# Patient Record
Sex: MALE | Race: WHITE | NOT HISPANIC OR LATINO | Employment: OTHER | URBAN - NONMETROPOLITAN AREA
[De-identification: names, ages, dates, MRNs, and addresses within clinical notes are randomized per-mention and may not be internally consistent; named-entity substitution may affect disease eponyms.]

---

## 2017-02-27 ENCOUNTER — OFFICE VISIT (OUTPATIENT)
Dept: GASTROENTEROLOGY | Facility: CLINIC | Age: 67
End: 2017-02-27

## 2017-02-27 VITALS
HEIGHT: 70 IN | SYSTOLIC BLOOD PRESSURE: 122 MMHG | DIASTOLIC BLOOD PRESSURE: 70 MMHG | HEART RATE: 67 BPM | OXYGEN SATURATION: 97 % | BODY MASS INDEX: 27.06 KG/M2 | WEIGHT: 189 LBS | TEMPERATURE: 98.3 F

## 2017-02-27 DIAGNOSIS — K63.5 COLON POLYPS: Primary | ICD-10-CM

## 2017-02-27 PROCEDURE — S0260 H&P FOR SURGERY: HCPCS | Performed by: NURSE PRACTITIONER

## 2017-02-27 RX ORDER — SODIUM, POTASSIUM,MAG SULFATES 17.5-3.13G
2 SOLUTION, RECONSTITUTED, ORAL ORAL ONCE
Qty: 2 BOTTLE | Refills: 0 | Status: SHIPPED | OUTPATIENT
Start: 2017-02-27 | End: 2017-02-27

## 2017-02-27 NOTE — PROGRESS NOTES
Chief Complaint   Patient presents with   • Colon Cancer Screening     Patient is here today for  colon recall.     Subjective   HPI  Se Ramirez is a 66 y.o. male who presents as a referral for preventative maintenance. He has no complaints of nausea or vomiting. No change in bowels. No wt loss. No BRBPR. No melena. There is no family hx for colon cancer. No abdominal pain. Last colonoscopy 11/20/12 with colon polyps and diverticulosis  Past Medical History   Diagnosis Date   • Colon polyps    • GERD (gastroesophageal reflux disease)    • Hyperlipidemia      Past Surgical History   Procedure Laterality Date   • Hernia repair     • Tonsillectomy     • Colonoscopy  11/20/2012   • Cataract extraction         Current Outpatient Prescriptions:   •  Multiple Vitamins-Minerals (MULTIVITAMIN WITH MINERALS) tablet tablet, Take 1 tablet by mouth Daily., Disp: , Rfl:   •  omeprazole (priLOSEC) 20 MG capsule, Take 20 mg by mouth Daily., Disp: , Rfl:   •  Probiotic Product (SOLUBLE FIBER/PROBIOTICS PO), Take  by mouth., Disp: , Rfl:   •  sodium-potassium-magnesium sulfates (SUPREP BOWEL PREP) solution oral solution, Take 2 bottles by mouth 1 (One) Time for 1 dose. Split dose prep as directed by office instructions provided.  2 bottles = one kit., Disp: 2 bottle, Rfl: 0  No Known Allergies  Social History     Social History   • Marital status:      Spouse name: N/A   • Number of children: N/A   • Years of education: N/A     Occupational History   • Not on file.     Social History Main Topics   • Smoking status: Current Every Day Smoker   • Smokeless tobacco: Not on file   • Alcohol use Yes   • Drug use: Not on file   • Sexual activity: Not on file     Other Topics Concern   • Not on file     Social History Narrative     History reviewed. No pertinent family history.    REVIEW OF SYSTEMS  General: well appearing, no fever chills or sweats, no unexplained wt loss  HEENT: no acute visual or hearing  "disturbances  Cardiovascular: No chest pain or palpitations  Pulmonary: No shortness of breath, coughing, wheezing or hemoptysis  : No burning, urgency, hematuria, or dysuria  Musculoskeletal: No joint pain or stiffness  Peripheral: no edema  Skin: No lesions or rashes  Neuro: No dizziness, headaches, stroke, syncope  Endocrine: No hot or cold intolerances  Hematological: No blood dyscrasias    Objective   Vitals:    02/27/17 0850   BP: 122/70   Pulse: 67   Temp: 98.3 °F (36.8 °C)   SpO2: 97%   Weight: 189 lb (85.7 kg)   Height: 70\" (177.8 cm)     Body mass index is 27.12 kg/(m^2).    PHYSICAL EXAM  General: age appropriate well nourished well appearing, no acute distress  Head: normocephalic and atraumatic  Global assessment-supple  Neck-No JVD noted, no lymphadenopathy  Pulmonary-clear to auscultation bilaterally, normal respiratory effort  Cardiovascular-normal rate and rhythm, normal heart sounds, S1 and S2 noted  Abdomen-soft, non tender, non distended, normal bowel sounds all 4 quadrants, no hepatosplenomegaly noted  Extremities-No clubbing cyanosis or edema  Neuro-Non focal, converses appropriately, awake, alert, oriented    Imaging Results (most recent)     None        Assessment/Plan   Se was seen today for colon cancer screening.    Diagnoses and all orders for this visit:    Colon polyps  -     Case request    Other orders  -     sodium-potassium-magnesium sulfates (SUPREP BOWEL PREP) solution oral solution; Take 2 bottles by mouth 1 (One) Time for 1 dose. Split dose prep as directed by office instructions provided.  2 bottles = one kit.      COLONOSCOPY WITH ANESTHESIA (N/A)  No Follow-up on file.  There are no Patient Instructions on file for this visit.    All risks, benefits, alternatives, and indications of colonoscopy procedure have been discussed with the patient. Risks to include perforation of the colon requiring possible surgery or colostomy, risk of bleeding from biopsies or removal of " colon tissue, possibility of missing a colon polyp or cancer, or adverse drug reaction.  Benefits to include the diagnosis and management of disease of the colon and rectum. Alternatives to include barium enema, radiographic evaluation, lab testing or no intervention. Pt verbalizes understanding and agrees.

## 2017-03-28 ENCOUNTER — ANESTHESIA EVENT (OUTPATIENT)
Dept: GASTROENTEROLOGY | Facility: HOSPITAL | Age: 67
End: 2017-03-28

## 2017-03-30 ENCOUNTER — ANESTHESIA (OUTPATIENT)
Dept: GASTROENTEROLOGY | Facility: HOSPITAL | Age: 67
End: 2017-03-30

## 2017-03-30 ENCOUNTER — HOSPITAL ENCOUNTER (OUTPATIENT)
Facility: HOSPITAL | Age: 67
Setting detail: HOSPITAL OUTPATIENT SURGERY
Discharge: HOME | End: 2017-03-30
Attending: INTERNAL MEDICINE | Admitting: INTERNAL MEDICINE

## 2017-03-30 VITALS
HEIGHT: 70 IN | RESPIRATION RATE: 16 BRPM | DIASTOLIC BLOOD PRESSURE: 71 MMHG | OXYGEN SATURATION: 95 % | TEMPERATURE: 97.3 F | SYSTOLIC BLOOD PRESSURE: 99 MMHG | HEART RATE: 71 BPM | WEIGHT: 186 LBS | BODY MASS INDEX: 26.63 KG/M2

## 2017-03-30 DIAGNOSIS — K63.5 COLON POLYPS: ICD-10-CM

## 2017-03-30 PROCEDURE — 88305 TISSUE EXAM BY PATHOLOGIST: CPT | Performed by: INTERNAL MEDICINE

## 2017-03-30 PROCEDURE — 25010000002 PROPOFOL 10 MG/ML EMULSION: Performed by: NURSE ANESTHETIST, CERTIFIED REGISTERED

## 2017-03-30 PROCEDURE — 45385 COLONOSCOPY W/LESION REMOVAL: CPT | Performed by: INTERNAL MEDICINE

## 2017-03-30 RX ORDER — PROPOFOL 10 MG/ML
VIAL (ML) INTRAVENOUS AS NEEDED
Status: DISCONTINUED | OUTPATIENT
Start: 2017-03-30 | End: 2017-03-30 | Stop reason: SURG

## 2017-03-30 RX ORDER — SODIUM CHLORIDE 0.9 % (FLUSH) 0.9 %
1-10 SYRINGE (ML) INJECTION AS NEEDED
Status: CANCELLED | OUTPATIENT
Start: 2017-03-30

## 2017-03-30 RX ORDER — LIDOCAINE HYDROCHLORIDE 20 MG/ML
INJECTION, SOLUTION INFILTRATION; PERINEURAL AS NEEDED
Status: DISCONTINUED | OUTPATIENT
Start: 2017-03-30 | End: 2017-03-30 | Stop reason: SURG

## 2017-03-30 RX ORDER — SODIUM CHLORIDE 0.9 % (FLUSH) 0.9 %
1-10 SYRINGE (ML) INJECTION AS NEEDED
Status: DISCONTINUED | OUTPATIENT
Start: 2017-03-30 | End: 2017-03-30 | Stop reason: HOSPADM

## 2017-03-30 RX ORDER — SODIUM CHLORIDE 9 MG/ML
100 INJECTION, SOLUTION INTRAVENOUS CONTINUOUS
Status: CANCELLED | OUTPATIENT
Start: 2017-03-30

## 2017-03-30 RX ORDER — SODIUM CHLORIDE 9 MG/ML
100 INJECTION, SOLUTION INTRAVENOUS CONTINUOUS
Status: DISCONTINUED | OUTPATIENT
Start: 2017-03-30 | End: 2017-03-30 | Stop reason: HOSPADM

## 2017-03-30 RX ADMIN — LIDOCAINE HYDROCHLORIDE 0.5 ML: 10 INJECTION, SOLUTION EPIDURAL; INFILTRATION; INTRACAUDAL; PERINEURAL at 07:05

## 2017-03-30 RX ADMIN — LIDOCAINE HYDROCHLORIDE 50 MG: 20 INJECTION, SOLUTION INFILTRATION; PERINEURAL at 07:35

## 2017-03-30 RX ADMIN — SODIUM CHLORIDE 100 ML/HR: 9 INJECTION, SOLUTION INTRAVENOUS at 07:06

## 2017-03-30 RX ADMIN — PROPOFOL 450 MG: 10 INJECTION, EMULSION INTRAVENOUS at 07:35

## 2017-03-30 NOTE — ANESTHESIA PREPROCEDURE EVALUATION
Anesthesia Evaluation     Patient summary reviewed and Nursing notes reviewed   no history of anesthetic complications:  NPO Status: > 2 hours   Airway   Mallampati: I  TM distance: <3 FB  Neck ROM: full  no difficulty expected  Dental - normal exam     Pulmonary - normal exam   (+) a smoker, COPD,   Cardiovascular - negative cardio ROS and normal exam  Exercise tolerance: good (4-7 METS)    (-) hypertension      Neuro/Psych- negative ROS  GI/Hepatic/Renal/Endo    (+)  GERD,   (-) renal disease, diabetes, hypothyroidism    Musculoskeletal (-) negative ROS    Abdominal  - normal exam    Bowel sounds: normal.   Substance History - negative use     OB/GYN negative ob/gyn ROS         Other                                    Anesthesia Plan    ASA 2     general     intravenous induction   Anesthetic plan and risks discussed with patient.

## 2017-03-30 NOTE — ANESTHESIA POSTPROCEDURE EVALUATION
Patient: Se Ramirez    Procedure Summary     Date Anesthesia Start Anesthesia Stop Room / Location    03/30/17 0733 0802  [facility] 2 / [facility]       Procedure Diagnosis Surgeon Provider    COLONOSCOPY WITH ANESTHESIA (N/A ) Colon polyps  (Colon polyps [K63.5]) MD Miguel Monroe CRNA          Anesthesia Type: general  Last vitals  BP      Temp      Pulse     Resp      SpO2        Post Anesthesia Care and Evaluation    Patient location during evaluation: PACU  Patient participation: complete - patient participated  Level of consciousness: awake and awake and alert  Pain score: 0  Pain management: adequate  Airway patency: patent  Anesthetic complications: No anesthetic complications    Cardiovascular status: acceptable and stable  Respiratory status: acceptable and unassisted  Hydration status: acceptable

## 2017-03-31 LAB
CYTO UR: NORMAL
LAB AP CASE REPORT: NORMAL
LAB AP CLINICAL INFORMATION: NORMAL
Lab: NORMAL
PATH REPORT.FINAL DX SPEC: NORMAL
PATH REPORT.GROSS SPEC: NORMAL

## 2017-09-12 ENCOUNTER — HOSPITAL ENCOUNTER (OUTPATIENT)
Dept: GENERAL RADIOLOGY | Age: 67
Discharge: HOME | End: 2017-09-12
Payer: MEDICARE

## 2017-09-12 DIAGNOSIS — R05.9 COUGH: ICD-10-CM

## 2017-09-12 PROCEDURE — 71020 XR CHEST STANDARD TWO VW: CPT

## 2018-06-29 ENCOUNTER — HOSPITAL ENCOUNTER (OUTPATIENT)
Dept: VASCULAR LAB | Age: 68
Discharge: HOME | End: 2018-06-29
Payer: MEDICARE

## 2018-06-29 DIAGNOSIS — R09.89 BRUIT: Primary | ICD-10-CM

## 2018-06-29 PROCEDURE — 93880 EXTRACRANIAL BILAT STUDY: CPT

## 2018-07-25 ENCOUNTER — APPOINTMENT (OUTPATIENT)
Dept: GENERAL RADIOLOGY | Age: 68
End: 2018-07-25
Payer: MEDICARE

## 2018-07-25 ENCOUNTER — APPOINTMENT (OUTPATIENT)
Dept: CT IMAGING | Age: 68
End: 2018-07-25
Payer: MEDICARE

## 2018-07-25 ENCOUNTER — HOSPITAL ENCOUNTER (EMERGENCY)
Age: 68
Discharge: HOME | End: 2018-07-25
Attending: EMERGENCY MEDICINE
Payer: MEDICARE

## 2018-07-25 VITALS
WEIGHT: 190 LBS | SYSTOLIC BLOOD PRESSURE: 137 MMHG | RESPIRATION RATE: 18 BRPM | TEMPERATURE: 98.1 F | OXYGEN SATURATION: 99 % | DIASTOLIC BLOOD PRESSURE: 75 MMHG | HEART RATE: 74 BPM | BODY MASS INDEX: 27.2 KG/M2 | HEIGHT: 70 IN

## 2018-07-25 DIAGNOSIS — S22.31XA CLOSED FRACTURE OF ONE RIB OF RIGHT SIDE, INITIAL ENCOUNTER: Primary | ICD-10-CM

## 2018-07-25 LAB
ALBUMIN SERPL-MCNC: 4.2 G/DL (ref 3.5–5.2)
ALP BLD-CCNC: 58 U/L (ref 40–130)
ALT SERPL-CCNC: 23 U/L (ref 5–41)
ANION GAP SERPL CALCULATED.3IONS-SCNC: 11 MMOL/L (ref 7–19)
AST SERPL-CCNC: 23 U/L (ref 5–40)
BACTERIA: NEGATIVE /HPF
BASOPHILS ABSOLUTE: 0.1 K/UL (ref 0–0.2)
BASOPHILS RELATIVE PERCENT: 0.7 % (ref 0–1)
BILIRUB SERPL-MCNC: 0.7 MG/DL (ref 0.2–1.2)
BILIRUBIN URINE: NEGATIVE
BLOOD, URINE: NEGATIVE
BUN BLDV-MCNC: 11 MG/DL (ref 8–23)
CALCIUM SERPL-MCNC: 9.2 MG/DL (ref 8.8–10.2)
CHLORIDE BLD-SCNC: 102 MMOL/L (ref 98–111)
CLARITY: CLEAR
CO2: 27 MMOL/L (ref 22–29)
COLOR: YELLOW
CREAT SERPL-MCNC: 0.8 MG/DL (ref 0.5–1.2)
EOSINOPHILS ABSOLUTE: 0.2 K/UL (ref 0–0.6)
EOSINOPHILS RELATIVE PERCENT: 2.3 % (ref 0–5)
EPITHELIAL CELLS, UA: 1 /HPF (ref 0–5)
GFR NON-AFRICAN AMERICAN: >60
GLUCOSE BLD-MCNC: 115 MG/DL (ref 74–109)
GLUCOSE URINE: NEGATIVE MG/DL
HCT VFR BLD CALC: 50.3 % (ref 42–52)
HEMOGLOBIN: 16.6 G/DL (ref 14–18)
HYALINE CASTS: 2 /HPF (ref 0–8)
KETONES, URINE: NEGATIVE MG/DL
LEUKOCYTE ESTERASE, URINE: ABNORMAL
LYMPHOCYTES ABSOLUTE: 1.5 K/UL (ref 1.1–4.5)
LYMPHOCYTES RELATIVE PERCENT: 19.9 % (ref 20–40)
MCH RBC QN AUTO: 31.7 PG (ref 27–31)
MCHC RBC AUTO-ENTMCNC: 33 G/DL (ref 33–37)
MCV RBC AUTO: 96.2 FL (ref 80–94)
MONOCYTES ABSOLUTE: 0.7 K/UL (ref 0–0.9)
MONOCYTES RELATIVE PERCENT: 9.2 % (ref 0–10)
NEUTROPHILS ABSOLUTE: 4.9 K/UL (ref 1.5–7.5)
NEUTROPHILS RELATIVE PERCENT: 67.4 % (ref 50–65)
NITRITE, URINE: NEGATIVE
PDW BLD-RTO: 12.3 % (ref 11.5–14.5)
PH UA: 6.5
PLATELET # BLD: 157 K/UL (ref 130–400)
PMV BLD AUTO: 10.5 FL (ref 9.4–12.4)
POTASSIUM REFLEX MAGNESIUM: 4.3 MMOL/L (ref 3.5–5)
PROTEIN UA: NEGATIVE MG/DL
RBC # BLD: 5.23 M/UL (ref 4.7–6.1)
RBC UA: 1 /HPF (ref 0–4)
SODIUM BLD-SCNC: 140 MMOL/L (ref 136–145)
SPECIFIC GRAVITY UA: 1.03
TOTAL PROTEIN: 7.3 G/DL (ref 6.6–8.7)
URINE REFLEX TO CULTURE: YES
UROBILINOGEN, URINE: 1 E.U./DL
WBC # BLD: 7.3 K/UL (ref 4.8–10.8)
WBC UA: 13 /HPF (ref 0–5)

## 2018-07-25 PROCEDURE — 74177 CT ABD & PELVIS W/CONTRAST: CPT

## 2018-07-25 PROCEDURE — 96374 THER/PROPH/DIAG INJ IV PUSH: CPT

## 2018-07-25 PROCEDURE — 99284 EMERGENCY DEPT VISIT MOD MDM: CPT

## 2018-07-25 PROCEDURE — 99284 EMERGENCY DEPT VISIT MOD MDM: CPT | Performed by: EMERGENCY MEDICINE

## 2018-07-25 PROCEDURE — 81001 URINALYSIS AUTO W/SCOPE: CPT

## 2018-07-25 PROCEDURE — 36415 COLL VENOUS BLD VENIPUNCTURE: CPT

## 2018-07-25 PROCEDURE — 96372 THER/PROPH/DIAG INJ SC/IM: CPT

## 2018-07-25 PROCEDURE — 80053 COMPREHEN METABOLIC PANEL: CPT

## 2018-07-25 PROCEDURE — 6360000002 HC RX W HCPCS: Performed by: EMERGENCY MEDICINE

## 2018-07-25 PROCEDURE — 71046 X-RAY EXAM CHEST 2 VIEWS: CPT

## 2018-07-25 PROCEDURE — 6360000004 HC RX CONTRAST MEDICATION: Performed by: EMERGENCY MEDICINE

## 2018-07-25 PROCEDURE — 87086 URINE CULTURE/COLONY COUNT: CPT

## 2018-07-25 PROCEDURE — 85025 COMPLETE CBC W/AUTO DIFF WBC: CPT

## 2018-07-25 RX ORDER — OMEPRAZOLE 20 MG/1
20 CAPSULE, DELAYED RELEASE ORAL DAILY
COMMUNITY

## 2018-07-25 RX ORDER — OXYCODONE AND ACETAMINOPHEN 10; 325 MG/1; MG/1
1 TABLET ORAL EVERY 6 HOURS PRN
Qty: 28 TABLET | Refills: 0 | Status: SHIPPED | OUTPATIENT
Start: 2018-07-25 | End: 2018-08-01

## 2018-07-25 RX ORDER — KETOROLAC TROMETHAMINE 30 MG/ML
30 INJECTION, SOLUTION INTRAMUSCULAR; INTRAVENOUS ONCE
Status: COMPLETED | OUTPATIENT
Start: 2018-07-25 | End: 2018-07-25

## 2018-07-25 RX ORDER — ORPHENADRINE CITRATE 30 MG/ML
60 INJECTION INTRAMUSCULAR; INTRAVENOUS ONCE
Status: COMPLETED | OUTPATIENT
Start: 2018-07-25 | End: 2018-07-25

## 2018-07-25 RX ADMIN — KETOROLAC TROMETHAMINE 30 MG: 30 INJECTION, SOLUTION INTRAMUSCULAR at 03:57

## 2018-07-25 RX ADMIN — ORPHENADRINE CITRATE 60 MG: 30 INJECTION INTRAMUSCULAR; INTRAVENOUS at 03:57

## 2018-07-25 RX ADMIN — IOPAMIDOL 90 ML: 755 INJECTION, SOLUTION INTRAVENOUS at 04:51

## 2018-07-25 ASSESSMENT — PAIN SCALES - GENERAL
PAINLEVEL_OUTOF10: 8
PAINLEVEL_OUTOF10: 0
PAINLEVEL_OUTOF10: 0

## 2018-07-25 ASSESSMENT — ENCOUNTER SYMPTOMS
SINUS PRESSURE: 0
FACIAL SWELLING: 0
EYE DISCHARGE: 0
ABDOMINAL PAIN: 0
BLOOD IN STOOL: 0
SHORTNESS OF BREATH: 0
SORE THROAT: 0
VOICE CHANGE: 0
NAUSEA: 0
WHEEZING: 0
CHOKING: 0
APNEA: 0
DIARRHEA: 0
BACK PAIN: 1
CONSTIPATION: 0

## 2018-07-25 NOTE — ED PROVIDER NOTES
[facility] EMERGENCY DEPT  eMERGENCY dEPARTMENT eNCOUnter      Pt Name: Pino Paulino  MRN: [id number]  [place] 1950  Date of evaluation: 7/25/2018  Provider: Yang Edmondson MD    [address] [place] [address]       Chief Complaint   Patient presents with    Fall     fell sunday    Back Pain         HISTORY OF PRESENT ILLNESS   (Location/Symptom, Timing/Onset, Context/Setting, Quality, Duration, Modifying Factors, Severity)  Note limiting factors. Pino Paulino is a 75 y.o. male who presents to the emergency department Evaluation of injuries from fall. 50-year-old male 3 days ago outside in the dark tripped over some bricks and a flower bed and fell onto them. He struck his right flank area. He was sore but was tolerable today the pain is really ramped up. It seems to be spasmodic as well. Is not complaining of any shortness of breath but he is a smoker. No fever or chills. He is here because the pain is worsened. The history is provided by the patient. Nursing Notes were reviewed. REVIEW OF SYSTEMS    (2-9 systems for level 4, 10 or more for level 5)     Review of Systems   Constitutional: Negative for chills and fever. HENT: Negative for drooling, facial swelling, nosebleeds, sinus pressure, sore throat and voice change. Eyes: Negative for discharge. Respiratory: Negative for apnea, choking, shortness of breath and wheezing. Cardiovascular: Negative for chest pain and leg swelling. Gastrointestinal: Negative for abdominal pain, blood in stool, constipation, diarrhea and nausea. Genitourinary: Negative for dysuria, enuresis and hematuria. Musculoskeletal: Positive for back pain. Negative for joint swelling. Skin: Negative for rash and wound. Neurological: Negative for seizures and syncope. Psychiatric/Behavioral: Negative for behavioral problems, hallucinations and suicidal ideas. All other systems reviewed and are negative.       A complete review of systems was performed and components within normal limits   MICROSCOPIC URINALYSIS - Abnormal; Notable for the following:     WBC, UA 13 (*)     All other components within normal limits   URINE CULTURE       All other labs were within normal range or not returned as of this dictation. EMERGENCY DEPARTMENT COURSE and DIFFERENTIAL DIAGNOSIS/MDM:   Vitals:    Vitals:    07/25/18 0242 07/25/18 0243   BP:  136/80   Pulse:  100   Resp:  20   Temp:  97.6 °F (36.4 °C)   SpO2:  95%   Weight: 190 lb (86.2 kg)    Height: 5' 10\" (1.778 m)        MDM      CONSULTS:  None    PROCEDURES:  Unless otherwise noted below, none     Procedures    FINAL IMPRESSION      1. Closed fracture of one rib of right side, initial encounter          DISPOSITION/PLAN   DISPOSITION Decision To Discharge 07/25/2018 06:02:01 AM      PATIENT REFERRED TO:  Marilyn Lacey MD  [address]  [place] [address]  [phone]            DISCHARGE MEDICATIONS:  New Prescriptions    OXYCODONE-ACETAMINOPHEN (PERCOCET)  MG PER TABLET    Take 1 tablet by mouth every 6 hours as needed for Pain for up to 7 days. .     Attestation: The Prescription Monitoring Report for this patient was reviewed today.  Eduard Oneal #[id number]) Per Ng MD)    (Please note that portions of this note were completed with a voice recognition program.  Efforts were made to edit the dictations but occasionally words are mis-transcribed.)    Per Ng MD (electronically signed)  Attending Emergency Physician          Per Ng MD  07/25/18 4268

## 2018-07-27 LAB — URINE CULTURE, ROUTINE: NORMAL

## 2018-09-07 ENCOUNTER — HOSPITAL ENCOUNTER (OUTPATIENT)
Dept: GENERAL RADIOLOGY | Age: 68
Discharge: HOME | End: 2018-09-07
Payer: MEDICARE

## 2018-09-07 DIAGNOSIS — J44.9 OBSTRUCTIVE CHRONIC BRONCHITIS WITHOUT EXACERBATION (HCC): ICD-10-CM

## 2018-09-07 PROCEDURE — 71046 X-RAY EXAM CHEST 2 VIEWS: CPT

## 2019-03-01 ENCOUNTER — HOSPITAL ENCOUNTER (OUTPATIENT)
Dept: ULTRASOUND IMAGING | Age: 69
Discharge: HOME | End: 2019-03-01
Payer: MEDICARE

## 2019-03-01 ENCOUNTER — HOSPITAL ENCOUNTER (OUTPATIENT)
Dept: NUCLEAR MEDICINE | Age: 69
Discharge: HOME | End: 2019-03-03
Payer: MEDICARE

## 2019-03-01 DIAGNOSIS — R10.9 ABDOMINAL PAIN, UNSPECIFIED ABDOMINAL LOCATION: ICD-10-CM

## 2019-03-01 PROCEDURE — 78226 HEPATOBILIARY SYSTEM IMAGING: CPT

## 2019-03-01 PROCEDURE — 76705 ECHO EXAM OF ABDOMEN: CPT

## 2019-03-01 PROCEDURE — A9537 TC99M MEBROFENIN: HCPCS | Performed by: NURSE PRACTITIONER

## 2019-03-01 PROCEDURE — 3430000000 HC RX DIAGNOSTIC RADIOPHARMACEUTICAL: Performed by: NURSE PRACTITIONER

## 2019-03-01 RX ADMIN — MEBROFENIN 5 MILLICURIE: 45 INJECTION, POWDER, LYOPHILIZED, FOR SOLUTION INTRAVENOUS at 12:34

## 2019-03-06 ENCOUNTER — HOSPITAL ENCOUNTER (OUTPATIENT)
Dept: CT IMAGING | Age: 69
Discharge: HOME | End: 2019-03-06
Payer: MEDICARE

## 2019-03-06 DIAGNOSIS — R10.9 ABDOMINAL PAIN, UNSPECIFIED ABDOMINAL LOCATION: ICD-10-CM

## 2019-03-06 PROCEDURE — 74177 CT ABD & PELVIS W/CONTRAST: CPT

## 2019-03-06 PROCEDURE — 6360000004 HC RX CONTRAST MEDICATION: Performed by: NURSE PRACTITIONER

## 2019-03-06 RX ADMIN — IOPAMIDOL 75 ML: 755 INJECTION, SOLUTION INTRAVENOUS at 09:22

## 2019-03-21 ENCOUNTER — APPOINTMENT (OUTPATIENT)
Dept: LAB | Facility: HOSPITAL | Age: 69
End: 2019-03-21

## 2019-03-21 ENCOUNTER — OFFICE VISIT (OUTPATIENT)
Dept: GASTROENTEROLOGY | Facility: CLINIC | Age: 69
End: 2019-03-21

## 2019-03-21 VITALS
HEART RATE: 75 BPM | TEMPERATURE: 98.1 F | SYSTOLIC BLOOD PRESSURE: 132 MMHG | OXYGEN SATURATION: 98 % | DIASTOLIC BLOOD PRESSURE: 80 MMHG | WEIGHT: 188 LBS | BODY MASS INDEX: 26.92 KG/M2 | HEIGHT: 70 IN

## 2019-03-21 DIAGNOSIS — F10.11 HISTORY OF ETOH ABUSE: ICD-10-CM

## 2019-03-21 DIAGNOSIS — R10.11 RUQ ABDOMINAL PAIN: ICD-10-CM

## 2019-03-21 DIAGNOSIS — K59.09 OTHER CONSTIPATION: ICD-10-CM

## 2019-03-21 DIAGNOSIS — R14.0 BLOATING: ICD-10-CM

## 2019-03-21 DIAGNOSIS — K21.9 GASTROESOPHAGEAL REFLUX DISEASE, ESOPHAGITIS PRESENCE NOT SPECIFIED: ICD-10-CM

## 2019-03-21 DIAGNOSIS — B18.2 CHRONIC HEPATITIS C WITHOUT HEPATIC COMA (HCC): Primary | ICD-10-CM

## 2019-03-21 DIAGNOSIS — R10.13 EPIGASTRIC PAIN: ICD-10-CM

## 2019-03-21 DIAGNOSIS — R14.2 BELCHING: ICD-10-CM

## 2019-03-21 LAB
HAV IGM SERPL QL IA: NEGATIVE
HBV CORE IGM SERPL QL IA: NEGATIVE
HBV SURFACE AG SERPL QL IA: NEGATIVE
HCV AB SER DONR QL: REACTIVE
HCV S/C RATIO: 14.8 (ref 0–0.99)

## 2019-03-21 PROCEDURE — 36415 COLL VENOUS BLD VENIPUNCTURE: CPT | Performed by: NURSE PRACTITIONER

## 2019-03-21 PROCEDURE — 87521 HEPATITIS C PROBE&RVRS TRNSC: CPT | Performed by: NURSE PRACTITIONER

## 2019-03-21 PROCEDURE — 80074 ACUTE HEPATITIS PANEL: CPT | Performed by: NURSE PRACTITIONER

## 2019-03-21 PROCEDURE — 99214 OFFICE O/P EST MOD 30 MIN: CPT | Performed by: NURSE PRACTITIONER

## 2019-03-21 RX ORDER — ATORVASTATIN CALCIUM 10 MG/1
10 TABLET, FILM COATED ORAL DAILY
COMMUNITY
Start: 2019-03-19

## 2019-03-21 NOTE — PROGRESS NOTES
"Chief Complaint:   Chief Complaint   Patient presents with   • Abdominal Pain     Pt has been having RUQ pain/discomfort for the last month-had CT, GB US, and HIDA scan at [facility] that were normal; pt states the pain has gone away for the last week or so; pt states it still feels \"swollen\"   • Constipation     Pt has been constipated the last month; also thinks he has a hemorrhoids-sees a little BRBPR when he wipes; has been using stool softeners-states those worked at first but aren't helping any more          Patient ID: Se Ramirez is a 68 y.o. male     History of Present Illness: This is a very pleasant 68-year-old female who was referred to our office for abdominal pain by PARISA Sandhu.    The patient was seen by Rubina Okeefe on 3/5/19 to follow-up for abdominal pain.  Patient had complaints of nausea she was started on Zofran as needed she also had an upper respiratory infection started on amoxicillin 500 mg 1 capsule 3 times daily for 10 days.     The patient states that he began to have right upper quadrant pain and discomfort over the past month.  He states that he feels some epigastric pain.  He does have a history of GERD and is on Prilosec for this.  He states he has had associated belching and bloating with his abdominal pain.  He states that he has had off and on constipation but has not really tried anything over-the-counter for relief of this.      On 3/1/19 the patient underwent a nuclear med hepatobiliary scan with findings of normal gallbladder ejection fraction and patent common bile duct and cystic ducts.  Fatty infiltration of liver.  Labs on 2/18/19 CBC unremarkable CMP unremarkable.  Positive hepatitis C.  Amylase and lipase within normal limits.  HCVRNA qualitative negative.  HCV quantitative was not performed.  The patient does tell me that he has a history of tattoos and IV drug use during the 70s.    The patient's last colonoscopy was performed on 3/30/17 with findings of colon " polyps as well as diverticulosis.    The patient denies any nausea, vomiting, dysphagia, pyrosis or hematemesis.  The patient denies any fever or chills.  Denies any melena or hematochezia.  Denies any unintentional weight loss or loss of appetite.    Past Medical History:   Diagnosis Date   • Colon polyps    • Family history of colonic polyps    • GERD (gastroesophageal reflux disease)    • Hyperlipidemia        Past Surgical History:   Procedure Laterality Date   • CATARACT EXTRACTION     • COLONOSCOPY  11/20/2012    One 1cm tubular adenomatous polyp in the sigmoid colon; One 5mm hyperplastic polyp in the rectum; Diverticulosis; Repeat 4 years    • COLONOSCOPY N/A 3/30/2017    Two 4-6mm tubular adenomatous polyps at the hepatic flexure; One 5mm tubular adenomatous polyp in the transverse colon; One 11mm tubular adenomatous polyp in the sigmoid colon; One 6mm tubular adenomatous polyp in the rectum; Diverticulosis in the left colon; Repeat 3 years    • COLONOSCOPY  12/17/2009    One less than 5mm tubular adenomatous polyp in the cecum; One 5mm hyperplastic polyp in the transverse; One less than 3mm hyperplastic polyp in the rectum; Diverticulosis; Repeat 3 years    • FOOT SURGERY Left     ORIF   • HERNIA REPAIR     • INNER EAR SURGERY     • TONSILLECTOMY           Current Outpatient Medications:   •  atorvastatin (LIPITOR) 10 MG tablet, Take 1 tablet by mouth Daily., Disp: , Rfl:   •  omeprazole (priLOSEC) 20 MG capsule, Take 20 mg by mouth Daily., Disp: , Rfl:     No Known Allergies    Social History     Socioeconomic History   • Marital status:      Spouse name: Not on file   • Number of children: Not on file   • Years of education: Not on file   • Highest education level: Not on file   Tobacco Use   • Smoking status: Current Every Day Smoker     Packs/day: 1.50     Years: 40.00     Pack years: 60.00     Types: Cigarettes   • Smokeless tobacco: Never Used   Substance and Sexual Activity   • Alcohol use:  "Yes       Family History   Problem Relation Age of Onset   • Colon polyps Mother    • Colon cancer Neg Hx        Vitals:    03/21/19 0816   BP: 132/80   BP Location: Left arm   Patient Position: Sitting   Cuff Size: Adult   Pulse: 75   Temp: 98.1 °F (36.7 °C)   TempSrc: Tympanic   SpO2: 98%   Weight: 85.3 kg (188 lb)   Height: 177.8 cm (70\")       Review of Systems:    General:    Present -feeling well   Skin:    Not Present-Rash   HEENT:     Not Present-Acute visual changes or Acute hearing changes   Neck :    Not Present- swollen glands   Genitourinary:      Not Present- burning, frequency, urgency hematuria, dysuria,   Cardiovascular:   Not Present-chest pain, palpitations, or pressure   Respiratory:   Not Present- shortness of breath or cough   Gastrointestinal:  Musculoskeletal:  Neurological:  Psychiatric:   Present as mentioned in the HP    Not Present. Recent gait disturbances.    Not Present-Seizures and weakness in extremities.    Not Present- Anxiety or Depression.       Physical Exam:    General Appearance:    Alert, cooperative, in no acute distress   Psych:    Mood appropriate    Eyes:          conjunctivae and sclerae normal, no   icterus, no pallor   ENMT:    Ears appear intact with no abnormalities noted oral mucosa moist   Neck:   No adenopathy, supple, trachea midline, no thyromegaly, no   carotid bruit, no JVD    Cardiovascular:    Regular rhythm and normal rate, normal S1 and S2, no            murmur, no gallop, no rub, no click   Gastrointestinal:     Inspection normal.  Normal bowel sounds, no masses, no organomegaly, soft round non-tender, non-distended, no guarding, no rebound or tenderness. No hepatosplenomegaly.   Skin:   No bleeding, bruising or rash   Neurologic:   nonfocal       No results found for: WBC, HGB, HCT, PLT     No results found for: NA, K, CL, CO2, BUN, CREATININE, BILITOT, ALKPHOS, ALT, AST, GLUCOSE    No results found for: INR    Body mass index is 26.98 kg/m². Patient's " Body mass index is 26.98 kg/m². BMI is within normal parameters. No follow-up required..    Assessment and Plan:  Assessment/Plan   Se was seen today for abdominal pain and constipation.    Diagnoses and all orders for this visit:    Chronic hepatitis C without hepatic coma (CMS/HCC)  Comments:  diagnosed around 19 years old not sure if treated per patient. Hx of IV drug use and tattoos. If positive will call with fu appointment  Orders:  -     Hepatitis Panel, Acute  -     HCV RNA SANYA QL RFX QT    RUQ abdominal pain  Comments:  We will schedule EGD.  Continue on Prilosec at this time.  Orders:  -     Case Request; Standing  -     Case Request    Epigastric pain  -     Case Request; Standing  -     Case Request    Belching  -     Case Request; Standing  -     Case Request    Bloating  -     Case Request; Standing  -     Case Request    Gastroesophageal reflux disease, esophagitis presence not specified    Other constipation  Comments:  for about 3 weeks. Will start Miralax as directed    History of ETOH abuse  Comments:  He states he only drinks socially now.    Other orders  -     Follow Anesthesia Guidelines / Standing Orders; Future  -     Implement Anesthesia Orders Day of Procedure; Standing  -     Obtain Informed Consent; Standing             There are no Patient Instructions on file for this visit.    Next follow-up appointment      EMR Dragon/Transcription disclaimer:  Much of this encounter note is an electronic transcription/translation of spoken language to printed text. The electronic translation of spoken language may permit erroneous, or at times, nonsensical words or phrases to be inadvertently transcribed; although I have reviewed the note for such errors, some may still exist.

## 2019-03-23 LAB — HCV RNA SERPL QL NAA+PROBE: NEGATIVE

## 2019-03-27 ENCOUNTER — ANESTHESIA EVENT (OUTPATIENT)
Dept: GASTROENTEROLOGY | Facility: HOSPITAL | Age: 69
End: 2019-03-27

## 2019-03-27 ENCOUNTER — ANESTHESIA (OUTPATIENT)
Dept: GASTROENTEROLOGY | Facility: HOSPITAL | Age: 69
End: 2019-03-27

## 2019-03-27 ENCOUNTER — HOSPITAL ENCOUNTER (OUTPATIENT)
Facility: HOSPITAL | Age: 69
Setting detail: HOSPITAL OUTPATIENT SURGERY
Discharge: HOME | End: 2019-03-27
Attending: INTERNAL MEDICINE | Admitting: INTERNAL MEDICINE

## 2019-03-27 VITALS
TEMPERATURE: 97.9 F | WEIGHT: 186 LBS | HEIGHT: 70 IN | DIASTOLIC BLOOD PRESSURE: 76 MMHG | OXYGEN SATURATION: 94 % | HEART RATE: 72 BPM | SYSTOLIC BLOOD PRESSURE: 115 MMHG | BODY MASS INDEX: 26.63 KG/M2 | RESPIRATION RATE: 18 BRPM

## 2019-03-27 DIAGNOSIS — R10.11 RUQ ABDOMINAL PAIN: ICD-10-CM

## 2019-03-27 DIAGNOSIS — R14.2 BELCHING: ICD-10-CM

## 2019-03-27 DIAGNOSIS — R14.0 BLOATING: ICD-10-CM

## 2019-03-27 DIAGNOSIS — R10.13 EPIGASTRIC PAIN: ICD-10-CM

## 2019-03-27 PROCEDURE — 88305 TISSUE EXAM BY PATHOLOGIST: CPT | Performed by: INTERNAL MEDICINE

## 2019-03-27 PROCEDURE — 25010000002 PROPOFOL 10 MG/ML EMULSION: Performed by: NURSE ANESTHETIST, CERTIFIED REGISTERED

## 2019-03-27 PROCEDURE — 43239 EGD BIOPSY SINGLE/MULTIPLE: CPT | Performed by: INTERNAL MEDICINE

## 2019-03-27 RX ORDER — LIDOCAINE HYDROCHLORIDE 20 MG/ML
INJECTION, SOLUTION INFILTRATION; PERINEURAL AS NEEDED
Status: DISCONTINUED | OUTPATIENT
Start: 2019-03-27 | End: 2019-03-27 | Stop reason: SURG

## 2019-03-27 RX ORDER — SODIUM CHLORIDE 0.9 % (FLUSH) 0.9 %
3 SYRINGE (ML) INJECTION AS NEEDED
Status: DISCONTINUED | OUTPATIENT
Start: 2019-03-27 | End: 2019-03-27 | Stop reason: HOSPADM

## 2019-03-27 RX ORDER — SODIUM CHLORIDE 9 MG/ML
500 INJECTION, SOLUTION INTRAVENOUS CONTINUOUS PRN
Status: DISCONTINUED | OUTPATIENT
Start: 2019-03-27 | End: 2019-03-27 | Stop reason: HOSPADM

## 2019-03-27 RX ORDER — PROPOFOL 10 MG/ML
VIAL (ML) INTRAVENOUS AS NEEDED
Status: DISCONTINUED | OUTPATIENT
Start: 2019-03-27 | End: 2019-03-27 | Stop reason: SURG

## 2019-03-27 RX ADMIN — PROPOFOL 70 MG: 10 INJECTION, EMULSION INTRAVENOUS at 13:02

## 2019-03-27 RX ADMIN — SODIUM CHLORIDE: 9 INJECTION, SOLUTION INTRAVENOUS at 12:56

## 2019-03-27 RX ADMIN — SODIUM CHLORIDE 500 ML: 9 INJECTION, SOLUTION INTRAVENOUS at 12:50

## 2019-03-27 RX ADMIN — LIDOCAINE HYDROCHLORIDE 100 MG: 20 INJECTION, SOLUTION INFILTRATION; PERINEURAL at 13:02

## 2019-03-27 NOTE — ANESTHESIA PREPROCEDURE EVALUATION
Anesthesia Evaluation     Patient summary reviewed   no history of anesthetic complications:  NPO Solid Status: > 8 hours             Airway   Mallampati: II  TM distance: >3 FB  Neck ROM: full  Dental    (+) upper dentures and lower dentures    Pulmonary    (+) a smoker, COPD,   Cardiovascular   Exercise tolerance: good (4-7 METS)    (+) hyperlipidemia,       Neuro/Psych- negative ROS  GI/Hepatic/Renal/Endo    (+)  GERD,      Musculoskeletal     Abdominal    Substance History      OB/GYN          Other                        Anesthesia Plan    ASA 2     MAC     intravenous induction   Anesthetic plan, all risks, benefits, and alternatives have been provided, discussed and informed consent has been obtained with: patient.

## 2019-03-27 NOTE — ANESTHESIA POSTPROCEDURE EVALUATION
Patient: Se Ramirez    Procedure Summary     Date:  03/27/19 Room / Location:  [facility] ENDOSCOPY 2 / [facility] END[facility]    Anesthesia Start:  1256 Anesthesia Stop:  1309    Procedure:  ESOPHAGOGASTRODUODENOSCOPY WITH ANESTHESIA (N/A ) Diagnosis:       RUQ abdominal pain      Epigastric pain      Belching      Bloating      (RUQ abdominal pain [R10.11])      (Epigastric pain [R10.13])      (Belching [R14.2])      (Bloating [R14.0])    Surgeon:  Kat Licea MD Provider:  Geremias Edge CRNA    Anesthesia Type:  MAC ASA Status:  2          Anesthesia Type: MAC  Last vitals  BP   127/94 (03/27/19 1228)   Temp   97.9 °F (36.6 °C) (03/27/19 1228)   Pulse   76 (03/27/19 1228)   Resp   20 (03/27/19 1228)     SpO2   94 % (03/27/19 1228)     Post Anesthesia Care and Evaluation    Patient location during evaluation: PHASE II  Patient participation: complete - patient participated  Level of consciousness: awake  Pain score: 0  Pain management: adequate  Airway patency: patent  Anesthetic complications: No anesthetic complications  PONV Status: none  Cardiovascular status: acceptable  Respiratory status: acceptable  Hydration status: acceptable

## 2019-03-31 LAB
CYTO UR: NORMAL
LAB AP CASE REPORT: NORMAL
PATH REPORT.FINAL DX SPEC: NORMAL
PATH REPORT.GROSS SPEC: NORMAL

## 2019-04-09 ENCOUNTER — TELEPHONE (OUTPATIENT)
Dept: GASTROENTEROLOGY | Facility: CLINIC | Age: 69
End: 2019-04-09

## 2019-04-09 NOTE — TELEPHONE ENCOUNTER
----- Message from PARISA Martinez sent at 4/9/2019  1:13 PM CDT -----  Please notify patient he is negative for hepatitis C

## 2019-04-10 NOTE — TELEPHONE ENCOUNTER
Tried to call pt again today re: results-was unable to reach him so I left another VM asking him to call me back.

## 2019-06-25 ENCOUNTER — OFFICE VISIT (OUTPATIENT)
Dept: OTOLARYNGOLOGY | Age: 69
End: 2019-06-25
Payer: MEDICARE

## 2019-06-25 ENCOUNTER — HOSPITAL ENCOUNTER (OUTPATIENT)
Dept: PREADMISSION TESTING | Age: 69
Setting detail: OUTPATIENT SURGERY
Discharge: HOME | End: 2019-06-29

## 2019-06-25 VITALS
SYSTOLIC BLOOD PRESSURE: 128 MMHG | DIASTOLIC BLOOD PRESSURE: 78 MMHG | BODY MASS INDEX: 26.26 KG/M2 | WEIGHT: 183 LBS | HEART RATE: 76 BPM | TEMPERATURE: 98.6 F

## 2019-06-25 VITALS — BODY MASS INDEX: 26.2 KG/M2 | WEIGHT: 183 LBS | HEIGHT: 70 IN

## 2019-06-25 DIAGNOSIS — D37.09 NEOPLASM OF UNCERTAIN BEHAVIOR OF VESTIBULE OF MOUTH: ICD-10-CM

## 2019-06-25 PROCEDURE — 99203 OFFICE O/P NEW LOW 30 MIN: CPT | Performed by: OTOLARYNGOLOGY

## 2019-06-25 PROCEDURE — 4004F PT TOBACCO SCREEN RCVD TLK: CPT | Performed by: OTOLARYNGOLOGY

## 2019-06-25 PROCEDURE — G8419 CALC BMI OUT NRM PARAM NOF/U: HCPCS | Performed by: OTOLARYNGOLOGY

## 2019-06-25 PROCEDURE — 1123F ACP DISCUSS/DSCN MKR DOCD: CPT | Performed by: OTOLARYNGOLOGY

## 2019-06-25 PROCEDURE — G8427 DOCREV CUR MEDS BY ELIG CLIN: HCPCS | Performed by: OTOLARYNGOLOGY

## 2019-06-25 PROCEDURE — 3017F COLORECTAL CA SCREEN DOC REV: CPT | Performed by: OTOLARYNGOLOGY

## 2019-06-25 PROCEDURE — 4040F PNEUMOC VAC/ADMIN/RCVD: CPT | Performed by: OTOLARYNGOLOGY

## 2019-06-25 RX ORDER — ATORVASTATIN CALCIUM 10 MG/1
TABLET, FILM COATED ORAL
COMMUNITY
Start: 2019-03-19

## 2019-06-25 NOTE — PROGRESS NOTES
70 y.o.  male presents today with lip lesion. He first became aware of this about 1 month ago. It has a bluish discoloration but otherwise is totally asymptomatic. He does smoke heavily. He reports no history of similar intraoral lesions. Does not chew tobacco    History reviewed. No pertinent family history.   Social History     Socioeconomic History    Marital status:      Spouse name: None    Number of children: None    Years of education: None    Highest education level: None   Occupational History    None   Social Needs    Financial resource strain: None    Food insecurity:     Worry: None     Inability: None    Transportation needs:     Medical: None     Non-medical: None   Tobacco Use    Smoking status: Current Every Day Smoker    Smokeless tobacco: Never Used   Substance and Sexual Activity    Alcohol use: No    Drug use: No    Sexual activity: Yes   Lifestyle    Physical activity:     Days per week: None     Minutes per session: None    Stress: None   Relationships    Social connections:     Talks on phone: None     Gets together: None     Attends [employer] service: None     Active member of club or organization: None     Attends meetings of clubs or organizations: None     Relationship status: None    Intimate partner violence:     Fear of current or ex partner: None     Emotionally abused: None     Physically abused: None     Forced sexual activity: None   Other Topics Concern    None   Social History Narrative    None     Past Medical History:   Diagnosis Date    Acid reflux     Hard of hearing      Past Surgical History:   Procedure Laterality Date    FOOT SURGERY Left          REVIEW OF SYSTEMS:  all other systems reviewed and are negative  General Health: no specific complaints reported  Ears: Hearing loss  Nose: obstruction: No  Throat: vocal difficulties: No and difficulty swallowing: No  Neck: lump(s)/ mass: No and thyroid problem: No  Respiratory: cough: Yes, emphysema: Yes and dyspnea or exertion: Yes       Comments:     PHYSICAL EXAM:    /78   Pulse 76   Temp 98.6 °F (37 °C)   Wt 183 lb (83 kg)   BMI 26.26 kg/m²   Body mass index is 26.26 kg/m². General Appearance: well developed  and well nourished  Head/ Face: normocephalic and atraumatic  Vocal Quality: coarse  Ears: Right Ear: External: external ears normal Otoscopy Ear Canal: canal clear Otoscopy TM: TM's normal Left Ear: External: external ears normal Otoscopy Ear Canal: canal clear Otoscopy TM: TM's normal  Nose: nares normal and septum midline  Oral:lips: lesion 1 cm pigmented firm lesion just inside left oral commissure teeth: edentulous full Oropharynx:normal tongue: normal   Tonsils: s/p tonsillectomy  Neck: negative, supple and adenopathy No  Thyroid: normal  Neuro: alert and oriented x3  Psych/ Mood: cooperative and no depression, anxiety or agitation    Assessment & Plan:    Problem List Items Addressed This Visit     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 Nontender. Firm and well-circumscribed. Nontender  Will excise under local anesthesia         Relevant Orders    DE Excision mouth mucosa/submucosa, simple repair          Orders Placed This Encounter   Procedures    DE Excision mouth mucosa/submucosa, simple repair     Surgery discussed with patient. We will perform under local anesthesia     Standing Status:   Future     Standing Expiration Date:   7/25/2019       No orders of the defined types were placed in this encounter. Please note that this chart was generated using dragon dictation software. Although every effort was made to ensure the accuracy of this automated transcription, some errors in transcription may have occurred.

## 2019-06-25 NOTE — ASSESSMENT & PLAN NOTE
1 cm sub-mucosal lesion just inside left oral commissure. Nontender. Firm and well-circumscribed.   Nontender  Will excise under local anesthesia

## 2019-07-02 ASSESSMENT — ENCOUNTER SYMPTOMS
ALLERGIC/IMMUNOLOGIC NEGATIVE: 1
GASTROINTESTINAL NEGATIVE: 1
EYES NEGATIVE: 1
WHEEZING: 1
SHORTNESS OF BREATH: 1

## 2019-07-03 ENCOUNTER — HOSPITAL ENCOUNTER (OUTPATIENT)
Age: 69
Setting detail: OUTPATIENT SURGERY
Discharge: HOME | End: 2019-07-03
Attending: OTOLARYNGOLOGY | Admitting: OTOLARYNGOLOGY

## 2019-07-03 ENCOUNTER — TELEPHONE (OUTPATIENT)
Dept: OTOLARYNGOLOGY | Age: 69
End: 2019-07-03

## 2019-07-03 VITALS
HEART RATE: 66 BPM | BODY MASS INDEX: 26.2 KG/M2 | SYSTOLIC BLOOD PRESSURE: 140 MMHG | OXYGEN SATURATION: 95 % | RESPIRATION RATE: 18 BRPM | WEIGHT: 183 LBS | DIASTOLIC BLOOD PRESSURE: 87 MMHG | HEIGHT: 70 IN

## 2020-03-02 ENCOUNTER — OFFICE VISIT (OUTPATIENT)
Dept: GASTROENTEROLOGY | Facility: CLINIC | Age: 70
End: 2020-03-02

## 2020-03-02 VITALS
OXYGEN SATURATION: 99 % | HEART RATE: 83 BPM | TEMPERATURE: 97.2 F | BODY MASS INDEX: 26.05 KG/M2 | SYSTOLIC BLOOD PRESSURE: 138 MMHG | WEIGHT: 182 LBS | HEIGHT: 70 IN | DIASTOLIC BLOOD PRESSURE: 82 MMHG

## 2020-03-02 DIAGNOSIS — Z86.010 HISTORY OF ADENOMATOUS POLYP OF COLON: Primary | ICD-10-CM

## 2020-03-02 DIAGNOSIS — Z83.71 FAMILY HISTORY OF POLYPS IN THE COLON: ICD-10-CM

## 2020-03-02 PROBLEM — Z86.0101 HISTORY OF ADENOMATOUS POLYP OF COLON: Status: ACTIVE | Noted: 2020-03-02

## 2020-03-02 PROBLEM — Z83.719 FAMILY HISTORY OF POLYPS IN THE COLON: Status: ACTIVE | Noted: 2020-03-02

## 2020-03-02 PROCEDURE — S0260 H&P FOR SURGERY: HCPCS | Performed by: NURSE PRACTITIONER

## 2020-03-02 RX ORDER — VARENICLINE TARTRATE 1 MG/1
1 TABLET, FILM COATED ORAL DAILY
COMMUNITY
Start: 2020-02-24 | End: 2021-04-21 | Stop reason: ALTCHOICE

## 2020-03-02 RX ORDER — SODIUM, POTASSIUM,MAG SULFATES 17.5-3.13G
1 SOLUTION, RECONSTITUTED, ORAL ORAL EVERY 12 HOURS
Qty: 2 BOTTLE | Refills: 0 | Status: SHIPPED | OUTPATIENT
Start: 2020-03-02 | End: 2020-07-02 | Stop reason: HOSPADM

## 2020-03-02 NOTE — PROGRESS NOTES
Chief Complaint   Patient presents with   • Colon Cancer Screening     Pt presents today for colon recall-last colon was 3/30/2017; Personal history of colon polyps; Family history of colon polyps     Subjective   HPI  Se Ramirez is a 69 y.o. male who presents as a referral for preventative maintenance. He has no complaints of nausea or vomiting. No change in bowels. No wt loss. No BRBPR. No melena. There is no family hx for colon cancer. No abdominal pain. There was no Cologuard screening this year. The patients last colonoscopy was 3/30/17 with adenomatous polyps.  Past Medical History:   Diagnosis Date   • COPD (chronic obstructive pulmonary disease) (CMS/HCC)    • Family history of colonic polyps    • GERD (gastroesophageal reflux disease)    • History of adenomatous polyp of colon    • History of colon polyps    • Hyperlipidemia      Past Surgical History:   Procedure Laterality Date   • CATARACT EXTRACTION     • COLONOSCOPY  11/20/2012    One 1cm tubular adenomatous polyp in the sigmoid colon; One 5mm hyperplastic polyp in the rectum; Diverticulosis; Repeat 4 years    • COLONOSCOPY N/A 3/30/2017    Two 4-6mm tubular adenomatous polyps at the hepatic flexure; One 5mm tubular adenomatous polyp in the transverse colon; One 11mm tubular adenomatous polyp in the sigmoid colon; One 6mm tubular adenomatous polyp in the rectum; Diverticulosis in the left colon; Repeat 3 years    • COLONOSCOPY  12/17/2009    One less than 5mm tubular adenomatous polyp in the cecum; One 5mm hyperplastic polyp in the transverse; One less than 3mm hyperplastic polyp in the rectum; Diverticulosis; Repeat 3 years    • ENDOSCOPY N/A 3/27/2019    Medium-sized HH; Normal stomach; Normal examined duodenum-biopsied   • FOOT SURGERY Left     ORIF   • HERNIA REPAIR     • INNER EAR SURGERY     • TONSILLECTOMY         Current Outpatient Medications:   •  atorvastatin (LIPITOR) 10 MG tablet, Take 10 mg by mouth Daily., Disp: , Rfl:   •  CHANTIX  "CONTINUING MONTH SAUNDRA 1 MG tablet, Take 1 tablet by mouth Daily., Disp: , Rfl:   •  omeprazole (priLOSEC) 20 MG capsule, Take 20 mg by mouth Daily., Disp: , Rfl:   •  sodium-potassium-magnesium sulfates (SUPREP BOWEL PREP KIT) 17.5-3.13-1.6 GM/177ML solution oral solution, Take 1 bottle by mouth Every 12 (Twelve) Hours. Split dose prep as directed by office instructions provided.  2 bottles = one kit., Disp: 2 bottle, Rfl: 0  No Known Allergies  Social History     Socioeconomic History   • Marital status:      Spouse name: Not on file   • Number of children: Not on file   • Years of education: Not on file   • Highest education level: Not on file   Tobacco Use   • Smoking status: Current Every Day Smoker     Packs/day: 0.75     Years: 40.00     Pack years: 30.00     Types: Cigarettes   • Smokeless tobacco: Never Used   • Tobacco comment: 3/2/2020-Is using Chantix to quit-has cut back \"quite a bit\" per pt   Substance and Sexual Activity   • Alcohol use: Yes     Frequency: Never     Comment: 3/2/20200-Daily-\"couple\" of drinks at night   • Drug use: Yes     Types: Marijuana   • Sexual activity: Defer     Family History   Problem Relation Age of Onset   • Colon polyps Mother         Unknown age   • Colon cancer Neg Hx    • Esophageal cancer Neg Hx    • Liver cancer Neg Hx    • Liver disease Neg Hx    • Rectal cancer Neg Hx    • Stomach cancer Neg Hx        REVIEW OF SYSTEMS  General: well appearing, no fever chills or sweats, no unexplained wt loss  HEENT: no acute visual or hearing disturbances  Cardiovascular: No chest pain or palpitations  Pulmonary: No shortness of breath, coughing, wheezing or hemoptysis  : No burning, urgency, hematuria, or dysuria  Musculoskeletal: No joint pain or stiffness  Peripheral: no edema  Skin: No lesions or rashes  Neuro: No dizziness, headaches, stroke, syncope  Endocrine: No hot or cold intolerances  Hematological: No blood dyscrasias    Objective   Vitals:    03/02/20 0854 " "  BP: 138/82   BP Location: Left arm   Patient Position: Sitting   Cuff Size: Adult   Pulse: 83   Temp: 97.2 °F (36.2 °C)   TempSrc: Tympanic   SpO2: 99%   Weight: 82.6 kg (182 lb)   Height: 177.8 cm (70\")     Body mass index is 26.11 kg/m².    PHYSICAL EXAM  General: age appropriate well nourished well appearing, no acute distress  Head: normocephalic and atraumatic  Global assessment-supple  Neck-No JVD noted, no lymphadenopathy  Pulmonary-clear to auscultation bilaterally, normal respiratory effort  Cardiovascular-normal rate and rhythm, normal heart sounds, S1 and S2 noted  Abdomen-soft, non tender, non distended, normal bowel sounds all 4 quadrants, no hepatosplenomegaly noted  Extremities-No clubbing cyanosis or edema  Neuro-Non focal, converses appropriately, awake, alert, oriented    Imaging Results (Most Recent)     None        Assessment/Plan   Se was seen today for colon cancer screening.    Diagnoses and all orders for this visit:    History of adenomatous polyp of colon  -     Case Request; Standing  -     Case Request    Family history of polyps in the colon  Comments:  mother  Orders:  -     Case Request; Standing  -     Case Request    Other orders  -     Follow Anesthesia Guidelines / Protocol; Future  -     Obtain Informed Consent; Future  -     sodium-potassium-magnesium sulfates (SUPREP BOWEL PREP KIT) 17.5-3.13-1.6 GM/177ML solution oral solution; Take 1 bottle by mouth Every 12 (Twelve) Hours. Split dose prep as directed by office instructions provided.  2 bottles = one kit.      COLONOSCOPY WITH ANESTHESIA (N/A)       Body mass index is 26.11 kg/m². Patient's Body mass index is 26.11 kg/m². BMI is above normal parameters. Recommendations include: nutrition counseling.      All risks, benefits, alternatives, and indications of colonoscopy procedure have been discussed with the patient. Risks to include perforation of the colon requiring possible surgery or colostomy, risk of bleeding from " biopsies or removal of colon tissue, possibility of missing a colon polyp or cancer, or adverse drug reaction.  Benefits to include the diagnosis and management of disease of the colon and rectum. Alternatives to include barium enema, radiographic evaluation, lab testing or no intervention. Pt verbalizes understanding and agrees.

## 2020-03-30 ENCOUNTER — TELEPHONE (OUTPATIENT)
Dept: GASTROENTEROLOGY | Facility: CLINIC | Age: 70
End: 2020-03-30

## 2020-03-30 NOTE — TELEPHONE ENCOUNTER
LVM for pt canceling his colonoscopy 4/20/20 due to COVID-19. In the message, I let pt know that we would call back at a later date to reschedule.

## 2020-05-04 ENCOUNTER — TELEPHONE (OUTPATIENT)
Dept: GASTROENTEROLOGY | Facility: CLINIC | Age: 70
End: 2020-05-04

## 2020-05-04 NOTE — TELEPHONE ENCOUNTER
I called and spoke to pt today re: rescheduling his colonoscopy that had been cancelled due to Covid-19 now that the scheduling restrictions have been lifted. I have him scheduled for Wed 5/20/2020 @ 7:30am. I am mailing him out instructions but I did go over them with him on the phone. He was advised of the new requirement for Covid-19 testing within 72 hours of his procedure and to self isolate after testing-he VU.  Pt advised to call me back with any further questions/problems.

## 2020-05-14 ENCOUNTER — TRANSCRIBE ORDERS (OUTPATIENT)
Dept: ADMINISTRATIVE | Facility: HOSPITAL | Age: 70
End: 2020-05-14

## 2020-05-14 DIAGNOSIS — Z01.818 PRE-OP TESTING: Primary | ICD-10-CM

## 2020-05-18 ENCOUNTER — TELEPHONE (OUTPATIENT)
Dept: GASTROENTEROLOGY | Facility: CLINIC | Age: 70
End: 2020-05-18

## 2020-05-18 ENCOUNTER — APPOINTMENT (OUTPATIENT)
Dept: LAB | Facility: HOSPITAL | Age: 70
End: 2020-05-18

## 2020-05-18 LAB — SARS-COV-2 RNA RESP QL NAA+PROBE: DETECTED

## 2020-05-18 PROCEDURE — 87635 SARS-COV-2 COVID-19 AMP PRB: CPT | Performed by: INTERNAL MEDICINE

## 2020-05-18 NOTE — TELEPHONE ENCOUNTER
Marina from microbiology at [facility] called about this pt-he had his Covid testing prior to his procedure scheduled for 5/20/2020 and it came back positive.     I called and discussed with pt-he DAGOBERTO. He tells me he has not had any symptoms. I have cancelled his procedure for Wed and advised him that Maryam would call him back to r/s once we know when we can get him rescheduled. Pt will need to be tested again to make sure he is negative prior to whatever date we get him rescheduled.     Pt tells me he lives at home with him wife and her mother (Who is in her 90's)-they were asking about testing. I advised them to contact their PCP's for further advice or that they could go to a drive-thru testing site. Pt was advised that he needs to quarantine for the next 14 days and to contact his PCP for further recommendations. I am faxing a copy of pt's labs to Dr. Lucio. Pt advised to call me back with any further questions/problems.    Per Marina-infection control at the hospital will notify all required parties at the State/Health dept.     Dr. Licea-just an FYI for you. Also-how far out do you want us to wait to r/s him?

## 2020-06-23 ENCOUNTER — TRANSCRIBE ORDERS (OUTPATIENT)
Dept: ADMINISTRATIVE | Facility: HOSPITAL | Age: 70
End: 2020-06-23

## 2020-06-23 DIAGNOSIS — Z01.818 PRE-OP TESTING: Primary | ICD-10-CM

## 2020-06-29 ENCOUNTER — LAB (OUTPATIENT)
Dept: LAB | Facility: HOSPITAL | Age: 70
End: 2020-06-29

## 2020-06-29 PROCEDURE — C9803 HOPD COVID-19 SPEC COLLECT: HCPCS | Performed by: INTERNAL MEDICINE

## 2020-06-29 PROCEDURE — U0003 INFECTIOUS AGENT DETECTION BY NUCLEIC ACID (DNA OR RNA); SEVERE ACUTE RESPIRATORY SYNDROME CORONAVIRUS 2 (SARS-COV-2) (CORONAVIRUS DISEASE [COVID-19]), AMPLIFIED PROBE TECHNIQUE, MAKING USE OF HIGH THROUGHPUT TECHNOLOGIES AS DESCRIBED BY CMS-2020-01-R: HCPCS | Performed by: INTERNAL MEDICINE

## 2020-06-30 LAB
COVID LABCORP PRIORITY: NORMAL
SARS-COV-2 RNA RESP QL NAA+PROBE: NOT DETECTED

## 2020-07-02 ENCOUNTER — ANESTHESIA (OUTPATIENT)
Dept: GASTROENTEROLOGY | Facility: HOSPITAL | Age: 70
End: 2020-07-02

## 2020-07-02 ENCOUNTER — HOSPITAL ENCOUNTER (OUTPATIENT)
Facility: HOSPITAL | Age: 70
Setting detail: HOSPITAL OUTPATIENT SURGERY
Discharge: HOME | End: 2020-07-02
Attending: INTERNAL MEDICINE | Admitting: INTERNAL MEDICINE

## 2020-07-02 ENCOUNTER — ANESTHESIA EVENT (OUTPATIENT)
Dept: GASTROENTEROLOGY | Facility: HOSPITAL | Age: 70
End: 2020-07-02

## 2020-07-02 VITALS
HEIGHT: 70 IN | WEIGHT: 179 LBS | HEART RATE: 78 BPM | DIASTOLIC BLOOD PRESSURE: 86 MMHG | BODY MASS INDEX: 25.62 KG/M2 | OXYGEN SATURATION: 96 % | SYSTOLIC BLOOD PRESSURE: 120 MMHG | RESPIRATION RATE: 18 BRPM

## 2020-07-02 DIAGNOSIS — Z86.010 HISTORY OF ADENOMATOUS POLYP OF COLON: ICD-10-CM

## 2020-07-02 DIAGNOSIS — Z83.71 FAMILY HISTORY OF POLYPS IN THE COLON: ICD-10-CM

## 2020-07-02 PROCEDURE — 45385 COLONOSCOPY W/LESION REMOVAL: CPT | Performed by: INTERNAL MEDICINE

## 2020-07-02 PROCEDURE — 88305 TISSUE EXAM BY PATHOLOGIST: CPT | Performed by: INTERNAL MEDICINE

## 2020-07-02 PROCEDURE — 25010000002 PROPOFOL 10 MG/ML EMULSION: Performed by: NURSE ANESTHETIST, CERTIFIED REGISTERED

## 2020-07-02 RX ORDER — SODIUM CHLORIDE 0.9 % (FLUSH) 0.9 %
10 SYRINGE (ML) INJECTION AS NEEDED
Status: DISCONTINUED | OUTPATIENT
Start: 2020-07-02 | End: 2020-07-02 | Stop reason: HOSPADM

## 2020-07-02 RX ORDER — SODIUM CHLORIDE 9 MG/ML
500 INJECTION, SOLUTION INTRAVENOUS CONTINUOUS PRN
Status: DISCONTINUED | OUTPATIENT
Start: 2020-07-02 | End: 2020-07-02 | Stop reason: HOSPADM

## 2020-07-02 RX ORDER — LIDOCAINE HYDROCHLORIDE 10 MG/ML
0.5 INJECTION, SOLUTION EPIDURAL; INFILTRATION; INTRACAUDAL; PERINEURAL ONCE AS NEEDED
Status: DISCONTINUED | OUTPATIENT
Start: 2020-07-02 | End: 2020-07-02 | Stop reason: HOSPADM

## 2020-07-02 RX ORDER — SODIUM CHLORIDE 9 MG/ML
100 INJECTION, SOLUTION INTRAVENOUS CONTINUOUS
Status: CANCELLED | OUTPATIENT
Start: 2020-07-02

## 2020-07-02 RX ORDER — SODIUM CHLORIDE 0.9 % (FLUSH) 0.9 %
10 SYRINGE (ML) INJECTION EVERY 12 HOURS SCHEDULED
Status: CANCELLED | OUTPATIENT
Start: 2020-07-02

## 2020-07-02 RX ORDER — SODIUM CHLORIDE 0.9 % (FLUSH) 0.9 %
10 SYRINGE (ML) INJECTION AS NEEDED
Status: CANCELLED | OUTPATIENT
Start: 2020-07-02

## 2020-07-02 RX ORDER — PROPOFOL 10 MG/ML
VIAL (ML) INTRAVENOUS AS NEEDED
Status: DISCONTINUED | OUTPATIENT
Start: 2020-07-02 | End: 2020-07-02 | Stop reason: SURG

## 2020-07-02 RX ADMIN — SODIUM CHLORIDE 500 ML: 9 INJECTION, SOLUTION INTRAVENOUS at 09:02

## 2020-07-02 RX ADMIN — LIDOCAINE HYDROCHLORIDE 50 MG: 20 INJECTION, SOLUTION INTRAVENOUS at 10:02

## 2020-07-02 RX ADMIN — PROPOFOL 600 MG: 10 INJECTION, EMULSION INTRAVENOUS at 10:07

## 2020-07-02 NOTE — ANESTHESIA POSTPROCEDURE EVALUATION
Patient: Se Ramirez    Procedure Summary     Date:  07/02/20 Room / Location:  [facility] ENDOSCOPY 4 / [facility]    Anesthesia Start:  1002 Anesthesia Stop:  1036    Procedure:  COLONOSCOPY WITH ANESTHESIA (N/A ) Diagnosis:       History of adenomatous polyp of colon      Family history of polyps in the colon      (History of adenomatous polyp of colon [Z86.010])      (Family history of polyps in the colon [Z83.71])    Surgeon:  Kat Licea MD Provider:  Miguel Burr CRNA    Anesthesia Type:  MAC ASA Status:  2          Anesthesia Type: MAC    Vitals  Vitals Value Taken Time   BP     Temp     Pulse 84 7/2/2020 10:35 AM   Resp     SpO2 94 % 7/2/2020 10:35 AM   Vitals shown include unvalidated device data.        Post Anesthesia Care and Evaluation    Patient location during evaluation: PACU  Patient participation: complete - patient participated  Level of consciousness: awake and awake and alert  Pain score: 0  Pain management: adequate  Airway patency: patent  Anesthetic complications: No anesthetic complications    Cardiovascular status: acceptable and stable  Respiratory status: acceptable and unassisted  Hydration status: acceptable

## 2020-07-02 NOTE — H&P
Chief Complaint:   Colon polyps    Subjective     HPI:   He had adenomatous colon polyps last colonoscopy March 2017.  He is here for ongoing surveillance.    Past Medical History:   Past Medical History:   Diagnosis Date   • COPD (chronic obstructive pulmonary disease) (CMS/HCC)    • Family history of colonic polyps    • GERD (gastroesophageal reflux disease)    • History of adenomatous polyp of colon    • History of colon polyps    • Hyperlipidemia        Past Surgical History:  Past Surgical History:   Procedure Laterality Date   • CATARACT EXTRACTION     • COLONOSCOPY  11/20/2012    One 1cm tubular adenomatous polyp in the sigmoid colon; One 5mm hyperplastic polyp in the rectum; Diverticulosis; Repeat 4 years    • COLONOSCOPY N/A 3/30/2017    Two 4-6mm tubular adenomatous polyps at the hepatic flexure; One 5mm tubular adenomatous polyp in the transverse colon; One 11mm tubular adenomatous polyp in the sigmoid colon; One 6mm tubular adenomatous polyp in the rectum; Diverticulosis in the left colon; Repeat 3 years    • COLONOSCOPY  12/17/2009    One less than 5mm tubular adenomatous polyp in the cecum; One 5mm hyperplastic polyp in the transverse; One less than 3mm hyperplastic polyp in the rectum; Diverticulosis; Repeat 3 years    • ENDOSCOPY N/A 3/27/2019    Medium-sized HH; Normal stomach; Normal examined duodenum-biopsied   • FOOT SURGERY Left     ORIF   • HERNIA REPAIR     • INNER EAR SURGERY     • TONSILLECTOMY          Family History:  Family History   Problem Relation Age of Onset   • Colon polyps Mother         Unknown age   • Colon cancer Neg Hx    • Esophageal cancer Neg Hx    • Liver cancer Neg Hx    • Liver disease Neg Hx    • Rectal cancer Neg Hx    • Stomach cancer Neg Hx        Social History:   reports that he has been smoking cigarettes. He has a 30.00 pack-year smoking history. He has never used smokeless tobacco. He reports that he drinks alcohol. He reports that he has current or past drug  "history. Drug: Marijuana.    Medications:   Medications Prior to Admission   Medication Sig Dispense Refill Last Dose   • atorvastatin (LIPITOR) 10 MG tablet Take 10 mg by mouth Daily.   7/1/2020 at Unknown time   • omeprazole (priLOSEC) 20 MG capsule Take 20 mg by mouth Daily.   7/1/2020 at Unknown time   • sodium-potassium-magnesium sulfates (SUPREP BOWEL PREP KIT) 17.5-3.13-1.6 GM/177ML solution oral solution Take 1 bottle by mouth Every 12 (Twelve) Hours. Split dose prep as directed by office instructions provided.  2 bottles = one kit. 2 bottle 0 7/2/2020 at 0600   • CHANTIX CONTINUING MONTH SAUNDRA 1 MG tablet Take 1 tablet by mouth Daily.   More than a month at Unknown time       Allergies:  Patient has no known allergies.    ROS:    General: Weight stable  Resp: No SOA  Cardiovascular: No CP      Objective     /90 (BP Location: Right arm, Patient Position: Sitting)   Pulse 90   Resp 20   Ht 177.8 cm (70\")   Wt 81.2 kg (179 lb)   SpO2 95%   BMI 25.68 kg/m²     Physical Exam   Constitutional: Pt is oriented to person, place, and in no distress.  Eyes: No icterus  ENMT: Unremarkable   Cardiovascular: Normal rate, regular rhythm.    Pulmonary/Chest: No distress.  No audible wheezes  Abdominal: Soft.   Skin: Skin is warm and dry.   Psychiatric: Mood, memory, affect and judgment appear normal.     Assessment/Plan     Diagnosis:  Colon polyps    Anticipated Surgical Procedure:  Colonoscopy    The risks, benefits, and alternatives of colonoscopy were reviewed with the patient today.  Risks including perforation of the colon possibly requiring surgery or colostomy.  Additional risks include risk of bleeding from biopsies or removal of colon tissue.  There is also the risk of a drug reaction or problems with anesthesia.  This will be discussed with the further by the anesthesia team on the day of the procedure.  Lastly there is a possibility of missing a colon polyp or cancer.  The benefits include the " diagnosis and management of disease of the colon and rectum.  Alternatives to colonoscopy include barium enema, laboratory testing, radiographic evaluation, or no intervention.  The patient verbalizes understanding and agrees.    Much of this encounter note is an electronic transcription/translation of spoken language to printed text. The electronic translation of spoken language may permit erroneous, or at times, nonsensical words or phrases to be inadvertently transcribed; although I have reviewed the note for such errors, some may still exist.

## 2020-07-02 NOTE — ANESTHESIA PREPROCEDURE EVALUATION
Anesthesia Evaluation     no history of anesthetic complications:  NPO Solid Status: > 8 hours  NPO Liquid Status: > 4 hours           Airway   Mallampati: I  TM distance: >3 FB  Neck ROM: full  Dental    (+) edentulous, upper dentures and lower dentures    Pulmonary - normal exam    breath sounds clear to auscultation  (+) a smoker (2 ppd) Current Smoked day of surgery, COPD,   (-) asthma, recent URI, sleep apnea  Cardiovascular - normal exam  Exercise tolerance: good (4-7 METS)    Rhythm: regular  Rate: normal    (+) hyperlipidemia,   (-) pacemaker, hypertension, past MI, angina, cardiac stents, CABG      Neuro/Psych  (-) seizures, TIA, CVA  GI/Hepatic/Renal/Endo    (+)  GERD,    (-) liver disease, no renal disease, diabetes, no thyroid disorder    Musculoskeletal     Abdominal    Substance History      OB/GYN          Other                        Anesthesia Plan    ASA 2     MAC     intravenous induction     Anesthetic plan, all risks, benefits, and alternatives have been provided, discussed and informed consent has been obtained with: patient.

## 2020-12-03 ENCOUNTER — HOSPITAL ENCOUNTER (OUTPATIENT)
Dept: GENERAL RADIOLOGY | Age: 70
Discharge: HOME | End: 2020-12-03
Payer: MEDICARE

## 2020-12-03 PROCEDURE — 71046 X-RAY EXAM CHEST 2 VIEWS: CPT

## 2020-12-10 ENCOUNTER — HOSPITAL ENCOUNTER (OUTPATIENT)
Dept: CT IMAGING | Age: 70
Discharge: HOME | End: 2020-12-10
Payer: MEDICARE

## 2020-12-10 PROCEDURE — 71270 CT THORAX DX C-/C+: CPT

## 2020-12-10 PROCEDURE — 6360000004 HC RX CONTRAST MEDICATION: Performed by: NURSE PRACTITIONER

## 2020-12-10 RX ADMIN — IOPAMIDOL 90 ML: 755 INJECTION, SOLUTION INTRAVENOUS at 08:21

## 2020-12-16 ENCOUNTER — HOSPITAL ENCOUNTER (OUTPATIENT)
Dept: ULTRASOUND IMAGING | Age: 70
Discharge: HOME | End: 2020-12-16
Payer: MEDICARE

## 2020-12-16 PROCEDURE — 76536 US EXAM OF HEAD AND NECK: CPT

## 2020-12-20 PROBLEM — K73.9 CHRONIC HEPATITIS: Status: ACTIVE | Noted: 2019-02-28

## 2020-12-20 PROBLEM — D37.09: Status: ACTIVE | Noted: 2019-06-25

## 2020-12-20 PROBLEM — K21.9 GASTROESOPHAGEAL REFLUX DISEASE: Status: ACTIVE | Noted: 2020-12-20

## 2020-12-20 PROBLEM — E78.5 HYPERLIPIDEMIA: Status: ACTIVE | Noted: 2020-12-20

## 2020-12-20 PROBLEM — Z72.0 TOBACCO USER: Status: ACTIVE | Noted: 2019-12-11

## 2020-12-20 PROBLEM — J44.9 CHRONIC OBSTRUCTIVE PULMONARY DISEASE (HCC): Status: ACTIVE | Noted: 2019-12-11

## 2020-12-20 NOTE — PROGRESS NOTES
Subjective   Se Ramirez is a 70 y.o. male.     Background: pt with spiculated 2.5 cm nodule rul anterior segment identified 12/2020, emphysema,sliding hiatal hernia, compression fx tspine. spirometry normal 2014. chronic change in left breast and left liver lobe.    Chief Complaint   Patient presents with   • Lung Nodule   • COPD        History of Present Illness   Dr. Lucio is asked me to see him for a lung nodule.  Patient has a history of emphysema and COPD that Dr. Lucio has been treating him for.  He uses inhalers and reports doing well with those.  He more recently was found to have a nodule on chest CT scan.  The nodule is present continuously for an undetermined time with no aggravating or alleviating factors, present continuously.  It does appear new compared to prior nodule in the same lobe which was followed for 2 years per Fleischner guidelines several years ago.  He has had normal spirometry despite the emphysema and COPD symptoms.    Medical/Family/Social History   has a past medical history of COPD (chronic obstructive pulmonary disease) (CMS/HCC), Family history of colonic polyps, GERD (gastroesophageal reflux disease), History of adenomatous polyp of colon, History of colon polyps, and Hyperlipidemia.   has a past surgical history that includes Hernia repair; Tonsillectomy; Colonoscopy (11/20/2012); Cataract extraction; Colonoscopy (N/A, 3/30/2017); Inner ear surgery; Foot surgery (Left); Colonoscopy (12/17/2009); Esophagogastroduodenoscopy (N/A, 3/27/2019); and Colonoscopy (N/A, 7/2/2020).  family history includes Colon polyps in his mother.   reports that he has been smoking cigarettes. He has a 30.00 pack-year smoking history. He has never used smokeless tobacco. He reports current alcohol use. He reports current drug use. Drug: Marijuana.  No Known Allergies  Medications    Current Outpatient Medications:   •  albuterol sulfate HFA (ProAir HFA) 108 (90 Base) MCG/ACT inhaler, ProAir HFA 90  "mcg/actuation aerosol inhaler  Inhale 2 puffs every 4 hours by inhalation route as needed for 30 days., Disp: , Rfl:   •  atorvastatin (LIPITOR) 10 MG tablet, Take 10 mg by mouth Daily., Disp: , Rfl:   •  CHANTIX CONTINUING MONTH SAUNDRA 1 MG tablet, Take 1 tablet by mouth Daily., Disp: , Rfl:   •  omeprazole (priLOSEC) 20 MG capsule, Take 20 mg by mouth Daily., Disp: , Rfl:   •  Spiriva Respimat 2.5 MCG/ACT aerosol solution inhaler, Inhale 2 puffs Daily., Disp: , Rfl:     Review of Systems   Constitutional: Negative for chills and fever.   HENT: Negative for trouble swallowing and voice change.    Eyes: Negative for photophobia and visual disturbance.   Respiratory: Negative for shortness of breath.    Gastrointestinal: Negative for abdominal pain, nausea, vomiting and GERD.   Genitourinary: Negative for difficulty urinating and hematuria.   Musculoskeletal: Negative for gait problem and joint swelling.   Skin: Negative for pallor and skin lesions.   Neurological: Negative for tremors, weakness and confusion.   Hematological: Negative for adenopathy. Does not bruise/bleed easily.   Psychiatric/Behavioral: The patient is not nervous/anxious.      Objective   /86   Pulse 75   Temp 97.5 °F (36.4 °C)   Ht 177.8 cm (70\")   Wt 83.1 kg (183 lb 3.2 oz)   SpO2 93% Comment: ra  BMI 26.29 kg/m²   Physical Exam  Constitutional:       General: He is not in acute distress.     Appearance: He is well-developed. He is not ill-appearing.   HENT:      Head: Normocephalic and atraumatic.      Nose: Nose normal.   Eyes:      Conjunctiva/sclera: Conjunctivae normal.   Neck:      Musculoskeletal: Neck supple.   Cardiovascular:      Rate and Rhythm: Normal rate and regular rhythm.      Heart sounds: S1 normal and S2 normal.   Pulmonary:      Effort: Pulmonary effort is normal.      Breath sounds: No wheezing or rales.   Abdominal:      General: There is no distension.      Palpations: Abdomen is soft.      Tenderness: There is no " abdominal tenderness. There is no guarding.   Musculoskeletal:         General: No deformity.   Lymphadenopathy:      Cervical: No cervical adenopathy.   Skin:     General: Skin is warm and dry.      Findings: No rash.   Neurological:      Mental Status: He is alert.       -----------------------------------------------------------------------------------------------  Examination. CT CHEST W WO CONTRAST 12/10/2020 7:08 AM  History: History of pulmonary nodules. Abnormal chest x-ray. No known  history of malignancy.  DLP: 1312 mGycm.  The CT scan of the chest is performed before and after intravenous  contrast enhancement. The images are acquired in axial plane with  subsequent reconstruction in coronal and sagittal planes.  The comparison is made with the previous study dated 9/27/2016. The  correlation is made with the chest radiograph dated 12/3/2020.  There is a spiculated, lobulated, noncalcified nodule in the right  upper lobe anterior segment measuring 2.5 x 1.9 cm with surrounding  parenchymal stranding extending into the visceral pleura and towards  the right hilum. It shows moderate contrast enhancement. This was not  noted in the previous study in September 2016. This was not seen in  the previous radiographs of the chest in September 2018. This  corresponds with the suspicious abnormality seen in the chest  radiograph dated 12/3/2020.  There are extensive chronic emphysematous minutes lung changes  bilaterally. A small subpleural nodule located laterally in the right  upper lobe aren't optimally visualized in this study.  The ill-defined nodular densities in the right middle lobe are stable  and probably represent intralobular thickening.  There are a few calcified nodules/granulomas in the lungs bilaterally.  The visualized soft tissues of the neck are unremarkable. No discrete  mass. No lymphadenopathy.  There is a small ill-defined low-density nodule located deep in the  left lobe of the thyroid  gland.  There is no axillary lymphadenopathy.  Atheromatous changes of the thoracic aorta is noted. No aneurysmal  dilatation or dissection. There are a few nonspecific small  subcentimeter mediastinal and right hilar lymph nodes similar to the  previous study. Calcified granulomas are seen in the mediastinum and  right hilum.  Severe atheromatous changes of coronary arteries are seen.  Fatty infiltration of the liver is noted which is a change from the  previous study. There is a persistent and unchanged small low-density  nodule in the left lobe probably represent a hepatic cyst. The spleen  appears unremarkable.  A small sliding-type hiatal hernia seen.  No radiopaque gallstones.  The pancreas appear unremarkable.  The adrenal glands appeared unremarkable.  The left kidney is not visualized. A low-density nodule in the mid  right kidney was not obviously visualized in the previous study which  is probably due to lack of contrast enhancement in the previous study.  This is too small to be further characterized.  The images reviewed in bone window show no acute bony abnormality or a  bone lesion. There is chronic appearing compression deformity of a  midthoracic vertebra which is stable since the previous study in 2016.  A hemangioma. Proximal thoracic vertebra was also noted in the  previous study. No change.   Other Result Information   Isidro, Kyin Incoming Radiology Results From [facility] - 12/10/2020  8:20 AM CST  Examination. CT CHEST W WO CONTRAST 12/10/2020 7:08 AM  History: History of pulmonary nodules. Abnormal chest x-ray. No known  history of malignancy.  DLP: 1312 mGycm.  The CT scan of the chest is performed before and after intravenous  contrast enhancement. The images are acquired in axial plane with  subsequent reconstruction in coronal and sagittal planes.  The comparison is made with the previous study dated 9/27/2016. The  correlation is made with the chest radiograph dated 12/3/2020.  There is a  spiculated, lobulated, noncalcified nodule in the right  upper lobe anterior segment measuring 2.5 x 1.9 cm with surrounding  parenchymal stranding extending into the visceral pleura and towards  the right hilum. It shows moderate contrast enhancement. This was not  noted in the previous study in September 2016. This was not seen in  the previous radiographs of the chest in September 2018. This  corresponds with the suspicious abnormality seen in the chest  radiograph dated 12/3/2020.  There are extensive chronic emphysematous minutes lung changes  bilaterally. A small subpleural nodule located laterally in the right  upper lobe aren't optimally visualized in this study.  The ill-defined nodular densities in the right middle lobe are stable  and probably represent intralobular thickening.  There are a few calcified nodules/granulomas in the lungs bilaterally.  The visualized soft tissues of the neck are unremarkable. No discrete  mass. No lymphadenopathy.  There is a small ill-defined low-density nodule located deep in the  left lobe of the thyroid gland.  There is no axillary lymphadenopathy.  Atheromatous changes of the thoracic aorta is noted. No aneurysmal  dilatation or dissection. There are a few nonspecific small  subcentimeter mediastinal and right hilar lymph nodes similar to the  previous study. Calcified granulomas are seen in the mediastinum and  right hilum.  Severe atheromatous changes of coronary arteries are seen.  Fatty infiltration of the liver is noted which is a change from the  previous study. There is a persistent and unchanged small low-density  nodule in the left lobe probably represent a hepatic cyst. The spleen  appears unremarkable.  A small sliding-type hiatal hernia seen.  No radiopaque gallstones.  The pancreas appear unremarkable.  The adrenal glands appeared unremarkable.  The left kidney is not visualized. A low-density nodule in the mid  right kidney was not obviously visualized in  the previous study which  is probably due to lack of contrast enhancement in the previous study.  This is too small to be further characterized.  The images reviewed in bone window show no acute bony abnormality or a  bone lesion. There is chronic appearing compression deformity of a  midthoracic vertebra which is stable since the previous study in 2016.  A hemangioma. Proximal thoracic vertebra was also noted in the  previous study. No change.  IMPRESSION:  A spiculated nodule in the right upper lobe which was not  noted in the previous studies and represent a neoplastic process, the  primary or metastatic?.  Chronic emphysematous minutes lung changes.  Old healed granulomas.  A stable low-density nodule in the liver.  Other findings as above.  Signed by Dr Lorenzo Jaramillo on 12/10/2020 9:17 AM        -----------------------------------------------------------------------------------------------         -----------------------------------------------------------------------------------------------  Assessment/Plan   Problem List Items Addressed This Visit        Pulmonary Problems    Chronic obstructive pulmonary disease (CMS/HCC)    Relevant Medications    Spiriva Respimat 2.5 MCG/ACT aerosol solution inhaler    albuterol sulfate HFA (ProAir HFA) 108 (90 Base) MCG/ACT inhaler    Other Relevant Orders    NM Pet Skull Base To Mid Thigh       Other    Chronic hepatitis (CMS/HCC)    Gastroesophageal reflux disease      Other Visit Diagnoses     Nodule of upper lobe of right lung    -  Primary    Relevant Orders    NM Pet Skull Base To Mid Thigh        Patient's Body mass index is 26.29 kg/m². BMI is within normal parameters. No follow-up required..    This is a new nodule in the same lobe as the previously identified nodule.  Pt is a smoker, elevated cancer risk.  Will plan PET scan  Will get PFT and CT surgery referral if pet scan shows resectability; otherwise will defer PFT until after pandemic is under better  control.  He declines flu shots         Electronically signed by Nakul Holbrook MD, 12/21/2020, 09:46 CST

## 2020-12-21 ENCOUNTER — OFFICE VISIT (OUTPATIENT)
Dept: PULMONOLOGY | Facility: CLINIC | Age: 70
End: 2020-12-21

## 2020-12-21 VITALS
TEMPERATURE: 97.5 F | SYSTOLIC BLOOD PRESSURE: 152 MMHG | DIASTOLIC BLOOD PRESSURE: 86 MMHG | HEART RATE: 75 BPM | OXYGEN SATURATION: 93 % | HEIGHT: 70 IN | WEIGHT: 183.2 LBS | BODY MASS INDEX: 26.23 KG/M2

## 2020-12-21 DIAGNOSIS — J43.2 CENTRILOBULAR EMPHYSEMA (HCC): ICD-10-CM

## 2020-12-21 DIAGNOSIS — K73.9 CHRONIC HEPATITIS (HCC): ICD-10-CM

## 2020-12-21 DIAGNOSIS — R91.1 NODULE OF UPPER LOBE OF RIGHT LUNG: Primary | ICD-10-CM

## 2020-12-21 DIAGNOSIS — K21.9 GASTROESOPHAGEAL REFLUX DISEASE, UNSPECIFIED WHETHER ESOPHAGITIS PRESENT: ICD-10-CM

## 2020-12-21 PROCEDURE — 99204 OFFICE O/P NEW MOD 45 MIN: CPT | Performed by: INTERNAL MEDICINE

## 2020-12-21 RX ORDER — ALBUTEROL SULFATE 90 UG/1
AEROSOL, METERED RESPIRATORY (INHALATION)
COMMUNITY
End: 2021-01-20 | Stop reason: SDUPTHER

## 2020-12-21 RX ORDER — TIOTROPIUM BROMIDE INHALATION SPRAY 3.12 UG/1
2 SPRAY, METERED RESPIRATORY (INHALATION) DAILY
COMMUNITY
Start: 2020-10-17 | End: 2021-01-20 | Stop reason: SDUPTHER

## 2020-12-29 ENCOUNTER — HOSPITAL ENCOUNTER (OUTPATIENT)
Dept: NUCLEAR MEDICINE | Age: 70
Discharge: HOME | End: 2020-12-31
Payer: MEDICARE

## 2020-12-29 DIAGNOSIS — R91.1 NODULE OF UPPER LOBE OF RIGHT LUNG: ICD-10-CM

## 2020-12-29 DIAGNOSIS — J43.2 CENTRILOBULAR EMPHYSEMA (HCC): ICD-10-CM

## 2020-12-29 LAB
GLUCOSE BLD-MCNC: 95 MG/DL (ref 70–99)
PERFORMED ON: NORMAL

## 2020-12-29 PROCEDURE — 82947 ASSAY GLUCOSE BLOOD QUANT: CPT

## 2020-12-29 PROCEDURE — A9552 F18 FDG: HCPCS | Performed by: INTERNAL MEDICINE

## 2020-12-29 PROCEDURE — 3430000000 HC RX DIAGNOSTIC RADIOPHARMACEUTICAL: Performed by: INTERNAL MEDICINE

## 2020-12-29 PROCEDURE — 78815 PET IMAGE W/CT SKULL-THIGH: CPT

## 2020-12-29 RX ORDER — FLUDEOXYGLUCOSE F 18 200 MCI/ML
10 INJECTION, SOLUTION INTRAVENOUS
Status: COMPLETED | OUTPATIENT
Start: 2020-12-29 | End: 2020-12-29

## 2020-12-29 RX ADMIN — FLUDEOXYGLUCOSE F 18 10 MILLICURIE: 200 INJECTION, SOLUTION INTRAVENOUS at 12:47

## 2020-12-30 NOTE — PROGRESS NOTES
Please call the patient regarding his abnormal result.  I showed positive in the spot we are concerned about, but nothing anywhere else.  Per our discussion in office, I think we should have this resected.  Will order PFT and CT surgery eval.

## 2021-01-14 NOTE — PROGRESS NOTES
"Chief Complaint  Lung Nodule right upper lobe and COPD    Subjective    History of Present Illness      Se Ramirez presents to [facility]   Mr. Ramirez is a pleasant 70-year-old male patient of Dr. Nakul Holbrook with known 2.5 cm right upper lobe anterior segment nodule noted on 12/20/2020.  He has known emphysema/COPD and a current every day smoker.  He was seen by Dr. Holbrook last month and sent for a PET scan which was done at [facility] on December 29 showing an SUV of 18.8 to the right upper lobe nodule.  He had an appointment with Dr. Murillo yesterday with plans to move forward for surgical intervention.  PFTs have been rescheduled from February 1 of January 27.  Once he has his PFTs done he will receive a call from Dr. Murillo's office letting him know if they are going to move forward with surgery and the date and time.  He does continue to have shortness of breath and is asking about inhalers.  He has been on Spiriva Respimat and albuterol HFA however he was in the donut hole and stopped using them about a month ago.  He felt as the Spiriva offered benefit as he was infrequently having to use his rescue inhaler.  Continues to have a cough that is worse in the morning.        Objective   Vital Signs:   /90   Pulse 85   Temp 96.2 °F (35.7 °C)   Ht 177.8 cm (70\")   Wt 83.6 kg (184 lb 3.2 oz)   SpO2 99% Comment: ra  BMI 26.43 kg/m²     Physical Exam  Cardiovascular:      Rate and Rhythm: Normal rate and regular rhythm.      Heart sounds: No murmur. No friction rub. No gallop.    Pulmonary:      Effort: Pulmonary effort is normal.   Neurological:      Mental Status: He is alert and oriented to person, place, and time.        Result Review :   The following data was reviewed by: PARISA Lopez on 01/20/2021:    Data reviewed: Radiologic studies PET 12/29/20 at [facility] with SUV of 18.8 to the RUL nodule        My interpretation of the PFT: " Pending- scheduled for 02/01/21    Patient's Body mass index is 26.43 kg/m². BMI is within normal parameters. No follow-up required..      Assessment and Plan    Problem List Items Addressed This Visit        Other    Chronic obstructive pulmonary disease (CMS/HCC)      Other Visit Diagnoses     Right upper lobe pulmonary nodule    -  Primary        COPD-refills of his Spiriva and albuterol HFA have been sent to the pharmacy for him.  Will await PFT results.  Right upper lobe nodule-he has had his visit with Dr. Murillo with plans to proceed with surgery depending on the results of his PFTs.  He is asked to give us a call and let us know when this is scheduled.  He is advised that we will continue to follow him for his COPD and will follow up in 3 months.    Health maintenance:   Influenza vaccine: Declines   Covid-19 plans to receive this when available for him.      Follow Up   No follow-ups on file.  Patient was given instructions and counseling regarding his condition or for health maintenance advice. Please see specific information pulled into the AVS if appropriate.     Mallory Bryan, APRN  1/20/2021  09:28 CST

## 2021-01-19 ENCOUNTER — OFFICE VISIT (OUTPATIENT)
Dept: CARDIAC SURGERY | Facility: CLINIC | Age: 71
End: 2021-01-19

## 2021-01-19 VITALS
DIASTOLIC BLOOD PRESSURE: 82 MMHG | OXYGEN SATURATION: 95 % | WEIGHT: 183.4 LBS | HEART RATE: 83 BPM | HEIGHT: 70 IN | SYSTOLIC BLOOD PRESSURE: 128 MMHG | BODY MASS INDEX: 26.26 KG/M2

## 2021-01-19 DIAGNOSIS — R91.1 RIGHT UPPER LOBE PULMONARY NODULE: ICD-10-CM

## 2021-01-19 DIAGNOSIS — Z72.0 TOBACCO USER: ICD-10-CM

## 2021-01-19 DIAGNOSIS — K73.9 CHRONIC HEPATITIS (HCC): Primary | ICD-10-CM

## 2021-01-19 DIAGNOSIS — J44.9 CHRONIC OBSTRUCTIVE PULMONARY DISEASE, UNSPECIFIED COPD TYPE (HCC): ICD-10-CM

## 2021-01-19 PROCEDURE — 99204 OFFICE O/P NEW MOD 45 MIN: CPT | Performed by: THORACIC SURGERY (CARDIOTHORACIC VASCULAR SURGERY)

## 2021-01-20 ENCOUNTER — OFFICE VISIT (OUTPATIENT)
Dept: PULMONOLOGY | Facility: CLINIC | Age: 71
End: 2021-01-20

## 2021-01-20 VITALS
WEIGHT: 184.2 LBS | BODY MASS INDEX: 26.37 KG/M2 | HEART RATE: 85 BPM | SYSTOLIC BLOOD PRESSURE: 130 MMHG | OXYGEN SATURATION: 99 % | HEIGHT: 70 IN | TEMPERATURE: 96.2 F | DIASTOLIC BLOOD PRESSURE: 82 MMHG

## 2021-01-20 DIAGNOSIS — J44.9 CHRONIC OBSTRUCTIVE PULMONARY DISEASE, UNSPECIFIED COPD TYPE (HCC): ICD-10-CM

## 2021-01-20 DIAGNOSIS — J43.9 PULMONARY EMPHYSEMA, UNSPECIFIED EMPHYSEMA TYPE (HCC): ICD-10-CM

## 2021-01-20 DIAGNOSIS — R91.1 RIGHT UPPER LOBE PULMONARY NODULE: Primary | ICD-10-CM

## 2021-01-20 PROCEDURE — 99214 OFFICE O/P EST MOD 30 MIN: CPT | Performed by: NURSE PRACTITIONER

## 2021-01-20 RX ORDER — ALBUTEROL SULFATE 90 UG/1
2 AEROSOL, METERED RESPIRATORY (INHALATION) EVERY 4 HOURS PRN
Qty: 18 G | Refills: 3 | Status: SHIPPED | OUTPATIENT
Start: 2021-01-20

## 2021-01-20 RX ORDER — TIOTROPIUM BROMIDE INHALATION SPRAY 3.12 UG/1
2 SPRAY, METERED RESPIRATORY (INHALATION) DAILY
Qty: 1 INHALER | Refills: 3 | Status: SHIPPED | OUTPATIENT
Start: 2021-01-20 | End: 2021-12-01

## 2021-01-21 ENCOUNTER — TRANSCRIBE ORDERS (OUTPATIENT)
Dept: LAB | Facility: HOSPITAL | Age: 71
End: 2021-01-21

## 2021-01-21 DIAGNOSIS — Z01.818 PRE-OP TESTING: Primary | ICD-10-CM

## 2021-01-25 ENCOUNTER — LAB (OUTPATIENT)
Dept: LAB | Facility: HOSPITAL | Age: 71
End: 2021-01-25

## 2021-01-25 LAB — SARS-COV-2 ORF1AB RESP QL NAA+PROBE: NOT DETECTED

## 2021-01-25 PROCEDURE — C9803 HOPD COVID-19 SPEC COLLECT: HCPCS | Performed by: INTERNAL MEDICINE

## 2021-01-25 PROCEDURE — U0004 COV-19 TEST NON-CDC HGH THRU: HCPCS | Performed by: INTERNAL MEDICINE

## 2021-01-27 ENCOUNTER — HOSPITAL ENCOUNTER (OUTPATIENT)
Dept: PULMONOLOGY | Facility: HOSPITAL | Age: 71
Discharge: HOME | End: 2021-01-27
Admitting: INTERNAL MEDICINE

## 2021-01-27 PROCEDURE — 94060 EVALUATION OF WHEEZING: CPT

## 2021-01-27 PROCEDURE — 94729 DIFFUSING CAPACITY: CPT | Performed by: INTERNAL MEDICINE

## 2021-01-27 PROCEDURE — 94729 DIFFUSING CAPACITY: CPT

## 2021-01-27 PROCEDURE — 94726 PLETHYSMOGRAPHY LUNG VOLUMES: CPT

## 2021-01-27 PROCEDURE — 94060 EVALUATION OF WHEEZING: CPT | Performed by: INTERNAL MEDICINE

## 2021-01-27 PROCEDURE — 94726 PLETHYSMOGRAPHY LUNG VOLUMES: CPT | Performed by: INTERNAL MEDICINE

## 2021-01-27 RX ORDER — ALBUTEROL SULFATE 2.5 MG/3ML
2.5 SOLUTION RESPIRATORY (INHALATION) ONCE
Status: COMPLETED | OUTPATIENT
Start: 2021-01-27 | End: 2021-01-27

## 2021-01-27 RX ADMIN — ALBUTEROL SULFATE 2.5 MG: 2.5 SOLUTION RESPIRATORY (INHALATION) at 16:10

## 2021-02-01 ENCOUNTER — APPOINTMENT (OUTPATIENT)
Dept: PULMONOLOGY | Facility: HOSPITAL | Age: 71
End: 2021-02-01

## 2021-02-04 ENCOUNTER — TELEPHONE (OUTPATIENT)
Dept: CARDIAC SURGERY | Facility: CLINIC | Age: 71
End: 2021-02-04

## 2021-02-09 ENCOUNTER — PREP FOR SURGERY (OUTPATIENT)
Dept: OTHER | Facility: HOSPITAL | Age: 71
End: 2021-02-09

## 2021-02-09 ENCOUNTER — TRANSCRIBE ORDERS (OUTPATIENT)
Dept: ADMINISTRATIVE | Facility: HOSPITAL | Age: 71
End: 2021-02-09

## 2021-02-09 DIAGNOSIS — Z11.59 SCREENING FOR VIRAL DISEASE: Primary | ICD-10-CM

## 2021-02-09 DIAGNOSIS — R91.1 RIGHT UPPER LOBE PULMONARY NODULE: Primary | ICD-10-CM

## 2021-02-09 DIAGNOSIS — R06.02 SOB (SHORTNESS OF BREATH): ICD-10-CM

## 2021-02-09 RX ORDER — SODIUM CHLORIDE 0.9 % (FLUSH) 0.9 %
10 SYRINGE (ML) INJECTION AS NEEDED
Status: CANCELLED | OUTPATIENT
Start: 2021-02-09

## 2021-02-09 RX ORDER — CEFAZOLIN SODIUM 2 G/50ML
2 SOLUTION INTRAVENOUS ONCE
Status: CANCELLED | OUTPATIENT
Start: 2021-02-15

## 2021-02-09 RX ORDER — SODIUM CHLORIDE 0.9 % (FLUSH) 0.9 %
3 SYRINGE (ML) INJECTION EVERY 12 HOURS SCHEDULED
Status: CANCELLED | OUTPATIENT
Start: 2021-02-09

## 2021-02-09 RX ORDER — ALBUTEROL SULFATE 1.25 MG/3ML
1.25 SOLUTION RESPIRATORY (INHALATION)
Status: CANCELLED | OUTPATIENT
Start: 2021-02-09

## 2021-02-09 NOTE — TELEPHONE ENCOUNTER
Patient notified of OR date/arrival time with instructions of NPO/no meds after midnight, also notified of prework date/time. Patient verbalized understanding to all.

## 2021-02-09 NOTE — TELEPHONE ENCOUNTER
Called pt and offered surgery date as directed.  Pt states this date will work for him.  Informed pt someone would be calling him in a day or two with prework appt and arrival time for surgery.  Pt voiced understanding to all/greg

## 2021-02-12 ENCOUNTER — ANESTHESIA EVENT (OUTPATIENT)
Dept: PERIOP | Facility: HOSPITAL | Age: 71
End: 2021-02-12

## 2021-02-12 ENCOUNTER — HOSPITAL ENCOUNTER (OUTPATIENT)
Dept: GENERAL RADIOLOGY | Facility: HOSPITAL | Age: 71
Discharge: HOME | End: 2021-02-12

## 2021-02-12 ENCOUNTER — LAB (OUTPATIENT)
Dept: LAB | Facility: HOSPITAL | Age: 71
End: 2021-02-12

## 2021-02-12 ENCOUNTER — APPOINTMENT (OUTPATIENT)
Dept: PREADMISSION TESTING | Facility: HOSPITAL | Age: 71
End: 2021-02-12

## 2021-02-12 VITALS
OXYGEN SATURATION: 95 % | BODY MASS INDEX: 26.7 KG/M2 | RESPIRATION RATE: 18 BRPM | HEART RATE: 84 BPM | SYSTOLIC BLOOD PRESSURE: 137 MMHG | HEIGHT: 70 IN | DIASTOLIC BLOOD PRESSURE: 87 MMHG | WEIGHT: 186.51 LBS

## 2021-02-12 DIAGNOSIS — R91.1 RIGHT UPPER LOBE PULMONARY NODULE: ICD-10-CM

## 2021-02-12 DIAGNOSIS — R06.02 SOB (SHORTNESS OF BREATH): ICD-10-CM

## 2021-02-12 LAB
ABO GROUP BLD: NORMAL
ALBUMIN SERPL-MCNC: 4.3 G/DL (ref 3.5–5.2)
ALBUMIN/GLOB SERPL: 1.4 G/DL
ALP SERPL-CCNC: 68 U/L (ref 39–117)
ALT SERPL W P-5'-P-CCNC: 20 U/L (ref 1–41)
ANION GAP SERPL CALCULATED.3IONS-SCNC: 9 MMOL/L (ref 5–15)
APTT PPP: 28.3 SECONDS (ref 24.1–35)
ARTERIAL PATENCY WRIST A: ABNORMAL
AST SERPL-CCNC: 20 U/L (ref 1–40)
ATMOSPHERIC PRESS: 757 MMHG
BASE EXCESS BLDA CALC-SCNC: 2.5 MMOL/L (ref 0–2)
BASOPHILS # BLD AUTO: 0.08 10*3/MM3 (ref 0–0.2)
BASOPHILS NFR BLD AUTO: 0.9 % (ref 0–1.5)
BDY SITE: ABNORMAL
BILIRUB SERPL-MCNC: 0.8 MG/DL (ref 0–1.2)
BLD GP AB SCN SERPL QL: NEGATIVE
BODY TEMPERATURE: 37 C
BUN SERPL-MCNC: 14 MG/DL (ref 8–23)
BUN/CREAT SERPL: 19.2 (ref 7–25)
CALCIUM SPEC-SCNC: 9.3 MG/DL (ref 8.6–10.5)
CHLORIDE SERPL-SCNC: 103 MMOL/L (ref 98–107)
CO2 SERPL-SCNC: 27 MMOL/L (ref 22–29)
CREAT SERPL-MCNC: 0.73 MG/DL (ref 0.76–1.27)
DEPRECATED RDW RBC AUTO: 41.4 FL (ref 37–54)
EOSINOPHIL # BLD AUTO: 0.09 10*3/MM3 (ref 0–0.4)
EOSINOPHIL NFR BLD AUTO: 1 % (ref 0.3–6.2)
ERYTHROCYTE [DISTWIDTH] IN BLOOD BY AUTOMATED COUNT: 12.1 % (ref 12.3–15.4)
GFR SERPL CREATININE-BSD FRML MDRD: 106 ML/MIN/1.73
GLOBULIN UR ELPH-MCNC: 3.1 GM/DL
GLUCOSE SERPL-MCNC: 94 MG/DL (ref 65–99)
HCO3 BLDA-SCNC: 26.7 MMOL/L (ref 20–26)
HCT VFR BLD AUTO: 45.6 % (ref 37.5–51)
HGB BLD-MCNC: 16.1 G/DL (ref 13–17.7)
IMM GRANULOCYTES # BLD AUTO: 0.04 10*3/MM3 (ref 0–0.05)
IMM GRANULOCYTES NFR BLD AUTO: 0.4 % (ref 0–0.5)
INR PPP: 0.94 (ref 0.91–1.09)
LYMPHOCYTES # BLD AUTO: 2.21 10*3/MM3 (ref 0.7–3.1)
LYMPHOCYTES NFR BLD AUTO: 24.6 % (ref 19.6–45.3)
Lab: ABNORMAL
MCH RBC QN AUTO: 32.9 PG (ref 26.6–33)
MCHC RBC AUTO-ENTMCNC: 35.3 G/DL (ref 31.5–35.7)
MCV RBC AUTO: 93.1 FL (ref 79–97)
MODALITY: ABNORMAL
MONOCYTES # BLD AUTO: 0.67 10*3/MM3 (ref 0.1–0.9)
MONOCYTES NFR BLD AUTO: 7.5 % (ref 5–12)
NEUTROPHILS NFR BLD AUTO: 5.88 10*3/MM3 (ref 1.7–7)
NEUTROPHILS NFR BLD AUTO: 65.6 % (ref 42.7–76)
NRBC BLD AUTO-RTO: 0 /100 WBC (ref 0–0.2)
PCO2 BLDA: 39.1 MM HG (ref 35–45)
PCO2 TEMP ADJ BLD: 39.1 MM HG (ref 35–45)
PH BLDA: 7.44 PH UNITS (ref 7.35–7.45)
PH, TEMP CORRECTED: 7.44 PH UNITS (ref 7.35–7.45)
PLATELET # BLD AUTO: 165 10*3/MM3 (ref 140–450)
PMV BLD AUTO: 11.5 FL (ref 6–12)
PO2 BLDA: 69.2 MM HG (ref 83–108)
PO2 TEMP ADJ BLD: 69.2 MM HG (ref 83–108)
POTASSIUM SERPL-SCNC: 4.4 MMOL/L (ref 3.5–5.2)
PROT SERPL-MCNC: 7.4 G/DL (ref 6–8.5)
PROTHROMBIN TIME: 12.2 SECONDS (ref 11.9–14.6)
RBC # BLD AUTO: 4.9 10*6/MM3 (ref 4.14–5.8)
RH BLD: POSITIVE
SAO2 % BLDCOA: 95.7 % (ref 94–99)
SARS-COV-2 ORF1AB RESP QL NAA+PROBE: NOT DETECTED
SODIUM SERPL-SCNC: 139 MMOL/L (ref 136–145)
T&S EXPIRATION DATE: NORMAL
VENTILATOR MODE: ABNORMAL
WBC # BLD AUTO: 8.97 10*3/MM3 (ref 3.4–10.8)

## 2021-02-12 PROCEDURE — 82803 BLOOD GASES ANY COMBINATION: CPT | Performed by: THORACIC SURGERY (CARDIOTHORACIC VASCULAR SURGERY)

## 2021-02-12 PROCEDURE — 85610 PROTHROMBIN TIME: CPT

## 2021-02-12 PROCEDURE — U0004 COV-19 TEST NON-CDC HGH THRU: HCPCS | Performed by: THORACIC SURGERY (CARDIOTHORACIC VASCULAR SURGERY)

## 2021-02-12 PROCEDURE — 71046 X-RAY EXAM CHEST 2 VIEWS: CPT

## 2021-02-12 PROCEDURE — 85730 THROMBOPLASTIN TIME PARTIAL: CPT

## 2021-02-12 PROCEDURE — C9803 HOPD COVID-19 SPEC COLLECT: HCPCS | Performed by: THORACIC SURGERY (CARDIOTHORACIC VASCULAR SURGERY)

## 2021-02-12 PROCEDURE — 36415 COLL VENOUS BLD VENIPUNCTURE: CPT

## 2021-02-12 PROCEDURE — 93010 ELECTROCARDIOGRAM REPORT: CPT | Performed by: INTERNAL MEDICINE

## 2021-02-12 PROCEDURE — 80053 COMPREHEN METABOLIC PANEL: CPT

## 2021-02-12 PROCEDURE — 85025 COMPLETE CBC W/AUTO DIFF WBC: CPT

## 2021-02-12 PROCEDURE — 86850 RBC ANTIBODY SCREEN: CPT

## 2021-02-12 PROCEDURE — 86901 BLOOD TYPING SEROLOGIC RH(D): CPT

## 2021-02-12 PROCEDURE — U0005 INFEC AGEN DETEC AMPLI PROBE: HCPCS | Performed by: THORACIC SURGERY (CARDIOTHORACIC VASCULAR SURGERY)

## 2021-02-12 PROCEDURE — 36600 WITHDRAWAL OF ARTERIAL BLOOD: CPT | Performed by: THORACIC SURGERY (CARDIOTHORACIC VASCULAR SURGERY)

## 2021-02-12 PROCEDURE — 93005 ELECTROCARDIOGRAM TRACING: CPT

## 2021-02-12 PROCEDURE — 86900 BLOOD TYPING SEROLOGIC ABO: CPT

## 2021-02-13 LAB
QT INTERVAL: 394 MS
QTC INTERVAL: 406 MS

## 2021-02-15 ENCOUNTER — APPOINTMENT (OUTPATIENT)
Dept: GENERAL RADIOLOGY | Facility: HOSPITAL | Age: 71
End: 2021-02-15

## 2021-02-15 ENCOUNTER — HOSPITAL ENCOUNTER (INPATIENT)
Facility: HOSPITAL | Age: 71
LOS: 4 days | Discharge: HOME | End: 2021-02-19
Attending: THORACIC SURGERY (CARDIOTHORACIC VASCULAR SURGERY) | Admitting: THORACIC SURGERY (CARDIOTHORACIC VASCULAR SURGERY)

## 2021-02-15 ENCOUNTER — ANESTHESIA (OUTPATIENT)
Dept: PERIOP | Facility: HOSPITAL | Age: 71
End: 2021-02-15

## 2021-02-15 DIAGNOSIS — C34.11 MALIGNANT NEOPLASM OF UPPER LOBE OF RIGHT LUNG (HCC): ICD-10-CM

## 2021-02-15 DIAGNOSIS — R91.1 RIGHT UPPER LOBE PULMONARY NODULE: Primary | ICD-10-CM

## 2021-02-15 LAB — GLUCOSE BLDC GLUCOMTR-MCNC: 92 MG/DL (ref 70–130)

## 2021-02-15 PROCEDURE — 25010000002 DEXAMETHASONE PER 1 MG: Performed by: ANESTHESIOLOGY

## 2021-02-15 PROCEDURE — 32666 THORACOSCOPY W/WEDGE RESECT: CPT | Performed by: THORACIC SURGERY (CARDIOTHORACIC VASCULAR SURGERY)

## 2021-02-15 PROCEDURE — 88307 TISSUE EXAM BY PATHOLOGIST: CPT | Performed by: THORACIC SURGERY (CARDIOTHORACIC VASCULAR SURGERY)

## 2021-02-15 PROCEDURE — 0BBC4ZZ EXCISION OF RIGHT UPPER LUNG LOBE, PERCUTANEOUS ENDOSCOPIC APPROACH: ICD-10-PCS | Performed by: THORACIC SURGERY (CARDIOTHORACIC VASCULAR SURGERY)

## 2021-02-15 PROCEDURE — 39402 MEDIASTINOSCPY W/LMPH NOD BX: CPT | Performed by: THORACIC SURGERY (CARDIOTHORACIC VASCULAR SURGERY)

## 2021-02-15 PROCEDURE — C1729 CATH, DRAINAGE: HCPCS | Performed by: THORACIC SURGERY (CARDIOTHORACIC VASCULAR SURGERY)

## 2021-02-15 PROCEDURE — 25010000003 HYDROMORPHONE HCL PF 50 MG/5ML SOLUTION: Performed by: THORACIC SURGERY (CARDIOTHORACIC VASCULAR SURGERY)

## 2021-02-15 PROCEDURE — 25010000002 DEXAMETHASONE PER 1 MG: Performed by: NURSE ANESTHETIST, CERTIFIED REGISTERED

## 2021-02-15 PROCEDURE — 94799 UNLISTED PULMONARY SVC/PX: CPT

## 2021-02-15 PROCEDURE — 88341 IMHCHEM/IMCYTCHM EA ADD ANTB: CPT | Performed by: THORACIC SURGERY (CARDIOTHORACIC VASCULAR SURGERY)

## 2021-02-15 PROCEDURE — 88305 TISSUE EXAM BY PATHOLOGIST: CPT | Performed by: THORACIC SURGERY (CARDIOTHORACIC VASCULAR SURGERY)

## 2021-02-15 PROCEDURE — 88342 IMHCHEM/IMCYTCHM 1ST ANTB: CPT | Performed by: THORACIC SURGERY (CARDIOTHORACIC VASCULAR SURGERY)

## 2021-02-15 PROCEDURE — 94640 AIRWAY INHALATION TREATMENT: CPT

## 2021-02-15 PROCEDURE — 31622 DX BRONCHOSCOPE/WASH: CPT | Performed by: THORACIC SURGERY (CARDIOTHORACIC VASCULAR SURGERY)

## 2021-02-15 PROCEDURE — 0BJ08ZZ INSPECTION OF TRACHEOBRONCHIAL TREE, VIA NATURAL OR ARTIFICIAL OPENING ENDOSCOPIC: ICD-10-PCS | Performed by: THORACIC SURGERY (CARDIOTHORACIC VASCULAR SURGERY)

## 2021-02-15 PROCEDURE — 25010000002 PROPOFOL 10 MG/ML EMULSION: Performed by: NURSE ANESTHETIST, CERTIFIED REGISTERED

## 2021-02-15 PROCEDURE — 82962 GLUCOSE BLOOD TEST: CPT

## 2021-02-15 PROCEDURE — 88331 PATH CONSLTJ SURG 1 BLK 1SPC: CPT | Performed by: THORACIC SURGERY (CARDIOTHORACIC VASCULAR SURGERY)

## 2021-02-15 PROCEDURE — 88313 SPECIAL STAINS GROUP 2: CPT | Performed by: THORACIC SURGERY (CARDIOTHORACIC VASCULAR SURGERY)

## 2021-02-15 PROCEDURE — 71045 X-RAY EXAM CHEST 1 VIEW: CPT

## 2021-02-15 PROCEDURE — 25010000002 ONDANSETRON PER 1 MG: Performed by: NURSE ANESTHETIST, CERTIFIED REGISTERED

## 2021-02-15 PROCEDURE — C2615 SEALANT, PULMONARY, LIQUID: HCPCS | Performed by: THORACIC SURGERY (CARDIOTHORACIC VASCULAR SURGERY)

## 2021-02-15 PROCEDURE — 25010000002 CEFAZOLIN PER 500 MG: Performed by: NURSE PRACTITIONER

## 2021-02-15 PROCEDURE — 25010000002 CEFAZOLIN PER 500 MG: Performed by: THORACIC SURGERY (CARDIOTHORACIC VASCULAR SURGERY)

## 2021-02-15 DEVICE — ENDOPATH ECHELON ENDOSCOPIC LINEAR CUTTER RELOADS, GREEN, 60MM
Type: IMPLANTABLE DEVICE | Site: LUNG | Status: FUNCTIONAL
Brand: ECHELON ENDOPATH

## 2021-02-15 DEVICE — SEALANT PLURAL AIRLEAK PROGEL W/APPL 4ML: Type: IMPLANTABLE DEVICE | Site: LUNG | Status: FUNCTIONAL

## 2021-02-15 DEVICE — ENDOPATH ECHELON ENDOSCOPIC LINEAR CUTTER RELOADS, BLACK, 60MM
Type: IMPLANTABLE DEVICE | Site: LUNG | Status: FUNCTIONAL
Brand: ECHELON ENDOPATH

## 2021-02-15 DEVICE — ENDOPATH ECHELON ENDOSCOPIC LINEAR CUTTER RELOADS, BLUE, 60MM
Type: IMPLANTABLE DEVICE | Site: LUNG | Status: FUNCTIONAL
Brand: ECHELON ENDOPATH

## 2021-02-15 RX ORDER — NALOXONE HCL 0.4 MG/ML
0.04 VIAL (ML) INJECTION AS NEEDED
Status: DISCONTINUED | OUTPATIENT
Start: 2021-02-15 | End: 2021-02-15 | Stop reason: HOSPADM

## 2021-02-15 RX ORDER — LIDOCAINE HYDROCHLORIDE 10 MG/ML
0.5 INJECTION, SOLUTION EPIDURAL; INFILTRATION; INTRACAUDAL; PERINEURAL ONCE AS NEEDED
Status: DISCONTINUED | OUTPATIENT
Start: 2021-02-15 | End: 2021-02-15 | Stop reason: HOSPADM

## 2021-02-15 RX ORDER — ACETAMINOPHEN 325 MG/1
650 TABLET ORAL EVERY 4 HOURS PRN
Status: DISCONTINUED | OUTPATIENT
Start: 2021-02-15 | End: 2021-02-19 | Stop reason: HOSPADM

## 2021-02-15 RX ORDER — GABAPENTIN 100 MG/1
100 CAPSULE ORAL 3 TIMES DAILY
Status: DISCONTINUED | OUTPATIENT
Start: 2021-02-15 | End: 2021-02-19 | Stop reason: HOSPADM

## 2021-02-15 RX ORDER — ACETYLCYSTEINE 200 MG/ML
3 SOLUTION ORAL; RESPIRATORY (INHALATION)
Status: COMPLETED | OUTPATIENT
Start: 2021-02-15 | End: 2021-02-17

## 2021-02-15 RX ORDER — IBUPROFEN 600 MG/1
600 TABLET ORAL ONCE AS NEEDED
Status: DISCONTINUED | OUTPATIENT
Start: 2021-02-15 | End: 2021-02-15 | Stop reason: HOSPADM

## 2021-02-15 RX ORDER — LIDOCAINE 50 MG/G
2 PATCH TOPICAL
Status: DISCONTINUED | OUTPATIENT
Start: 2021-02-15 | End: 2021-02-16

## 2021-02-15 RX ORDER — SODIUM CHLORIDE 0.9 % (FLUSH) 0.9 %
10 SYRINGE (ML) INJECTION AS NEEDED
Status: DISCONTINUED | OUTPATIENT
Start: 2021-02-15 | End: 2021-02-15 | Stop reason: HOSPADM

## 2021-02-15 RX ORDER — LABETALOL HYDROCHLORIDE 5 MG/ML
5 INJECTION, SOLUTION INTRAVENOUS
Status: DISCONTINUED | OUTPATIENT
Start: 2021-02-15 | End: 2021-02-15 | Stop reason: HOSPADM

## 2021-02-15 RX ORDER — BISACODYL 10 MG
10 SUPPOSITORY, RECTAL RECTAL DAILY PRN
Status: DISCONTINUED | OUTPATIENT
Start: 2021-02-15 | End: 2021-02-19 | Stop reason: HOSPADM

## 2021-02-15 RX ORDER — ONDANSETRON 4 MG/1
4 TABLET, FILM COATED ORAL EVERY 6 HOURS PRN
Status: DISCONTINUED | OUTPATIENT
Start: 2021-02-15 | End: 2021-02-19 | Stop reason: HOSPADM

## 2021-02-15 RX ORDER — SUFENTANIL CITRATE 50 UG/ML
INJECTION EPIDURAL; INTRAVENOUS AS NEEDED
Status: DISCONTINUED | OUTPATIENT
Start: 2021-02-15 | End: 2021-02-15 | Stop reason: SURG

## 2021-02-15 RX ORDER — IPRATROPIUM BROMIDE AND ALBUTEROL SULFATE 2.5; .5 MG/3ML; MG/3ML
3 SOLUTION RESPIRATORY (INHALATION)
Status: COMPLETED | OUTPATIENT
Start: 2021-02-15 | End: 2021-02-17

## 2021-02-15 RX ORDER — NALOXONE HCL 0.4 MG/ML
0.1 VIAL (ML) INJECTION
Status: DISCONTINUED | OUTPATIENT
Start: 2021-02-15 | End: 2021-02-19 | Stop reason: HOSPADM

## 2021-02-15 RX ORDER — SODIUM CHLORIDE 0.9 % (FLUSH) 0.9 %
3 SYRINGE (ML) INJECTION AS NEEDED
Status: DISCONTINUED | OUTPATIENT
Start: 2021-02-15 | End: 2021-02-15 | Stop reason: HOSPADM

## 2021-02-15 RX ORDER — FENTANYL CITRATE 50 UG/ML
25 INJECTION, SOLUTION INTRAMUSCULAR; INTRAVENOUS
Status: DISCONTINUED | OUTPATIENT
Start: 2021-02-15 | End: 2021-02-15 | Stop reason: HOSPADM

## 2021-02-15 RX ORDER — DEXAMETHASONE SODIUM PHOSPHATE 4 MG/ML
INJECTION, SOLUTION INTRA-ARTICULAR; INTRALESIONAL; INTRAMUSCULAR; INTRAVENOUS; SOFT TISSUE AS NEEDED
Status: DISCONTINUED | OUTPATIENT
Start: 2021-02-15 | End: 2021-02-15 | Stop reason: SURG

## 2021-02-15 RX ORDER — ATORVASTATIN CALCIUM 10 MG/1
10 TABLET, FILM COATED ORAL DAILY
Status: DISCONTINUED | OUTPATIENT
Start: 2021-02-15 | End: 2021-02-19 | Stop reason: HOSPADM

## 2021-02-15 RX ORDER — BUPIVACAINE HCL/0.9 % NACL/PF 0.1 %
2 PLASTIC BAG, INJECTION (ML) EPIDURAL EVERY 8 HOURS
Status: COMPLETED | OUTPATIENT
Start: 2021-02-15 | End: 2021-02-16

## 2021-02-15 RX ORDER — SODIUM CHLORIDE 0.9 % (FLUSH) 0.9 %
3 SYRINGE (ML) INJECTION EVERY 12 HOURS SCHEDULED
Status: DISCONTINUED | OUTPATIENT
Start: 2021-02-15 | End: 2021-02-15 | Stop reason: HOSPADM

## 2021-02-15 RX ORDER — OXYCODONE AND ACETAMINOPHEN 10; 325 MG/1; MG/1
1 TABLET ORAL ONCE AS NEEDED
Status: DISCONTINUED | OUTPATIENT
Start: 2021-02-15 | End: 2021-02-15 | Stop reason: HOSPADM

## 2021-02-15 RX ORDER — DOCUSATE SODIUM 100 MG/1
100 CAPSULE, LIQUID FILLED ORAL 2 TIMES DAILY
Status: DISCONTINUED | OUTPATIENT
Start: 2021-02-15 | End: 2021-02-19 | Stop reason: HOSPADM

## 2021-02-15 RX ORDER — KETAMINE HYDROCHLORIDE 50 MG/ML
INJECTION, SOLUTION, CONCENTRATE INTRAMUSCULAR; INTRAVENOUS AS NEEDED
Status: DISCONTINUED | OUTPATIENT
Start: 2021-02-15 | End: 2021-02-15 | Stop reason: SURG

## 2021-02-15 RX ORDER — DEXAMETHASONE SODIUM PHOSPHATE 4 MG/ML
4 INJECTION, SOLUTION INTRA-ARTICULAR; INTRALESIONAL; INTRAMUSCULAR; INTRAVENOUS; SOFT TISSUE ONCE AS NEEDED
Status: COMPLETED | OUTPATIENT
Start: 2021-02-15 | End: 2021-02-15

## 2021-02-15 RX ORDER — PROPOFOL 10 MG/ML
VIAL (ML) INTRAVENOUS AS NEEDED
Status: DISCONTINUED | OUTPATIENT
Start: 2021-02-15 | End: 2021-02-15 | Stop reason: SURG

## 2021-02-15 RX ORDER — OXYCODONE HYDROCHLORIDE AND ACETAMINOPHEN 5; 325 MG/1; MG/1
1 TABLET ORAL
Status: DISCONTINUED | OUTPATIENT
Start: 2021-02-15 | End: 2021-02-19 | Stop reason: HOSPADM

## 2021-02-15 RX ORDER — SODIUM CHLORIDE, SODIUM LACTATE, POTASSIUM CHLORIDE, CALCIUM CHLORIDE 600; 310; 30; 20 MG/100ML; MG/100ML; MG/100ML; MG/100ML
9 INJECTION, SOLUTION INTRAVENOUS CONTINUOUS
Status: DISCONTINUED | OUTPATIENT
Start: 2021-02-15 | End: 2021-02-15 | Stop reason: HOSPADM

## 2021-02-15 RX ORDER — BUPIVACAINE HCL/0.9 % NACL/PF 0.1 %
2 PLASTIC BAG, INJECTION (ML) EPIDURAL ONCE
Status: COMPLETED | OUTPATIENT
Start: 2021-02-15 | End: 2021-02-15

## 2021-02-15 RX ORDER — ALBUTEROL SULFATE 1.25 MG/3ML
1.25 SOLUTION RESPIRATORY (INHALATION)
Status: DISCONTINUED | OUTPATIENT
Start: 2021-02-15 | End: 2021-02-15 | Stop reason: SDUPTHER

## 2021-02-15 RX ORDER — POLYETHYLENE GLYCOL 3350 17 G/17G
17 POWDER, FOR SOLUTION ORAL DAILY
Status: DISCONTINUED | OUTPATIENT
Start: 2021-02-15 | End: 2021-02-19 | Stop reason: HOSPADM

## 2021-02-15 RX ORDER — SODIUM CHLORIDE 9 MG/ML
20 INJECTION, SOLUTION INTRAVENOUS CONTINUOUS
Status: DISCONTINUED | OUTPATIENT
Start: 2021-02-15 | End: 2021-02-17

## 2021-02-15 RX ORDER — ONDANSETRON 2 MG/ML
INJECTION INTRAMUSCULAR; INTRAVENOUS AS NEEDED
Status: DISCONTINUED | OUTPATIENT
Start: 2021-02-15 | End: 2021-02-15 | Stop reason: SURG

## 2021-02-15 RX ORDER — ALBUTEROL SULFATE 1.25 MG/3ML
1.25 SOLUTION RESPIRATORY (INHALATION)
Status: DISCONTINUED | OUTPATIENT
Start: 2021-02-15 | End: 2021-02-15 | Stop reason: HOSPADM

## 2021-02-15 RX ORDER — SODIUM CHLORIDE 0.9 % (FLUSH) 0.9 %
10 SYRINGE (ML) INJECTION EVERY 12 HOURS SCHEDULED
Status: DISCONTINUED | OUTPATIENT
Start: 2021-02-15 | End: 2021-02-15 | Stop reason: HOSPADM

## 2021-02-15 RX ORDER — SODIUM CHLORIDE, SODIUM LACTATE, POTASSIUM CHLORIDE, CALCIUM CHLORIDE 600; 310; 30; 20 MG/100ML; MG/100ML; MG/100ML; MG/100ML
1000 INJECTION, SOLUTION INTRAVENOUS CONTINUOUS
Status: DISCONTINUED | OUTPATIENT
Start: 2021-02-15 | End: 2021-02-15 | Stop reason: HOSPADM

## 2021-02-15 RX ORDER — ONDANSETRON 2 MG/ML
4 INJECTION INTRAMUSCULAR; INTRAVENOUS EVERY 6 HOURS PRN
Status: DISCONTINUED | OUTPATIENT
Start: 2021-02-15 | End: 2021-02-19 | Stop reason: HOSPADM

## 2021-02-15 RX ORDER — MAGNESIUM HYDROXIDE 1200 MG/15ML
LIQUID ORAL AS NEEDED
Status: DISCONTINUED | OUTPATIENT
Start: 2021-02-15 | End: 2021-02-15 | Stop reason: HOSPADM

## 2021-02-15 RX ORDER — NAPROXEN 500 MG/1
250 TABLET ORAL 2 TIMES DAILY WITH MEALS
Status: DISCONTINUED | OUTPATIENT
Start: 2021-02-15 | End: 2021-02-19 | Stop reason: HOSPADM

## 2021-02-15 RX ORDER — ACETAMINOPHEN 325 MG/1
650 TABLET ORAL EVERY 6 HOURS
Status: DISCONTINUED | OUTPATIENT
Start: 2021-02-15 | End: 2021-02-19 | Stop reason: HOSPADM

## 2021-02-15 RX ORDER — ROCURONIUM BROMIDE 10 MG/ML
INJECTION, SOLUTION INTRAVENOUS AS NEEDED
Status: DISCONTINUED | OUTPATIENT
Start: 2021-02-15 | End: 2021-02-15 | Stop reason: SURG

## 2021-02-15 RX ORDER — NEOSTIGMINE METHYLSULFATE 5 MG/5 ML
SYRINGE (ML) INTRAVENOUS AS NEEDED
Status: DISCONTINUED | OUTPATIENT
Start: 2021-02-15 | End: 2021-02-15 | Stop reason: SURG

## 2021-02-15 RX ORDER — DEXTROSE MONOHYDRATE 25 G/50ML
12.5 INJECTION, SOLUTION INTRAVENOUS AS NEEDED
Status: DISCONTINUED | OUTPATIENT
Start: 2021-02-15 | End: 2021-02-15 | Stop reason: HOSPADM

## 2021-02-15 RX ORDER — FLUMAZENIL 0.1 MG/ML
0.2 INJECTION INTRAVENOUS AS NEEDED
Status: DISCONTINUED | OUTPATIENT
Start: 2021-02-15 | End: 2021-02-15 | Stop reason: HOSPADM

## 2021-02-15 RX ORDER — HYDROMORPHONE HYDROCHLORIDE 1 MG/ML
0.5 INJECTION, SOLUTION INTRAMUSCULAR; INTRAVENOUS; SUBCUTANEOUS
Status: DISCONTINUED | OUTPATIENT
Start: 2021-02-15 | End: 2021-02-15 | Stop reason: HOSPADM

## 2021-02-15 RX ORDER — LIDOCAINE HYDROCHLORIDE 20 MG/ML
INJECTION, SOLUTION EPIDURAL; INFILTRATION; INTRACAUDAL; PERINEURAL AS NEEDED
Status: DISCONTINUED | OUTPATIENT
Start: 2021-02-15 | End: 2021-02-15 | Stop reason: SURG

## 2021-02-15 RX ORDER — ONDANSETRON 2 MG/ML
4 INJECTION INTRAMUSCULAR; INTRAVENOUS AS NEEDED
Status: DISCONTINUED | OUTPATIENT
Start: 2021-02-15 | End: 2021-02-15 | Stop reason: HOSPADM

## 2021-02-15 RX ADMIN — OXYCODONE HYDROCHLORIDE AND ACETAMINOPHEN 1 TABLET: 5; 325 TABLET ORAL at 20:29

## 2021-02-15 RX ADMIN — ACETYLCYSTEINE 1 ML: 200 SOLUTION ORAL; RESPIRATORY (INHALATION) at 22:40

## 2021-02-15 RX ADMIN — Medication 3 MG: at 11:43

## 2021-02-15 RX ADMIN — ACETAMINOPHEN 650 MG: 325 TABLET, FILM COATED ORAL at 17:06

## 2021-02-15 RX ADMIN — SODIUM CHLORIDE, POTASSIUM CHLORIDE, SODIUM LACTATE AND CALCIUM CHLORIDE: 600; 310; 30; 20 INJECTION, SOLUTION INTRAVENOUS at 11:47

## 2021-02-15 RX ADMIN — HYDROMORPHONE HYDROCHLORIDE: 10 INJECTION, SOLUTION INTRAMUSCULAR; INTRAVENOUS; SUBCUTANEOUS at 14:26

## 2021-02-15 RX ADMIN — GABAPENTIN 100 MG: 100 CAPSULE ORAL at 16:51

## 2021-02-15 RX ADMIN — OXYCODONE HYDROCHLORIDE AND ACETAMINOPHEN 1 TABLET: 5; 325 TABLET ORAL at 17:06

## 2021-02-15 RX ADMIN — CEFAZOLIN SODIUM 2 G: 10 INJECTION, POWDER, FOR SOLUTION INTRAVENOUS at 08:45

## 2021-02-15 RX ADMIN — VASOPRESSIN 0.5 ML: 20 INJECTION INTRAVENOUS at 11:36

## 2021-02-15 RX ADMIN — ROCURONIUM BROMIDE 30 MG: 10 INJECTION INTRAVENOUS at 09:23

## 2021-02-15 RX ADMIN — GABAPENTIN 100 MG: 100 CAPSULE ORAL at 20:30

## 2021-02-15 RX ADMIN — VASOPRESSIN 0.75 ML: 20 INJECTION INTRAVENOUS at 10:57

## 2021-02-15 RX ADMIN — SODIUM CHLORIDE 80 ML/HR: 9 INJECTION, SOLUTION INTRAVENOUS at 14:25

## 2021-02-15 RX ADMIN — PROPOFOL 50 MG: 10 INJECTION, EMULSION INTRAVENOUS at 08:41

## 2021-02-15 RX ADMIN — IPRATROPIUM BROMIDE AND ALBUTEROL SULFATE 3 ML: 2.5; .5 SOLUTION RESPIRATORY (INHALATION) at 22:39

## 2021-02-15 RX ADMIN — NAPROXEN 250 MG: 500 TABLET ORAL at 17:06

## 2021-02-15 RX ADMIN — SUFENTANIL CITRATE 10 MCG: 50 INJECTION EPIDURAL; INTRAVENOUS at 11:11

## 2021-02-15 RX ADMIN — SUFENTANIL CITRATE 20 MCG: 50 INJECTION EPIDURAL; INTRAVENOUS at 11:24

## 2021-02-15 RX ADMIN — GLYCOPYRROLATE 0.4 MG: 0.2 INJECTION, SOLUTION INTRAMUSCULAR; INTRAVENOUS at 11:43

## 2021-02-15 RX ADMIN — SODIUM CHLORIDE, POTASSIUM CHLORIDE, SODIUM LACTATE AND CALCIUM CHLORIDE: 600; 310; 30; 20 INJECTION, SOLUTION INTRAVENOUS at 08:38

## 2021-02-15 RX ADMIN — LIDOCAINE HYDROCHLORIDE 100 MG: 20 INJECTION, SOLUTION EPIDURAL; INFILTRATION; INTRACAUDAL; PERINEURAL at 08:38

## 2021-02-15 RX ADMIN — METOPROLOL TARTRATE 12.5 MG: 25 TABLET, FILM COATED ORAL at 20:30

## 2021-02-15 RX ADMIN — KETAMINE HYDROCHLORIDE 50 MG: 50 INJECTION, SOLUTION INTRAMUSCULAR; INTRAVENOUS at 09:05

## 2021-02-15 RX ADMIN — ROCURONIUM BROMIDE 10 MG: 10 INJECTION INTRAVENOUS at 10:14

## 2021-02-15 RX ADMIN — PROPOFOL 150 MG: 10 INJECTION, EMULSION INTRAVENOUS at 08:38

## 2021-02-15 RX ADMIN — ACETYLCYSTEINE 3 ML: 200 SOLUTION ORAL; RESPIRATORY (INHALATION) at 14:48

## 2021-02-15 RX ADMIN — DOCUSATE SODIUM 100 MG: 100 CAPSULE ORAL at 20:30

## 2021-02-15 RX ADMIN — DEXAMETHASONE SODIUM PHOSPHATE 4 MG: 4 INJECTION, SOLUTION INTRAMUSCULAR; INTRAVENOUS at 08:17

## 2021-02-15 RX ADMIN — VASOPRESSIN 0.5 ML: 20 INJECTION INTRAVENOUS at 09:38

## 2021-02-15 RX ADMIN — KETAMINE HYDROCHLORIDE 50 MG: 50 INJECTION, SOLUTION INTRAMUSCULAR; INTRAVENOUS at 10:06

## 2021-02-15 RX ADMIN — SUFENTANIL CITRATE 20 MCG: 50 INJECTION EPIDURAL; INTRAVENOUS at 08:38

## 2021-02-15 RX ADMIN — DEXAMETHASONE SODIUM PHOSPHATE 8 MG: 4 INJECTION, SOLUTION INTRAMUSCULAR; INTRAVENOUS at 08:59

## 2021-02-15 RX ADMIN — ONDANSETRON HYDROCHLORIDE 4 MG: 2 SOLUTION INTRAMUSCULAR; INTRAVENOUS at 11:41

## 2021-02-15 RX ADMIN — CEFAZOLIN SODIUM 2 G: 10 INJECTION, POWDER, FOR SOLUTION INTRAVENOUS at 16:51

## 2021-02-15 RX ADMIN — SUFENTANIL CITRATE 20 MCG: 50 INJECTION EPIDURAL; INTRAVENOUS at 09:23

## 2021-02-15 RX ADMIN — LIDOCAINE 2 PATCH: 50 PATCH CUTANEOUS at 14:39

## 2021-02-15 RX ADMIN — VASOPRESSIN 0.25 ML: 20 INJECTION INTRAVENOUS at 09:49

## 2021-02-15 RX ADMIN — VASOPRESSIN 0.25 ML: 20 INJECTION INTRAVENOUS at 10:10

## 2021-02-15 RX ADMIN — VASOPRESSIN 1 ML: 20 INJECTION INTRAVENOUS at 11:26

## 2021-02-15 RX ADMIN — IPRATROPIUM BROMIDE AND ALBUTEROL SULFATE 3 ML: 2.5; .5 SOLUTION RESPIRATORY (INHALATION) at 14:48

## 2021-02-15 RX ADMIN — VASOPRESSIN 0.25 ML: 20 INJECTION INTRAVENOUS at 10:36

## 2021-02-15 RX ADMIN — ROCURONIUM BROMIDE 50 MG: 10 INJECTION INTRAVENOUS at 08:39

## 2021-02-15 RX ADMIN — ROCURONIUM BROMIDE 10 MG: 10 INJECTION INTRAVENOUS at 10:57

## 2021-02-15 NOTE — ANESTHESIA POSTPROCEDURE EVALUATION
"Patient: Se Ramirez    Procedure Summary     Date: 02/15/21 Room / Location:  [facility] OR 15 /  [facility] OR    Anesthesia Start: 0830 Anesthesia Stop: 1158    Procedures:       BRONCHOSCOPY (N/A Bronchus)      THORACOSCOPY, WEDGE RESECTION OF RIGHT UPPER LOBE WITH FROZEN,  , MEDIASTINAL LYMPH NODE DISSECTION (Right Chest)      CERVICAL MEDIASTINOSCOPY WITH LYMPH NODE DISECTION (N/A Chest) Diagnosis:       Right upper lobe pulmonary nodule      (Right upper lobe pulmonary nodule [R91.1])    Surgeon: Bruce Murillo MD Provider: Wander Caban CRNA    Anesthesia Type: general ASA Status: 3          Anesthesia Type: general    Vitals  Vitals Value Taken Time   /85 02/15/21 1241   Temp 98.9 °F (37.2 °C) 02/15/21 1236   Pulse 81 02/15/21 1242   Resp 18 02/15/21 1236   SpO2 98 % 02/15/21 1242   Vitals shown include unvalidated device data.        Post Anesthesia Care and Evaluation    PONV Status: none  Comments: Patient d/c from PACU prior to anes eval based on Ara score.  Please see RN notes for details of d/c criteria.    Blood pressure 128/69, pulse 72, temperature 99 °F (37.2 °C), temperature source Oral, resp. rate 18, height 177.5 cm (69.88\"), weight 84.6 kg (186 lb 8.2 oz), SpO2 95 %.          "

## 2021-02-15 NOTE — ANESTHESIA PREPROCEDURE EVALUATION
Anesthesia Evaluation     Patient summary reviewed   no history of anesthetic complications:  NPO Solid Status: > 8 hours             Airway   Mallampati: II  TM distance: >3 FB  Neck ROM: full  Dental    (+) upper dentures and lower dentures    Pulmonary    (+) a smoker, COPD,   (-) asthma, sleep apnea  Cardiovascular   Exercise tolerance: good (4-7 METS)    ECG reviewed    (+) hyperlipidemia,   (-) pacemaker, past MI, angina, cardiac stents      Neuro/Psych  (-) seizures, TIA, CVA  GI/Hepatic/Renal/Endo    (+)  GERD,  hepatitis C,   (-) liver disease, no renal disease, diabetes    Musculoskeletal     Abdominal    Substance History      OB/GYN          Other                      Anesthesia Plan    ASA 3     general     intravenous induction     Anesthetic plan, all risks, benefits, and alternatives have been provided, discussed and informed consent has been obtained with: patient.

## 2021-02-15 NOTE — ANESTHESIA PROCEDURE NOTES
Airway  Urgency: elective    Date/Time: 2/15/2021 8:43 AM  Airway not difficult    General Information and Staff    Patient location during procedure: OR  CRNA: Wander Caban CRNA    Indications and Patient Condition  Indications for airway management: airway protection    Preoxygenated: yes  Mask difficulty assessment: 2 - vent by mask + OA or adjuvant +/- NMBA    Final Airway Details  Final airway type: endotracheal airway      Successful airway: EBT - double lumen left  Cuffed: yes   Successful intubation technique: direct laryngoscopy  Endotracheal tube insertion site: oral  Blade: Strickland  Blade size: 3  EBT DL size (fr): 39  Cormack-Lehane Classification: grade IIa - partial view of glottis  Placement verified by: chest auscultation and capnometry   Measured from: lips  ETT/EBT  to lips (cm): 31  Number of attempts at approach: 2  Assessment: lips, teeth, and gum same as pre-op and atraumatic intubation    Additional Comments  Unable to obtain view of glottis with MAC 3.5. Grade 2a view with Strickland #3

## 2021-02-15 NOTE — PROGRESS NOTES
Chief Complaint   Patient presents with   • Lung Nodule     New pt from Dr Holbrook          Subjective     History of Present Illness    Mr. Ramirez is a 70-year-old male with a history of COPD, hepatitis C, hyperlipidemia, and chronic tobacco use presents for evaluation for surgical resection of a 2.5 cm right upper lobe anterior segment lung nodule noted on a CT scan obtained in December 2020.  He had been seen by Dr. Nakul Holbrook with a PET scan obtained at [facility].  The lesion has a max SUV of 18.8.  No other hypermetabolic sites are noted.  He has shortness of breath at baseline.  He does continue to smoke a pack per day. He denies unintended weight loss, hoarseness of voice, chest pain, hemoptysis, or history of pneumonia.  He does have a cough.  It is nonproductive.      Review of Systems   Constitutional: Positive for fatigue. Negative for activity change, appetite change, chills, diaphoresis, fever and unexpected weight change.   HENT: Negative for dental problem, hearing loss, nosebleeds, sore throat, trouble swallowing and voice change.    Eyes: Negative for photophobia, redness and visual disturbance.   Respiratory: Positive for shortness of breath. Negative for apnea, cough, chest tightness, wheezing and stridor.    Cardiovascular: Negative for chest pain, palpitations and leg swelling.   Gastrointestinal: Negative for abdominal distention, abdominal pain, blood in stool, constipation, diarrhea, nausea and vomiting.   Endocrine: Negative for cold intolerance, heat intolerance, polyphagia and polyuria.   Genitourinary: Negative for decreased urine volume, difficulty urinating, dysuria, flank pain, frequency, hematuria and urgency.   Musculoskeletal: Positive for arthralgias. Negative for back pain, gait problem, joint swelling, myalgias and neck pain.   Skin: Negative for pallor, rash and wound.   Allergic/Immunologic: Negative for immunocompromised state.   Neurological: Negative for dizziness,  tremors, seizures, syncope, speech difficulty, weakness, light-headedness, numbness and headaches.   Hematological: Does not bruise/bleed easily.   Psychiatric/Behavioral: Negative for confusion, sleep disturbance and suicidal ideas. The patient is not nervous/anxious.           Past Medical History:   Diagnosis Date   • Bronchitis    • COPD (chronic obstructive pulmonary disease) (CMS/HCC)    • Family history of colonic polyps    • GERD (gastroesophageal reflux disease)    • Hearing loss     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   • Hepatitis C    • History of adenomatous polyp of colon    • History of colon polyps    • Hyperlipidemia      Past Surgical History:   Procedure Laterality Date   • CATARACT EXTRACTION     • COLONOSCOPY  11/20/2012    One 1cm tubular adenomatous polyp in the sigmoid colon; One 5mm hyperplastic polyp in the rectum; Diverticulosis; Repeat 4 years    • COLONOSCOPY N/A 3/30/2017    Two 4-6mm tubular adenomatous polyps at the hepatic flexure; One 5mm tubular adenomatous polyp in the transverse colon; One 11mm tubular adenomatous polyp in the sigmoid colon; One 6mm tubular adenomatous polyp in the rectum; Diverticulosis in the left colon; Repeat 3 years    • COLONOSCOPY  12/17/2009    One less than 5mm tubular adenomatous polyp in the cecum; One 5mm hyperplastic polyp in the transverse; One less than 3mm hyperplastic polyp in the rectum; Diverticulosis; Repeat 3 years    • COLONOSCOPY N/A 7/2/2020    Procedure: COLONOSCOPY WITH ANESTHESIA;  Surgeon: Kat Licea MD;  Location: [facility] ENDOSCOPY;  Service: Gastroenterology;  Laterality: N/A;  pre: hx adenomatous colon polyp;  post: polyps  Diego Lucio MD   • ENDOSCOPY N/A 3/27/2019    Medium-sized HH; Normal stomach; Normal examined duodenum-biopsied   • FOOT SURGERY Left     ORIF   • HERNIA REPAIR     • INNER EAR SURGERY     • TONSILLECTOMY       Family History   Problem Relation Age of Onset   • Colon polyps Mother         Unknown  "age   • Colon cancer Neg Hx    • Esophageal cancer Neg Hx    • Liver cancer Neg Hx    • Liver disease Neg Hx    • Rectal cancer Neg Hx    • Stomach cancer Neg Hx      Social History     Tobacco Use   • Smoking status: Current Every Day Smoker     Packs/day: 1.00     Years: 40.00     Pack years: 40.00     Types: Cigarettes   • Smokeless tobacco: Never Used   Substance Use Topics   • Alcohol use: Yes     Frequency: Never     Comment: 3/2/20200-Daily-\"couple\" of drinks at night   • Drug use: Yes     Types: Marijuana     No current facility-administered medications for this visit.      No current outpatient medications on file.     Facility-Administered Medications Ordered in Other Visits   Medication Dose Route Frequency Provider Last Rate Last Admin   • albuterol (PROVENTIL) nebulizer solution 0.042% 1.25 mg/3mL  1.25 mg Nebulization 4x Daily - RT Sheron Mckeon APRN       • ceFAZolin in 0.9% normal saline (ANCEF) IVPB solution 2 g  2 g Intravenous Once Sheron Mckeon APRN       • dexamethasone (DECADRON) injection 4 mg  4 mg Intravenous Once PRN Valerio Castro MD       • dextrose (D50W) 25 g/ 50mL Intravenous Solution 12.5 g  12.5 g Intravenous PRN Valerio Castro MD       • fentaNYL citrate (PF) (SUBLIMAZE) injection 25 mcg  25 mcg Intravenous Q5 Min PRN Valerio Castro MD       • lactated ringers infusion 1,000 mL  1,000 mL Intravenous Continuous Bruce Murillo MD       • lactated ringers infusion 1,000 mL  1,000 mL Intravenous Continuous Bruce Murillo MD       • lactated ringers infusion  9 mL/hr Intravenous Continuous Valerio Castro MD       • lidocaine PF 1% (XYLOCAINE) injection 0.5 mL  0.5 mL Injection Once PRN Valerio Castro MD       • sodium chloride 0.9 % flush 10 mL  10 mL Intravenous PRN Sheron Mckeon APRN       • sodium chloride 0.9 % flush 10 mL  10 mL Intravenous PRN Bruce Murillo MD       • sodium chloride 0.9 % " "flush 10 mL  10 mL Intravenous Q12H Valerio Castro MD       • sodium chloride 0.9 % flush 10 mL  10 mL Intravenous PRN Valerio Castro MD       • sodium chloride 0.9 % flush 3 mL  3 mL Intravenous Q12H Sheron Mckeon APRN       • sodium chloride 0.9 % flush 3 mL  3 mL Intravenous PRN Bruce Murillo MD         Allergies:  Patient has no known allergies.    Objective      Vital Signs  Visit Vitals  /82 (BP Location: Left arm, Patient Position: Sitting, Cuff Size: Adult)   Pulse 83   Ht 177.8 cm (70\")   Wt 83.2 kg (183 lb 6.4 oz)   SpO2 95%   BMI 26.32 kg/m²         Physical Exam  Constitutional:       Appearance: He is well-developed.   HENT:      Head: Normocephalic and atraumatic.   Eyes:      Pupils: Pupils are equal, round, and reactive to light.   Neck:      Musculoskeletal: Normal range of motion and neck supple.      Thyroid: No thyromegaly.      Vascular: No JVD.      Trachea: No tracheal deviation.   Cardiovascular:      Rate and Rhythm: Normal rate and regular rhythm.      Heart sounds: Normal heart sounds. No murmur. No friction rub. No gallop.    Pulmonary:      Effort: Pulmonary effort is normal. No respiratory distress.      Breath sounds: Normal breath sounds. No wheezing or rales.   Chest:      Chest wall: No tenderness.   Abdominal:      General: There is no distension.      Palpations: Abdomen is soft.      Tenderness: There is no abdominal tenderness.   Musculoskeletal: Normal range of motion.   Lymphadenopathy:      Cervical: No cervical adenopathy.   Skin:     General: Skin is warm and dry.   Neurological:      Mental Status: He is alert and oriented to person, place, and time.      Cranial Nerves: No cranial nerve deficit.         Results Review:                I reviewed the patient's new clinical results.  Discussed with patient      Assessment/Plan       Diagnoses and all orders for this visit:    1. Chronic hepatitis (CMS/HCC) (Primary)    2. Chronic " obstructive pulmonary disease, unspecified COPD type (CMS/HCC)    3. Tobacco user    4. Right upper lobe pulmonary nodule          I discussed the patients findings and my recommendations with patient.    We discussed the differential diagnoses possible with malignancy high in the differential.  We discussed the importance of staging such that best therapy can be selected and portend prognosis accurately.  We discussed options for care including continued surveillance verses efforts towards diagnosis and treatment.  We discussed options for diagnosis including bronchoscopy, CT-guided needle biopsy, or surgical biopsy with either cervical mediastinoscopy or thoracoscopy with resection.We discussed the value of clinical staging with a CT/PET scan.  The pros and cons of each option were discussed at length.  I believe the best option for treatment is Right thoracoscopic wedge resection with mediastinal lymph node dissection.  He has dyspnea at baseline.  He has a DLCO that is not compatible with lobectomy.  His DLCO adjusted for alveolar volume places him at a marginal lobectomy candidate.  The pros and cons were discussed at length a lobectomy versus wedge resection.  In my opinion balancing the risks and benefits he is best to consider a wedge resection and is agreeable to do so understanding its limitations.  The risks of the procedure were discussed to included but not limited to bleeding, infection, stroke, heart attack, anesthesia risks, need for additional procedures, great vessel injury, injury to nerve with resultant hoarseness of voice, mechanical ventilation, ICU stay, chronic pain syndromes, need for thoracotomy, need for prolonged chest tube use, and/or death.  All questions have been answered to the best of my ability and he is agreeable to the aforementioned plan.    He has been advised as to smoking cessation x7 minutes.  He really does not seem very interested as he reports very hard to  quit.    Patient's Body mass index is 26.32 kg/m². BMI is within normal parameters. No follow-up required..    We will proceed with surgery once date is identified.

## 2021-02-15 NOTE — ANESTHESIA PROCEDURE NOTES
Arterial Line      Patient reassessed immediately prior to procedure    Patient location during procedure: pre-op  Start time: 2/15/2021 8:11 AM  Stop Time:2/15/2021 8:14 AM       Line placed for hemodynamic monitoring, ABGs/Labs/ISTAT and MD/Surgeon request.  Performed By   CRNA: Wnader Caban CRNA  Preanesthetic Checklist  Completed: patient identified, site marked, surgical consent, pre-op evaluation, timeout performed, IV checked, risks and benefits discussed and monitors and equipment checked  Arterial Line Prep   Sterile Tech: mask, gloves and cap  Prep: ChloraPrep  Patient monitoring: continuous pulse oximetry and blood pressure monitoring  Arterial Line Procedure   Laterality:left  Location:  radial artery  Catheter size: 20 G   Guidance: palpation technique  PROCEDURE NOTE/ULTRASOUND INTERPRETATION.  Using ultrasound guidance the potential vascular sites for insertion of the catheter were visualized to determine the patency of the vessel to be used for vascular access.  After selecting the appropriate site for insertion, the needle was visualized under ultrasound being inserted into the radial artery, followed by ultrasound confirmation of wire and catheter placement. There were no abnormalities seen on ultrasound; an image was taken; and the patient tolerated the procedure with no complications.   Number of attempts: 1  Successful placement: yes  Post Assessment   Dressing Type: occlusive dressing applied, secured with tape and wrist guard applied.   Complications no  Circ/Move/Sens Assessment: unchanged and normal.   Patient Tolerance: patient tolerated the procedure well with no apparent complications

## 2021-02-16 ENCOUNTER — APPOINTMENT (OUTPATIENT)
Dept: GENERAL RADIOLOGY | Facility: HOSPITAL | Age: 71
End: 2021-02-16

## 2021-02-16 LAB
ANION GAP SERPL CALCULATED.3IONS-SCNC: 8 MMOL/L (ref 5–15)
BUN SERPL-MCNC: 14 MG/DL (ref 8–23)
BUN/CREAT SERPL: 17.9 (ref 7–25)
CALCIUM SPEC-SCNC: 8.8 MG/DL (ref 8.6–10.5)
CHLORIDE SERPL-SCNC: 102 MMOL/L (ref 98–107)
CO2 SERPL-SCNC: 29 MMOL/L (ref 22–29)
CREAT SERPL-MCNC: 0.78 MG/DL (ref 0.76–1.27)
CYTO UR: NORMAL
DEPRECATED RDW RBC AUTO: 43.6 FL (ref 37–54)
ERYTHROCYTE [DISTWIDTH] IN BLOOD BY AUTOMATED COUNT: 12.4 % (ref 12.3–15.4)
GFR SERPL CREATININE-BSD FRML MDRD: 98 ML/MIN/1.73
GLUCOSE SERPL-MCNC: 98 MG/DL (ref 65–99)
HCT VFR BLD AUTO: 41.4 % (ref 37.5–51)
HGB BLD-MCNC: 14.3 G/DL (ref 13–17.7)
LAB AP CASE REPORT: NORMAL
LAB AP SYNOPTIC CHECKLIST: NORMAL
Lab: NORMAL
MCH RBC QN AUTO: 33.4 PG (ref 26.6–33)
MCHC RBC AUTO-ENTMCNC: 34.5 G/DL (ref 31.5–35.7)
MCV RBC AUTO: 96.7 FL (ref 79–97)
PATH REPORT.FINAL DX SPEC: NORMAL
PATH REPORT.GROSS SPEC: NORMAL
PLATELET # BLD AUTO: 159 10*3/MM3 (ref 140–450)
PMV BLD AUTO: 10.7 FL (ref 6–12)
POTASSIUM SERPL-SCNC: 3.9 MMOL/L (ref 3.5–5.2)
RBC # BLD AUTO: 4.28 10*6/MM3 (ref 4.14–5.8)
SODIUM SERPL-SCNC: 139 MMOL/L (ref 136–145)
WBC # BLD AUTO: 12.8 10*3/MM3 (ref 3.4–10.8)

## 2021-02-16 PROCEDURE — 71045 X-RAY EXAM CHEST 1 VIEW: CPT

## 2021-02-16 PROCEDURE — 25010000002 CEFAZOLIN PER 500 MG: Performed by: THORACIC SURGERY (CARDIOTHORACIC VASCULAR SURGERY)

## 2021-02-16 PROCEDURE — 94799 UNLISTED PULMONARY SVC/PX: CPT

## 2021-02-16 PROCEDURE — 85027 COMPLETE CBC AUTOMATED: CPT | Performed by: THORACIC SURGERY (CARDIOTHORACIC VASCULAR SURGERY)

## 2021-02-16 PROCEDURE — 25010000002 FUROSEMIDE PER 20 MG: Performed by: NURSE PRACTITIONER

## 2021-02-16 PROCEDURE — 25010000002 ENOXAPARIN PER 10 MG: Performed by: THORACIC SURGERY (CARDIOTHORACIC VASCULAR SURGERY)

## 2021-02-16 PROCEDURE — 80048 BASIC METABOLIC PNL TOTAL CA: CPT | Performed by: THORACIC SURGERY (CARDIOTHORACIC VASCULAR SURGERY)

## 2021-02-16 PROCEDURE — 99024 POSTOP FOLLOW-UP VISIT: CPT | Performed by: NURSE PRACTITIONER

## 2021-02-16 RX ORDER — LIDOCAINE 50 MG/G
2 PATCH TOPICAL
Status: DISCONTINUED | OUTPATIENT
Start: 2021-02-17 | End: 2021-02-19 | Stop reason: HOSPADM

## 2021-02-16 RX ORDER — FUROSEMIDE 10 MG/ML
20 INJECTION INTRAMUSCULAR; INTRAVENOUS
Status: DISCONTINUED | OUTPATIENT
Start: 2021-02-16 | End: 2021-02-19 | Stop reason: HOSPADM

## 2021-02-16 RX ORDER — POTASSIUM CHLORIDE 750 MG/1
20 CAPSULE, EXTENDED RELEASE ORAL 2 TIMES DAILY WITH MEALS
Status: DISCONTINUED | OUTPATIENT
Start: 2021-02-16 | End: 2021-02-19 | Stop reason: HOSPADM

## 2021-02-16 RX ADMIN — DOCUSATE SODIUM 100 MG: 100 CAPSULE ORAL at 08:34

## 2021-02-16 RX ADMIN — ACETYLCYSTEINE 3 ML: 200 SOLUTION ORAL; RESPIRATORY (INHALATION) at 15:08

## 2021-02-16 RX ADMIN — OXYCODONE HYDROCHLORIDE AND ACETAMINOPHEN 1 TABLET: 5; 325 TABLET ORAL at 15:29

## 2021-02-16 RX ADMIN — CEFAZOLIN SODIUM 2 G: 10 INJECTION, POWDER, FOR SOLUTION INTRAVENOUS at 00:25

## 2021-02-16 RX ADMIN — OXYCODONE HYDROCHLORIDE AND ACETAMINOPHEN 1 TABLET: 5; 325 TABLET ORAL at 10:45

## 2021-02-16 RX ADMIN — GABAPENTIN 100 MG: 100 CAPSULE ORAL at 15:29

## 2021-02-16 RX ADMIN — ACETAMINOPHEN 650 MG: 325 TABLET, FILM COATED ORAL at 06:00

## 2021-02-16 RX ADMIN — METOPROLOL TARTRATE 12.5 MG: 25 TABLET, FILM COATED ORAL at 08:34

## 2021-02-16 RX ADMIN — METOPROLOL TARTRATE 12.5 MG: 25 TABLET, FILM COATED ORAL at 21:04

## 2021-02-16 RX ADMIN — NAPROXEN 250 MG: 500 TABLET ORAL at 17:27

## 2021-02-16 RX ADMIN — OXYCODONE HYDROCHLORIDE AND ACETAMINOPHEN 1 TABLET: 5; 325 TABLET ORAL at 04:08

## 2021-02-16 RX ADMIN — DOCUSATE SODIUM 100 MG: 100 CAPSULE ORAL at 21:04

## 2021-02-16 RX ADMIN — ENOXAPARIN SODIUM 30 MG: 30 INJECTION SUBCUTANEOUS at 21:04

## 2021-02-16 RX ADMIN — GABAPENTIN 100 MG: 100 CAPSULE ORAL at 08:34

## 2021-02-16 RX ADMIN — ACETYLCYSTEINE 3 ML: 200 SOLUTION ORAL; RESPIRATORY (INHALATION) at 22:31

## 2021-02-16 RX ADMIN — ENOXAPARIN SODIUM 30 MG: 30 INJECTION SUBCUTANEOUS at 08:34

## 2021-02-16 RX ADMIN — GABAPENTIN 100 MG: 100 CAPSULE ORAL at 21:04

## 2021-02-16 RX ADMIN — ACETYLCYSTEINE 3 ML: 200 SOLUTION ORAL; RESPIRATORY (INHALATION) at 07:17

## 2021-02-16 RX ADMIN — POTASSIUM CHLORIDE 20 MEQ: 750 CAPSULE, EXTENDED RELEASE ORAL at 17:27

## 2021-02-16 RX ADMIN — LIDOCAINE 2 PATCH: 50 PATCH CUTANEOUS at 08:34

## 2021-02-16 RX ADMIN — FUROSEMIDE 20 MG: 10 INJECTION, SOLUTION INTRAVENOUS at 17:27

## 2021-02-16 RX ADMIN — NAPROXEN 250 MG: 500 TABLET ORAL at 08:34

## 2021-02-16 RX ADMIN — IPRATROPIUM BROMIDE AND ALBUTEROL SULFATE 3 ML: 2.5; .5 SOLUTION RESPIRATORY (INHALATION) at 07:17

## 2021-02-16 RX ADMIN — ATORVASTATIN CALCIUM 10 MG: 10 TABLET, FILM COATED ORAL at 08:34

## 2021-02-16 RX ADMIN — IPRATROPIUM BROMIDE AND ALBUTEROL SULFATE 3 ML: 2.5; .5 SOLUTION RESPIRATORY (INHALATION) at 22:31

## 2021-02-16 RX ADMIN — ACETAMINOPHEN 650 MG: 325 TABLET, FILM COATED ORAL at 17:27

## 2021-02-16 RX ADMIN — FUROSEMIDE 20 MG: 10 INJECTION, SOLUTION INTRAVENOUS at 12:11

## 2021-02-16 RX ADMIN — SODIUM CHLORIDE 80 ML/HR: 9 INJECTION, SOLUTION INTRAVENOUS at 04:11

## 2021-02-16 RX ADMIN — IPRATROPIUM BROMIDE AND ALBUTEROL SULFATE 3 ML: 2.5; .5 SOLUTION RESPIRATORY (INHALATION) at 15:08

## 2021-02-16 RX ADMIN — OXYCODONE HYDROCHLORIDE AND ACETAMINOPHEN 1 TABLET: 5; 325 TABLET ORAL at 21:04

## 2021-02-17 ENCOUNTER — APPOINTMENT (OUTPATIENT)
Dept: GENERAL RADIOLOGY | Facility: HOSPITAL | Age: 71
End: 2021-02-17

## 2021-02-17 ENCOUNTER — TELEPHONE (OUTPATIENT)
Dept: PULMONOLOGY | Facility: CLINIC | Age: 71
End: 2021-02-17

## 2021-02-17 PROBLEM — C34.11 MALIGNANT NEOPLASM OF UPPER LOBE OF RIGHT LUNG (HCC): Status: ACTIVE | Noted: 2021-02-17

## 2021-02-17 PROBLEM — R91.1 NODULE OF UPPER LOBE OF RIGHT LUNG: Status: RESOLVED | Noted: 2021-02-15 | Resolved: 2021-02-17

## 2021-02-17 PROCEDURE — 94799 UNLISTED PULMONARY SVC/PX: CPT

## 2021-02-17 PROCEDURE — 71045 X-RAY EXAM CHEST 1 VIEW: CPT

## 2021-02-17 PROCEDURE — 25010000002 ENOXAPARIN PER 10 MG: Performed by: THORACIC SURGERY (CARDIOTHORACIC VASCULAR SURGERY)

## 2021-02-17 PROCEDURE — 99024 POSTOP FOLLOW-UP VISIT: CPT | Performed by: NURSE PRACTITIONER

## 2021-02-17 PROCEDURE — 25010000002 FUROSEMIDE PER 20 MG: Performed by: NURSE PRACTITIONER

## 2021-02-17 RX ADMIN — ATORVASTATIN CALCIUM 10 MG: 10 TABLET, FILM COATED ORAL at 08:50

## 2021-02-17 RX ADMIN — FUROSEMIDE 20 MG: 10 INJECTION, SOLUTION INTRAVENOUS at 17:57

## 2021-02-17 RX ADMIN — METOPROLOL TARTRATE 12.5 MG: 25 TABLET, FILM COATED ORAL at 20:37

## 2021-02-17 RX ADMIN — NAPROXEN 250 MG: 500 TABLET ORAL at 17:56

## 2021-02-17 RX ADMIN — ACETAMINOPHEN 650 MG: 325 TABLET, FILM COATED ORAL at 06:15

## 2021-02-17 RX ADMIN — IPRATROPIUM BROMIDE AND ALBUTEROL SULFATE 3 ML: 2.5; .5 SOLUTION RESPIRATORY (INHALATION) at 06:23

## 2021-02-17 RX ADMIN — OXYCODONE HYDROCHLORIDE AND ACETAMINOPHEN 1 TABLET: 5; 325 TABLET ORAL at 20:37

## 2021-02-17 RX ADMIN — GABAPENTIN 100 MG: 100 CAPSULE ORAL at 08:50

## 2021-02-17 RX ADMIN — GABAPENTIN 100 MG: 100 CAPSULE ORAL at 17:57

## 2021-02-17 RX ADMIN — POTASSIUM CHLORIDE 20 MEQ: 750 CAPSULE, EXTENDED RELEASE ORAL at 17:57

## 2021-02-17 RX ADMIN — METOPROLOL TARTRATE 12.5 MG: 25 TABLET, FILM COATED ORAL at 08:49

## 2021-02-17 RX ADMIN — ACETAMINOPHEN 650 MG: 325 TABLET, FILM COATED ORAL at 17:56

## 2021-02-17 RX ADMIN — ACETYLCYSTEINE 3 ML: 200 SOLUTION ORAL; RESPIRATORY (INHALATION) at 06:23

## 2021-02-17 RX ADMIN — OXYCODONE HYDROCHLORIDE AND ACETAMINOPHEN 1 TABLET: 5; 325 TABLET ORAL at 04:55

## 2021-02-17 RX ADMIN — POLYETHYLENE GLYCOL (3350) 17 G: 17 POWDER, FOR SOLUTION ORAL at 08:50

## 2021-02-17 RX ADMIN — ENOXAPARIN SODIUM 30 MG: 30 INJECTION SUBCUTANEOUS at 20:37

## 2021-02-17 RX ADMIN — POTASSIUM CHLORIDE 20 MEQ: 750 CAPSULE, EXTENDED RELEASE ORAL at 08:50

## 2021-02-17 RX ADMIN — LIDOCAINE 2 PATCH: 50 PATCH CUTANEOUS at 08:50

## 2021-02-17 RX ADMIN — ENOXAPARIN SODIUM 30 MG: 30 INJECTION SUBCUTANEOUS at 08:50

## 2021-02-17 RX ADMIN — NAPROXEN 250 MG: 500 TABLET ORAL at 08:49

## 2021-02-17 RX ADMIN — GABAPENTIN 100 MG: 100 CAPSULE ORAL at 20:37

## 2021-02-17 RX ADMIN — FUROSEMIDE 20 MG: 10 INJECTION, SOLUTION INTRAVENOUS at 08:48

## 2021-02-17 RX ADMIN — DOCUSATE SODIUM 100 MG: 100 CAPSULE ORAL at 20:37

## 2021-02-17 RX ADMIN — ACETAMINOPHEN 650 MG: 325 TABLET, FILM COATED ORAL at 12:04

## 2021-02-17 RX ADMIN — DOCUSATE SODIUM 100 MG: 100 CAPSULE ORAL at 08:50

## 2021-02-17 NOTE — TELEPHONE ENCOUNTER
Patient wanted you to know he is currently in  in room 374. Dr. Murillo did surgery on him and thinks he will get to go home tomorrow.

## 2021-02-18 ENCOUNTER — APPOINTMENT (OUTPATIENT)
Dept: GENERAL RADIOLOGY | Facility: HOSPITAL | Age: 71
End: 2021-02-18

## 2021-02-18 PROCEDURE — 25010000002 FUROSEMIDE PER 20 MG: Performed by: NURSE PRACTITIONER

## 2021-02-18 PROCEDURE — 99024 POSTOP FOLLOW-UP VISIT: CPT | Performed by: NURSE PRACTITIONER

## 2021-02-18 PROCEDURE — 71045 X-RAY EXAM CHEST 1 VIEW: CPT

## 2021-02-18 PROCEDURE — 71046 X-RAY EXAM CHEST 2 VIEWS: CPT

## 2021-02-18 PROCEDURE — 25010000002 ENOXAPARIN PER 10 MG: Performed by: THORACIC SURGERY (CARDIOTHORACIC VASCULAR SURGERY)

## 2021-02-18 RX ADMIN — ACETAMINOPHEN 650 MG: 325 TABLET, FILM COATED ORAL at 12:21

## 2021-02-18 RX ADMIN — ACETAMINOPHEN 650 MG: 325 TABLET, FILM COATED ORAL at 17:16

## 2021-02-18 RX ADMIN — OXYCODONE HYDROCHLORIDE AND ACETAMINOPHEN 1 TABLET: 5; 325 TABLET ORAL at 20:39

## 2021-02-18 RX ADMIN — GABAPENTIN 100 MG: 100 CAPSULE ORAL at 20:38

## 2021-02-18 RX ADMIN — ACETAMINOPHEN 650 MG: 325 TABLET, FILM COATED ORAL at 20:38

## 2021-02-18 RX ADMIN — NAPROXEN 250 MG: 500 TABLET ORAL at 09:14

## 2021-02-18 RX ADMIN — ACETAMINOPHEN 650 MG: 325 TABLET, FILM COATED ORAL at 06:21

## 2021-02-18 RX ADMIN — BISACODYL 10 MG: 10 SUPPOSITORY RECTAL at 17:20

## 2021-02-18 RX ADMIN — METOPROLOL TARTRATE 12.5 MG: 25 TABLET, FILM COATED ORAL at 20:38

## 2021-02-18 RX ADMIN — DOCUSATE SODIUM 100 MG: 100 CAPSULE ORAL at 09:14

## 2021-02-18 RX ADMIN — METOPROLOL TARTRATE 12.5 MG: 25 TABLET, FILM COATED ORAL at 09:13

## 2021-02-18 RX ADMIN — NAPROXEN 250 MG: 500 TABLET ORAL at 17:16

## 2021-02-18 RX ADMIN — GABAPENTIN 100 MG: 100 CAPSULE ORAL at 17:16

## 2021-02-18 RX ADMIN — FUROSEMIDE 20 MG: 10 INJECTION, SOLUTION INTRAVENOUS at 09:14

## 2021-02-18 RX ADMIN — ACETAMINOPHEN 650 MG: 325 TABLET, FILM COATED ORAL at 00:12

## 2021-02-18 RX ADMIN — POTASSIUM CHLORIDE 20 MEQ: 750 CAPSULE, EXTENDED RELEASE ORAL at 17:16

## 2021-02-18 RX ADMIN — GABAPENTIN 100 MG: 100 CAPSULE ORAL at 09:13

## 2021-02-18 RX ADMIN — ENOXAPARIN SODIUM 30 MG: 30 INJECTION SUBCUTANEOUS at 09:14

## 2021-02-18 RX ADMIN — POLYETHYLENE GLYCOL (3350) 17 G: 17 POWDER, FOR SOLUTION ORAL at 09:14

## 2021-02-18 RX ADMIN — OXYCODONE HYDROCHLORIDE AND ACETAMINOPHEN 1 TABLET: 5; 325 TABLET ORAL at 12:21

## 2021-02-18 RX ADMIN — ATORVASTATIN CALCIUM 10 MG: 10 TABLET, FILM COATED ORAL at 09:14

## 2021-02-18 RX ADMIN — POTASSIUM CHLORIDE 20 MEQ: 750 CAPSULE, EXTENDED RELEASE ORAL at 09:14

## 2021-02-18 RX ADMIN — OXYCODONE HYDROCHLORIDE AND ACETAMINOPHEN 1 TABLET: 5; 325 TABLET ORAL at 09:24

## 2021-02-18 RX ADMIN — DOCUSATE SODIUM 100 MG: 100 CAPSULE ORAL at 20:38

## 2021-02-18 RX ADMIN — LIDOCAINE 2 PATCH: 50 PATCH CUTANEOUS at 09:15

## 2021-02-19 ENCOUNTER — APPOINTMENT (OUTPATIENT)
Dept: GENERAL RADIOLOGY | Facility: HOSPITAL | Age: 71
End: 2021-02-19

## 2021-02-19 VITALS
WEIGHT: 186.51 LBS | SYSTOLIC BLOOD PRESSURE: 112 MMHG | BODY MASS INDEX: 26.7 KG/M2 | TEMPERATURE: 97.6 F | HEIGHT: 70 IN | RESPIRATION RATE: 18 BRPM | DIASTOLIC BLOOD PRESSURE: 71 MMHG | HEART RATE: 60 BPM | OXYGEN SATURATION: 94 %

## 2021-02-19 PROCEDURE — 71046 X-RAY EXAM CHEST 2 VIEWS: CPT

## 2021-02-19 PROCEDURE — 99024 POSTOP FOLLOW-UP VISIT: CPT | Performed by: NURSE PRACTITIONER

## 2021-02-19 RX ORDER — OXYCODONE HYDROCHLORIDE AND ACETAMINOPHEN 5; 325 MG/1; MG/1
1 TABLET ORAL EVERY 6 HOURS PRN
Qty: 20 TABLET | Refills: 0 | Status: SHIPPED | OUTPATIENT
Start: 2021-02-19 | End: 2021-04-21 | Stop reason: ALTCHOICE

## 2021-02-19 RX ADMIN — LIDOCAINE 2 PATCH: 50 PATCH CUTANEOUS at 08:33

## 2021-02-19 RX ADMIN — POTASSIUM CHLORIDE 20 MEQ: 750 CAPSULE, EXTENDED RELEASE ORAL at 08:34

## 2021-02-19 RX ADMIN — DOCUSATE SODIUM 100 MG: 100 CAPSULE ORAL at 08:34

## 2021-02-19 RX ADMIN — ATORVASTATIN CALCIUM 10 MG: 10 TABLET, FILM COATED ORAL at 08:34

## 2021-02-19 RX ADMIN — NAPROXEN 250 MG: 500 TABLET ORAL at 08:34

## 2021-02-19 RX ADMIN — POLYETHYLENE GLYCOL (3350) 17 G: 17 POWDER, FOR SOLUTION ORAL at 08:34

## 2021-02-19 RX ADMIN — GABAPENTIN 100 MG: 100 CAPSULE ORAL at 08:34

## 2021-02-19 RX ADMIN — METOPROLOL TARTRATE 12.5 MG: 25 TABLET, FILM COATED ORAL at 08:34

## 2021-02-20 ENCOUNTER — READMISSION MANAGEMENT (OUTPATIENT)
Dept: CALL CENTER | Facility: HOSPITAL | Age: 71
End: 2021-02-20

## 2021-02-20 NOTE — OUTREACH NOTE
Prep Survey      Responses   [facility] [place] facility patient discharged from?  [place]   Is LACE score < 7 ?  No   Emergency Room discharge w/ pulse ox?  No   Eligibility  Readm Mgmt   Discharge diagnosis  Bronchoscopy, Right thoracoscopy, right upper lobe wedge resection, cervical mediastinoscopy with lymph node dissection performed on 2/15/2021 by Dr. Murillo.    Does the patient have one of the following disease processes/diagnoses(primary or secondary)?  General Surgery   Does the patient have Home health ordered?  No   Is there a DME ordered?  Yes   What DME was ordered?  O2   Comments regarding appointments  f/u apmts needed   Prep survey completed?  Yes          Yokasta Rios RN

## 2021-02-22 ENCOUNTER — READMISSION MANAGEMENT (OUTPATIENT)
Dept: CALL CENTER | Facility: HOSPITAL | Age: 71
End: 2021-02-22

## 2021-02-22 NOTE — OUTREACH NOTE
General Surgery Week 1 Survey      Responses   [facility] patient discharged from?  [place]   Does the patient have one of the following disease processes/diagnoses(primary or secondary)?  General Surgery   Week 1 attempt successful?  Yes   Call start time  1619   Call end time  1624   Discharge diagnosis  Bronchoscopy, Right thoracoscopy, right upper lobe wedge resection, cervical mediastinoscopy with lymph node dissection performed on 2/15/2021 by Dr. Murillo.    Meds reviewed with patient/caregiver?  Yes   Is the patient having any side effects they believe may be caused by any medication additions or changes?  No   Does the patient have all medications related to this admission filled (includes all antibiotics, pain medications, etc.)  Yes   Is the patient taking all medications as directed (includes completed medication regime)?  Yes   Does the patient have a follow up appointment scheduled with their surgeon?  Yes   Has the patient kept scheduled appointments due by today?  N/A   Has home health visited the patient within 72 hours of discharge?  N/A   Psychosocial issues?  No   Did the patient receive a copy of their discharge instructions?  Yes   Nursing interventions  Reviewed instructions with patient   What is the patient's perception of their health status since discharge?  Improving   Nursing interventions  Nurse provided patient education   Is the patient /caregiver able to teach back basic post-op care?  Continue use of incentive spirometry at least 1 week post discharge, Practice 'cough and deep breath', Drive as instructed by MD in discharge instructions, Take showers only when approved by MD-sponge bathe until then, No tub bath, swimming, or hot tub until instructed by MD, Keep incision areas clean,dry and protected, Do not remove steri-strips, Lifting as instructed by MD in discharge instructions   Is the patient/caregiver able to teach back signs and symptoms of incisional infection?   Increased redness, swelling or pain at the incisonal site, Increased drainage or bleeding, Incisional warmth, Pus or odor from incision, Fever   Is the patient/caregiver able to teach back steps to recovery at home?  Set small, achievable goals for return to baseline health, Rest and rebuild strength, gradually increase activity, Eat a well-balance diet   If the patient is a current smoker, are they able to teach back resources for cessation?  Not a smoker [has cut back to 2 cigs/day from 1 1/2 PPD]   Is the patient/caregiver able to teach back the hierarchy of who to call/visit for symptoms/problems? PCP, Specialist, Home health nurse, Urgent Care, ED, 911  Yes   Week 1 call completed?  Yes          Laura Ledezma RN

## 2021-02-25 ENCOUNTER — TELEPHONE (OUTPATIENT)
Dept: CARDIOLOGY | Facility: CLINIC | Age: 71
End: 2021-02-25

## 2021-02-25 NOTE — TELEPHONE ENCOUNTER
Pt left vm message stating he is having some drainage from the wound where his tube was located.  States it is clear to yellow w/ a little bit of blood.  States it is not red or warm.  States it does not look infected.  Pt also states he is taking 2-3 pain pills per day.  He tries to keep it at 2 but usually ends up needing a third.  Pt states he will run out of med over the weekend and is requesting a refill of this med.  Can reach pt at #[phone]/greg

## 2021-02-25 NOTE — TELEPHONE ENCOUNTER
Called patient and he said previous chest tube site is draining small amount of clear-yellow drainage. Advised him that this is to be expected as skin heals. He will try to take ibuprofen or aleve to help with pain and call us back tomorrow morning to let us know how he is doing.

## 2021-02-26 DIAGNOSIS — C34.90 MALIGNANT NEOPLASM OF BRONCHUS AND LUNG (HCC): Primary | ICD-10-CM

## 2021-02-26 RX ORDER — OXYCODONE HYDROCHLORIDE AND ACETAMINOPHEN 5; 325 MG/1; MG/1
1 TABLET ORAL EVERY 8 HOURS PRN
Qty: 20 TABLET | Refills: 0 | Status: SHIPPED | OUTPATIENT
Start: 2021-02-26 | End: 2021-04-21 | Stop reason: ALTCHOICE

## 2021-02-26 NOTE — TELEPHONE ENCOUNTER
Pt called back to report that he only has 3 Percocet left.  States he was told to call today to report how many pills he had remaining.  Asked pt if Ibuprofen or Aleve helped any with his pain but pt states he has not taken any of this yet./greg

## 2021-03-01 ENCOUNTER — READMISSION MANAGEMENT (OUTPATIENT)
Dept: CALL CENTER | Facility: HOSPITAL | Age: 71
End: 2021-03-01

## 2021-03-01 NOTE — OUTREACH NOTE
General Surgery Week 2 Survey      Responses   [facility] patient discharged from?  [place]   Does the patient have one of the following disease processes/diagnoses(primary or secondary)?  General Surgery   Week 2 attempt successful?  Yes   Call start time  0755   Call end time  0757   Discharge diagnosis  Bronchoscopy, Right thoracoscopy, right upper lobe wedge resection, cervical mediastinoscopy with lymph node dissection performed on 2/15/2021 by Dr. Murillo.    Is patient permission given to speak with other caregiver?  No   Meds reviewed with patient/caregiver?  Yes   Is the patient having any side effects they believe may be caused by any medication additions or changes?  No   Does the patient have all medications related to this admission filled (includes all antibiotics, pain medications, etc.)  Yes   Is the patient taking all medications as directed (includes completed medication regime)?  Yes   Does the patient have a follow up appointment scheduled with their surgeon?  Yes   Has the patient kept scheduled appointments due by today?  Yes   Comments  He has had a telehealth appt.    Did the patient receive a copy of their discharge instructions?  Yes   Nursing interventions  Reviewed instructions with patient   What is the patient's perception of their health status since discharge?  Improving   Nursing interventions  Nurse provided patient education   Is the patient /caregiver able to teach back basic post-op care?  Continue use of incentive spirometry at least 1 week post discharge, Practice 'cough and deep breath', Drive as instructed by MD in discharge instructions, Take showers only when approved by MD-sponge bathe until then, No tub bath, swimming, or hot tub until instructed by MD, Keep incision areas clean,dry and protected, Do not remove steri-strips, Lifting as instructed by MD in discharge instructions   Is the patient/caregiver able to teach back signs and symptoms of incisional infection?   Increased redness, swelling or pain at the incisonal site, Increased drainage or bleeding, Incisional warmth, Pus or odor from incision, Fever   Is the patient/caregiver able to teach back steps to recovery at home?  Set small, achievable goals for return to baseline health, Rest and rebuild strength, gradually increase activity, Eat a well-balance diet   If the patient is a current smoker, are they able to teach back resources for cessation?  -- [He has decreased his smoking greatly for him. ]   Is the patient/caregiver able to teach back the hierarchy of who to call/visit for symptoms/problems? PCP, Specialist, Home health nurse, Urgent Care, ED, 911  Yes   Week 2 call completed?  Yes          Nivia Powell RN

## 2021-03-09 ENCOUNTER — TELEPHONE (OUTPATIENT)
Dept: CARDIAC SURGERY | Facility: CLINIC | Age: 71
End: 2021-03-09

## 2021-03-09 ENCOUNTER — NURSE TRIAGE (OUTPATIENT)
Dept: CALL CENTER | Facility: HOSPITAL | Age: 71
End: 2021-03-09

## 2021-03-09 NOTE — TELEPHONE ENCOUNTER
"    Reason for Disposition  • [1] Caller requesting NON-URGENT health information AND [2] PCP's office is the best resource    Additional Information  • Negative: [1] Caller is not with the adult (patient) AND [2] reporting urgent symptoms  • Negative: Lab result questions  • Negative: Medication questions  • Negative: Caller can't be reached by phone  • Negative: Caller has already spoken to PCP or another triager  • Negative: RN needs further essential information from caller in order to complete triage  • Negative: Requesting regular office appointment    Answer Assessment - Initial Assessment Questions  1. REASON FOR CALL or QUESTION: \"What is your reason for calling today?\" or \"How can I best help you?\" or \"What question do you have that I can help answer?\"      Caller asking where he should get f/u Xrays prior to MD appt and also asking if he can go ahead and get COVID vaccine. He had lung surgery about 2 weeks ago.    Protocols used: INFORMATION ONLY CALL - NO TRIAGE-ADULT-      "

## 2021-03-09 NOTE — TELEPHONE ENCOUNTER
Pt calling to see if it would be OK for him to get a COVID vaccine.  Can reach pt at #[phone]/greg

## 2021-03-10 ENCOUNTER — TELEPHONE (OUTPATIENT)
Dept: CARDIAC SURGERY | Facility: CLINIC | Age: 71
End: 2021-03-10

## 2021-03-10 ENCOUNTER — READMISSION MANAGEMENT (OUTPATIENT)
Dept: CALL CENTER | Facility: HOSPITAL | Age: 71
End: 2021-03-10

## 2021-03-10 NOTE — OUTREACH NOTE
"General Surgery Week 3 Survey      Responses   [facility] patient discharged from?  [place]   Does the patient have one of the following disease processes/diagnoses(primary or secondary)?  General Surgery   Week 3 attempt successful?  Yes   Call start time  1222   Call end time  1234   Discharge diagnosis  Bronchoscopy, Right thoracoscopy, right upper lobe wedge resection, cervical mediastinoscopy with lymph node dissection performed on 2/15/2021 by Dr. Murillo.    Psychosocial issues?  No   Comments  He c/o right chest edema & right armpit edema is present, he will try icepacks & will call surgeon if swelling is no better by morning. He does note that each area of edema has a \"hard knot\" Advised him to call surgeon office.    What is the patient's perception of their health status since discharge?  Same   Nursing interventions  Advised patient to call provider, Nurse provided patient education   Is the patient /caregiver able to teach back basic post-op care?  Drive as instructed by MD in discharge instructions, Continue use of incentive spirometry at least 1 week post discharge, Lifting as instructed by MD in discharge instructions   Is the patient/caregiver able to teach back signs and symptoms of incisional infection?  Increased drainage or bleeding, Incisional warmth   Is the patient/caregiver able to teach back steps to recovery at home?  Set small, achievable goals for return to baseline health, Rest and rebuild strength, gradually increase activity   Is the patient/caregiver able to teach back the hierarchy of who to call/visit for symptoms/problems? PCP, Specialist, Home health nurse, Urgent Care, ED, 911  Yes   Additional teach back comments  Denies lifting/pushing/pulling.    Week 3 call completed?  Yes          Criss Guerra RN  "

## 2021-03-10 NOTE — TELEPHONE ENCOUNTER
Called patient to discuss. He wishes to be seen tomorrow. Please place on schedule at 2 pm tomorrow with Dr. Murilol. Informed him to arrive early for a chest x ray prior to the office visit. He verbalizes understanding.

## 2021-03-10 NOTE — TELEPHONE ENCOUNTER
Called patient back to discuss. Says he has swelling around chest incisions that he feels when he lowers his arm to his body. He denies any arm swelling, shortness of breath, worsening swelling or pain. Pain is about a 4/10 and this is better since he has been home. Swelling is about the same from hospitalization. DW Dr. Murillo and we can see patient tomorrow at 2 if he wishes to be seen sooner. He needs chest x ray prior to office visit.

## 2021-03-10 NOTE — TELEPHONE ENCOUNTER
Patient called stating that he still has swelling on the right side of his chest and under right arm. The person from [facility] that called him to follow up was concerned and recommended he call to let us know. States he has no other symptoms.

## 2021-03-11 ENCOUNTER — HOSPITAL ENCOUNTER (OUTPATIENT)
Dept: GENERAL RADIOLOGY | Facility: HOSPITAL | Age: 71
Discharge: HOME | End: 2021-03-11
Admitting: NURSE PRACTITIONER

## 2021-03-11 ENCOUNTER — OFFICE VISIT (OUTPATIENT)
Dept: CARDIAC SURGERY | Facility: CLINIC | Age: 71
End: 2021-03-11

## 2021-03-11 VITALS
WEIGHT: 185.6 LBS | SYSTOLIC BLOOD PRESSURE: 127 MMHG | OXYGEN SATURATION: 95 % | DIASTOLIC BLOOD PRESSURE: 80 MMHG | HEART RATE: 80 BPM | BODY MASS INDEX: 26.57 KG/M2 | HEIGHT: 70 IN

## 2021-03-11 DIAGNOSIS — C34.11 MALIGNANT NEOPLASM OF UPPER LOBE OF RIGHT LUNG (HCC): Primary | ICD-10-CM

## 2021-03-11 DIAGNOSIS — C34.11 MALIGNANT NEOPLASM OF UPPER LOBE OF RIGHT LUNG (HCC): ICD-10-CM

## 2021-03-11 PROCEDURE — 99024 POSTOP FOLLOW-UP VISIT: CPT | Performed by: THORACIC SURGERY (CARDIOTHORACIC VASCULAR SURGERY)

## 2021-03-11 PROCEDURE — 71046 X-RAY EXAM CHEST 2 VIEWS: CPT

## 2021-03-18 ENCOUNTER — TELEPHONE (OUTPATIENT)
Dept: CARDIAC SURGERY | Facility: CLINIC | Age: 71
End: 2021-03-18

## 2021-03-18 ENCOUNTER — READMISSION MANAGEMENT (OUTPATIENT)
Dept: CALL CENTER | Facility: HOSPITAL | Age: 71
End: 2021-03-18

## 2021-03-18 DIAGNOSIS — C34.90 MALIGNANT NEOPLASM OF BRONCHUS AND LUNG (HCC): Primary | ICD-10-CM

## 2021-03-18 NOTE — TELEPHONE ENCOUNTER
Pt left vm message asking if he needed to do a CXR prior to appt next week with Dr Murillo.  Can reach pt at #[phone]/greg

## 2021-03-18 NOTE — OUTREACH NOTE
General Surgery Week 4 Survey      Responses   [facility] patient discharged from?  [place]   Does the patient have one of the following disease processes/diagnoses(primary or secondary)?  General Surgery   Week 4 attempt successful?  No          Sheila Ac RN

## 2021-03-22 ENCOUNTER — HOSPITAL ENCOUNTER (OUTPATIENT)
Dept: GENERAL RADIOLOGY | Facility: HOSPITAL | Age: 71
Discharge: HOME | End: 2021-03-22
Admitting: NURSE PRACTITIONER

## 2021-03-22 DIAGNOSIS — C34.90 MALIGNANT NEOPLASM OF BRONCHUS AND LUNG (HCC): ICD-10-CM

## 2021-03-22 PROCEDURE — 71046 X-RAY EXAM CHEST 2 VIEWS: CPT

## 2021-03-23 ENCOUNTER — OFFICE VISIT (OUTPATIENT)
Dept: CARDIAC SURGERY | Facility: CLINIC | Age: 71
End: 2021-03-23

## 2021-03-23 VITALS
HEIGHT: 70 IN | OXYGEN SATURATION: 97 % | WEIGHT: 185.6 LBS | DIASTOLIC BLOOD PRESSURE: 76 MMHG | BODY MASS INDEX: 26.57 KG/M2 | SYSTOLIC BLOOD PRESSURE: 116 MMHG | HEART RATE: 86 BPM

## 2021-03-23 DIAGNOSIS — C34.11 MALIGNANT NEOPLASM OF UPPER LOBE OF RIGHT LUNG (HCC): Primary | ICD-10-CM

## 2021-03-23 PROCEDURE — 99024 POSTOP FOLLOW-UP VISIT: CPT | Performed by: THORACIC SURGERY (CARDIOTHORACIC VASCULAR SURGERY)

## 2021-04-08 NOTE — PROGRESS NOTES
"Subjective   Patient ID: Se Ramirez is a 70 y.o. male who is here for follow-up having had Bronchoscopy, Right thoracoscopy, right upper lobe wedge resection, cervical mediastinoscopy with lymph node dissection performed on 2/15/2021 by Dr. Murillo.         History of Present Illness  Post operative recovery was unevent.  Sleep habits are good.  Pain control has been excellent.    No fevers/sweats/chills.   No drainage from incisions.  Some chest soreness.  No shortness of breath.  He is eating well.  He is ambulate without difficulties.  Unfortunately is smoking but trying to quit.     The following portions of the patient's history were reviewed and updated as appropriate: allergies, current medications, past family history, past medical history, past social history, past surgical history and problem list.        Objective   Visit Vitals  /80 (BP Location: Left arm, Patient Position: Sitting, Cuff Size: Adult)   Pulse 80   Ht 177.5 cm (69.88\")   Wt 84.2 kg (185 lb 9.6 oz)   SpO2 95%   BMI 26.72 kg/m²       Physical Exam  Constitutional:       Appearance: He is well-developed.   HENT:      Head: Normocephalic and atraumatic.   Eyes:      Pupils: Pupils are equal, round, and reactive to light.   Neck:      Thyroid: No thyromegaly.      Vascular: No JVD.      Trachea: No tracheal deviation.   Cardiovascular:      Rate and Rhythm: Normal rate and regular rhythm.      Heart sounds: Normal heart sounds. No murmur heard.   No friction rub. No gallop.    Pulmonary:      Effort: Pulmonary effort is normal. No respiratory distress.      Breath sounds: Normal breath sounds. No wheezing or rales.   Chest:      Chest wall: No tenderness.   Abdominal:      General: There is no distension.      Palpations: Abdomen is soft.      Tenderness: There is no abdominal tenderness.   Musculoskeletal:         General: Normal range of motion.      Cervical back: Normal range of motion and neck supple.      Comments: Thoracic " incisions are healing nicely.  No thoracic hernia   Lymphadenopathy:      Cervical: No cervical adenopathy.   Skin:     General: Skin is warm and dry.   Neurological:      Mental Status: He is alert and oriented to person, place, and time.      Cranial Nerves: No cranial nerve deficit.           XR Chest 2 View    Result Date: 3/22/2021  Narrative: EXAM: XR CHEST 2 VW- - 3/22/2021 12:14 PM CDT  HISTORY: lung cancer; C34.90-Malignant neoplasm of unspecified part of unspecified bronchus or lung   COMPARISON: 3/11/2021.  TECHNIQUE:  2 images.  Frontal and lateral views of the chest.  FINDINGS:  Postoperative changes at the right upper lung. Similar coarse pulmonary interstitial markings. No pneumothorax, pleural effusion or focal consolidation. Stable calcified right lung granuloma.  Cardiac mediastinal silhouette within normal limits. No acute findings in the bones, soft tissues or upper abdomen. Similar mild chronic height loss of T4 and T8 compared to prior CT 12/10/2020.       Impression: 1. No acute cardiopulmonary findings. This report was finalized on 03/22/2021 14:16 by Dr Lacie Thakur MD.    XR Chest 2 View    Result Date: 3/11/2021  Narrative: Exam: XR CHEST 2 VW-  Indication: lung cancer; C34.11-Malignant neoplasm of upper lobe, right bronchus or lung  Comparison: 2/19/2021  Findings:  Cardiac silhouette is stable. No change in linear RIGHT upper lobe opacity adjacent to a long suture line. No new airspace opacity or area of nodularity. No pleural effusion, visible pneumothorax, or consolidation. Mild chronic interstitial coarsening appears unchanged. No aggressive osseous lesion.      Impression: Impression:  No acute findings. No pneumothorax. This report was finalized on 03/11/2021 13:50 by Dr. Esteban Almanzar MD.    Tissue Pathology Exam: [id number]  Order: [id number]  Status:  Final result   Visible to patient:  Yes (MyChart) Next appt:  04/21/2021 at 09:30 AM in Pulmonology (Mallory Bryan  APRN) Dx:  Right upper lobe pulmonary nodule  Specimen Information: A: Lung, Right Upper Lobe; Tissue    B: Lymph Node; Tissue    C: Lymph Node; Tissue    D: Lymph Node; Tissue    E: Lymph Node; Tissue        Component    Case Report   Surgical Pathology Report                         Case: [id number]                                   Authorizing Provider:  Bruce Murillo MD      Collected:           02/15/2021 10:03 AM           Ordering Location:     [facility] [place]  Received:            02/15/2021 10:09 AM           Pathologist:           Norman Rhodes MD                                                       Specimens:   1) - Lung, Right Upper Lobe, RIGHT UPPER LOBE WEDGE RESECTION                                        2) - Lymph Node, LYMPH NODE LEVEL 7 MEDIASTINAL                                                      3) - Lymph Node, LEVEL 4L  MEDIASTINAL                                                               4) - Lymph Node, LYMPH NODE LEVEL 4R MEDIASTINAL                                                     5) - Lymph Node, LYMPH NODE LEVEL 2R MEDIASTINAL                                           Final Diagnosis   1.  Lung, right upper lobe, wedge resection:  Invasive keratinizing squamous cell carcinoma.  Maximum tumor diameter is 3.2 cm.  The surgical margins are free of tumor (0.3 cm).  Please see CAP synoptic.    2.  Level 7 lymph node, excision:   1 benign lymph node.    3.  Level 4L lymph node, excision:  1 benign lymph node.    4.  Level 4R lymph node, excision:   1 benign lymph node.    5.  Level 2R lymph node, excision:  1 benign lymph node.   Electronically signed by Norman Rhodes MD on 2/16/2021 at 1443   Synoptic Checklist   LUNG  8th Edition - Protocol posted: 2/26/2020  LUNG: RESECTION - All Specimens  SPECIMEN   Procedure  Wedge resection    Specimen Laterality  Right    TUMOR   Tumor Site  Upper lobe of lung    Histologic Type  Invasive squamous cell  carcinoma, keratinizing    Histologic Grade  G2: Moderately differentiated    Tumor Size     Total Tumor Size (size of entire tumor)  Greatest Dimension (Centimeters): 3.2 cm   Additional Dimension (Centimeters)  2.6 cm     1.3 cm   Tumor Focality  Single focus    Visceral Pleura Invasion  Not identified    Direct Invasion of Adjacent Structures  No adjacent structures present    Treatment Effect  No known presurgical therapy    Lymphovascular Invasion  Not identified    MARGINS   Margins  All margins are uninvolved by tumor    Margins Examined  Parenchymal    Distance of Invasive Carcinoma from Closest Margin (Centimeters)  0.3 cm   Closest Margin  Parenchymal    LYMPH NODES   Number of Lymph Nodes Involved  0    Number of Lymph Nodes Examined  4    Juan David Stations Examined  2R: Upper paratracheal      4R: Lower paratracheal      7: Subcarinal      4L: Lower paratracheal    PATHOLOGIC STAGE CLASSIFICATION (pTNM, AJCC 8th Edition)   Primary Tumor (pT)  pT2a    Regional Lymph Nodes (pN)  pN0    ADDITIONAL FINDINGS   Additional Findings  Emphysema    .      Intraoperative Consultation    Right upper lobe lung, wedge resection, frozen section:  Non-small cell carcinoma.  The surgical margin appears free of tumor (3 mm).  Patient discussed with Dr. Murillo at 10:40 AM on 2/15/2020 by Dr. Norman Rhodes.   Gross Description    1.  Received a container labeled the patient's name and lung, right upper lobe.  Received fresh for frozen section is a wedge-shaped 46.4 g, 8.3 x 4.1 x 2.7 cm wedge resection of lung.  The pleural surface is hemorrhagic red-brown with a ill-defined 1.6 x 1.5 cm area of puckering.  The staples are removed and the surgical margin is inked in blue.  There is a white-tan firm 3.2 x 2.6 x 1.3 cm mass 0.3 cm from the inked stapled margin.  The tumor mass does not involve the pleura.  The tumor is 0.4 cm from the pleural surface.  Representative sections are submitted.  Cassette 1A frozen section.  Cassette  1B through 1E tumor mass cassette 1F tissue adjacent tumor mass cassette 1G and cassette 1H lung tissue.       2.  Received contentedly with patient's name and lymph node level 7 mediastinal. Received in fixative are 2 possible lymph nodes measuring 0.3 cm and 0.5 cm.  The specimen is totally submitted cassette 2A, 2 pieces.     3.  Received in fixative is a container labeled the patient's name and lymph node level 4L, mediastinal.  Received in fixative is a 0.4 x 0.3 x 0.3 cm possible lymph node.  The specimen is totally submitted in cassette 3A, 1 piece.     4.  Received is a container labeled the patient's name and lymph node level 4R, mediastinal. Received in fixative are multiple pieces of brown to black tissue measuring 0.7 x 0.6 x 0.3 cm in aggregate.  The specimen is totally submitted in cassette 4A, numerous.     5.  Received is a container labeled the patient's name and lymph node level 2R, mediastinal. Received in fixative is a 0.5 x 0.3 x 0.2 cm possible lymph node.  The specimen is totally submitted in cassette 5A, 1 piece.               Microscopic Description    1.  Sections of the mass show islands of atypical cells with increased N/C ratios, dark vesicular chromatin, occasional prominent nucleoli, and pink amorphous cytoplasm.  There are focal areas of keratinization.  Prominent mitotic activity is noted.  Immunohistochemical stains for p40 are positive for the tumor cells Immunohistochemical stains for TTF-1 are negative for the tumor cells. The surrounding stroma shows a desmoplastic response of the tumor.  There are prominent lymphoid aggregates in the outer borders of the infiltrative tumor.  The tumor extends up to but does not appear to infiltrate the adjacent parietal pleura.  The elastic stain shows an intact pleural surface free of invasion. The adjacent lung parenchyma shows emphysematous change.    2.  Sections show one benign lymph node.    3.  Sections show one benign lymph node.    4.   Sections show one benign lymph node.    5.  Sections show one benign lymph node.               Assessment/Plan       Diagnoses and all orders for this visit:    1. Malignant neoplasm of upper lobe of right lung (CMS/HCC) (Primary)        Overall, Se Ramirez is doing well.  We discussed his pathology and at this time he will not require any further intervention.  We discussed surveillance is indicated at this time.  Respiratory disease clinic will continue to follow and an appointment is in place for April of this year.  We discussed the need to discontinue smoking for both cardiovascular and oncologic benefits.    Although I have not provided a specific follow up appointment, should I be of further assistance, please do not hesitate to contact us.      Smoking cessation x 7 min advised.  He is working on it.      Patient's Body mass index is 26.72 kg/m². BMI is within normal parameters. No follow-up required..

## 2021-04-09 NOTE — PROGRESS NOTES
"Subjective   Patient ID: Se Ramirez is a 70 y.o. male who is here for follow-up having had Bronchoscopy, Right thoracoscopy, right upper lobe wedge resection, cervical mediastinoscopy with lymph node dissection performed on 2/15/2021        History of Present Illness  Post operative recovery was unevent.  Sleep habits are back to baseline.  Pain control has been excellent.    No fevers/sweats/chills.   No drainage from incisions.  Post operative pain nearly resolved.  No shortness of breath.  He is eating well.  He is ambulate without difficulties.  Unfortunately is still smoking but trying to quit.     The following portions of the patient's history were reviewed and updated as appropriate: allergies, current medications, past family history, past medical history, past social history, past surgical history and problem list.        Objective   Visit Vitals  /76 (BP Location: Right arm, Patient Position: Sitting, Cuff Size: Adult)   Pulse 86   Ht 177.5 cm (69.88\")   Wt 84.2 kg (185 lb 9.6 oz)   SpO2 97%   BMI 26.72 kg/m²       Physical Exam  Constitutional:       Appearance: He is well-developed.   HENT:      Head: Normocephalic and atraumatic.   Eyes:      Pupils: Pupils are equal, round, and reactive to light.   Neck:      Thyroid: No thyromegaly.      Vascular: No JVD.      Trachea: No tracheal deviation.   Cardiovascular:      Rate and Rhythm: Normal rate and regular rhythm.      Heart sounds: Normal heart sounds. No murmur heard.   No friction rub. No gallop.    Pulmonary:      Effort: Pulmonary effort is normal. No respiratory distress.      Breath sounds: Normal breath sounds. No wheezing or rales.   Chest:      Chest wall: No tenderness.   Abdominal:      General: There is no distension.      Palpations: Abdomen is soft.      Tenderness: There is no abdominal tenderness.   Musculoskeletal:         General: Normal range of motion.      Cervical back: Normal range of motion and neck supple.      " Comments: Thoracic incisions are healing nicely.  No thoracic hernia   Lymphadenopathy:      Cervical: No cervical adenopathy.   Skin:     General: Skin is warm and dry.   Neurological:      Mental Status: He is alert and oriented to person, place, and time.      Cranial Nerves: No cranial nerve deficit.           XR Chest 2 View    Result Date: 3/22/2021  Narrative: EXAM: XR CHEST 2 VW- - 3/22/2021 12:14 PM CDT  HISTORY: lung cancer; C34.90-Malignant neoplasm of unspecified part of unspecified bronchus or lung   COMPARISON: 3/11/2021.  TECHNIQUE:  2 images.  Frontal and lateral views of the chest.  FINDINGS:  Postoperative changes at the right upper lung. Similar coarse pulmonary interstitial markings. No pneumothorax, pleural effusion or focal consolidation. Stable calcified right lung granuloma.  Cardiac mediastinal silhouette within normal limits. No acute findings in the bones, soft tissues or upper abdomen. Similar mild chronic height loss of T4 and T8 compared to prior CT 12/10/2020.       Impression: 1. No acute cardiopulmonary findings. This report was finalized on 03/22/2021 14:16 by Dr Lacie Thakur MD.    XR Chest 2 View    Result Date: 3/11/2021  Narrative: Exam: XR CHEST 2 VW-  Indication: lung cancer; C34.11-Malignant neoplasm of upper lobe, right bronchus or lung  Comparison: 2/19/2021  Findings:  Cardiac silhouette is stable. No change in linear RIGHT upper lobe opacity adjacent to a long suture line. No new airspace opacity or area of nodularity. No pleural effusion, visible pneumothorax, or consolidation. Mild chronic interstitial coarsening appears unchanged. No aggressive osseous lesion.      Impression: Impression:  No acute findings. No pneumothorax. This report was finalized on 03/11/2021 13:50 by Dr. Esteban Almanzar MD.    Tissue Pathology Exam: [id number]  Order: [id number]  Status:  Final result   Visible to patient:  Yes (MyChart) Next appt:  04/21/2021 at 09:30 AM in Pulmonology  (Mallory Bryan, APRN) Dx:  Right upper lobe pulmonary nodule  Specimen Information: A: Lung, Right Upper Lobe; Tissue    B: Lymph Node; Tissue    C: Lymph Node; Tissue    D: Lymph Node; Tissue    E: Lymph Node; Tissue        Component    Case Report   Surgical Pathology Report                         Case: [id number]                                   Authorizing Provider:  Bruce Murillo MD      Collected:           02/15/2021 10:03 AM           Ordering Location:     [facility] [place]  Received:            02/15/2021 10:09 AM           Pathologist:           Norman Rhodes MD                                                       Specimens:   1) - Lung, Right Upper Lobe, RIGHT UPPER LOBE WEDGE RESECTION                                        2) - Lymph Node, LYMPH NODE LEVEL 7 MEDIASTINAL                                                      3) - Lymph Node, LEVEL 4L  MEDIASTINAL                                                               4) - Lymph Node, LYMPH NODE LEVEL 4R MEDIASTINAL                                                     5) - Lymph Node, LYMPH NODE LEVEL 2R MEDIASTINAL                                           Final Diagnosis   1.  Lung, right upper lobe, wedge resection:  Invasive keratinizing squamous cell carcinoma.  Maximum tumor diameter is 3.2 cm.  The surgical margins are free of tumor (0.3 cm).  Please see CAP synoptic.    2.  Level 7 lymph node, excision:   1 benign lymph node.    3.  Level 4L lymph node, excision:  1 benign lymph node.    4.  Level 4R lymph node, excision:   1 benign lymph node.    5.  Level 2R lymph node, excision:  1 benign lymph node.   Electronically signed by Norman Rhodes MD on 2/16/2021 at 1443   Synoptic Checklist   LUNG  8th Edition - Protocol posted: 2/26/2020  LUNG: RESECTION - All Specimens  SPECIMEN   Procedure  Wedge resection    Specimen Laterality  Right    TUMOR   Tumor Site  Upper lobe of lung    Histologic Type  Invasive  squamous cell carcinoma, keratinizing    Histologic Grade  G2: Moderately differentiated    Tumor Size     Total Tumor Size (size of entire tumor)  Greatest Dimension (Centimeters): 3.2 cm   Additional Dimension (Centimeters)  2.6 cm     1.3 cm   Tumor Focality  Single focus    Visceral Pleura Invasion  Not identified    Direct Invasion of Adjacent Structures  No adjacent structures present    Treatment Effect  No known presurgical therapy    Lymphovascular Invasion  Not identified    MARGINS   Margins  All margins are uninvolved by tumor    Margins Examined  Parenchymal    Distance of Invasive Carcinoma from Closest Margin (Centimeters)  0.3 cm   Closest Margin  Parenchymal    LYMPH NODES   Number of Lymph Nodes Involved  0    Number of Lymph Nodes Examined  4    Juan David Stations Examined  2R: Upper paratracheal      4R: Lower paratracheal      7: Subcarinal      4L: Lower paratracheal    PATHOLOGIC STAGE CLASSIFICATION (pTNM, AJCC 8th Edition)   Primary Tumor (pT)  pT2a    Regional Lymph Nodes (pN)  pN0    ADDITIONAL FINDINGS   Additional Findings  Emphysema    .      Intraoperative Consultation    Right upper lobe lung, wedge resection, frozen section:  Non-small cell carcinoma.  The surgical margin appears free of tumor (3 mm).  Patient discussed with Dr. Murillo at 10:40 AM on 2/15/2020 by Dr. Norman Rhodes.   Gross Description    1.  Received a container labeled the patient's name and lung, right upper lobe.  Received fresh for frozen section is a wedge-shaped 46.4 g, 8.3 x 4.1 x 2.7 cm wedge resection of lung.  The pleural surface is hemorrhagic red-brown with a ill-defined 1.6 x 1.5 cm area of puckering.  The staples are removed and the surgical margin is inked in blue.  There is a white-tan firm 3.2 x 2.6 x 1.3 cm mass 0.3 cm from the inked stapled margin.  The tumor mass does not involve the pleura.  The tumor is 0.4 cm from the pleural surface.  Representative sections are submitted.  Cassette 1A frozen  section.  Cassette 1B through 1E tumor mass cassette 1F tissue adjacent tumor mass cassette 1G and cassette 1H lung tissue.       2.  Received contentedly with patient's name and lymph node level 7 mediastinal. Received in fixative are 2 possible lymph nodes measuring 0.3 cm and 0.5 cm.  The specimen is totally submitted cassette 2A, 2 pieces.     3.  Received in fixative is a container labeled the patient's name and lymph node level 4L, mediastinal.  Received in fixative is a 0.4 x 0.3 x 0.3 cm possible lymph node.  The specimen is totally submitted in cassette 3A, 1 piece.     4.  Received is a container labeled the patient's name and lymph node level 4R, mediastinal. Received in fixative are multiple pieces of brown to black tissue measuring 0.7 x 0.6 x 0.3 cm in aggregate.  The specimen is totally submitted in cassette 4A, numerous.     5.  Received is a container labeled the patient's name and lymph node level 2R, mediastinal. Received in fixative is a 0.5 x 0.3 x 0.2 cm possible lymph node.  The specimen is totally submitted in cassette 5A, 1 piece.               Microscopic Description    1.  Sections of the mass show islands of atypical cells with increased N/C ratios, dark vesicular chromatin, occasional prominent nucleoli, and pink amorphous cytoplasm.  There are focal areas of keratinization.  Prominent mitotic activity is noted.  Immunohistochemical stains for p40 are positive for the tumor cells Immunohistochemical stains for TTF-1 are negative for the tumor cells. The surrounding stroma shows a desmoplastic response of the tumor.  There are prominent lymphoid aggregates in the outer borders of the infiltrative tumor.  The tumor extends up to but does not appear to infiltrate the adjacent parietal pleura.  The elastic stain shows an intact pleural surface free of invasion. The adjacent lung parenchyma shows emphysematous change.    2.  Sections show one benign lymph node.    3.  Sections show one benign  lymph node.    4.  Sections show one benign lymph node.    5.  Sections show one benign lymph node.               Assessment/Plan       Diagnoses and all orders for this visit:    1. Malignant neoplasm of upper lobe of right lung (CMS/HCC) (Primary)        Overall, Se Ramirez is doing well.  We discussed his pathology and at this time he will not require any further intervention.  We discussed surveillance is indicated at this time.  Respiratory disease clinic will continue to follow and an appointment is in place for April of this year.    We discussed the need to discontinue smoking for both cardiovascular and oncologic benefits.      Although I have not provided a specific follow up appointment, should I be of further assistance, please do not hesitate to contact us.      Smoking cessation x 4 min advised.  He is working on it.      Patient's Body mass index is 26.72 kg/m². BMI is within normal parameters. No follow-up required..

## 2021-04-19 NOTE — PROGRESS NOTES
"Chief Complaint  right upper lobe pulmonary nodule    Subjective    History of Present Illness      Se Ramirez presents to [facility] PULMONARY & CRITICAL CARE MEDICINE   Mr. Ramirez is a pleasant 70-year-old male patient of Dr. Nakul Holbrook with known 2.5 cm right upper lobe anterior segment nodule noted on 12/20/2020. He was treated by Dr. 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 He has known emphysema/COPD and a current every day smoker. He has gone from 1/5-2 PPD to <1/2 ppd and trying to quit. PFTs showed mild obstruction with FEV1 86% predicted. He was continued on his Spirva and Albuterol HFA at last visit however he has been out for the last couple of months. He denies any shortness of breath or cough. He states he feels great and does not feel he would use them at this time.        Objective   Vital Signs:   /78   Pulse 69   Ht 177.5 cm (69.88\")   Wt 83.9 kg (185 lb)   SpO2 98% Comment: RA  BMI 26.64 kg/m²     Physical Exam  Vitals reviewed.   Constitutional:       Appearance: Normal appearance.   Cardiovascular:      Rate and Rhythm: Normal rate and regular rhythm.   Pulmonary:      Effort: Pulmonary effort is normal.      Breath sounds: Normal breath sounds.   Neurological:      General: No focal deficit present.      Mental Status: He is alert and oriented to person, place, and time.   Psychiatric:         Mood and Affect: Mood normal.         Behavior: Behavior normal.          Result Review :   The following data was reviewed by: PARISA Lopez on 04/21/2021:  CMP    CMP 2/12/21 2/16/21   Glucose 94 98   BUN 14 14   Creatinine 0.73 (A) 0.78   eGFR Non African Am 106 98   Sodium 139 139   Potassium 4.4 3.9   Chloride 103 102   Calcium 9.3 8.8   Albumin 4.30    Total Bilirubin 0.8    Alkaline Phosphatase 68    AST (SGOT) 20    ALT (SGPT) 20    (A) Abnormal value            CBC w/diff    CBC " w/Diff 2/12/21 2/16/21   WBC 8.97 12.80 (A)   RBC 4.90 4.28   Hemoglobin 16.1 14.3   Hematocrit 45.6 41.4   MCV 93.1 96.7   MCH 32.9 33.4 (A)   MCHC 35.3 34.5   RDW 12.1 (A) 12.4   Platelets 165 159   Neutrophil Rel % 65.6    Immature Granulocyte Rel % 0.4    Lymphocyte Rel % 24.6    Monocyte Rel % 7.5    Eosinophil Rel % 1.0    Basophil Rel % 0.9    (A) Abnormal value            Data reviewed: pathology 2/15/ 21   Tissue Pathology Exam (02/15/2021 10:03)      My interpretation of the PFT: mild obstruction    Pulmonary Function Test (01/28/2021 08:45)      Results for orders placed in visit on 12/21/20    Pulmonary Function Test    [facility] - Pulmonary Function Test    [address] [place] [address]  [id number]  [phone]    Patient : Se Ramirez  MRN : [id number]  CSN : [id number]  Pulmonologist : Diego Landry MD  Date : 1/28/2021    ______________________________________________________________________    Interpretation :  1.  Spirometry is consistent with a mild obstructive ventilatory defect manifested by small airways disease.  2.  There is no significant change in spirometry postbronchodilator.  3.  Lung volumes reveal a decrease in residual volume and otherwise are within normal limits.  4.  There is a moderate diffusion impairment even when corrected for alveolar volume.      Diego Landry MD      Patient's Body mass index is 26.64 kg/m². BMI is within normal parameters. No follow-up required..      Assessment and Plan    Diagnoses and all orders for this visit:    1. Malignant neoplasm of upper lobe of right lung (CMS/HCC) (Primary)  Comments:  Keratonizing squamous cell carcinoma      2. Primary lung squamous cell carcinoma, right (CMS/HCC)  Comments:  Will plan every 3 month CT with first on with contrast to view lymph nodes and without therafter if nodes ok. After 12 months, every 6 mo, then yearly     3. Current every day smoker    4. Stage 1 mild COPD by GOLD  classification (CMS/HCC)  Comments:  He does not wish to have inhalers at this time. will plan for yearly PFTs. He will call should he have symptoms and will go back on inhlaers.     Other orders  -     CT Chest With Contrast Diagnostic; Future    Will schedule CT with contrast for next month which will be 3 months post surgical resection. Follow up in 4 months with another CT.     Alpha 1: none    Health maintenance:   Influenza vaccine: declines  Pneumovax 23:declines  Prevnar 13:declines  Covid-19 Moderna  3/24/21, second will be done today     Follow Up   Return in about 4 months (around 8/21/2021) for Ct in one month.  Patient was given instructions and counseling regarding his condition or for health maintenance advice. Please see specific information pulled into the AVS if appropriate.     Mallory Bryan, APRN  4/21/2021  10:13 CDT

## 2021-04-21 ENCOUNTER — OFFICE VISIT (OUTPATIENT)
Dept: PULMONOLOGY | Facility: CLINIC | Age: 71
End: 2021-04-21

## 2021-04-21 VITALS
WEIGHT: 185 LBS | HEIGHT: 70 IN | OXYGEN SATURATION: 98 % | DIASTOLIC BLOOD PRESSURE: 78 MMHG | HEART RATE: 69 BPM | SYSTOLIC BLOOD PRESSURE: 152 MMHG | BODY MASS INDEX: 26.48 KG/M2

## 2021-04-21 DIAGNOSIS — F17.200 CURRENT EVERY DAY SMOKER: ICD-10-CM

## 2021-04-21 DIAGNOSIS — C34.11 MALIGNANT NEOPLASM OF UPPER LOBE OF RIGHT LUNG (HCC): Primary | ICD-10-CM

## 2021-04-21 DIAGNOSIS — C34.91 PRIMARY LUNG SQUAMOUS CELL CARCINOMA, RIGHT (HCC): Chronic | ICD-10-CM

## 2021-04-21 DIAGNOSIS — J44.9 STAGE 1 MILD COPD BY GOLD CLASSIFICATION (HCC): Chronic | ICD-10-CM

## 2021-04-21 PROCEDURE — 99214 OFFICE O/P EST MOD 30 MIN: CPT | Performed by: NURSE PRACTITIONER

## 2021-04-29 ENCOUNTER — TELEPHONE (OUTPATIENT)
Dept: CARDIAC SURGERY | Facility: CLINIC | Age: 71
End: 2021-04-29

## 2021-04-29 NOTE — TELEPHONE ENCOUNTER
Per PARISA Manning pt will need OV to discharge oxygen. Called patient and offered appt for 5/4 @ 9:30, pt accepted this.

## 2021-05-04 ENCOUNTER — OFFICE VISIT (OUTPATIENT)
Dept: CARDIAC SURGERY | Facility: CLINIC | Age: 71
End: 2021-05-04

## 2021-05-04 VITALS
DIASTOLIC BLOOD PRESSURE: 80 MMHG | HEIGHT: 70 IN | HEART RATE: 89 BPM | BODY MASS INDEX: 26.6 KG/M2 | SYSTOLIC BLOOD PRESSURE: 120 MMHG | WEIGHT: 185.8 LBS | OXYGEN SATURATION: 93 %

## 2021-05-04 DIAGNOSIS — J44.9 CHRONIC OBSTRUCTIVE PULMONARY DISEASE, UNSPECIFIED COPD TYPE (HCC): ICD-10-CM

## 2021-05-04 DIAGNOSIS — Z79.899 ON STATIN THERAPY: ICD-10-CM

## 2021-05-04 DIAGNOSIS — Z72.0 TOBACCO USER: ICD-10-CM

## 2021-05-04 DIAGNOSIS — E78.5 HYPERLIPIDEMIA, UNSPECIFIED HYPERLIPIDEMIA TYPE: ICD-10-CM

## 2021-05-04 DIAGNOSIS — C34.11 MALIGNANT NEOPLASM OF UPPER LOBE OF RIGHT LUNG (HCC): Primary | ICD-10-CM

## 2021-05-04 PROCEDURE — 99024 POSTOP FOLLOW-UP VISIT: CPT | Performed by: NURSE PRACTITIONER

## 2021-05-04 NOTE — PROGRESS NOTES
"Subjective   Patient ID: Se Ramirez is a 70 y.o. male who is here for follow-up having had Bronchoscopy, Right thoracoscopy, right upper lobe wedge resection, cervical mediastinoscopy with lymph node dissection performed on 2/15/2021 Dr. Murillo.     History of Present Illness  Post operative recovery was uneventful. He returns today for assessment for O2 discontinuance. Has not been wearing oxygen for sometime now. States he has been doing well. Follows with Respiratory disease clinic. No pain. He is still smoking, down to 8 cigarettes per day. He is trying to quit smoking.     The following portions of the patient's history were reviewed and updated as appropriate: allergies, current medications, past family history, past medical history, past social history, past surgical history and problem list.      Objective   Visit Vitals  /80 (BP Location: Left arm, Patient Position: Sitting, Cuff Size: Adult)   Pulse 89   Ht 177.5 cm (69.88\")   Wt 84.3 kg (185 lb 12.8 oz)   SpO2 93%   BMI 26.75 kg/m²       Physical Exam  Constitutional:       Appearance: He is well-developed.   HENT:      Head: Normocephalic and atraumatic.   Eyes:      Pupils: Pupils are equal, round, and reactive to light.   Cardiovascular:      Rate and Rhythm: Normal rate and regular rhythm.      Heart sounds: Normal heart sounds. No murmur heard.   No friction rub. No gallop.    Pulmonary:      Effort: Pulmonary effort is normal. No respiratory distress.      Breath sounds: Normal breath sounds. No wheezing or rales.   Abdominal:      General: There is no distension.      Palpations: Abdomen is soft.      Tenderness: There is no abdominal tenderness.   Musculoskeletal:         General: Normal range of motion.      Cervical back: Normal range of motion and neck supple.      Right lower leg: No edema.      Left lower leg: No edema.      Comments: Thoracic incisions are healing nicely.  No thoracic hernia   Skin:     General: Skin is warm and " dry.   Neurological:      Mental Status: He is alert and oriented to person, place, and time.      Cranial Nerves: No cranial nerve deficit.       O2 walk test  1 minute O2 sat 95%  2 minutes O2 sat 95%  3 minutes O2 sat 92%  4 minutes O2 sat 91%  5 minutes O2 sat 94%  6 minutes O2 sat 95%    Assessment/Plan       Diagnoses and all orders for this visit:    1. Malignant neoplasm of upper lobe of right lung (CMS/HCC) (Primary)  -     Miscellaneous DME    2. Chronic obstructive pulmonary disease, unspecified COPD type (CMS/HCC)    3. Tobacco user    Other orders  -Discontinue O2     4. Hyperlipidemia, unspecified    5. On statin therapy       Overall, Se aRmirez is doing well.  OK to discontinue supplemental oxygen based off 6 minute walk test. [facility]  oxygen supplies. Respiratory disease clinic will continue to follow. All questions have been answered to the best of my ability and he has no other issues.  Although I have not provided a specific follow up appointment, should I be of further assistance, please do not hesitate to contact us.      Se Ramirez  reports that he has been smoking cigarettes. He has a 10.00 pack-year smoking history. He has never used smokeless tobacco. I have educated him on the risk of diseases from using tobacco products such as cancer, COPD and heart disease.     I advised him to quit and he is willing to quit.       Patient's Body mass index is 26.75 kg/m². BMI is within normal parameters. No follow-up required.  Advance Care Planning      ACP discussion was declined by the patient. Patient does not have an advance directive, declines further assistance.

## 2021-05-21 ENCOUNTER — HOSPITAL ENCOUNTER (OUTPATIENT)
Dept: CT IMAGING | Facility: HOSPITAL | Age: 71
Discharge: HOME | End: 2021-05-21

## 2021-05-21 DIAGNOSIS — C34.11 MALIGNANT NEOPLASM OF UPPER LOBE OF RIGHT LUNG (HCC): ICD-10-CM

## 2021-05-21 DIAGNOSIS — C34.91 PRIMARY LUNG SQUAMOUS CELL CARCINOMA, RIGHT (HCC): Chronic | ICD-10-CM

## 2021-05-21 PROCEDURE — 25010000002 IOPAMIDOL 61 % SOLUTION: Performed by: NURSE PRACTITIONER

## 2021-05-21 PROCEDURE — 82565 ASSAY OF CREATININE: CPT

## 2021-05-21 PROCEDURE — 71260 CT THORAX DX C+: CPT

## 2021-05-21 RX ADMIN — IOPAMIDOL 100 ML: 612 INJECTION, SOLUTION INTRAVENOUS at 09:16

## 2021-06-01 LAB — CREAT BLDA-MCNC: 0.9 MG/DL (ref 0.6–1.3)

## 2021-08-18 ENCOUNTER — OFFICE VISIT (OUTPATIENT)
Dept: PULMONOLOGY | Facility: CLINIC | Age: 71
End: 2021-08-18

## 2021-08-18 VITALS
HEART RATE: 90 BPM | SYSTOLIC BLOOD PRESSURE: 158 MMHG | DIASTOLIC BLOOD PRESSURE: 88 MMHG | WEIGHT: 181.4 LBS | BODY MASS INDEX: 25.97 KG/M2 | HEIGHT: 70 IN | OXYGEN SATURATION: 98 %

## 2021-08-18 DIAGNOSIS — R59.0 MEDIASTINAL ADENOPATHY: ICD-10-CM

## 2021-08-18 DIAGNOSIS — Z01.812 PRE-PROCEDURE LAB EXAM: ICD-10-CM

## 2021-08-18 DIAGNOSIS — C34.11 MALIGNANT NEOPLASM OF UPPER LOBE OF RIGHT LUNG (HCC): Primary | ICD-10-CM

## 2021-08-18 DIAGNOSIS — R59.0 HILAR ADENOPATHY: ICD-10-CM

## 2021-08-18 DIAGNOSIS — J44.9 STAGE 1 MILD COPD BY GOLD CLASSIFICATION (HCC): ICD-10-CM

## 2021-08-18 DIAGNOSIS — Z72.0 TOBACCO USER: ICD-10-CM

## 2021-08-18 DIAGNOSIS — E66.3 OVERWEIGHT: ICD-10-CM

## 2021-08-18 PROCEDURE — 99214 OFFICE O/P EST MOD 30 MIN: CPT | Performed by: INTERNAL MEDICINE

## 2021-08-18 RX ORDER — ROSUVASTATIN CALCIUM 5 MG/1
TABLET, COATED ORAL
COMMUNITY
End: 2022-06-01

## 2021-08-18 RX ORDER — TIOTROPIUM BROMIDE INHALATION SPRAY 3.12 UG/1
2 SPRAY, METERED RESPIRATORY (INHALATION)
Qty: 1 EACH | Refills: 0 | COMMUNITY
Start: 2021-08-18 | End: 2021-12-15 | Stop reason: SDUPTHER

## 2021-08-18 NOTE — PATIENT INSTRUCTIONS
The patient is doing well status post resection of his lung cancer.  His last chest CT showed no recurrent disease.  Plan on a follow-up CT in about 3 months and then he will return to the office shortly thereafter to see Adrienne Bryan we will get complete pulmonary functions on return as he does have some mild shortness of breath particular in the morning.  Otherwise we will continue his inhaled medications.  A sample of Spiriva Respimat was provided.

## 2021-08-18 NOTE — PROGRESS NOTES
Chief Complaint  Lung Cancer right lung and COPD    Subjective    History of Present Illness {CC  Problem List  Visit Diagnosis   Encounters  Notes  Medications  Labs  Result Review Imaging  Media     Se Ramirez presents to [facility] PULMONARY & CRITICAL CARE MEDICINE for COPD with mild shortness of breath and personal history of lung cancer.    History of Present Illness   The patient did have a wedge resection of a squamous cell cancer from the right upper lobe in February of this year.  He did have multiple lymph nodes sampled at the time of the surgery per Dr. Murillo which showed no evidence of metastatic disease.  A follow-up chest CT was performed in May which showed no evidence of recurrence.  He did have some enlarged lymph nodes but these have been sampled at the time of his surgery and did not have evidence of metastatic disease in the lymph nodes had not changed on his most recent scan.  He does complain of some mild shortness of breath when he gets up in the morning and does have albuterol to use.  He is also use Spiriva and I gave another sample of this particular if he had more frequent symptoms.  I told him we will plan on a follow-up with Adrienne Bryan in about 3 months with complete pulmonary functions here in the office.  We will also get a follow-up chest CT with contrast which will be about 6 months from his prior CT.  I told him we can continue to get periodic chest CTs for at 5-year period of time and could do these on an every 6-month basis for the first few years and then go to once yearly scans and based on his smoking history we could continue to perform these on a yearly basis even after that period of time.  He has had his COVID-19 vaccines in the form of the Moderna vaccine.  He was counseled regarding smoking cessation for 2 minutes as he is still smoking less than half a pack per day.  Prior to Admission medications    Medication Sig Start Date End Date  "Taking? Authorizing Provider   albuterol sulfate HFA (ProAir HFA) 108 (90 Base) MCG/ACT inhaler Inhale 2 puffs Every 4 (Four) Hours As Needed for Wheezing. 1/20/21  Yes Mallory Bryan APRN   atorvastatin (LIPITOR) 10 MG tablet Take 10 mg by mouth Daily. 3/19/19  Yes ProviderKrysten MD   omeprazole (priLOSEC) 20 MG capsule Take 20 mg by mouth Daily.    Provider, MD Krysten   rosuvastatin (CRESTOR) 5 MG tablet rosuvastatin 5 mg tablet    Provider, MD Krysten   Spiriva Respimat 2.5 MCG/ACT aerosol solution inhaler Inhale 2 puffs Daily. 1/20/21   Mallory Bryan APRN       Social History     Socioeconomic History   • Marital status: Significant Other   Tobacco Use   • Smoking status: Current Every Day Smoker     Packs/day: 0.25     Years: 40.00     Pack years: 10.00     Types: Cigarettes   • Smokeless tobacco: Never Used   • Tobacco comment: Pt states about 7-8 cigarettes per day   Vaping Use   • Vaping Use: Never used   Substance and Sexual Activity   • Alcohol use: Yes     Alcohol/week: 2.0 standard drinks     Types: 2 Standard drinks or equivalent per week     Comment: 2 drinks daily    • Drug use: Yes     Types: Marijuana   • Sexual activity: Defer       Objective   Vital Signs:   /88   Pulse 90   Ht 177.5 cm (69.88\")   Wt 82.3 kg (181 lb 6.4 oz)   SpO2 98% Comment: ra  BMI 26.12 kg/m²     Physical Exam  Vitals and nursing note reviewed.   HENT:      Head: Normocephalic.      Comments: He is wearing a mask.  Eyes:      Pupils: Pupils are equal, round, and reactive to light.   Cardiovascular:      Rate and Rhythm: Normal rate and regular rhythm.   Pulmonary:      Effort: Pulmonary effort is normal.      Comments: Lung fields are clear with no adventitious sounds heard.  Musculoskeletal:         General: Normal range of motion.   Skin:     General: Skin is warm and dry.   Neurological:      General: No focal deficit present.      Mental Status: He is alert and oriented to " person, place, and time.   Psychiatric:         Mood and Affect: Mood normal.         Behavior: Behavior normal.        Result Review :{ Labs  Result Review  Imaging  Med Tab  Media :          Results for orders placed in visit on 12/21/20    Pulmonary Function Test    Narrative  [facility] - Pulmonary Function Test    [address]  [place]  [place]  [address]  [phone]    Patient : Se Ramirez  MRN : [id number]  CSN : [id number]  Pulmonologist : Diego Landry MD  Date : 1/28/2021    ______________________________________________________________________    Interpretation :  1.  Spirometry is consistent with a mild obstructive ventilatory defect manifested by small airways disease.  2.  There is no significant change in spirometry postbronchodilator.  3.  Lung volumes reveal a decrease in residual volume and otherwise are within normal limits.  4.  There is a moderate diffusion impairment even when corrected for alveolar volume.      Diego Landry MD                 My interpretation of imaging:    CT Chest With Contrast Diagnostic (05/21/2021 09:15)      Assessment and Plan {CC Problem List  Visit Diagnosis  ROS  Review (Popup)  Health Maintenance  Quality  BestPractice  Medications  SmartSets  SnapShot Encounters  Media      Diagnoses and all orders for this visit:    1. Malignant neoplasm of upper lobe of right lung (CMS/HCC) (Primary)  Assessment & Plan:  Again he is status post wedge resection of a squamous cell cancer from the right upper lobe by Dr. Murillo in February of this year.  A follow-up CT done with contrast showed no evidence of recurrent disease.  There were some enlarged intrathoracic nodes but lymph node sampling at the time of his surgery revealed no metastatic disease.  We will plan on a follow-up CT of the chest with contrast in about 3 months.    Orders:  -     CT Chest With Contrast; Future    2. Stage 1 mild COPD by GOLD classification  (CMS/[facility])  Assessment & Plan:  He does have some mild shortness of breath associated with this.  He can continue his rescue inhaler up to 4 times a day as needed and I did give him a sample of Spiriva Respimat that he can use as maintenance therapy if he has symptoms on a more regular basis.  Also he is encouraged to work on smoking cessation.  Also we will plan on follow-up pulmonary functions when he returns the office in 3 months.        Orders:  -     tiotropium bromide monohydrate (Spiriva Respimat) 2.5 MCG/ACT aerosol solution inhaler; Inhale 2 puffs Daily.  Dispense: 1 each; Refill: 0  -     Full Pulmonary Function Test Without Bronchodilator; Future    3. Mediastinal adenopathy  Assessment & Plan:  He had a minimally enlarged subcarinal lymph node on his most recent chest CT.  This was stable from previous CT but we will follow this with a repeat CT of the chest with contrast in 3 months.    Orders:  -     CT Chest With Contrast; Future    4. Hilar adenopathy  Assessment & Plan:  His recent chest CT did show some mildly enlarged right hilar nodes which were stable compared to a previous scan.  He again had lymph node sampling when he had his wedge resection performed and did not have any evidence of metastatic disease to the lymph nodes.  We will still plan on a follow-up CT of the chest with contrast in about 3 months.    Orders:  -     CT Chest With Contrast; Future    5. Tobacco user  Assessment & Plan:  He is still smoking about 7 to 8 cigarettes/day and is counseled regarding smoking cessation for 2 minutes.      6. Overweight  Assessment & Plan:  His BMI is minimally elevated and diet and exercise are encouraged.  Otherwise he will follow-up with his primary care physician regarding his elevated BMI.      7. Pre-procedure lab exam  Assessment & Plan:  Serum creatinine will be checked in the day of his CT with contrast or shortly before.    Orders:  -     Creatinine, Serum; Future      Patient's Body  mass index is 26.12 kg/m². indicating that he is overweight (BMI 25-29.9). Obesity-related health conditions include the following: dyslipidemias. Obesity is unchanged.  His BMI is slightly elevated and diet and exercise are encouraged and otherwise he will follow-up with his primary care physician regarding his minimally elevated BMI.        Diego Landry MD  11/24/2021  15:12 CST    Follow Up {Instructions Charge Capture  Follow-up Communications   Return in about 3 months (around 11/18/2021) for Complete PFT.    Patient was given instructions and counseling regarding his condition or for health maintenance advice. Please see specific information pulled into the AVS if appropriate.

## 2021-08-18 NOTE — ASSESSMENT & PLAN NOTE
He had a minimally enlarged subcarinal lymph node on his most recent chest CT.  This was stable from previous CT but we will follow this with a repeat CT of the chest with contrast in 3 months.

## 2021-08-18 NOTE — ASSESSMENT & PLAN NOTE
His BMI is minimally elevated and diet and exercise are encouraged.  Otherwise he will follow-up with his primary care physician regarding his elevated BMI.

## 2021-08-18 NOTE — ASSESSMENT & PLAN NOTE
Again he is status post wedge resection of a squamous cell cancer from the right upper lobe by Dr. Murillo in February of this year.  A follow-up CT done with contrast showed no evidence of recurrent disease.  There were some enlarged intrathoracic nodes but lymph node sampling at the time of his surgery revealed no metastatic disease.  We will plan on a follow-up CT of the chest with contrast in about 3 months.

## 2021-08-18 NOTE — ASSESSMENT & PLAN NOTE
He does have some mild shortness of breath associated with this.  He can continue his rescue inhaler up to 4 times a day as needed and I did give him a sample of Spiriva Respimat that he can use as maintenance therapy if he has symptoms on a more regular basis.  Also he is encouraged to work on smoking cessation.  Also we will plan on follow-up pulmonary functions when he returns the office in 3 months.

## 2021-08-18 NOTE — ASSESSMENT & PLAN NOTE
He is still smoking about 7 to 8 cigarettes/day and is counseled regarding smoking cessation for 2 minutes.

## 2021-08-18 NOTE — ASSESSMENT & PLAN NOTE
His recent chest CT did show some mildly enlarged right hilar nodes which were stable compared to a previous scan.  He again had lymph node sampling when he had his wedge resection performed and did not have any evidence of metastatic disease to the lymph nodes.  We will still plan on a follow-up CT of the chest with contrast in about 3 months.

## 2021-11-24 ENCOUNTER — HOSPITAL ENCOUNTER (OUTPATIENT)
Dept: CT IMAGING | Facility: HOSPITAL | Age: 71
Discharge: HOME | End: 2021-11-24
Admitting: INTERNAL MEDICINE

## 2021-11-24 DIAGNOSIS — R59.0 HILAR ADENOPATHY: ICD-10-CM

## 2021-11-24 DIAGNOSIS — C34.11 MALIGNANT NEOPLASM OF UPPER LOBE OF RIGHT LUNG (HCC): ICD-10-CM

## 2021-11-24 DIAGNOSIS — R59.0 MEDIASTINAL ADENOPATHY: ICD-10-CM

## 2021-11-24 LAB — CREAT BLDA-MCNC: 0.7 MG/DL (ref 0.6–1.3)

## 2021-11-24 PROCEDURE — 71260 CT THORAX DX C+: CPT

## 2021-11-24 PROCEDURE — 25010000002 IOPAMIDOL 61 % SOLUTION: Performed by: INTERNAL MEDICINE

## 2021-11-24 PROCEDURE — 82565 ASSAY OF CREATININE: CPT

## 2021-11-24 RX ADMIN — IOPAMIDOL 100 ML: 612 INJECTION, SOLUTION INTRAVENOUS at 08:19

## 2021-11-29 ENCOUNTER — LAB (OUTPATIENT)
Dept: LAB | Facility: HOSPITAL | Age: 71
End: 2021-11-29

## 2021-11-29 DIAGNOSIS — Z01.812 PRE-PROCEDURE LAB EXAM: ICD-10-CM

## 2021-11-29 DIAGNOSIS — Z01.818 PREOP TESTING: Primary | ICD-10-CM

## 2021-11-29 LAB — SARS-COV-2 ORF1AB RESP QL NAA+PROBE: NOT DETECTED

## 2021-11-29 PROCEDURE — U0005 INFEC AGEN DETEC AMPLI PROBE: HCPCS

## 2021-11-29 PROCEDURE — C9803 HOPD COVID-19 SPEC COLLECT: HCPCS

## 2021-11-29 PROCEDURE — U0004 COV-19 TEST NON-CDC HGH THRU: HCPCS

## 2021-12-01 ENCOUNTER — OFFICE VISIT (OUTPATIENT)
Dept: PULMONOLOGY | Facility: CLINIC | Age: 71
End: 2021-12-01

## 2021-12-01 VITALS
SYSTOLIC BLOOD PRESSURE: 128 MMHG | DIASTOLIC BLOOD PRESSURE: 78 MMHG | BODY MASS INDEX: 27.08 KG/M2 | WEIGHT: 182.8 LBS | HEIGHT: 69 IN | HEART RATE: 70 BPM | OXYGEN SATURATION: 95 %

## 2021-12-01 DIAGNOSIS — C34.11 MALIGNANT NEOPLASM OF UPPER LOBE OF RIGHT LUNG (HCC): ICD-10-CM

## 2021-12-01 DIAGNOSIS — Z20.822 ENCOUNTER FOR PREOPERATIVE SCREENING LABORATORY TESTING FOR COVID-19 VIRUS: ICD-10-CM

## 2021-12-01 DIAGNOSIS — Z01.812 PRE-PROCEDURE LAB EXAM: Primary | ICD-10-CM

## 2021-12-01 DIAGNOSIS — J43.9 PULMONARY EMPHYSEMA, UNSPECIFIED EMPHYSEMA TYPE (HCC): ICD-10-CM

## 2021-12-01 DIAGNOSIS — J44.9 STAGE 1 MILD COPD BY GOLD CLASSIFICATION (HCC): Primary | ICD-10-CM

## 2021-12-01 DIAGNOSIS — Z01.812 ENCOUNTER FOR PREOPERATIVE SCREENING LABORATORY TESTING FOR COVID-19 VIRUS: ICD-10-CM

## 2021-12-01 DIAGNOSIS — J44.9 STAGE 1 MILD COPD BY GOLD CLASSIFICATION (HCC): ICD-10-CM

## 2021-12-01 DIAGNOSIS — R59.0 HILAR ADENOPATHY: ICD-10-CM

## 2021-12-01 DIAGNOSIS — R91.1 LUNG NODULE: ICD-10-CM

## 2021-12-01 DIAGNOSIS — R59.0 MEDIASTINAL ADENOPATHY: ICD-10-CM

## 2021-12-01 DIAGNOSIS — Z72.0 TOBACCO USER: ICD-10-CM

## 2021-12-01 PROCEDURE — 94010 BREATHING CAPACITY TEST: CPT | Performed by: NURSE PRACTITIONER

## 2021-12-01 PROCEDURE — 94729 DIFFUSING CAPACITY: CPT | Performed by: NURSE PRACTITIONER

## 2021-12-01 PROCEDURE — 94727 GAS DIL/WSHOT DETER LNG VOL: CPT | Performed by: NURSE PRACTITIONER

## 2021-12-01 PROCEDURE — 99214 OFFICE O/P EST MOD 30 MIN: CPT | Performed by: NURSE PRACTITIONER

## 2021-12-01 NOTE — PROCEDURES
Full Pulmonary Function Test Without Bronchodilator  Performed by: Alona Voss, RRT  Authorized by: Diego Landry MD      Pre Drug % Predicted    FVC: 115%   FEV1: 91%   FEF 25-75%: 41%   FEV1/FVC: 62.03%   T%   RV: 118%   DLCO: 65%   D/VAsb: 62%    Interpretation   Spirometry   Spirometry shows mild obstruction. There is reduced midflow suggesting small airway/airflow obstruction.   Review of FVL curve   Patient's effort is normal.   Lung Volume Measurements  Measurements show normal results.   Diffusion Capacity  The patient's diffusion capacity is moderately reduced.  Diffusion capacity is moderately reduced when corrected for alveolar volume.

## 2021-12-15 ENCOUNTER — TELEPHONE (OUTPATIENT)
Dept: PULMONOLOGY | Facility: CLINIC | Age: 71
End: 2021-12-15

## 2021-12-15 DIAGNOSIS — J44.9 STAGE 1 MILD COPD BY GOLD CLASSIFICATION (HCC): ICD-10-CM

## 2021-12-15 RX ORDER — TIOTROPIUM BROMIDE INHALATION SPRAY 3.12 UG/1
2 SPRAY, METERED RESPIRATORY (INHALATION)
Qty: 1 EACH | Refills: 3 | Status: SHIPPED | OUTPATIENT
Start: 2021-12-15 | End: 2022-06-01 | Stop reason: SDUPTHER

## 2021-12-15 NOTE — TELEPHONE ENCOUNTER
Pt req refill on Spirivia, per notes a sample was given in August by Dr. Landry. Would you like him to continue this? He saw you 12/1 and has an upcoming appt 6/01/2022 c you.

## 2022-02-15 ENCOUNTER — HOSPITAL ENCOUNTER (OUTPATIENT)
Dept: GENERAL RADIOLOGY | Age: 72
Discharge: HOME | End: 2022-02-15
Payer: MEDICARE

## 2022-02-15 DIAGNOSIS — M54.50 ACUTE LOW BACK PAIN, UNSPECIFIED BACK PAIN LATERALITY, UNSPECIFIED WHETHER SCIATICA PRESENT: ICD-10-CM

## 2022-02-15 PROCEDURE — 72072 X-RAY EXAM THORAC SPINE 3VWS: CPT

## 2022-02-23 ENCOUNTER — TELEPHONE (OUTPATIENT)
Dept: NEUROSURGERY | Age: 72
End: 2022-02-23

## 2022-02-23 NOTE — TELEPHONE ENCOUNTER
1st attempt to call patient to schedule appointment, left voicemail with call back number [phone]

## 2022-02-23 NOTE — TELEPHONE ENCOUNTER
[facility] Neurosurgery New Patient Questionnaire    1. Diagnosis/Reason for Referral?    LOW BACK PAIN    2. Who is completing questionnaire? Patient X Caregiver Family      3. Has the patient had any previous spinal/brain surgeries? NO      A. If yes, what is the name of the facility in which the surgery was performed? B. Procedure/Surgery performed? C. Who was the surgeon? D. When was the surgery? MM/YY       E. Did the patient improve after the surgery? 4. Is this a second opinion? If yes, Dr. Hemphill Even would like to review patient first before making the appointment. 5. Have MRI Images been obtain within the last year? Yes X No      XR  CT     If yes, where was the imaging performed? If yes, what part of the body? Lumbar  Cervical  Thoracic X Brain     If yes, when was it obtained? 02-    Note: if the scan was performed at a facility other than [address] [place] [address], the disc will need to be brought to the appointment or we need to reach out to obtain the disc. A. Was the patient instructed to provide the disc? Yes   No X      8. Has the patient had a NCV/EMG within the last year? Yes  No X     If yes, where was it performed and date? MM/YY  Location:      9. Has the patient been to Physical Therapy? Yes  No  X     If yes, what location, how long attended, and last visit? Location:        Therapy Lasted:    Date of Last Visit:      10. Has the patient been to Pain Management? Yes  No X     If yes, what location and last visit     Location:   Last Visit:   Is it helping?

## 2022-02-28 ENCOUNTER — OFFICE VISIT (OUTPATIENT)
Dept: NEUROSURGERY | Age: 72
End: 2022-02-28
Payer: MEDICARE

## 2022-02-28 VITALS
WEIGHT: 185 LBS | HEART RATE: 98 BPM | SYSTOLIC BLOOD PRESSURE: 133 MMHG | HEIGHT: 70 IN | BODY MASS INDEX: 26.48 KG/M2 | DIASTOLIC BLOOD PRESSURE: 84 MMHG

## 2022-02-28 DIAGNOSIS — M51.34 DDD (DEGENERATIVE DISC DISEASE), THORACIC: ICD-10-CM

## 2022-02-28 DIAGNOSIS — S22.000A COMPRESSION FRACTURE OF THORACIC VERTEBRA, UNSPECIFIED THORACIC VERTEBRAL LEVEL, INITIAL ENCOUNTER (HCC): Primary | ICD-10-CM

## 2022-02-28 DIAGNOSIS — G89.29 CHRONIC MIDLINE THORACIC BACK PAIN: ICD-10-CM

## 2022-02-28 DIAGNOSIS — M54.6 CHRONIC MIDLINE THORACIC BACK PAIN: ICD-10-CM

## 2022-02-28 DIAGNOSIS — Z85.118 HISTORY OF LUNG CANCER: ICD-10-CM

## 2022-02-28 PROCEDURE — 4040F PNEUMOC VAC/ADMIN/RCVD: CPT | Performed by: NURSE PRACTITIONER

## 2022-02-28 PROCEDURE — G8427 DOCREV CUR MEDS BY ELIG CLIN: HCPCS | Performed by: NURSE PRACTITIONER

## 2022-02-28 PROCEDURE — 99204 OFFICE O/P NEW MOD 45 MIN: CPT | Performed by: NURSE PRACTITIONER

## 2022-02-28 PROCEDURE — G8484 FLU IMMUNIZE NO ADMIN: HCPCS | Performed by: NURSE PRACTITIONER

## 2022-02-28 PROCEDURE — 3017F COLORECTAL CA SCREEN DOC REV: CPT | Performed by: NURSE PRACTITIONER

## 2022-02-28 PROCEDURE — G8417 CALC BMI ABV UP PARAM F/U: HCPCS | Performed by: NURSE PRACTITIONER

## 2022-02-28 PROCEDURE — 1123F ACP DISCUSS/DSCN MKR DOCD: CPT | Performed by: NURSE PRACTITIONER

## 2022-02-28 PROCEDURE — 4004F PT TOBACCO SCREEN RCVD TLK: CPT | Performed by: NURSE PRACTITIONER

## 2022-02-28 RX ORDER — HYDROCODONE BITARTRATE AND ACETAMINOPHEN 5; 325 MG/1; MG/1
1 TABLET ORAL 2 TIMES DAILY PRN
Qty: 30 TABLET | Refills: 0 | Status: SHIPPED | OUTPATIENT
Start: 2022-02-28 | End: 2022-04-07

## 2022-02-28 RX ORDER — VIT C/B6/B5/MAGNESIUM/HERB 173 50-5-6-5MG
CAPSULE ORAL DAILY
COMMUNITY

## 2022-02-28 RX ORDER — CALCIUM CARBONATE/VITAMIN D3 600 MG-10
TABLET ORAL DAILY
COMMUNITY

## 2022-02-28 ASSESSMENT — ENCOUNTER SYMPTOMS
GASTROINTESTINAL NEGATIVE: 1
RESPIRATORY NEGATIVE: 1
EYES NEGATIVE: 1
BACK PAIN: 1

## 2022-02-28 NOTE — PROGRESS NOTES
[facility] Neurosurgery  Office Visit      Chief Complaint   Patient presents with    Consultation     back pain       HISTORY OF PRESENT ILLNESS:    Rosario Branch is a 66 y.o. 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 Adilia Menard with surgical resection who presents with mid thoracic back pain that has been ongoing for about 5 months. He states the pain has always worsened after bending and twisting actions. No known injury. Sometimes the pain will bring him to his knees. The pain does radiate into the left ribs. His pain is mostly located in the thoracic back. The patient denies numbness. The patient has underwent a non-operative treatment course that has included:  NSAIDs  Opiates (took one left over from a surgery)    Of note he does use tobacco and does not take blood thinning medications. Past Medical History:   Diagnosis Date    Acid reflux     COPD (chronic obstructive pulmonary disease) (HCC)     Hard of hearing        Past Surgical History:   Procedure Laterality Date    COLONOSCOPY      ENDOSCOPY, COLON, DIAGNOSTIC      FOOT SURGERY Left     TONSILLECTOMY      WRIST SURGERY         Current Outpatient Medications   Medication Sig Dispense Refill    Omega 3 1200 MG CAPS Take by mouth daily      Turmeric 1053 MG TABS Take by mouth daily      turmeric (CURCUMIN 95) 500 MG CAPS Take by mouth daily      HYDROcodone-acetaminophen (NORCO) 5-325 MG per tablet Take 1 tablet by mouth 2 times daily as needed for Pain for up to 15 days. 30 tablet 0    atorvastatin (LIPITOR) 10 MG tablet atorvastatin 10 mg tablet   Take 1 tablet every day by oral route.  omeprazole (PRILOSEC) 20 MG delayed release capsule Take 20 mg by mouth daily       No current facility-administered medications for this visit. Allergies:  Patient has no known allergies.     Social History:   Social History     Tobacco Use   Smoking Status Current Every Day Smoker    Packs/day: 1.50    Types: Cigarettes   Smokeless Tobacco Never Used     Social History     Substance and Sexual Activity   Alcohol Use Yes    Comment: daily         Family History:   History reviewed. No pertinent family history. REVIEW OF SYSTEMS:  Constitutional: Negative. HENT: Negative. Eyes: Negative. Respiratory: Negative. Cardiovascular: Negative. Gastrointestinal: Negative. Genitourinary: Negative. Musculoskeletal: Positive for back pain. Skin: Negative. Neurological: Negative. Endo/Heme/Allergies: Negative. Psychiatric/Behavioral: Negative. PHYSICAL EXAM:  Vitals:    02/28/22 1317   BP: 133/84   Pulse: 98     Constitutional: appears well-developed and well-nourished. Eyes  conjunctiva normal.  Pupils react to light  Ear, nose, throat - hearing intact to finger rub, No scars, masses, or lesions over external nose or ears, no atrophy oftongue  Neck- symmetric, no masses noted, no jugular vein distension  Respiration- chest wall appears symmetric, good expansion, normal effort without use of accessory muscles  Musculoskeletal  no significant wasting of muscles noted, no bony deformities, gait no gross ataxia  Extremities- no clubbing, cyanosis oredema  Skin  warm, dry, and intact. No rash, erythema, or pallor. Psychiatric  mood, affect, and behavior appear normal.     Neurologic Examination  Awake, Alert and oriented x 4  Normal speech pattern, following commands    Motor:  RIGHT:    iliopsoas 5/5    knee flexor 5/5    knee extension 5/5    EHL/dorsiflexion 5/5    plantar flexion 5/5    LEFT:      iliopsoas 5/5    knee flexor 5/5    knee extension 5/5    EHL/dorsiflexion 5/5    plantar flexion 5/5    No deficits to light touch or pinprick sensation  Reflexes are 2+ and symmetric  No myofacial tenderness to palpation  Normal Gait pattern        DATA and IMAGING:    Nursing/pcp notes, imaging, labs, and vitals reviewed.      PT,OT and/or speech notes reviewed    Lab Results   Component Value Date    WBC 7.3 07/25/2018    HGB 16.6 07/25/2018    HCT 50.3 07/25/2018    MCV 96.2 (H) 07/25/2018     07/25/2018     Lab Results   Component Value Date     07/25/2018    K 4.3 07/25/2018     07/25/2018    CO2 27 07/25/2018    BUN 11 07/25/2018    CREATININE 0.8 07/25/2018    GLUCOSE 115 (H) 07/25/2018    CALCIUM 9.2 07/25/2018    PROT 7.3 07/25/2018    LABALBU 4.2 07/25/2018    BILITOT 0.7 07/25/2018    ALKPHOS 58 07/25/2018    AST 23 07/25/2018    ALT 23 07/25/2018    LABGLOM >60 07/25/2018   No results found for: INR, PROTIME      Narrative   XR THORACIC SPINE (3 VIEWS)    2/15/2022 1:02 PM   History: Right-sided mid back pain. Thoracic spine series, 3 views. 13 degrees right convex scoliosis. No paraspinal soft tissue abnormality. Normal heart size. Chronic interstitial lung disease. Previous surgery at the upper right lung. The lateral view shows exaggerated lordosis. There is mild chronic anterior wedge compression of a midthoracic   vertebra. Grossly normal cervical thoracic and thoracolumbar junction alignment.       Impression   1. Osteopenic bones with thoracic kyphosis and mild chronic anterior   wedge compression of a midthoracic vertebra. 2. No acute bony abnormality is seen. Signed by Dr Jessica Solares   I have personally reviewed these images and my interpretation is: There is a mild compression deformity in the mid thoracic spine      ASSESSMENT:    Ermelinda Concepcion is a 66 y.o. male with a history of lung cancer with a questionable compression fracture and thoracic back pain. ICD-10-CM    1. Compression fracture of thoracic vertebra, unspecified thoracic vertebral level, initial encounter ([facility])  S22.000A MRI THORACIC SPINE W WO CONTRAST     HYDROcodone-acetaminophen (NORCO) 5-325 MG per tablet   2.  Chronic midline thoracic back pain  M54.6 MRI THORACIC SPINE W WO CONTRAST    G89.29 HYDROcodone-acetaminophen (NORCO) 5-325 MG per tablet   3. DDD (degenerative disc disease), thoracic  M51.34 MRI THORACIC SPINE W WO CONTRAST   4. History of lung cancer  Z85.118 MRI THORACIC SPINE W WO CONTRAST       PLAN:  I have discussed and reviewed the results of the XR thoracic spine with Mr. Luz Bueno at length. I explained that it appears he does likely have a mild compression deformity of the mid thoracic spine. This could be the culprit of his pain syndrome. Given that he has a recent diagnosis of lung cancer I would like to just move forward with MRI with contrast to rule out pathologic process/fracture.     -MRI thoracic spine  -Will give Norco  -Follow up after imaging         Irais Walker, APRN

## 2022-03-09 ENCOUNTER — HOSPITAL ENCOUNTER (OUTPATIENT)
Dept: MRI IMAGING | Age: 72
Discharge: HOME | End: 2022-03-09
Payer: MEDICARE

## 2022-03-09 DIAGNOSIS — M54.6 CHRONIC MIDLINE THORACIC BACK PAIN: ICD-10-CM

## 2022-03-09 DIAGNOSIS — G89.29 CHRONIC MIDLINE THORACIC BACK PAIN: ICD-10-CM

## 2022-03-09 DIAGNOSIS — M51.34 DDD (DEGENERATIVE DISC DISEASE), THORACIC: ICD-10-CM

## 2022-03-09 DIAGNOSIS — S22.000A COMPRESSION FRACTURE OF THORACIC VERTEBRA, UNSPECIFIED THORACIC VERTEBRAL LEVEL, INITIAL ENCOUNTER (HCC): ICD-10-CM

## 2022-03-09 DIAGNOSIS — Z85.118 HISTORY OF LUNG CANCER: ICD-10-CM

## 2022-03-09 PROCEDURE — 6360000004 HC RX CONTRAST MEDICATION: Performed by: NURSE PRACTITIONER

## 2022-03-09 PROCEDURE — A9577 INJ MULTIHANCE: HCPCS | Performed by: NURSE PRACTITIONER

## 2022-03-09 PROCEDURE — 72157 MRI CHEST SPINE W/O & W/DYE: CPT

## 2022-03-09 RX ADMIN — GADOBENATE DIMEGLUMINE 17 ML: 529 INJECTION, SOLUTION INTRAVENOUS at 09:22

## 2022-03-22 ENCOUNTER — OFFICE VISIT (OUTPATIENT)
Dept: NEUROSURGERY | Age: 72
End: 2022-03-22
Payer: MEDICARE

## 2022-03-22 VITALS
HEIGHT: 70 IN | WEIGHT: 184 LBS | SYSTOLIC BLOOD PRESSURE: 145 MMHG | HEART RATE: 97 BPM | DIASTOLIC BLOOD PRESSURE: 97 MMHG | BODY MASS INDEX: 26.34 KG/M2

## 2022-03-22 DIAGNOSIS — Z85.118 HISTORY OF LUNG CANCER: ICD-10-CM

## 2022-03-22 DIAGNOSIS — R93.7 ABNORMAL MRI, THORACIC SPINE: ICD-10-CM

## 2022-03-22 DIAGNOSIS — M51.34 DDD (DEGENERATIVE DISC DISEASE), THORACIC: Primary | ICD-10-CM

## 2022-03-22 LAB
BASOPHILS ABSOLUTE: 0.1 K/UL (ref 0–0.2)
BASOPHILS RELATIVE PERCENT: 0.9 % (ref 0–1)
C-REACTIVE PROTEIN: 0.37 MG/DL (ref 0–0.5)
EOSINOPHILS ABSOLUTE: 0.2 K/UL (ref 0–0.6)
EOSINOPHILS RELATIVE PERCENT: 2.2 % (ref 0–5)
HCT VFR BLD CALC: 49.5 % (ref 42–52)
HEMOGLOBIN: 16.1 G/DL (ref 14–18)
IMMATURE GRANULOCYTES #: 0 K/UL
LYMPHOCYTES ABSOLUTE: 2.2 K/UL (ref 1.1–4.5)
LYMPHOCYTES RELATIVE PERCENT: 27.2 % (ref 20–40)
MCH RBC QN AUTO: 32.2 PG (ref 27–31)
MCHC RBC AUTO-ENTMCNC: 32.5 G/DL (ref 33–37)
MCV RBC AUTO: 99 FL (ref 80–94)
MONOCYTES ABSOLUTE: 0.7 K/UL (ref 0–0.9)
MONOCYTES RELATIVE PERCENT: 8.4 % (ref 0–10)
NEUTROPHILS ABSOLUTE: 4.8 K/UL (ref 1.5–7.5)
NEUTROPHILS RELATIVE PERCENT: 60.8 % (ref 50–65)
PDW BLD-RTO: 12.3 % (ref 11.5–14.5)
PLATELET # BLD: 164 K/UL (ref 130–400)
PMV BLD AUTO: 10.8 FL (ref 9.4–12.4)
RBC # BLD: 5 M/UL (ref 4.7–6.1)
SEDIMENTATION RATE, ERYTHROCYTE: 2 MM/HR (ref 0–15)
WBC # BLD: 7.9 K/UL (ref 4.8–10.8)

## 2022-03-22 PROCEDURE — G8484 FLU IMMUNIZE NO ADMIN: HCPCS | Performed by: NURSE PRACTITIONER

## 2022-03-22 PROCEDURE — 99214 OFFICE O/P EST MOD 30 MIN: CPT | Performed by: NURSE PRACTITIONER

## 2022-03-22 PROCEDURE — 4040F PNEUMOC VAC/ADMIN/RCVD: CPT | Performed by: NURSE PRACTITIONER

## 2022-03-22 PROCEDURE — 4004F PT TOBACCO SCREEN RCVD TLK: CPT | Performed by: NURSE PRACTITIONER

## 2022-03-22 PROCEDURE — G8417 CALC BMI ABV UP PARAM F/U: HCPCS | Performed by: NURSE PRACTITIONER

## 2022-03-22 PROCEDURE — 1123F ACP DISCUSS/DSCN MKR DOCD: CPT | Performed by: NURSE PRACTITIONER

## 2022-03-22 PROCEDURE — G8427 DOCREV CUR MEDS BY ELIG CLIN: HCPCS | Performed by: NURSE PRACTITIONER

## 2022-03-22 PROCEDURE — 3017F COLORECTAL CA SCREEN DOC REV: CPT | Performed by: NURSE PRACTITIONER

## 2022-03-22 ASSESSMENT — ENCOUNTER SYMPTOMS
GASTROINTESTINAL NEGATIVE: 1
BACK PAIN: 1
RESPIRATORY NEGATIVE: 1
EYES NEGATIVE: 1

## 2022-03-22 NOTE — PROGRESS NOTES
[facility] Neurosurgery  Office Visit      Chief Complaint   Patient presents with    Follow-up     review imaging       3/22/2022: Mr. Mary oBx returns to clinic today to review the MRI thoracic spine. He has continued to have mid thoracic back pain, the pain will occasionally radiate around to the ribs. Some days are worse than others. HISTORY OF PRESENT ILLNESS:    Trey Diaz is a 66 y.o. 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 Mathew Sol with surgical resection who presents with mid thoracic back pain that has been ongoing for about 5 months. He states the pain has always worsened after bending and twisting actions. No known injury. Sometimes the pain will bring him to his knees. The pain does radiate into the left ribs. His pain is mostly located in the thoracic back. The patient denies numbness. The patient has underwent a non-operative treatment course that has included:  NSAIDs  Opiates (took one left over from a surgery)    Of note he does use tobacco and does not take blood thinning medications. Past Medical History:   Diagnosis Date    Acid reflux     COPD (chronic obstructive pulmonary disease) (HCC)     Hard of hearing        Past Surgical History:   Procedure Laterality Date    COLONOSCOPY      ENDOSCOPY, COLON, DIAGNOSTIC      FOOT SURGERY Left     TONSILLECTOMY      WRIST SURGERY         Current Outpatient Medications   Medication Sig Dispense Refill    Omega 3 1200 MG CAPS Take by mouth daily      Turmeric 1053 MG TABS Take by mouth daily      turmeric (CURCUMIN 95) 500 MG CAPS Take by mouth daily      atorvastatin (LIPITOR) 10 MG tablet atorvastatin 10 mg tablet   Take 1 tablet every day by oral route.  omeprazole (PRILOSEC) 20 MG delayed release capsule Take 20 mg by mouth daily       No current facility-administered medications for this visit.        Allergies:  Patient has no known allergies. Social History:   Social History     Tobacco Use   Smoking Status Current Every Day Smoker    Packs/day: 1.50    Types: Cigarettes   Smokeless Tobacco Never Used     Social History     Substance and Sexual Activity   Alcohol Use Yes    Comment: daily         Family History:   History reviewed. No pertinent family history. REVIEW OF SYSTEMS:  Constitutional: Negative. HENT: Negative. Eyes: Negative. Respiratory: Negative. Cardiovascular: Negative. Gastrointestinal: Negative. Genitourinary: Negative. Musculoskeletal: Positive for back pain. Skin: Negative. Neurological: Negative. Endo/Heme/Allergies: Negative. Psychiatric/Behavioral: Negative. PHYSICAL EXAM:  Vitals:    03/22/22 1430   BP: (!) 145/97   Pulse: 97     Constitutional: appears well-developed and well-nourished. Eyes  conjunctiva normal.  Pupils react to light  Ear, nose, throat - hearing intact to finger rub, No scars, masses, or lesions over external nose or ears, no atrophy oftongue  Neck- symmetric, no masses noted, no jugular vein distension  Respiration- chest wall appears symmetric, good expansion, normal effort without use of accessory muscles  Musculoskeletal  no significant wasting of muscles noted, no bony deformities, gait no gross ataxia  Extremities- no clubbing, cyanosis oredema  Skin  warm, dry, and intact. No rash, erythema, or pallor.   Psychiatric  mood, affect, and behavior appear normal.     Neurologic Examination  Awake, Alert and oriented x 4  Normal speech pattern, following commands    Motor:  RIGHT:    iliopsoas 5/5    knee flexor 5/5    knee extension 5/5    EHL/dorsiflexion 5/5    plantar flexion 5/5    LEFT:      iliopsoas 5/5    knee flexor 5/5    knee extension 5/5    EHL/dorsiflexion 5/5    plantar flexion 5/5    No deficits to light touch or pinprick sensation  Reflexes are 2+ and symmetric  No myofacial tenderness to palpation  Normal Gait pattern        DATA and IMAGING:    Nursing/pcp notes, imaging, labs, and vitals reviewed. PT,OT and/or speech notes reviewed    Lab Results   Component Value Date    WBC 7.3 07/25/2018    HGB 16.6 07/25/2018    HCT 50.3 07/25/2018    MCV 96.2 (H) 07/25/2018     07/25/2018     Lab Results   Component Value Date     07/25/2018    K 4.3 07/25/2018     07/25/2018    CO2 27 07/25/2018    BUN 11 07/25/2018    CREATININE 0.8 07/25/2018    GLUCOSE 115 (H) 07/25/2018    CALCIUM 9.2 07/25/2018    PROT 7.3 07/25/2018    LABALBU 4.2 07/25/2018    BILITOT 0.7 07/25/2018    ALKPHOS 58 07/25/2018    AST 23 07/25/2018    ALT 23 07/25/2018    LABGLOM >60 07/25/2018   No results found for: INR, PROTIME      Narrative   XR THORACIC SPINE (3 VIEWS)    2/15/2022 1:02 PM   History: Right-sided mid back pain. Thoracic spine series, 3 views. 13 degrees right convex scoliosis. No paraspinal soft tissue abnormality. Normal heart size. Chronic interstitial lung disease. Previous surgery at the upper right lung. The lateral view shows exaggerated lordosis. There is mild chronic anterior wedge compression of a midthoracic   vertebra. Grossly normal cervical thoracic and thoracolumbar junction alignment.       Impression   1. Osteopenic bones with thoracic kyphosis and mild chronic anterior   wedge compression of a midthoracic vertebra. 2. No acute bony abnormality is seen. Signed by Dr Sharee Dakin   I have personally reviewed these images and my interpretation is: There is a mild compression deformity in the mid thoracic spine      Narrative   EXAMINATION: MRI THORACIC SPINE W WO CONTRAST   COMPARISON: None. HISTORY: S22.000A   TECHNIQUE: Routine pulse sequences were obtained of the thoracic spine   before and after the administration of IV contrast.    FINDINGS:    The thoracic spine is normal in position and alignment.   No marrow   replacing process.  Hemangioma at L1, with approximately 50% of volume.     Chronic wedging of the T4 vertebral body, with approximately 30%   height loss. Chronic wedging of the T8 vertebral body, with   approximately 30% height loss. No retropulsion.  Multilevel   degenerative disc disease with disc desiccation and height loss. No   isabella disc herniation.   Cord signal is preserved throughout. Rogene Chantel   is no evidence of abnormal enhancement.       Impression   1.  No acute abnormalities. 2.  Chronic compression deformities of C4 and T8 vertebral bodies as   above. 3.  Multilevel degenerative disc disease, without isabella disc   herniation, significant spinal canal stenosis or cord signal changes. Signed by Dr Selvin Escamilla   I have personally reviewed the images and my interpretation is: There is DDD throughout  Old schmorl's node/compression fracture of T4  Old compression fracture at T8  No significant neural element compression   Slight increased signal intensity at T9-T10 on STIR within the disc space and adjacent vertebral bodies, this is very subtle in nature. Some slight enhancement. ASSESSMENT:    Joanne Haro is a 66 y.o. male with a history of lung cancer with a questionable compression fracture and thoracic back pain. ICD-10-CM    1. DDD (degenerative disc disease), thoracic  M51.34    2. Abnormal MRI, thoracic spine  R93.7 Sedimentation Rate     C-Reactive Protein     CBC with Auto Differential     Culture, Blood 1     Culture, Blood 2     NM BONE SCAN WHOLE BODY   3. History of lung cancer  Z85.118 NM BONE SCAN WHOLE BODY       PLAN:  -We have discussed and reviewed the results of the MRI thoracic spine with Mr. Brain Gray at length. We explained that he does have DDD throughout his thoracic spine, however, no acute compression fractures. He does have a very subtle signal change within the T9-T10 disc space that could represent an infection or neoplasm. This could also be benign.  We would like to obtain some labs to be thorough.     -ESR, CRP, CBC  -NM bone scan   -Follow up 1 month          Cheko Erm, APRN

## 2022-03-27 LAB
BLOOD CULTURE, ROUTINE: NORMAL
CULTURE, BLOOD 2: NORMAL

## 2022-03-30 ENCOUNTER — HOSPITAL ENCOUNTER (OUTPATIENT)
Dept: NUCLEAR MEDICINE | Age: 72
Discharge: HOME | End: 2022-04-01
Payer: MEDICARE

## 2022-03-30 DIAGNOSIS — R93.7 ABNORMAL MRI, THORACIC SPINE: ICD-10-CM

## 2022-03-30 DIAGNOSIS — Z85.118 HISTORY OF LUNG CANCER: ICD-10-CM

## 2022-03-30 PROCEDURE — 78306 BONE IMAGING WHOLE BODY: CPT

## 2022-03-30 PROCEDURE — 3430000000 HC RX DIAGNOSTIC RADIOPHARMACEUTICAL: Performed by: NURSE PRACTITIONER

## 2022-03-30 PROCEDURE — A9561 TC99M OXIDRONATE: HCPCS | Performed by: NURSE PRACTITIONER

## 2022-03-30 RX ADMIN — TECHNETIUM TC 99M OXIDRONATE 20 MILLICURIE: 3.15 INJECTION, POWDER, LYOPHILIZED, FOR SOLUTION INTRAVENOUS at 13:42

## 2022-04-06 DIAGNOSIS — M54.6 CHRONIC MIDLINE THORACIC BACK PAIN: ICD-10-CM

## 2022-04-06 DIAGNOSIS — G89.29 CHRONIC MIDLINE THORACIC BACK PAIN: ICD-10-CM

## 2022-04-06 DIAGNOSIS — S22.000A COMPRESSION FRACTURE OF THORACIC VERTEBRA, UNSPECIFIED THORACIC VERTEBRAL LEVEL, INITIAL ENCOUNTER (HCC): ICD-10-CM

## 2022-04-06 NOTE — TELEPHONE ENCOUNTER
Requested Prescriptions     Pending Prescriptions Disp Refills    HYDROcodone-acetaminophen (Daniela Lock) 5-325 MG per tablet [Pharmacy Med Name: HYDROCODONE BITARTRATE/ACETAMINOPHEN 325MG-5MG TABLET] 30 tablet 0     Sig: TAKE 1 TABLET BY MOUTH 2 TIMES DAILY AS NEEDED FOR PAIN FOR UP TO 15 DAYS. Last Office Visit:  3/22/2022  Next Office Visit:  4/19/2022  Last Medication Refill: 2/28/2020   Roxanna Nieto up to date:  4/6/2022    *RX updated to reflect   3/30/2022  fill date*  We have discussed and reviewed the results of the MRI thoracic spine with Mr. Ronald Nash at length. We explained that he does have DDD throughout his thoracic spine, however, no acute compression fractures. He does have a very subtle signal change within the T9-T10 disc space that could represent an infection or neoplasm. This could also be benign. We would like to obtain some labs to be thorough.     -ESR, CRP, CBC  -NM bone scan   -Follow up 1 month

## 2022-04-07 RX ORDER — HYDROCODONE BITARTRATE AND ACETAMINOPHEN 5; 325 MG/1; MG/1
1 TABLET ORAL 2 TIMES DAILY PRN
Qty: 30 TABLET | Refills: 0 | Status: SHIPPED | OUTPATIENT
Start: 2022-04-07 | End: 2022-04-22

## 2022-04-21 ENCOUNTER — OFFICE VISIT (OUTPATIENT)
Dept: NEUROSURGERY | Age: 72
End: 2022-04-21
Payer: MEDICARE

## 2022-04-21 VITALS
OXYGEN SATURATION: 95 % | HEART RATE: 95 BPM | HEIGHT: 70 IN | DIASTOLIC BLOOD PRESSURE: 77 MMHG | WEIGHT: 181.2 LBS | BODY MASS INDEX: 25.94 KG/M2 | SYSTOLIC BLOOD PRESSURE: 124 MMHG

## 2022-04-21 DIAGNOSIS — Z85.118 HISTORY OF LUNG CANCER: ICD-10-CM

## 2022-04-21 DIAGNOSIS — M51.34 DDD (DEGENERATIVE DISC DISEASE), THORACIC: ICD-10-CM

## 2022-04-21 DIAGNOSIS — R93.7 ABNORMAL MRI, THORACIC SPINE: ICD-10-CM

## 2022-04-21 DIAGNOSIS — G89.29 CHRONIC MIDLINE THORACIC BACK PAIN: Primary | ICD-10-CM

## 2022-04-21 DIAGNOSIS — M54.6 CHRONIC MIDLINE THORACIC BACK PAIN: Primary | ICD-10-CM

## 2022-04-21 PROCEDURE — 4004F PT TOBACCO SCREEN RCVD TLK: CPT | Performed by: NURSE PRACTITIONER

## 2022-04-21 PROCEDURE — 99213 OFFICE O/P EST LOW 20 MIN: CPT | Performed by: NURSE PRACTITIONER

## 2022-04-21 PROCEDURE — 1123F ACP DISCUSS/DSCN MKR DOCD: CPT | Performed by: NURSE PRACTITIONER

## 2022-04-21 PROCEDURE — G8427 DOCREV CUR MEDS BY ELIG CLIN: HCPCS | Performed by: NURSE PRACTITIONER

## 2022-04-21 PROCEDURE — G8417 CALC BMI ABV UP PARAM F/U: HCPCS | Performed by: NURSE PRACTITIONER

## 2022-04-21 PROCEDURE — 3017F COLORECTAL CA SCREEN DOC REV: CPT | Performed by: NURSE PRACTITIONER

## 2022-04-21 PROCEDURE — 4040F PNEUMOC VAC/ADMIN/RCVD: CPT | Performed by: NURSE PRACTITIONER

## 2022-04-21 RX ORDER — METRONIDAZOLE 500 MG/1
1 TABLET ORAL EVERY 8 HOURS
COMMUNITY

## 2022-04-21 ASSESSMENT — ENCOUNTER SYMPTOMS
EYES NEGATIVE: 1
RESPIRATORY NEGATIVE: 1
BACK PAIN: 0
GASTROINTESTINAL NEGATIVE: 1

## 2022-04-21 NOTE — PROGRESS NOTES
Review of Systems   Constitutional: Negative. HENT: Negative. Eyes: Negative. Respiratory: Negative. Cardiovascular: Negative. Gastrointestinal: Negative. Genitourinary: Negative. Musculoskeletal: Positive for neck pain. Negative for back pain, falls, joint pain and myalgias. Skin: Negative. Neurological: Negative. Endo/Heme/Allergies: Negative. Psychiatric/Behavioral: Negative.

## 2022-04-21 NOTE — PROGRESS NOTES
[facility]  Office Visit      Chief Complaint   Patient presents with    Results     3 week follow up results from bone scan and labs     4/21/2022: Mr. Asuncion Ellison returns to clinic today to discuss his results from the NM bone scan and labs. ESR, CRP, WBC, and blood cultures all unremarkable. He states that his thoracic back pain has improved since our last visit. He states every once in a while he will have a tightness. 3/22/2022: Mr. Asuncion Ellison returns to clinic today to review the MRI thoracic spine. He has continued to have mid thoracic back pain, the pain will occasionally radiate around to the ribs. Some days are worse than others. HISTORY OF PRESENT ILLNESS:    Willette Bosworth is a 66 y.o. 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 Angie Wan with surgical resection who presents with mid thoracic back pain that has been ongoing for about 5 months. He states the pain has always worsened after bending and twisting actions. No known injury. Sometimes the pain will bring him to his knees. The pain does radiate into the left ribs. His pain is mostly located in the thoracic back. The patient denies numbness. The patient has underwent a non-operative treatment course that has included:  NSAIDs  Opiates (took one left over from a surgery)    Of note he does use tobacco and does not take blood thinning medications.                Past Medical History:   Diagnosis Date    Acid reflux     COPD (chronic obstructive pulmonary disease) ([facility])     Hard of hearing        Past Surgical History:   Procedure Laterality Date    COLONOSCOPY      ENDOSCOPY, COLON, DIAGNOSTIC      FOOT SURGERY Left     TONSILLECTOMY      WRIST SURGERY         Current Outpatient Medications   Medication Sig Dispense Refill    metroNIDAZOLE (FLAGYL) 500 MG tablet Take 1 tablet by mouth every 8 (eight) hours      HYDROcodone-acetaminophen ([address]) 5-325 MG per tablet TAKE 1 TABLET BY MOUTH 2 TIMES DAILY AS NEEDED FOR PAIN FOR UP TO 15 DAYS. 30 tablet 0    Omega 3 1200 MG CAPS Take by mouth daily      turmeric (CURCUMIN 95) 500 MG CAPS Take by mouth daily      atorvastatin (LIPITOR) 10 MG tablet atorvastatin 10 mg tablet   Take 1 tablet every day by oral route.  omeprazole (PRILOSEC) 20 MG delayed release capsule Take 20 mg by mouth daily       No current facility-administered medications for this visit. Allergies:  Patient has no known allergies. Social History:   Social History     Tobacco Use   Smoking Status Current Every Day Smoker    Packs/day: 1.50    Types: Cigarettes   Smokeless Tobacco Never Used     Social History     Substance and Sexual Activity   Alcohol Use Yes    Comment: daily         Family History:   History reviewed. No pertinent family history. REVIEW OF SYSTEMS:  Constitutional: Negative. HENT: Negative. Eyes: Negative. Respiratory: Negative. Cardiovascular: Negative. Gastrointestinal: Negative. Genitourinary: Negative. Musculoskeletal: Positive for back pain. Skin: Negative. Neurological: Negative. Endo/Heme/Allergies: Negative. Psychiatric/Behavioral: Negative. PHYSICAL EXAM:  Vitals:    04/21/22 1116   BP: 124/77   Pulse: 95   SpO2: 95%     Constitutional: appears well-developed and well-nourished. Eyes - conjunctiva normal.  Pupils react to light  Ear, nose, throat - hearing intact to finger rub, No scars, masses, or lesions over external nose or ears, no atrophy oftongue  Neck- symmetric, no masses noted, no jugular vein distension  Respiration- chest wall appears symmetric, good expansion, normal effort without use of accessory muscles  Musculoskeletal - no significant wasting of muscles noted, no bony deformities, gait no gross ataxia  Extremities- no clubbing, cyanosis oredema  Skin - warm, dry, and intact. No rash, erythema, or pallor.   Psychiatric - mood, affect, and behavior appear normal.     Neurologic Examination  Awake, Alert and oriented x 4  Normal speech pattern, following commands    Motor:  RIGHT:    iliopsoas 5/5    knee flexor 5/5    knee extension 5/5    EHL/dorsiflexion 5/5    plantar flexion 5/5    LEFT:      iliopsoas 5/5    knee flexor 5/5    knee extension 5/5    EHL/dorsiflexion 5/5    plantar flexion 5/5    No deficits to light touch or pinprick sensation  Reflexes are 2+ and symmetric  No myofacial tenderness to palpation  Normal Gait pattern        DATA and IMAGING:    Nursing/pcp notes, imaging, labs, and vitals reviewed. PT,OT and/or speech notes reviewed    Lab Results   Component Value Date    WBC 7.9 03/22/2022    HGB 16.1 03/22/2022    HCT 49.5 03/22/2022    MCV 99.0 (H) 03/22/2022     03/22/2022     Lab Results   Component Value Date     07/25/2018    K 4.3 07/25/2018     07/25/2018    CO2 27 07/25/2018    BUN 11 07/25/2018    CREATININE 0.8 07/25/2018    GLUCOSE 115 (H) 07/25/2018    CALCIUM 9.2 07/25/2018    PROT 7.3 07/25/2018    LABALBU 4.2 07/25/2018    BILITOT 0.7 07/25/2018    ALKPHOS 58 07/25/2018    AST 23 07/25/2018    ALT 23 07/25/2018    LABGLOM >60 07/25/2018   No results found for: INR, PROTIME      Narrative   XR THORACIC SPINE (3 VIEWS)    2/15/2022 1:02 PM   History: Right-sided mid back pain. Thoracic spine series, 3 views. 13 degrees right convex scoliosis. No paraspinal soft tissue abnormality. Normal heart size. Chronic interstitial lung disease. Previous surgery at the upper right lung. The lateral view shows exaggerated lordosis. There is mild chronic anterior wedge compression of a midthoracic   vertebra. Grossly normal cervical thoracic and thoracolumbar junction alignment.       Impression   1. Osteopenic bones with thoracic kyphosis and mild chronic anterior   wedge compression of a midthoracic vertebra. 2. No acute bony abnormality is seen.    Signed by Dr Adilia Arteaga have personally reviewed these images and my interpretation is: There is a mild compression deformity in the mid thoracic spine      Narrative   EXAMINATION: MRI THORACIC SPINE W WO CONTRAST   COMPARISON: None. HISTORY: S22.000A   TECHNIQUE: Routine pulse sequences were obtained of the thoracic spine   before and after the administration of IV contrast.    FINDINGS:    The thoracic spine is normal in position and alignment.   No marrow   replacing process. Hemangioma at L1, with approximately 50% of volume.     Chronic wedging of the T4 vertebral body, with approximately 30%   height loss. Chronic wedging of the T8 vertebral body, with   approximately 30% height loss. No retropulsion.  Multilevel   degenerative disc disease with disc desiccation and height loss. No   isabella disc herniation.   Cord signal is preserved throughout. Bret Brown   is no evidence of abnormal enhancement.       Impression   1.  No acute abnormalities. 2.  Chronic compression deformities of C4 and T8 vertebral bodies as   above. 3.  Multilevel degenerative disc disease, without isabella disc   herniation, significant spinal canal stenosis or cord signal changes. Signed by Dr Hernesto Devlin   I have personally reviewed the images and my interpretation is: There is DDD throughout  Old schmorl's node/compression fracture of T4  Old compression fracture at T8  No significant neural element compression   Slight increased signal intensity at T9-T10 on STIR within the disc space and adjacent vertebral bodies, this is very subtle in nature. Some slight enhancement. Narrative   History: Rule out neoplastic process at T9/10 region, history of lung   cancer   Nuclear medicine bone scan: Following IV injection of 20 mCi of   technetium 99m HDP, delayed anterior and posterior whole body images   were obtained. Additional oblique views of the chest performed.    COMPARISON: MRI thoracic spine exam 3/9/2022, PET exam 12/29/2020   There is increased tracer activity within a vertical distribution   involving 3 consecutive right posterior lateral ribs, ribs 6 through 8   suspected, favoring posttraumatic injury. There is no increased or   tracer activity of the thoracic spine. Mild arthritic uptake is   symmetrical within the joints of the visible upper and lower   extremities. The left kidney is absent. Normal physiologic activity   within the right kidney and bladder.       Impression   1. No increased FDG activity of the thoracic spine to suggest   osteoblastic metastasis. No scintigraphic evidence of active osseous   metastasis. 2. Increased tracer activity of 3 consecutive right posterior lateral   ribs favoring posttraumatic injury/fractures. 3. Absent left kidney. Signed by Dr Blair Larson               ASSESSMENT:    Deb Gallego is a 66 y.o. male with a history of lung cancer with a questionable compression fracture and thoracic back pain. ICD-10-CM    1. Chronic midline thoracic back pain  M54.6     G89.29    2. Abnormal MRI, thoracic spine  R93.7    3. DDD (degenerative disc disease), thoracic  M51.34    4. History of lung cancer  Z85.118        PLAN:  -We discussed that his labs were largely unremarkable, NM bone scan did not reveal any uptake in the thoracic spine. It is less likely that he has an infectious or neoplastic process. His pain has improved. At this point, we do not feel that an additional labs or imaging is necessary.   He knows to call with worsening pain.   -Follow up as needed for now         YENI Tavera

## 2022-05-04 ENCOUNTER — HOSPITAL ENCOUNTER (OUTPATIENT)
Dept: GENERAL RADIOLOGY | Age: 72
Discharge: HOME | End: 2022-05-04
Payer: MEDICARE

## 2022-05-04 DIAGNOSIS — J44.9 CHRONIC OBSTRUCTIVE BRONCHITIS (HCC): ICD-10-CM

## 2022-05-04 PROCEDURE — 71046 X-RAY EXAM CHEST 2 VIEWS: CPT

## 2022-05-06 ENCOUNTER — HOSPITAL ENCOUNTER (OUTPATIENT)
Dept: ULTRASOUND IMAGING | Facility: HOSPITAL | Age: 72
Discharge: HOME | End: 2022-05-06
Admitting: NURSE PRACTITIONER

## 2022-05-06 ENCOUNTER — TRANSCRIBE ORDERS (OUTPATIENT)
Dept: ADMINISTRATIVE | Facility: HOSPITAL | Age: 72
End: 2022-05-06

## 2022-05-06 DIAGNOSIS — R60.0 LOCALIZED EDEMA: ICD-10-CM

## 2022-05-06 DIAGNOSIS — R60.0 LOCALIZED EDEMA: Primary | ICD-10-CM

## 2022-05-06 PROCEDURE — 93970 EXTREMITY STUDY: CPT

## 2022-05-10 ENCOUNTER — HOSPITAL ENCOUNTER (EMERGENCY)
Facility: HOSPITAL | Age: 72
Discharge: HOME | End: 2022-05-10
Attending: FAMILY MEDICINE | Admitting: FAMILY MEDICINE

## 2022-05-10 ENCOUNTER — APPOINTMENT (OUTPATIENT)
Dept: CT IMAGING | Facility: HOSPITAL | Age: 72
End: 2022-05-10

## 2022-05-10 ENCOUNTER — TELEPHONE (OUTPATIENT)
Dept: GASTROENTEROLOGY | Facility: CLINIC | Age: 72
End: 2022-05-10

## 2022-05-10 ENCOUNTER — LAB (OUTPATIENT)
Dept: LAB | Facility: HOSPITAL | Age: 72
End: 2022-05-10

## 2022-05-10 ENCOUNTER — OFFICE VISIT (OUTPATIENT)
Dept: GASTROENTEROLOGY | Facility: CLINIC | Age: 72
End: 2022-05-10

## 2022-05-10 VITALS
SYSTOLIC BLOOD PRESSURE: 124 MMHG | DIASTOLIC BLOOD PRESSURE: 72 MMHG | TEMPERATURE: 97.5 F | OXYGEN SATURATION: 97 % | BODY MASS INDEX: 26.66 KG/M2 | HEART RATE: 91 BPM | WEIGHT: 180 LBS | HEIGHT: 69 IN

## 2022-05-10 VITALS
HEART RATE: 97 BPM | BODY MASS INDEX: 25.77 KG/M2 | WEIGHT: 180 LBS | SYSTOLIC BLOOD PRESSURE: 125 MMHG | OXYGEN SATURATION: 93 % | TEMPERATURE: 98.4 F | DIASTOLIC BLOOD PRESSURE: 79 MMHG | RESPIRATION RATE: 19 BRPM | HEIGHT: 70 IN

## 2022-05-10 DIAGNOSIS — R68.81 EARLY SATIETY: ICD-10-CM

## 2022-05-10 DIAGNOSIS — Z86.718 HISTORY OF DVT (DEEP VEIN THROMBOSIS): ICD-10-CM

## 2022-05-10 DIAGNOSIS — Z86.010 HISTORY OF ADENOMATOUS POLYP OF COLON: ICD-10-CM

## 2022-05-10 DIAGNOSIS — R10.13 EPIGASTRIC PAIN: ICD-10-CM

## 2022-05-10 DIAGNOSIS — K62.5 RECTAL BLEEDING: ICD-10-CM

## 2022-05-10 DIAGNOSIS — Z86.16 HISTORY OF COVID-19: ICD-10-CM

## 2022-05-10 DIAGNOSIS — Z85.118 HISTORY OF LUNG CANCER: ICD-10-CM

## 2022-05-10 DIAGNOSIS — R10.30 LOWER ABDOMINAL PAIN: ICD-10-CM

## 2022-05-10 DIAGNOSIS — R53.81 MALAISE AND FATIGUE: ICD-10-CM

## 2022-05-10 DIAGNOSIS — R06.02 SHORTNESS OF BREATH: ICD-10-CM

## 2022-05-10 DIAGNOSIS — R19.7 DIARRHEA, UNSPECIFIED TYPE: ICD-10-CM

## 2022-05-10 DIAGNOSIS — R53.83 MALAISE AND FATIGUE: ICD-10-CM

## 2022-05-10 DIAGNOSIS — R63.4 WEIGHT LOSS: ICD-10-CM

## 2022-05-10 DIAGNOSIS — R63.0 LOSS OF APPETITE: ICD-10-CM

## 2022-05-10 DIAGNOSIS — K92.1 MELENA: ICD-10-CM

## 2022-05-10 DIAGNOSIS — Z79.1 ENCOUNTER FOR LONG-TERM (CURRENT) USE OF NSAIDS: ICD-10-CM

## 2022-05-10 DIAGNOSIS — K92.1 MELENA: Primary | ICD-10-CM

## 2022-05-10 DIAGNOSIS — J44.1 COPD EXACERBATION: Primary | ICD-10-CM

## 2022-05-10 LAB
ADV 40+41 DNA STL QL NAA+NON-PROBE: NOT DETECTED
ALBUMIN SERPL-MCNC: 3.1 G/DL (ref 3.5–5.2)
ALBUMIN SERPL-MCNC: 3.3 G/DL (ref 3.5–5.2)
ALBUMIN/GLOB SERPL: 1 G/DL
ALBUMIN/GLOB SERPL: 1 G/DL
ALP SERPL-CCNC: 81 U/L (ref 39–117)
ALP SERPL-CCNC: 94 U/L (ref 39–117)
ALT SERPL W P-5'-P-CCNC: 21 U/L (ref 1–41)
ALT SERPL W P-5'-P-CCNC: 25 U/L (ref 1–41)
ANION GAP SERPL CALCULATED.3IONS-SCNC: 13 MMOL/L (ref 5–15)
ANION GAP SERPL CALCULATED.3IONS-SCNC: 8 MMOL/L (ref 5–15)
AST SERPL-CCNC: 30 U/L (ref 1–40)
AST SERPL-CCNC: 38 U/L (ref 1–40)
ASTRO TYP 1-8 RNA STL QL NAA+NON-PROBE: NOT DETECTED
BASOPHILS # BLD MANUAL: 0.08 10*3/MM3 (ref 0–0.2)
BASOPHILS # BLD MANUAL: 0.09 10*3/MM3 (ref 0–0.2)
BASOPHILS NFR BLD MANUAL: 1 % (ref 0–1.5)
BASOPHILS NFR BLD MANUAL: 1 % (ref 0–1.5)
BILIRUB SERPL-MCNC: 0.4 MG/DL (ref 0–1.2)
BILIRUB SERPL-MCNC: 0.4 MG/DL (ref 0–1.2)
BUN SERPL-MCNC: 14 MG/DL (ref 8–23)
BUN SERPL-MCNC: 15 MG/DL (ref 8–23)
BUN/CREAT SERPL: 20 (ref 7–25)
BUN/CREAT SERPL: 20.6 (ref 7–25)
C CAYETANENSIS DNA STL QL NAA+NON-PROBE: NOT DETECTED
C COLI+JEJ+UPSA DNA STL QL NAA+NON-PROBE: NOT DETECTED
C DIFF TOX GENS STL QL NAA+PROBE: NEGATIVE
CALCIUM SPEC-SCNC: 8.6 MG/DL (ref 8.6–10.5)
CALCIUM SPEC-SCNC: 8.7 MG/DL (ref 8.6–10.5)
CANCER AG19-9 SERPL-ACNC: 16.8 U/ML
CEA SERPL-MCNC: 3.28 NG/ML
CHLORIDE SERPL-SCNC: 104 MMOL/L (ref 98–107)
CHLORIDE SERPL-SCNC: 105 MMOL/L (ref 98–107)
CO2 SERPL-SCNC: 24 MMOL/L (ref 22–29)
CO2 SERPL-SCNC: 29 MMOL/L (ref 22–29)
CREAT SERPL-MCNC: 0.68 MG/DL (ref 0.76–1.27)
CREAT SERPL-MCNC: 0.75 MG/DL (ref 0.76–1.27)
CRP SERPL-MCNC: 2.11 MG/DL (ref 0–0.5)
CRYPTOSP DNA STL QL NAA+NON-PROBE: NOT DETECTED
D DIMER PPP FEU-MCNC: 0.95 MCGFEU/ML (ref 0–0.5)
DEPRECATED RDW RBC AUTO: 44.4 FL (ref 37–54)
DEPRECATED RDW RBC AUTO: 45.6 FL (ref 37–54)
E HISTOLYT DNA STL QL NAA+NON-PROBE: NOT DETECTED
EAEC PAA PLAS AGGR+AATA ST NAA+NON-PRB: NOT DETECTED
EC STX1+STX2 GENES STL QL NAA+NON-PROBE: NOT DETECTED
EGFRCR SERPLBLD CKD-EPI 2021: 95.9 ML/MIN/1.73
EGFRCR SERPLBLD CKD-EPI 2021: 98.8 ML/MIN/1.73
EOSINOPHIL # BLD MANUAL: 0.91 10*3/MM3 (ref 0–0.4)
EOSINOPHIL # BLD MANUAL: 1.31 10*3/MM3 (ref 0–0.4)
EOSINOPHIL NFR BLD MANUAL: 12.1 % (ref 0.3–6.2)
EOSINOPHIL NFR BLD MANUAL: 14.4 % (ref 0.3–6.2)
EPEC EAE GENE STL QL NAA+NON-PROBE: NOT DETECTED
ERYTHROCYTE [DISTWIDTH] IN BLOOD BY AUTOMATED COUNT: 12.2 % (ref 12.3–15.4)
ERYTHROCYTE [DISTWIDTH] IN BLOOD BY AUTOMATED COUNT: 12.2 % (ref 12.3–15.4)
ETEC LTA+ST1A+ST1B TOX ST NAA+NON-PROBE: NOT DETECTED
FERRITIN SERPL-MCNC: 336 NG/ML (ref 30–400)
G LAMBLIA DNA STL QL NAA+NON-PROBE: NOT DETECTED
GIANT PLATELETS: ABNORMAL
GLOBULIN UR ELPH-MCNC: 3 GM/DL
GLOBULIN UR ELPH-MCNC: 3.3 GM/DL
GLUCOSE SERPL-MCNC: 181 MG/DL (ref 65–99)
GLUCOSE SERPL-MCNC: 85 MG/DL (ref 65–99)
HCT VFR BLD AUTO: 43.5 % (ref 37.5–51)
HCT VFR BLD AUTO: 44.3 % (ref 37.5–51)
HGB BLD-MCNC: 14.3 G/DL (ref 13–17.7)
HGB BLD-MCNC: 14.4 G/DL (ref 13–17.7)
HOLD SPECIMEN: NORMAL
INR PPP: 1.4 (ref 0.91–1.09)
INR PPP: 1.6 (ref 0.91–1.09)
IRON 24H UR-MRATE: 73 MCG/DL (ref 59–158)
IRON SATN MFR SERPL: 32 % (ref 20–50)
LYMPHOCYTES # BLD MANUAL: 1.31 10*3/MM3 (ref 0.7–3.1)
LYMPHOCYTES # BLD MANUAL: 1.6 10*3/MM3 (ref 0.7–3.1)
LYMPHOCYTES NFR BLD MANUAL: 10.1 % (ref 5–12)
LYMPHOCYTES NFR BLD MANUAL: 3.1 % (ref 5–12)
MACROCYTES BLD QL SMEAR: ABNORMAL
MCH RBC QN AUTO: 32.4 PG (ref 26.6–33)
MCH RBC QN AUTO: 32.5 PG (ref 26.6–33)
MCHC RBC AUTO-ENTMCNC: 32.5 G/DL (ref 31.5–35.7)
MCHC RBC AUTO-ENTMCNC: 32.9 G/DL (ref 31.5–35.7)
MCV RBC AUTO: 100 FL (ref 79–97)
MCV RBC AUTO: 98.4 FL (ref 79–97)
METAMYELOCYTES NFR BLD MANUAL: 1 % (ref 0–0)
MONOCYTES # BLD: 0.28 10*3/MM3 (ref 0.1–0.9)
MONOCYTES # BLD: 0.76 10*3/MM3 (ref 0.1–0.9)
NEUTROPHILS # BLD AUTO: 4.19 10*3/MM3 (ref 1.7–7)
NEUTROPHILS # BLD AUTO: 5.92 10*3/MM3 (ref 1.7–7)
NEUTROPHILS NFR BLD MANUAL: 50.5 % (ref 42.7–76)
NEUTROPHILS NFR BLD MANUAL: 64.9 % (ref 42.7–76)
NEUTS BAND NFR BLD MANUAL: 5.1 % (ref 0–5)
NOROVIRUS GI+II RNA STL QL NAA+NON-PROBE: NOT DETECTED
NRBC BLD AUTO-RTO: 0 /100 WBC (ref 0–0.2)
NT-PROBNP SERPL-MCNC: 158.4 PG/ML (ref 0–900)
P SHIGELLOIDES DNA STL QL NAA+NON-PROBE: NOT DETECTED
PLASMA CELL PREC NFR BLD MANUAL: 1 % (ref 0–0)
PLAT MORPH BLD: NORMAL
PLATELET # BLD AUTO: 187 10*3/MM3 (ref 140–450)
PLATELET # BLD AUTO: 192 10*3/MM3 (ref 140–450)
PMV BLD AUTO: 10.8 FL (ref 6–12)
PMV BLD AUTO: 11.1 FL (ref 6–12)
POTASSIUM SERPL-SCNC: 3.8 MMOL/L (ref 3.5–5.2)
POTASSIUM SERPL-SCNC: 3.9 MMOL/L (ref 3.5–5.2)
PROT SERPL-MCNC: 6.1 G/DL (ref 6–8.5)
PROT SERPL-MCNC: 6.6 G/DL (ref 6–8.5)
PROTHROMBIN TIME: 16.6 SECONDS (ref 11.9–14.6)
PROTHROMBIN TIME: 18.4 SECONDS (ref 11.9–14.6)
RBC # BLD AUTO: 4.42 10*6/MM3 (ref 4.14–5.8)
RBC # BLD AUTO: 4.43 10*6/MM3 (ref 4.14–5.8)
RBC MORPH BLD: NORMAL
RVA RNA STL QL NAA+NON-PROBE: NOT DETECTED
S ENT+BONG DNA STL QL NAA+NON-PROBE: NOT DETECTED
SAPO I+II+IV+V RNA STL QL NAA+NON-PROBE: NOT DETECTED
SHIGELLA SP+EIEC IPAH ST NAA+NON-PROBE: NOT DETECTED
SODIUM SERPL-SCNC: 141 MMOL/L (ref 136–145)
SODIUM SERPL-SCNC: 142 MMOL/L (ref 136–145)
TIBC SERPL-MCNC: 226 MCG/DL (ref 298–536)
TRANSFERRIN SERPL-MCNC: 152 MG/DL (ref 200–360)
TROPONIN T SERPL-MCNC: <0.01 NG/ML (ref 0–0.03)
TSH SERPL DL<=0.05 MIU/L-ACNC: 1.69 UIU/ML (ref 0.27–4.2)
V CHOL+PARA+VUL DNA STL QL NAA+NON-PROBE: NOT DETECTED
V CHOLERAE DNA STL QL NAA+NON-PROBE: NOT DETECTED
VARIANT LYMPHS NFR BLD MANUAL: 1 % (ref 0–5)
VARIANT LYMPHS NFR BLD MANUAL: 13.4 % (ref 19.6–45.3)
VARIANT LYMPHS NFR BLD MANUAL: 17.2 % (ref 19.6–45.3)
VARIANT LYMPHS NFR BLD MANUAL: 4 % (ref 0–5)
WBC MORPH BLD: NORMAL
WBC MORPH BLD: NORMAL
WBC NRBC COR # BLD: 7.55 10*3/MM3 (ref 3.4–10.8)
WBC NRBC COR # BLD: 9.12 10*3/MM3 (ref 3.4–10.8)
WHOLE BLOOD HOLD COAG: NORMAL
WHOLE BLOOD HOLD SPECIMEN: NORMAL
Y ENTEROCOL DNA STL QL NAA+NON-PROBE: NOT DETECTED

## 2022-05-10 PROCEDURE — 83540 ASSAY OF IRON: CPT

## 2022-05-10 PROCEDURE — 85007 BL SMEAR W/DIFF WBC COUNT: CPT | Performed by: FAMILY MEDICINE

## 2022-05-10 PROCEDURE — 99283 EMERGENCY DEPT VISIT LOW MDM: CPT

## 2022-05-10 PROCEDURE — 84443 ASSAY THYROID STIM HORMONE: CPT

## 2022-05-10 PROCEDURE — 85025 COMPLETE CBC W/AUTO DIFF WBC: CPT | Performed by: FAMILY MEDICINE

## 2022-05-10 PROCEDURE — 87493 C DIFF AMPLIFIED PROBE: CPT

## 2022-05-10 PROCEDURE — 80053 COMPREHEN METABOLIC PANEL: CPT | Performed by: FAMILY MEDICINE

## 2022-05-10 PROCEDURE — 83630 LACTOFERRIN FECAL (QUAL): CPT

## 2022-05-10 PROCEDURE — 36415 COLL VENOUS BLD VENIPUNCTURE: CPT

## 2022-05-10 PROCEDURE — 86140 C-REACTIVE PROTEIN: CPT

## 2022-05-10 PROCEDURE — 93005 ELECTROCARDIOGRAM TRACING: CPT | Performed by: FAMILY MEDICINE

## 2022-05-10 PROCEDURE — 83880 ASSAY OF NATRIURETIC PEPTIDE: CPT | Performed by: FAMILY MEDICINE

## 2022-05-10 PROCEDURE — 99214 OFFICE O/P EST MOD 30 MIN: CPT | Performed by: NURSE PRACTITIONER

## 2022-05-10 PROCEDURE — 84484 ASSAY OF TROPONIN QUANT: CPT | Performed by: FAMILY MEDICINE

## 2022-05-10 PROCEDURE — 87507 IADNA-DNA/RNA PROBE TQ 12-25: CPT

## 2022-05-10 PROCEDURE — 85610 PROTHROMBIN TIME: CPT

## 2022-05-10 PROCEDURE — 86301 IMMUNOASSAY TUMOR CA 19-9: CPT | Performed by: NURSE PRACTITIONER

## 2022-05-10 PROCEDURE — 0 IOPAMIDOL PER 1 ML: Performed by: FAMILY MEDICINE

## 2022-05-10 PROCEDURE — 80053 COMPREHEN METABOLIC PANEL: CPT

## 2022-05-10 PROCEDURE — 36415 COLL VENOUS BLD VENIPUNCTURE: CPT | Performed by: NURSE PRACTITIONER

## 2022-05-10 PROCEDURE — 85610 PROTHROMBIN TIME: CPT | Performed by: FAMILY MEDICINE

## 2022-05-10 PROCEDURE — 93010 ELECTROCARDIOGRAM REPORT: CPT | Performed by: INTERNAL MEDICINE

## 2022-05-10 PROCEDURE — 84466 ASSAY OF TRANSFERRIN: CPT

## 2022-05-10 PROCEDURE — 85379 FIBRIN DEGRADATION QUANT: CPT

## 2022-05-10 PROCEDURE — 82728 ASSAY OF FERRITIN: CPT

## 2022-05-10 PROCEDURE — 85007 BL SMEAR W/DIFF WBC COUNT: CPT

## 2022-05-10 PROCEDURE — 71275 CT ANGIOGRAPHY CHEST: CPT

## 2022-05-10 PROCEDURE — 82378 CARCINOEMBRYONIC ANTIGEN: CPT

## 2022-05-10 PROCEDURE — 85025 COMPLETE CBC W/AUTO DIFF WBC: CPT

## 2022-05-10 RX ORDER — PREDNISONE 20 MG/1
40 TABLET ORAL DAILY
Qty: 6 TABLET | Refills: 0 | Status: SHIPPED | OUTPATIENT
Start: 2022-05-10 | End: 2022-06-01

## 2022-05-10 RX ORDER — DIPHENHYDRAMINE HCL 25 MG
25 CAPSULE ORAL EVERY 6 HOURS PRN
COMMUNITY
End: 2023-03-08

## 2022-05-10 RX ORDER — SODIUM CHLORIDE 0.9 % (FLUSH) 0.9 %
10 SYRINGE (ML) INJECTION AS NEEDED
Status: DISCONTINUED | OUTPATIENT
Start: 2022-05-10 | End: 2022-05-10 | Stop reason: HOSPADM

## 2022-05-10 RX ADMIN — IOPAMIDOL 100 ML: 755 INJECTION, SOLUTION INTRAVENOUS at 19:09

## 2022-05-10 NOTE — PROGRESS NOTES
"Chief Complaint:   Chief Complaint   Patient presents with   • Diarrhea     Pt c/o diarrhea for the last couple of months-was placed on Flagyl by PCP and that has helped-states today his BM was \"almost formed but still in pieces\"-takes last dose tomorrow   • Weight Loss     Pt also c/o unintentional weight loss-has lost about 5-6 pounds         Patient ID: Se Ramirez is a 71 y.o. male     History of Present Illness: This is a very pleasant 71-year-old male who is here today with complaints of diarrhea and weight loss.    The patient states that he has had diarrhea for the last couple of months and was placed on Flagyl \"2 rounds of this and now I have almost formed stools but they are still in pieces, I will finish my last dose tomorrow.  Prior to that the patient states he was not treated with any antibiotics. the patient states that he has had weight loss of about 5 to 6 pounds as well over the past couple of months.  The patient tells me \"just a few days I was found to have a blood clot in my left leg my foot is swollen like a watermelon and they have started me on Eliquis.\"\"  I have run out of energy and very short of breath and all I do is sit and sit and sit.\"  The patient was found to have a DVT and was told that this was most likely due to being sedentary.  He tells me that his PCP has been treating him for the DVT.  The patient does carry history of lung cancer treated with surgical removal per Dr. Murillo February 2021.  I was told I was cancer free by the patient also tells me that he had COVID \"when it very first came out and I had no symptoms in 1/2020.  Patient does carry a history of stage I mild COPD by Gold classification with right upper lobe pulmonary nodule history.  Hilar adenopathy and mediastinal adenopathy.  The patient tells me that he has not been seen by hematology/oncology \"as I was told I was cancer free by Dr. Murillo.\"    The patient's last colonoscopy was performed on 7/2/2020 with " "findings of adenomatous polyps.  There is a family history of colon polyps but no family history of colon cancer.  The patient tells me that he has been seen blood with wiping \"but not in the toilet or in the stool.\"  Stool cultures were negative for blood.  Gastrointestinal panel for pathogens was not checked.  This morning I did see \"awfully dark black stools.\"  I have back issues and I take way too many ibuprofen over the past 2 weeks, 6 pills a day or more.\"  CBC on 3/22/2022 hemoglobin 16.  \"I been drinking a lot of Gatorade I have lost my appetite and I feel up really quick so I only eat chicken noodle soup because everything runs right through me I have also had heartburn and reflux and used over-the-counter Tums to try to help.\"    Weight Weight (kg) Weight (lbs) Weight Method VISIT REPORT   5/10/2022 81.647 kg 180 lb - Report   12/1/2021 82.918 kg 182 lb 12.8 oz - Report   8/18/2021 82.283 kg 181 lb 6.4 oz - Report   5/4/2021 84.278 kg 185 lb 12.8 oz - Report   4/21/2021 83.915 kg 185 lb - Report   3/23/2021 84.188 kg 185 lb 9.6 oz - Report   3/11/2021 84.188 kg 185 lb 9.6 oz - Report   2/16/2021 84.6 kg 186 lb 8.2 oz - -   2/15/2021 84.6 kg 186 lb 8.2 oz - -   2/12/2021 84.6 kg 186 lb 8.2 oz Standing scale -   1/20/2021 83.553 kg 184 lb 3.2 oz - Report   1/19/2021 83.19 kg 183 lb 6.4 oz - Report   12/21/2020 83.099 kg 183 lb 3.2 oz - Report   7/2/2020 81.194 kg 179 lb Standing scale -   3/2/2020 82.555 kg 182 lb           NM BONE SCAN WHOLE BODY    Anatomical Region Laterality Modality   -- -- Nuclear Medicine       Impression    1. No increased FDG activity of the thoracic spine to suggest   osteoblastic metastasis. No scintigraphic evidence of active osseous   metastasis.   2. Increased tracer activity of 3 consecutive right posterior lateral   ribs favoring posttraumatic injury/fractures.   3. Absent left kidney.   Signed by Dr Jazmín Cheatham      MRI THORACIC SPINE W WO CONTRAST    Anatomical Region " Laterality Modality   C-spine -- Magnetic Resonance   T-spine -- --   L-spine -- --   Chest -- --       Impression    1.  No acute abnormalities.   2.  Chronic compression deformities of C4 and T8 vertebral bodies as   above.   3.  Multilevel degenerative disc disease, without candi disc   herniation, significant spinal canal stenosis or cord signal changes.   Signed by Dr Kristy Mtz      XR CHEST (2 VW)  Specimen:  Chest - Chest  Anatomical Region Laterality Modality   Chest -- Computed Radiography       Impression    COPD, no acute cardiopulmonary abnormality.   Signed by Dr Billy Lazcano    Narrative    EXAMINATION:  XR CHEST (2 VW)  5/4/2022 9:37 AM   HISTORY: Chronic obstructive bronchitis.   COMPARISON: Chest x-rays 12/3/2020, CT of the chest 11/24/2021.   FINDINGS:  PA and lateral views of the chest were obtained. The lungs   are free of focal infiltrates, with mild interstitial prominence and   hyperinflation compatible with COPD. There is mild smooth pleural   thickening in the right lateral hemithorax. A small mass in the right   upper lobe seen on previous chest x-ray has been resected. Heart size   is normal. No pneumothorax or pleural effusion is identified. The   osseous structures are unremarkable.      CT CHEST W CONTRAST DIAGNOSTIC- 5/21/2021 8:58 AM CDT    HISTORY: post malinant neoplasm; C34.11-Malignant neoplasm of upper  lobe, right bronchus or lung; C34.91-Malignant neoplasm of unspecified  part of right bronchus or lung     COMPARISON: 12/10/2020   IMPRESSION:  1. Since the previous study the patient's undergone wedge resection for  a primary neoplasm of the lung within the anterior segment of the right  upper lobe. No evidence of localized recurrence. No new lung lesions are  present.  2. Stable subcarinal right hilar lymph nodes. These are enlarged by CT  criteria but demonstrate no significant change from previous study of  12/10/2020. No new mediastinal lymphadenopathy is present.  There is  evidence of remote granulomatous disease.  3. Stable subtle hypodense lesion within the left lobe of the liver.  There is steatosis of the liver. No new lesions are identified within  the liver in those portions which are imaged.  4. Heavy coronary calcifications are present..      This report was finalized on 05/21/2021 09:52 by Dr. Torres Moralez MD.      Past Medical History:   Diagnosis Date   • Bronchitis    • COPD (chronic obstructive pulmonary disease) (HCC)    • Family history of colonic polyps    • GERD (gastroesophageal reflux disease)    • Hearing loss     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   • Hepatitis C    • History of adenomatous polyp of colon    • History of colon polyps    • Hyperlipidemia        Past Surgical History:   Procedure Laterality Date   • BRONCHOSCOPY N/A 02/15/2021    Procedure: BRONCHOSCOPY;  Surgeon: Bruce Murillo MD;  Location: [facility];  Service: Cardiothoracic;  Laterality: N/A;   • CATARACT EXTRACTION     • COLONOSCOPY  11/20/2012    One 1cm tubular adenomatous polyp in the sigmoid colon; One 5mm hyperplastic polyp in the rectum; Diverticulosis; Repeat 4 years    • COLONOSCOPY N/A 03/30/2017    Two 4-6mm tubular adenomatous polyps at the hepatic flexure; One 5mm tubular adenomatous polyp in the transverse colon; One 11mm tubular adenomatous polyp in the sigmoid colon; One 6mm tubular adenomatous polyp in the rectum; Diverticulosis in the left colon; Repeat 3 years    • COLONOSCOPY  12/17/2009    One less than 5mm tubular adenomatous polyp in the cecum; One 5mm hyperplastic polyp in the transverse; One less than 3mm hyperplastic polyp in the rectum; Diverticulosis; Repeat 3 years    • COLONOSCOPY N/A 07/02/2020    Two 5-7mm tubular adenomatous polyps at the hepatic flexure; One 6mm tubular adenomatous polyp in the transverse colon; Diverticulosis in the left colon; The entire examined colon is normal on direct and retroflexion views; Repeat 5 years   •  ENDOSCOPY N/A 03/27/2019    Medium-sized HH; Normal stomach; Normal examined duodenum-biopsied   • FOOT SURGERY Left     ORIF   • HERNIA REPAIR     • INNER EAR SURGERY     • MEDIASTINOSCOPY N/A 02/15/2021    Procedure: CERVICAL MEDIASTINOSCOPY WITH LYMPH NODE DISECTION;  Surgeon: Bruce Murillo MD;  Location:  [facility];  Service: Cardiothoracic;  Laterality: N/A;   • THORACOSCOPY Right 02/15/2021    Procedure: THORACOSCOPY, WEDGE RESECTION OF RIGHT UPPER LOBE WITH FROZEN,  , MEDIASTINAL LYMPH NODE DISSECTION;  Surgeon: Bruce Murillo MD;  Location:  [facility];  Service: Cardiothoracic;  Laterality: Right;   • TONSILLECTOMY           Current Outpatient Medications:   •  albuterol sulfate HFA (ProAir HFA) 108 (90 Base) MCG/ACT inhaler, Inhale 2 puffs Every 4 (Four) Hours As Needed for Wheezing., Disp: 18 g, Rfl: 3  •  apixaban (ELIQUIS) 5 MG tablet tablet, Take 1 tablet by mouth 2 (Two) Times a Day., Disp: , Rfl:   •  atorvastatin (LIPITOR) 10 MG tablet, Take 10 mg by mouth Daily., Disp: , Rfl:   •  diphenhydrAMINE (BENADRYL) 25 mg capsule, Take 25 mg by mouth Every 6 (Six) Hours As Needed for Itching., Disp: , Rfl:   •  rosuvastatin (CRESTOR) 5 MG tablet, rosuvastatin 5 mg tablet, Disp: , Rfl:   •  tiotropium bromide monohydrate (Spiriva Respimat) 2.5 MCG/ACT aerosol solution inhaler, Inhale 2 puffs Daily., Disp: 1 each, Rfl: 3    No Known Allergies    Social History     Socioeconomic History   • Marital status: Significant Other   Tobacco Use   • Smoking status: Current Every Day Smoker     Packs/day: 0.50     Years: 40.00     Pack years: 20.00     Types: Cigarettes   • Smokeless tobacco: Never Used   • Tobacco comment: Pt states about 7-8 cigarettes per day   Vaping Use   • Vaping Use: Never used   Substance and Sexual Activity   • Alcohol use: Yes     Alcohol/week: 2.0 standard drinks     Types: 2 Standard drinks or equivalent per week     Comment: 2 drinks daily    • Drug use: Yes     Types: Marijuana   •  "Sexual activity: Defer       Family History   Problem Relation Age of Onset   • Colon polyps Mother         Unknown age   • Colon cancer Neg Hx    • Esophageal cancer Neg Hx    • Liver cancer Neg Hx    • Liver disease Neg Hx    • Rectal cancer Neg Hx    • Stomach cancer Neg Hx        Vitals:    05/10/22 0942   BP: 124/72   BP Location: Left arm   Patient Position: Sitting   Cuff Size: Adult   Pulse: 91   Temp: 97.5 °F (36.4 °C)   TempSrc: Infrared   SpO2: 97%   Weight: 81.6 kg (180 lb)   Height: 174 cm (68.5\")       Review of Systems:    General:    Present -feeling well   Skin:    Not Present-Rash   HEENT:     Not Present-Acute visual changes or Acute hearing changes   Neck :    Not Present- swollen glands   Genitourinary:      Not Present- burning, frequency, urgency hematuria, dysuria,   Cardiovascular:   Not Present-chest pain, palpitations, or pressure   Respiratory:   Not Present- shortness of breath or cough   Gastrointestinal:  Musculoskeletal:  Neurological:  Psychiatric:   Present as mentioned in the HP    Not Present. Recent gait disturbances.    Not Present-Seizures and weakness in extremities.    Not Present- Anxiety or Depression.       Physical Exam:    General Appearance:    Alert, cooperative, in no acute distress   Psych:    Mood appropriate    Eyes:          conjunctivae and sclerae normal, no   icterus, no pallor   ENMT:    Ears appear intact with no abnormalities noted oral mucosa moist   Neck:   No adenopathy, supple, trachea midline, no thyromegaly, no   carotid bruit, no JVD    Cardiovascular:    Regular rhythm and normal rate, normal S1 and S2, no            murmur, no gallop, no rub, no click.  3+ pitting brawny edema left lower extremity   Gastrointestinal:     Inspection normal.  Normal bowel sounds, no masses, no organomegaly, soft round mild epigastric tenderness no rebound, non-distended, no guarding,. No hepatosplenomegaly.   Skin:   No bleeding, bruising or rash   Lungs:  Decreased " bilateral       Lab Results - Last 18 Months   Lab Units 03/22/22  1515 11/24/21  0800 05/21/21  0912 02/16/21  0700 02/12/21  1446   GLUCOSE mg/dL  --   --   --  98 94   BUN mg/dL  --   --   --  14 14   CREATININE mg/dL  --  0.70 0.90 0.78 0.73*   SODIUM mmol/L  --   --   --  139 139   POTASSIUM mmol/L  --   --   --  3.9 4.4   CHLORIDE mmol/L  --   --   --  102 103   CO2 mmol/L  --   --   --  29.0 27.0   TOTAL PROTEIN g/dL  --   --   --   --  7.4   ALBUMIN g/dL  --   --   --   --  4.30   ALT (SGPT) U/L  --   --   --   --  20   AST (SGOT) U/L  --   --   --   --  20   ALK PHOS U/L  --   --   --   --  68   BILIRUBIN mg/dL  --   --   --   --  0.8   GLOBULIN gm/dL  --   --   --   --  3.1   CRP mg/dL 0.37  --   --   --   --    SED RATE mm/Hr 2  --   --   --   --        Lab Results - Last 18 Months   Lab Units 03/22/22  1515 02/16/21  0700 02/12/21  1446   HEMOGLOBIN g/dL 16.1 14.3 16.1   HEMATOCRIT % 49.5 41.4 45.6   MCV fL 99.0* 96.7 93.1   WBC K/uL 7.9 12.80* 8.97   RDW % 12.3 12.4 12.1*   MPV fL 10.8 10.7 11.5   PLATELETS K/uL 164 159 165   INR   --   --  0.94           Assessment and Plan:  [unfilled]  Diagnoses and all orders for this visit:    1. Melena (Primary)  -     Gastrointestinal Panel, PCR - Stool, Per Rectum; Future  -     Fecal Lactoferrin Qual. - Stool, Per Rectum; Future  -     Clostridium Difficile Toxin - , Per Rectum; Future  -     Protime-INR; Future    2. Diarrhea, unspecified type  -     Gastrointestinal Panel, PCR - Stool, Per Rectum; Future  -     Fecal Lactoferrin Qual. - Stool, Per Rectum; Future  -     Clostridium Difficile Toxin - , Per Rectum; Future  -     C-reactive Protein; Future    3. Rectal bleeding  -     Comprehensive Metabolic Panel; Future  -     CBC & Differential; Future  -     Gastrointestinal Panel, PCR - Stool, Per Rectum; Future  -     Fecal Lactoferrin Qual. - Stool, Per Rectum; Future  -     Clostridium Difficile Toxin - , Per Rectum; Future  -     Cancer  Antigen 19-9    4. History of adenomatous polyp of colon  -     CEA; Future  -     Cancer Antigen 19-9    5. Lower abdominal pain  -     Gastrointestinal Panel, PCR - Stool, Per Rectum; Future  -     Fecal Lactoferrin Qual. - Stool, Per Rectum; Future  -     Clostridium Difficile Toxin - , Per Rectum; Future    6. Early satiety    7. Epigastric pain    8. Weight loss    9. Loss of appetite    10. Encounter for long-term (current) use of NSAIDs    11. Shortness of breath  -     D-dimer, Quantitative; Future  -     CT Angiogram Abdomen With & Without Contrast; Future    12. Malaise and fatigue  -     D-dimer, Quantitative; Future  -     TSH; Future  -     CT Angiogram Abdomen With & Without Contrast; Future  -     Iron Profile; Future  -     Ferritin; Future    13. History of COVID-19  Comments:  The patient received both doses of Moderna and tells me he has had his booster on 11/10/2021 at [facility]  Orders:  -     D-dimer, Quantitative; Future  -     CT Angiogram Abdomen With & Without Contrast; Future    14. History of DVT (deep vein thrombosis)  -     D-dimer, Quantitative; Future  -     CT Angiogram Abdomen With & Without Contrast; Future    15. History of lung cancer  -     Cancer Antigen 19-9      The patient has multiple comorbidities today and complaints, due to this I will initiate schedule for EGD due to above-noted complaints and associated diagnosis with permission to hold Eliquis x2 days.  Will check stools for gastrointestinal panel and pathogens along with C. difficile.  Patient will need colonoscopy at some point but not until these are resulted.  Labs as noted above due to complaints of shortness of breath malaise and fatigue.  Will order CT angiogram along with D-dimer due DVT found 5/2022    The patient has given both verbal and written consent and is in agreement and with understanding to the plan of care for his health today on this appointment.       There are no Patient Instructions  on file for this visit.    Next follow-up appointment      The risks, benefits, and alternatives of endoscopy were reviewed with the patient today.  Risks including perforation, with or without dilation, possibly requiring surgery.  Additional risks include risk of bleeding.  There is also the risk of a drug reaction or problems with anesthesia.  This will be discussed with the further by the anesthesia team on the day of the procedure. The benefits include the diagnosis and management of disease of the upper digestive tract.  Alternatives to endoscopy include upper GI series, laboratory testing, radiographic evaluation, or no intervention.  The patient verbalizes understanding and agrees.      EMR Dragon/Transcription disclaimer:  Much of this encounter note is an electronic transcription/translation of spoken language to printed text. The electronic translation of spoken language may permit erroneous, or at times, nonsensical words or phrases to be inadvertently transcribed; although I have reviewed the note for such errors, some may still exist.

## 2022-05-10 NOTE — TELEPHONE ENCOUNTER
----- Message from PARISA Martinez sent at 5/10/2022  4:50 PM CDT -----  Please notify the patient that his D-dimer was very elevated as well as his PT and other CBC work-up.  Due to his shortness of breath I am very concerned about pulmonary embolism.  Please call him and tell him I feel it would be safest for him to go to the ER.  If he is unable to drive that he can call an ambulance.  If he is able to drive I do just want him to drive in a careful and calm manner.  He is on a blood thinner which is good in this particular case however my fear is that he may have already had blood clots in his lungs by now.

## 2022-05-11 ENCOUNTER — TRANSCRIBE ORDERS (OUTPATIENT)
Dept: ADMINISTRATIVE | Facility: HOSPITAL | Age: 72
End: 2022-05-11

## 2022-05-11 DIAGNOSIS — I82.402 ACUTE EMBOLISM AND THROMBOSIS OF UNSPECIFIED DEEP VEINS OF LEFT LOWER EXTREMITY: Primary | ICD-10-CM

## 2022-05-12 ENCOUNTER — TELEPHONE (OUTPATIENT)
Dept: GASTROENTEROLOGY | Facility: CLINIC | Age: 72
End: 2022-05-12

## 2022-05-12 LAB
QT INTERVAL: 382 MS
QTC INTERVAL: 464 MS

## 2022-05-12 NOTE — TELEPHONE ENCOUNTER
Tried to call pt re: results-was unable to reach him so I left detailed VM with results for him. Pt advised on VM to call me back with any further questions/problems. I have faxed records to Dr. Lucio's office.

## 2022-05-12 NOTE — TELEPHONE ENCOUNTER
----- Message from PARISA Martinez sent at 5/12/2022  9:42 AM CDT -----  Please notify patient CA 19 was found to be normal.

## 2022-05-12 NOTE — TELEPHONE ENCOUNTER
----- Message from PARISA Martinez sent at 5/12/2022  9:50 AM CDT -----  I made a call to the patient and spoke with his wife they followed my instruction to follow-up in the ER for shortness of breath as I was concerned he had no findings of DVT and complained with shortness of breath and fatigue.  I wanted to rule out any acute care that might be needed.  ER results revealed CT angiography revealing no PE but suggested some bronchitis.  The patient was sent home on a course of steroids to try and help with that.  He was encouraged to return if he had worsening symptoms or fever.  He was found to have a diagnosis of elevated D-dimer along with PT/INR.  He will need to follow-up with his PCP for further evaluation.  Please send all records to Dr. Diego Lucio his PCP.  I have included Dr. Diego Lucio in this note which should be sent by fax since he is not in [facility] employee as far as I can tell.

## 2022-05-13 ENCOUNTER — TELEPHONE (OUTPATIENT)
Dept: PULMONOLOGY | Facility: CLINIC | Age: 72
End: 2022-05-13

## 2022-05-13 LAB — LACTOFERRIN STL QL LA: POSITIVE

## 2022-05-13 NOTE — TELEPHONE ENCOUNTER
Patient states he went to  ER last week and had a CT of his chest. He also has a CT that you ordered scheduled for next week. He is asking if he should cancel that CT?

## 2022-05-13 NOTE — TELEPHONE ENCOUNTER
Patient will call and cancel the CT next week at [facility], [place], operated by [facility].

## 2022-05-16 ENCOUNTER — TELEPHONE (OUTPATIENT)
Dept: GASTROENTEROLOGY | Facility: CLINIC | Age: 72
End: 2022-05-16

## 2022-05-16 NOTE — TELEPHONE ENCOUNTER
"Pt called me wanting me to thank you for the help last week-he is feeling a lot better as far as his blood clot/breathing. He was asking about his BM issues-he is still having the same problems and was asking what he needed to do to \"settle his stomach\"? I know Sylvia was asking about scheduling endo-I told him I was going to check with you about the plan going forward-I did explain you were out of office today so it would be later this week before I got back to him.   "

## 2022-05-16 NOTE — TELEPHONE ENCOUNTER
Sharon- We were waiting on CT results to schedule pt for an endo. Am I needing to call him to schedule this or do I need to wait?

## 2022-05-16 NOTE — TELEPHONE ENCOUNTER
Per Dr. Luico, ok for pt to hold Eliquis. Lovenox required. Waiting to schedule pt's egd per Sharon. Spoke with pt and he is aware that he has clearance. He is also aware that Dr. Lucio will contact him to set up Lovenox injections. Will contact pt to schedule egd once Sharon ok's. Pt vu.

## 2022-05-18 NOTE — TELEPHONE ENCOUNTER
Called and spoke to pt re: Sharon's recommendations-he VU and will f/u with Dr. Lucio's office about abnormal CT Angiogram Chest. I have faxed copy of CT and ER records to Dr. Lucio's office. I called scheduling-spoke to Louisa-she is going to cancel appt for CT Angiogram Abdomen tomorrow. I have also spoke to [facility] as she called me earlier asking if test was still needed-she is aware it is going to be cancelled. Pt advised to call me back with any further questions/problems, otherwise he is aware Dr. Licea is reviewing everything to make sure he is stable for sedation to schedule endo-he will call me in a few days if he hasn't heard back from us.

## 2022-05-18 NOTE — TELEPHONE ENCOUNTER
Encounter Date      5/10/22      CT Angiogram Chest [[id number]] (Order [id number])  Order  Status: Final result           Study Notes       Rashaad Paz on 5/10/2022  7:10 PM   DLP:258   Diagnosis of DVT now with increased shortness of breath               Appointment Information      PACS Images     Radiology Images    Study Result    Narrative & Impression   Exam: CT angiogram of the of the chest with intravenous contrast.     CLINICAL HISTORY:  Diagnosis of DVT now with increasing shortness of  breath.     DOSE:  258 mGycm. All CT scans are performed using dose optimization  techniques as appropriate to the performed exam and including at least  one of the following: Automated exposure control, adjustment of the mA  and/or kV according to size, and the use of the iterative reconstruction  technique.     TECHNIQUE: Contiguous axial images were obtained through the thorax  following intravenous contrast administration with reformatted images  obtained in the sagittal and coronal projections from the original data  set. Three-dimensional reconstructions are also obtained.     COMPARISON:  11/24/2021     FINDINGS:     Pulmonary arteries:  There is normal enhancement of the pulmonary  arteries with no evidence of pulmonary thromboembolic disease.     Aorta:  The thoracic aorta and proximal great vessels are normal in  appearance. There is no evidence of aortic dissection or aneurysm.     LUNGS:  There are moderate changes of centrilobular emphysema. There is  scarring within the apices. There is what appears to be a staple line  within the anterior segment of the right upper lobe stable from the  previous exam. Right middle lobe and bilateral lower lobe atelectasis is  present. No evidence of acute consolidative pneumonia. Bronchial wall  thickening is noted bilaterally, particularly within the lower lobes.     Pleural spaces: Unremarkable. No evidence of pleural effusion or  pneumothorax.     HEART: There is mild  cardiomegaly. Heavy coronary calcifications are  present. There is no evidence of right ventricular dysfunction. No  evidence of pericardial effusion.     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 No suspicious bony abnormalities are present. There is an  accentuated thoracic kyphosis. Mild wedge compression deformities are  noted at several levels in the mid thoracic spine and upper thoracic  spine stable from the previous exam.     CHEST WALL: No chest wall abnormalities are demonstrated.     Lymph nodes: Enlarged subcarinal and bilateral hilar nodes are again  present. These are stable from the previous exam. No new or progressive  hilar or mediastinal adenopathy is present.     Upper abdomen: The patient appears to have undergone previous left  nephrectomy. Limited images of the upper abdomen are otherwise  unremarkable.     IMPRESSION:  1. No evidence of pulmonary thromboembolic disease.  2. Moderate changes of centrilobular emphysema. There is atelectasis in  both lower lobes and the right middle lobe. No acute consolidative  pneumonia. There is bronchial wall thickening within both lower lobes  suggesting an element of reactive airway disease/bronchitis.  3. Enlarged subcarinal and bilateral hilar adenopathy. This is similar  in size and appearance to the previous exam. These may be reactive.  4. Status post previous wedge resection within the right upper lobe. No  evidence of localized recurrence or complication. 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  5. Subtle hypodensity within the left lobe of liver stable from previous  exam including a study dating back to December 2020. Suspected previous  left nephrectomy although not documented in the electronic medical  record.  This report was finalized on 05/10/2022 19:36 by Dr. Torres Moralez MD.     Please notify the patient and Dr. Lucio that the patient has multiple abnormal findings on  "his CT angiogram due to his complaints of shortness of breath and being seen in the ER.  He was discharged home on prednisone 20 mg twice daily however I do note several changes that include bilateral hilar subcarinal lymph right and left main bronchus.  Also noted are several right-sided rib fractures present with none union.  The patient did tell deandra Morse over the phone today \"I had those rib fractures years ago and I am aware of them.\"  Please notify Dr. Lucio that I am concerned that the patient may have a DVT due to his complaint.  Please send him my office note for further work-up.  Thank you for his continuity of care and if there is anything else that I can do to help please notify me.    Sharon dunn, [facility] at [facility]    "

## 2022-05-19 ENCOUNTER — HOSPITAL ENCOUNTER (OUTPATIENT)
Dept: CT IMAGING | Facility: HOSPITAL | Age: 72
End: 2022-05-19

## 2022-05-25 ENCOUNTER — PREP FOR SURGERY (OUTPATIENT)
Dept: OTHER | Facility: HOSPITAL | Age: 72
End: 2022-05-25

## 2022-05-25 DIAGNOSIS — R10.13 EPIGASTRIC PAIN: Primary | ICD-10-CM

## 2022-05-25 DIAGNOSIS — Z01.818 PREOPERATIVE TESTING: Primary | ICD-10-CM

## 2022-05-27 ENCOUNTER — APPOINTMENT (OUTPATIENT)
Dept: CT IMAGING | Facility: HOSPITAL | Age: 72
End: 2022-05-27

## 2022-05-27 PROBLEM — J44.9 STAGE 1 MILD COPD BY GOLD CLASSIFICATION: Chronic | Status: ACTIVE | Noted: 2019-12-11

## 2022-05-27 PROBLEM — J43.9 PULMONARY EMPHYSEMA: Chronic | Status: ACTIVE | Noted: 2022-05-27

## 2022-06-01 ENCOUNTER — OFFICE VISIT (OUTPATIENT)
Dept: PULMONOLOGY | Facility: CLINIC | Age: 72
End: 2022-06-01

## 2022-06-01 VITALS
OXYGEN SATURATION: 97 % | BODY MASS INDEX: 24.6 KG/M2 | SYSTOLIC BLOOD PRESSURE: 126 MMHG | HEIGHT: 70 IN | WEIGHT: 171.8 LBS | DIASTOLIC BLOOD PRESSURE: 74 MMHG | HEART RATE: 107 BPM

## 2022-06-01 DIAGNOSIS — R59.0 HILAR ADENOPATHY: ICD-10-CM

## 2022-06-01 DIAGNOSIS — J44.9 STAGE 1 MILD COPD BY GOLD CLASSIFICATION: Primary | ICD-10-CM

## 2022-06-01 DIAGNOSIS — R91.1 LUNG NODULE: ICD-10-CM

## 2022-06-01 DIAGNOSIS — R59.0 MEDIASTINAL ADENOPATHY: ICD-10-CM

## 2022-06-01 DIAGNOSIS — C34.11 MALIGNANT NEOPLASM OF UPPER LOBE OF RIGHT LUNG: ICD-10-CM

## 2022-06-01 DIAGNOSIS — J43.9 PULMONARY EMPHYSEMA, UNSPECIFIED EMPHYSEMA TYPE: Chronic | ICD-10-CM

## 2022-06-01 DIAGNOSIS — Z72.0 TOBACCO USER: ICD-10-CM

## 2022-06-01 PROCEDURE — 99214 OFFICE O/P EST MOD 30 MIN: CPT | Performed by: NURSE PRACTITIONER

## 2022-06-01 RX ORDER — TIOTROPIUM BROMIDE INHALATION SPRAY 3.12 UG/1
2 SPRAY, METERED RESPIRATORY (INHALATION)
Qty: 1 EACH | Refills: 3 | Status: SHIPPED | OUTPATIENT
Start: 2022-06-01 | End: 2022-12-07 | Stop reason: ALTCHOICE

## 2022-06-01 RX ORDER — AMMONIUM LACTATE 12 G/100G
CREAM TOPICAL
COMMUNITY
Start: 2022-05-11 | End: 2022-12-07 | Stop reason: ALTCHOICE

## 2022-06-02 ENCOUNTER — TELEPHONE (OUTPATIENT)
Dept: GASTROENTEROLOGY | Facility: CLINIC | Age: 72
End: 2022-06-02

## 2022-06-02 NOTE — TELEPHONE ENCOUNTER
Pt called me just now stating he has been getting choked the last few days. He saw pulmonology yesterday and they advised him to call our office to let you know. He is already on for an endo next Thurs. I advised that he keep that appt as that is the appropriate procedure but I would give you a heads up about his new issue of trouble swallowing. I also advised ER if swallowing got worse or if he was unable to get food liquids up/down-he VU. Pt advised to call me back with any further questions/problems. Just an FYI for you for next week!

## 2022-06-06 ENCOUNTER — LAB (OUTPATIENT)
Dept: LAB | Facility: HOSPITAL | Age: 72
End: 2022-06-06

## 2022-06-06 LAB — SARS-COV-2 ORF1AB RESP QL NAA+PROBE: NOT DETECTED

## 2022-06-06 PROCEDURE — U0004 COV-19 TEST NON-CDC HGH THRU: HCPCS | Performed by: INTERNAL MEDICINE

## 2022-06-06 PROCEDURE — U0005 INFEC AGEN DETEC AMPLI PROBE: HCPCS | Performed by: INTERNAL MEDICINE

## 2022-06-06 PROCEDURE — C9803 HOPD COVID-19 SPEC COLLECT: HCPCS

## 2022-06-09 ENCOUNTER — ANESTHESIA EVENT (OUTPATIENT)
Dept: GASTROENTEROLOGY | Facility: HOSPITAL | Age: 72
End: 2022-06-09

## 2022-06-09 ENCOUNTER — HOSPITAL ENCOUNTER (OUTPATIENT)
Facility: HOSPITAL | Age: 72
Setting detail: HOSPITAL OUTPATIENT SURGERY
Discharge: HOME | End: 2022-06-09
Attending: INTERNAL MEDICINE | Admitting: INTERNAL MEDICINE

## 2022-06-09 ENCOUNTER — TELEPHONE (OUTPATIENT)
Dept: GASTROENTEROLOGY | Facility: CLINIC | Age: 72
End: 2022-06-09

## 2022-06-09 ENCOUNTER — ANESTHESIA (OUTPATIENT)
Dept: GASTROENTEROLOGY | Facility: HOSPITAL | Age: 72
End: 2022-06-09

## 2022-06-09 ENCOUNTER — PREP FOR SURGERY (OUTPATIENT)
Dept: OTHER | Facility: HOSPITAL | Age: 72
End: 2022-06-09

## 2022-06-09 VITALS
OXYGEN SATURATION: 96 % | SYSTOLIC BLOOD PRESSURE: 115 MMHG | BODY MASS INDEX: 24.62 KG/M2 | HEART RATE: 76 BPM | TEMPERATURE: 97.8 F | RESPIRATION RATE: 14 BRPM | WEIGHT: 172 LBS | DIASTOLIC BLOOD PRESSURE: 65 MMHG | HEIGHT: 70 IN

## 2022-06-09 DIAGNOSIS — R19.7 DIARRHEA, UNSPECIFIED TYPE: Primary | ICD-10-CM

## 2022-06-09 DIAGNOSIS — R10.13 EPIGASTRIC PAIN: ICD-10-CM

## 2022-06-09 PROCEDURE — 43239 EGD BIOPSY SINGLE/MULTIPLE: CPT | Performed by: INTERNAL MEDICINE

## 2022-06-09 PROCEDURE — 25010000002 PROPOFOL 10 MG/ML EMULSION: Performed by: NURSE ANESTHETIST, CERTIFIED REGISTERED

## 2022-06-09 PROCEDURE — 87081 CULTURE SCREEN ONLY: CPT | Performed by: INTERNAL MEDICINE

## 2022-06-09 PROCEDURE — 88305 TISSUE EXAM BY PATHOLOGIST: CPT | Performed by: INTERNAL MEDICINE

## 2022-06-09 RX ORDER — PROPOFOL 10 MG/ML
VIAL (ML) INTRAVENOUS AS NEEDED
Status: DISCONTINUED | OUTPATIENT
Start: 2022-06-09 | End: 2022-06-09 | Stop reason: SURG

## 2022-06-09 RX ORDER — SODIUM CHLORIDE 0.9 % (FLUSH) 0.9 %
10 SYRINGE (ML) INJECTION AS NEEDED
Status: DISCONTINUED | OUTPATIENT
Start: 2022-06-09 | End: 2022-06-09 | Stop reason: HOSPADM

## 2022-06-09 RX ORDER — SODIUM CHLORIDE 9 MG/ML
500 INJECTION, SOLUTION INTRAVENOUS CONTINUOUS PRN
Status: DISCONTINUED | OUTPATIENT
Start: 2022-06-09 | End: 2022-06-09 | Stop reason: HOSPADM

## 2022-06-09 RX ORDER — LIDOCAINE HYDROCHLORIDE 20 MG/ML
INJECTION, SOLUTION EPIDURAL; INFILTRATION; INTRACAUDAL; PERINEURAL AS NEEDED
Status: DISCONTINUED | OUTPATIENT
Start: 2022-06-09 | End: 2022-06-09 | Stop reason: SURG

## 2022-06-09 RX ORDER — PANTOPRAZOLE SODIUM 40 MG/1
40 TABLET, DELAYED RELEASE ORAL DAILY
Qty: 30 TABLET | Refills: 11 | Status: SHIPPED | OUTPATIENT
Start: 2022-06-09 | End: 2022-06-28 | Stop reason: SDUPTHER

## 2022-06-09 RX ADMIN — PROPOFOL 100 MG: 10 INJECTION, EMULSION INTRAVENOUS at 10:15

## 2022-06-09 RX ADMIN — SODIUM CHLORIDE 500 ML: 9 INJECTION, SOLUTION INTRAVENOUS at 09:02

## 2022-06-09 RX ADMIN — LIDOCAINE HYDROCHLORIDE 50 MG: 20 INJECTION, SOLUTION EPIDURAL; INFILTRATION; INTRACAUDAL; PERINEURAL at 10:17

## 2022-06-09 RX ADMIN — PROPOFOL 100 MG: 10 INJECTION, EMULSION INTRAVENOUS at 10:19

## 2022-06-09 RX ADMIN — LIDOCAINE HYDROCHLORIDE 50 MG: 20 INJECTION, SOLUTION EPIDURAL; INFILTRATION; INTRACAUDAL; PERINEURAL at 10:15

## 2022-06-09 NOTE — ANESTHESIA POSTPROCEDURE EVALUATION
Patient: Se Ramirez    Procedure Summary     Date: 06/09/22 Room / Location: [facility] ENDOSCOPY 4 / [facility]    Anesthesia Start: 1012 Anesthesia Stop: 1027    Procedure: ESOPHAGOGASTRODUODENOSCOPY WITH ANESTHESIA (N/A ) Diagnosis:       Epigastric pain      (Epigastric pain [R10.13])    Surgeons: Kat Licea MD Provider: Carmine Echeverria CRNA    Anesthesia Type: MAC ASA Status: 3          Anesthesia Type: MAC    Vitals  Vitals Value Taken Time   BP 97/73 06/09/22 1026   Temp     Pulse 77 06/09/22 1027   Resp     SpO2 97 % 06/09/22 1027   Vitals shown include unvalidated device data.        Post Anesthesia Care and Evaluation    Patient location during evaluation: PHASE II  Patient participation: complete - patient participated  Level of consciousness: awake and alert  Pain score: 0  Pain management: adequate    Airway patency: patent  Anesthetic complications: No anesthetic complications  PONV Status: none  Cardiovascular status: acceptable and stable  Respiratory status: acceptable  Hydration status: acceptable

## 2022-06-09 NOTE — ANESTHESIA PREPROCEDURE EVALUATION
Anesthesia Evaluation     no history of anesthetic complications:  NPO Solid Status: > 8 hours  NPO Liquid Status: > 8 hours           Airway   Mallampati: I  TM distance: >3 FB  Neck ROM: full  No difficulty expected  Dental      Pulmonary    (+) a smoker Current, lung cancer (s/p surgery), COPD,   Cardiovascular   Exercise tolerance: good (4-7 METS)    (+) DVT (diagnosed 1 month ago, took eliquis but stopped for procedur), hyperlipidemia,   (-) hypertension      Neuro/Psych  (-) seizures, TIA, CVA  GI/Hepatic/Renal/Endo    (+)  GERD,  hepatitis (s/p treatment, resolved) C,   (-) no renal disease, diabetes    Musculoskeletal     Abdominal    Substance History      OB/GYN          Other                        Anesthesia Plan    ASA 3     MAC     intravenous induction     Anesthetic plan, risks, benefits, and alternatives have been provided, discussed and informed consent has been obtained with: patient.        CODE STATUS:

## 2022-06-09 NOTE — INTERVAL H&P NOTE
"  H&P updated. The patient was examined and the following changes are noted:        His main complaint is diarrhea \"like [place]\".  No melena/hematochezia.    "

## 2022-06-09 NOTE — TELEPHONE ENCOUNTER
Scheduled pt for colon 6-29 @ 6:30 am. Giving pt Plenvu sample and prep instructions. He will come in the office to pick them up tomorrow or next week. Pended case request to Dr. Licea. He is aware that last dose of Eliquis will be on 6-26.

## 2022-06-10 LAB
LAB AP CASE REPORT: NORMAL
Lab: NORMAL
PATH REPORT.FINAL DX SPEC: NORMAL
PATH REPORT.GROSS SPEC: NORMAL
UREASE TISS QL: NEGATIVE

## 2022-06-13 PROBLEM — Z86.19 HEPATITIS C VIRUS INFECTION CURED AFTER ANTIVIRAL DRUG THERAPY: Status: ACTIVE | Noted: 2019-02-28

## 2022-06-28 DIAGNOSIS — K21.9 GASTROESOPHAGEAL REFLUX DISEASE, UNSPECIFIED WHETHER ESOPHAGITIS PRESENT: Primary | ICD-10-CM

## 2022-06-28 RX ORDER — PANTOPRAZOLE SODIUM 40 MG/1
40 TABLET, DELAYED RELEASE ORAL DAILY
Qty: 90 TABLET | Refills: 3 | Status: SHIPPED | OUTPATIENT
Start: 2022-06-28

## 2022-06-28 NOTE — TELEPHONE ENCOUNTER
Rx Refill Note  Requested Prescriptions     Pending Prescriptions Disp Refills   • pantoprazole (PROTONIX) 40 MG EC tablet 90 tablet 3     Sig: Take 1 tablet by mouth Daily.      Last office visit with prescribing clinician: 6/2/2022        Next office visit with prescribing clinician: 6/29/2022    I rec'd fax from [facility] requesting 90-day refills on pt's Pantoprazole. I called and spoke to him about it-he would like them sent there as he can get a 90-day supply cheaper than a 30-day supply. I have pended new script to Dr. Licea.               Maggie Su MA  06/28/22, 10:54 CDT

## 2022-06-29 ENCOUNTER — ANESTHESIA EVENT (OUTPATIENT)
Dept: GASTROENTEROLOGY | Facility: HOSPITAL | Age: 72
End: 2022-06-29

## 2022-06-29 ENCOUNTER — ANESTHESIA (OUTPATIENT)
Dept: GASTROENTEROLOGY | Facility: HOSPITAL | Age: 72
End: 2022-06-29

## 2022-06-29 ENCOUNTER — HOSPITAL ENCOUNTER (OUTPATIENT)
Facility: HOSPITAL | Age: 72
Setting detail: HOSPITAL OUTPATIENT SURGERY
Discharge: HOME | End: 2022-06-29
Attending: INTERNAL MEDICINE | Admitting: INTERNAL MEDICINE

## 2022-06-29 VITALS
RESPIRATION RATE: 14 BRPM | HEART RATE: 77 BPM | WEIGHT: 170 LBS | OXYGEN SATURATION: 97 % | HEIGHT: 70 IN | BODY MASS INDEX: 24.34 KG/M2 | SYSTOLIC BLOOD PRESSURE: 120 MMHG | DIASTOLIC BLOOD PRESSURE: 92 MMHG | TEMPERATURE: 97.7 F

## 2022-06-29 DIAGNOSIS — R19.7 DIARRHEA, UNSPECIFIED TYPE: ICD-10-CM

## 2022-06-29 PROCEDURE — 88305 TISSUE EXAM BY PATHOLOGIST: CPT | Performed by: INTERNAL MEDICINE

## 2022-06-29 PROCEDURE — 25010000002 PROPOFOL 10 MG/ML EMULSION: Performed by: NURSE ANESTHETIST, CERTIFIED REGISTERED

## 2022-06-29 PROCEDURE — 45380 COLONOSCOPY AND BIOPSY: CPT | Performed by: INTERNAL MEDICINE

## 2022-06-29 PROCEDURE — 45385 COLONOSCOPY W/LESION REMOVAL: CPT | Performed by: INTERNAL MEDICINE

## 2022-06-29 RX ORDER — PROPOFOL 10 MG/ML
VIAL (ML) INTRAVENOUS AS NEEDED
Status: DISCONTINUED | OUTPATIENT
Start: 2022-06-29 | End: 2022-06-29 | Stop reason: SURG

## 2022-06-29 RX ORDER — LIDOCAINE HYDROCHLORIDE 10 MG/ML
0.5 INJECTION, SOLUTION EPIDURAL; INFILTRATION; INTRACAUDAL; PERINEURAL ONCE AS NEEDED
Status: CANCELLED | OUTPATIENT
Start: 2022-06-29

## 2022-06-29 RX ORDER — SODIUM CHLORIDE 0.9 % (FLUSH) 0.9 %
10 SYRINGE (ML) INJECTION AS NEEDED
Status: DISCONTINUED | OUTPATIENT
Start: 2022-06-29 | End: 2022-06-29 | Stop reason: HOSPADM

## 2022-06-29 RX ORDER — LIDOCAINE HYDROCHLORIDE 20 MG/ML
INJECTION, SOLUTION EPIDURAL; INFILTRATION; INTRACAUDAL; PERINEURAL AS NEEDED
Status: DISCONTINUED | OUTPATIENT
Start: 2022-06-29 | End: 2022-06-29 | Stop reason: SURG

## 2022-06-29 RX ORDER — SODIUM CHLORIDE 9 MG/ML
500 INJECTION, SOLUTION INTRAVENOUS CONTINUOUS PRN
Status: DISCONTINUED | OUTPATIENT
Start: 2022-06-29 | End: 2022-06-29 | Stop reason: HOSPADM

## 2022-06-29 RX ADMIN — SODIUM CHLORIDE: 0.9 INJECTION, SOLUTION INTRAVENOUS at 07:35

## 2022-06-29 RX ADMIN — PROPOFOL 50 MG: 10 INJECTION, EMULSION INTRAVENOUS at 07:50

## 2022-06-29 RX ADMIN — PROPOFOL 80 MG: 10 INJECTION, EMULSION INTRAVENOUS at 07:41

## 2022-06-29 RX ADMIN — PROPOFOL 100 MG: 10 INJECTION, EMULSION INTRAVENOUS at 07:39

## 2022-06-29 RX ADMIN — LIDOCAINE HYDROCHLORIDE 5 ML: 20 INJECTION, SOLUTION EPIDURAL; INFILTRATION; INTRACAUDAL; PERINEURAL at 07:39

## 2022-06-29 RX ADMIN — PROPOFOL 50 MG: 10 INJECTION, EMULSION INTRAVENOUS at 07:43

## 2022-06-29 RX ADMIN — PROPOFOL 50 MG: 10 INJECTION, EMULSION INTRAVENOUS at 07:47

## 2022-06-29 RX ADMIN — PROPOFOL 50 MG: 10 INJECTION, EMULSION INTRAVENOUS at 07:54

## 2022-06-29 RX ADMIN — PROPOFOL 70 MG: 10 INJECTION, EMULSION INTRAVENOUS at 07:45

## 2022-06-29 NOTE — ANESTHESIA PREPROCEDURE EVALUATION
Anesthesia Evaluation     no history of anesthetic complications:  NPO Solid Status: > 8 hours  NPO Liquid Status: > 2 hours           Airway   Mallampati: I  TM distance: >3 FB  Neck ROM: full  No difficulty expected  Dental      Pulmonary    (+) a smoker Current, lung cancer (s/p surgery), COPD,   Cardiovascular   Exercise tolerance: good (4-7 METS)    (+) DVT (diagnosed 1 month ago, took eliquis but stopped for procedur), hyperlipidemia,   (-) hypertension      Neuro/Psych  (-) seizures, TIA, CVA  GI/Hepatic/Renal/Endo    (+)  GERD,  hepatitis (s/p treatment, resolved) C,   (-) no renal disease, diabetes    Musculoskeletal     Abdominal    Substance History      OB/GYN          Other                          Anesthesia Plan    ASA 3     MAC     intravenous induction     Anesthetic plan, risks, benefits, and alternatives have been provided, discussed and informed consent has been obtained with: patient.        CODE STATUS:

## 2022-06-29 NOTE — ANESTHESIA POSTPROCEDURE EVALUATION
Patient: Se NJ    Procedure Summary     Date: 06/29/22 Room / Location: [facility] ENDOSCOPY 2 / [facility]    Anesthesia Start: 0735 Anesthesia Stop: 0812    Procedure: COLONOSCOPY WITH ANESTHESIA (N/A ) Diagnosis:       Diarrhea, unspecified type      (Diarrhea, unspecified type [R19.7])    Surgeons: Kat Licea MD Provider: Kelsi Brown CRNA    Anesthesia Type: MAC ASA Status: 3          Anesthesia Type: MAC    Vitals  Vitals Value Taken Time   BP     Temp     Pulse 87 06/29/22 0812   Resp     SpO2 98 % 06/29/22 0812   Vitals shown include unvalidated device data.        Post Anesthesia Care and Evaluation    Patient location during evaluation: PACU  Patient participation: complete - patient participated  Level of consciousness: sleepy but conscious  Pain score: 0  Pain management: adequate    Airway patency: patent  Anesthetic complications: No anesthetic complications  PONV Status: none  Cardiovascular status: acceptable  Respiratory status: acceptable  Hydration status: acceptable

## 2022-06-30 LAB
CYTO UR: NORMAL
LAB AP CASE REPORT: NORMAL
Lab: NORMAL
PATH REPORT.FINAL DX SPEC: NORMAL
PATH REPORT.GROSS SPEC: NORMAL

## 2022-07-05 ENCOUNTER — HOSPITAL ENCOUNTER (OUTPATIENT)
Dept: ULTRASOUND IMAGING | Facility: HOSPITAL | Age: 72
Discharge: HOME | End: 2022-07-05
Admitting: NURSE PRACTITIONER

## 2022-07-05 DIAGNOSIS — I82.402 ACUTE EMBOLISM AND THROMBOSIS OF UNSPECIFIED DEEP VEINS OF LEFT LOWER EXTREMITY: ICD-10-CM

## 2022-07-05 PROCEDURE — 93971 EXTREMITY STUDY: CPT

## 2022-07-07 ENCOUNTER — TELEPHONE (OUTPATIENT)
Dept: GASTROENTEROLOGY | Facility: CLINIC | Age: 72
End: 2022-07-07

## 2022-07-07 NOTE — TELEPHONE ENCOUNTER
"Called and spoke to pt re: results-he VU. I have placed him in recall for 6/2027.     Dr. Licea-pt tells me he does have diarrhea. It doesn't happen all the time but he does have it and he tells me he has \"an uneasy\" feeling in his stomach at time. I advised I would update you and that most likely we would be sending in a medication to help with that inflammation that was found. I will call him back tomorrow with your recommendations.   "

## 2022-07-07 NOTE — TELEPHONE ENCOUNTER
Please let him know that his colon polyps were benign.  He will need a repeat colonoscopy in 5 years.  Please place in recall.    The biopsies from his colon showed nonspecific inflammation.  If he is still having diarrhea, please report back to me and I will start Lialda and regroup in upcoming office visit in November.  If he is doing well, then no need to start any medication.    Kat Licea MD

## 2022-07-08 RX ORDER — MESALAMINE 1.2 G/1
3.6 TABLET, DELAYED RELEASE ORAL DAILY
Qty: 90 TABLET | Refills: 11 | Status: SHIPPED | OUTPATIENT
Start: 2022-07-08

## 2022-07-08 NOTE — TELEPHONE ENCOUNTER
Called and spoke to pt about new script-he DAGOBERTO. I did advise he call pharmacy just to make sure everything was good before making a trip up there-he will do that and let me know if there are issues at pharmacy. Pt also advised to call me back with any further questions/problems, otherwise we will see him in Nov as planned.

## 2022-07-08 NOTE — TELEPHONE ENCOUNTER
Prescription for Lialda 3 tablets daily sent to pharmacy.  Remind him of his November 2, 2022 appointment with me.    Kat Licea MD

## 2022-11-02 ENCOUNTER — OFFICE VISIT (OUTPATIENT)
Dept: GASTROENTEROLOGY | Facility: CLINIC | Age: 72
End: 2022-11-02

## 2022-11-02 VITALS
SYSTOLIC BLOOD PRESSURE: 116 MMHG | HEIGHT: 70 IN | WEIGHT: 175 LBS | DIASTOLIC BLOOD PRESSURE: 72 MMHG | TEMPERATURE: 97.3 F | OXYGEN SATURATION: 96 % | BODY MASS INDEX: 25.05 KG/M2 | HEART RATE: 83 BPM

## 2022-11-02 DIAGNOSIS — R19.7 DIARRHEA, UNSPECIFIED TYPE: Primary | ICD-10-CM

## 2022-11-02 DIAGNOSIS — R13.19 ESOPHAGEAL DYSPHAGIA: ICD-10-CM

## 2022-11-02 DIAGNOSIS — R63.4 WEIGHT LOSS: ICD-10-CM

## 2022-11-02 DIAGNOSIS — R14.0 BLOATING: ICD-10-CM

## 2022-11-02 DIAGNOSIS — Z86.010 HISTORY OF ADENOMATOUS POLYP OF COLON: ICD-10-CM

## 2022-11-02 PROCEDURE — 99214 OFFICE O/P EST MOD 30 MIN: CPT | Performed by: INTERNAL MEDICINE

## 2022-11-02 NOTE — PROGRESS NOTES
Primary Physician: Diego Lucio MD    Chief Complaint   Patient presents with   • Follow-up     Pt presents today for nonspecific inflammation and diarrhea follow up-had colon 6/29/2022 and started on Lialda 7/8/2022; Pt states overall he is feeling good isn't having any issues with diarrhea at this time        Subjective     Se NJ is a 72 y.o. male.     HPI   Diarrhea  Nonbloody watery diarrhea started in early 2022 nonresponsive to 2 courses of Flagyl.  Colonoscopy 6/2022 showed adenomatous polyp and biopsies from the colon showed nonspecific inflammation.  Liada 3 tablets daily was started.  He is much better now.  No diarrhea!!!    Weight loss  Endoscopy with small bowel biopsies normal 6/2022.  Follow-up colonoscopy showed nonspecific inflammation for which she was started on Lialda 3 tablets daily.  He has gained weight since last seen.     Dysphagia  Endoscopy 6/2022 was unremarkable.  No dysphagia.  Taking protonix.    Colon polyps  He has had adenomatous colon polyps removed intermittently over the years most recently 6/2022.  Repeat colonoscopy 6/2027.      Past Medical History:   Diagnosis Date   • Bronchitis    • COPD (chronic obstructive pulmonary disease) (HCC)    • Diverticulosis    • Family history of colonic polyps    • GERD (gastroesophageal reflux disease)    • Hearing loss     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   • History of adenomatous polyp of colon    • History of colon polyps    • History of lung cancer     Right lung   • Hyperlipidemia        Past Surgical History:   Procedure Laterality Date   • BRONCHOSCOPY N/A 02/15/2021    Procedure: BRONCHOSCOPY;  Surgeon: Bruce Murillo MD;  Location: [facility];  Service: Cardiothoracic;  Laterality: N/A;   • CATARACT EXTRACTION     • COLONOSCOPY  11/20/2012    One 1cm tubular adenomatous polyp in the sigmoid colon; One 5mm hyperplastic polyp in the rectum; Diverticulosis; Repeat 4 years    • COLONOSCOPY N/A 03/30/2017     Two 4-6mm tubular adenomatous polyps at the hepatic flexure; One 5mm tubular adenomatous polyp in the transverse colon; One 11mm tubular adenomatous polyp in the sigmoid colon; One 6mm tubular adenomatous polyp in the rectum; Diverticulosis in the left colon; Repeat 3 years    • COLONOSCOPY  12/17/2009    One less than 5mm tubular adenomatous polyp in the cecum; One 5mm hyperplastic polyp in the transverse; One less than 3mm hyperplastic polyp in the rectum; Diverticulosis; Repeat 3 years    • COLONOSCOPY N/A 07/02/2020    Two 5-7mm tubular adenomatous polyps at the hepatic flexure; One 6mm tubular adenomatous polyp in the transverse colon; Diverticulosis in the left colon; The entire examined colon is normal on direct and retroflexion views; Repeat 5 years   • COLONOSCOPY N/A 06/29/2022    Diverticulosis in the left colon; One 6mm tubular adenomatous polyp in the descending colon; One 4mm tubular adenomatous polyp in the descending colon; Congested mucosa in the entire examined colon-biopsied; Repeat 5 years   • ENDOSCOPY N/A 03/27/2019    Medium-sized HH; Normal stomach; Normal examined duodenum-biopsied   • ENDOSCOPY N/A 06/09/2022    Small HH; Non-erosive gastritis-biopsied; Normal examined duodenum-biopsied   • FOOT SURGERY Left     ORIF   • HERNIA REPAIR     • INNER EAR SURGERY     • MEDIASTINOSCOPY N/A 02/15/2021    Procedure: CERVICAL MEDIASTINOSCOPY WITH LYMPH NODE DISECTION;  Surgeon: Bruce Murillo MD;  Location: [facility] OR;  Service: Cardiothoracic;  Laterality: N/A;   • THORACOSCOPY Right 02/15/2021    Procedure: THORACOSCOPY, WEDGE RESECTION OF RIGHT UPPER LOBE WITH FROZEN,  , MEDIASTINAL LYMPH NODE DISSECTION;  Surgeon: Bruce Murillo MD;  Location: [facility] OR;  Service: Cardiothoracic;  Laterality: Right;   • TONSILLECTOMY          Current Outpatient Medications:   •  albuterol sulfate HFA (ProAir HFA) 108 (90 Base) MCG/ACT inhaler, Inhale 2 puffs Every 4 (Four) Hours As Needed for Wheezing.,  "Disp: 18 g, Rfl: 3  •  ammonium lactate (AMLACTIN) 12 % cream, APPLY TOPICALLY ONCE DAILY, Disp: , Rfl:   •  atorvastatin (LIPITOR) 10 MG tablet, Take 10 mg by mouth Daily., Disp: , Rfl:   •  diphenhydrAMINE (BENADRYL) 25 mg capsule, Take 25 mg by mouth Every 6 (Six) Hours As Needed for Itching., Disp: , Rfl:   •  mesalamine (LIALDA) 1.2 g EC tablet, Take 3 tablets by mouth Daily., Disp: 90 tablet, Rfl: 11  •  pantoprazole (PROTONIX) 40 MG EC tablet, Take 1 tablet by mouth Daily., Disp: 90 tablet, Rfl: 3  •  tiotropium bromide monohydrate (Spiriva Respimat) 2.5 MCG/ACT aerosol solution inhaler, Inhale 2 puffs Daily., Disp: 1 each, Rfl: 3    No Known Allergies    Social History     Socioeconomic History   • Marital status: Significant Other   Tobacco Use   • Smoking status: Every Day     Packs/day: 0.50     Years: 53.00     Pack years: 26.50     Types: Cigarettes     Start date: 1/1/1969   • Smokeless tobacco: Never   • Tobacco comments:     Pt states about 7-8 cigarettes per day   Vaping Use   • Vaping Use: Never used   Substance and Sexual Activity   • Alcohol use: Not Currently     Alcohol/week: 3.0 standard drinks     Types: 3 Shots of liquor per week     Comment: sober as of 06/01/2022   • Drug use: Yes     Types: Marijuana   • Sexual activity: Yes     Partners: Female       Family History   Problem Relation Age of Onset   • Colon polyps Mother         Unknown age   • Colon cancer Neg Hx    • Esophageal cancer Neg Hx    • Liver cancer Neg Hx    • Liver disease Neg Hx    • Rectal cancer Neg Hx    • Stomach cancer Neg Hx        Review of Systems   Constitutional:        He is no longer on eliquis for DVT.  He got an all clear sign that this is doing well.   Respiratory: Negative for shortness of breath.    Cardiovascular: Negative for chest pain.       Objective     /72 (BP Location: Left arm, Patient Position: Sitting, Cuff Size: Adult)   Pulse 83   Temp 97.3 °F (36.3 °C) (Infrared)   Ht 177.8 cm (70\")  "  Wt 79.4 kg (175 lb)   SpO2 96%   BMI 25.11 kg/m²     Physical Exam  Constitutional:       Appearance: He is well-developed.   Pulmonary:      Effort: Pulmonary effort is normal.   Musculoskeletal:         General: Normal range of motion.   Skin:     General: Skin is warm.   Neurological:      Mental Status: He is alert and oriented to person, place, and time.   Psychiatric:         Behavior: Behavior normal.         Lab Results - Last 18 Months   Lab Units 05/10/22  1848 05/10/22  1304 03/22/22  1515 11/24/21  0800 05/21/21  0912   GLUCOSE mg/dL 85 181*  --   --   --    BUN mg/dL 14 15  --   --   --    CREATININE mg/dL 0.68* 0.75*  --  0.70 0.90   SODIUM mmol/L 141 142  --   --   --    POTASSIUM mmol/L 3.8 3.9  --   --   --    CHLORIDE mmol/L 104 105  --   --   --    CO2 mmol/L 24.0 29.0  --   --   --    TOTAL PROTEIN g/dL 6.1 6.6  --   --   --    ALBUMIN g/dL 3.10* 3.30*  --   --   --    ALT (SGPT) U/L 21 25  --   --   --    AST (SGOT) U/L 30 38  --   --   --    ALK PHOS U/L 81 94  --   --   --    BILIRUBIN mg/dL 0.4 0.4  --   --   --    GLOBULIN gm/dL 3.0 3.3  --   --   --    CRP mg/dL  --  2.11* 0.37  --   --    SED RATE mm/Hr  --   --  2  --   --        Lab Results - Last 18 Months   Lab Units 05/10/22  1848 05/10/22  1304 05/10/22  1304 03/22/22  1515   HEMOGLOBIN g/dL 14.3  --  14.4 16.1   HEMATOCRIT % 43.5  --  44.3 49.5   MCV fL 98.4*  --  100.0* 99.0*   WBC 10*3/mm3 9.12  --  7.55 7.9   RDW % 12.2*  --  12.2* 12.3   MPV fL 10.8  --  11.1 10.8   PLATELETS 10*3/mm3 192  --  187 164   INR  1.40*   < > 1.60*  --     < > = values in this interval not displayed.       Lab Results - Last 18 Months   Lab Units 05/10/22  1304   IRON mcg/dL 73   TIBC mcg/dL 226*   IRON SATURATION % 32   FERRITIN ng/mL 336.00   TSH uIU/mL 1.690        Lab Results - Last 18 Months   Lab Units 05/10/22  1304   FERRITIN ng/mL 336.00       Colonoscopy with biopsies 6/2022:  Final Diagnosis   1.  Descending colon, biopsies:  Minimal  chronic active colitis.  No evidence of dysplasia.    2.  Right colon, biopsies:  Minimal chronic active colitis.  No evidence of dysplasia.    3.  Transverse colon, biopsies:  Minimal chronic active colitis.  No evidence of dysplasia.    4.  Polyps, descending colon, polypectomies:  Tubular adenomas, negative for high-grade dysplasia.    5.  Sigmoid colon, biopsies:  Colonic mucosa with no significant pathologic changes.  No evidence of active or microscopic colitis.  No evidence of dysplasia.    6.  Rectum, biopsies:  Minimal chronic active proctitis.  No evidence of dysplasia.   Electronically signed by Khoa Rudolph MD on 6/30/2022 at 1359       Endoscopy biopsy 6/2022  Final Diagnosis   1.  Small bowel, biopsy:               - Fragments of duodenal mucosa with no significant pathologic abnormalities.    2.  Gastric antrum, biopsy:               - Reactive gastropathy.               - Negative for dysplasia and malignancy.   Electronically signed by Jeremi Diaz MD on 6/10/2022 at 1612         IMPRESSION/PLAN:    Assessment & Plan      Problem List Items Addressed This Visit        Endocrine and Metabolic    Weight loss    Overview     Endoscopy with small bowel biopsies normal 6/2022.  Follow-up colonoscopy showed nonspecific inflammation for which she was started on Lialda 3 tablets daily.  TSH normal 5/2022.  Now that the diarrhea is in control, his weight has actually increased.  He has gained 5 pounds since June 2022.  He is at an ideal weight now.            Gastrointestinal Abdominal     Bloating    Overview     Endoscopy with small bowel biopsies negative for celiac disease 6/2022.         History of adenomatous polyp of colon    Overview     Adenomatous colon polyps removed 6/2022.  Repeat colonoscopy 6/2027.         Diarrhea - Primary    Overview     Nonbloody watery diarrhea started in early 2022 nonresponsive to 2 courses of Flagyl.  TSH normal 5/2022.  Colonoscopy 6/2022 showed adenomatous  polyp and biopsies from the colon showed nonspecific inflammation.  Liada 3 tablets daily was started.  No further complaints of diarrhea since starting Lialda.  We will continue.         Esophageal dysphagia    Overview     Intermittent.  Endoscopy 6/2022 unremarkable. No problems now.          MEDICAL COMPLEXITY must have 2 out of 3   Moderate Complexity Level 4                                                                                                              1 of the following medical problems:                                                                                               []One chronic illness with mild exacerbation                                                                                [x]Two or more stable chronic illness                                                                                              []One new problem  []One acute illness with systemic symptoms     Complexity of Data  Reviewed (1 out of the 3 following categories)                                             Category 1 tests, documents, historian (must have 3 points)                                                      []Review of prior external records  [x]Review of results of unique tests  []Ordering unique tests   []Assessment requires an independent historian   Category 2 Interpretation of tests     []Independent interpretation of test read by another doc   Category 3 Discuss Management/tests  []Discussion with external physician     Risk of complications and/or morbidity                                                                                           [x]Prescription Drug Management     []Decision for elective endoscopic procedure                RTC June 2023      Kat Licea MD  11/02/22  14:48 CDT    Much of this encounter note is an electronic transcription/translation of spoken language to printed text. The electronic translation of spoken language may permit erroneous, or at  times, nonsensical words or phrases to be inadvertently transcribed; although I have reviewed the note for such errors, some may still exist.

## 2022-12-05 NOTE — PROGRESS NOTES
" PARISA Lopez  [facility]   Pulmonary and Critical Care  [address] [place] [address]  Phone: [phone]  Fax: [phone]           Chief Complaint  Malignant neoplasm of upper lobe of right lung  and Stage 1 mild COPD by GOLD classification     Subjective    History of Present Illness     Se RAMIREZ presents to [facility] PULMONARY & CRITICAL CARE MEDICINE   History of Present Illness  Mr. Ramirez is a pleasant 72-year-old male patient with known 2.5 cm right upper lobe anterior segment nodule noted on 12/20/2020. He was treated by Dr. 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 He has known emphysema/COPD and a current every day smoker.  He is using the Spiriva daily and does not find as much benefit now.  He has an albuterol HFA that he uses once a day sometimes more. He has known DVT and completed Eliquis. His last GI scopes were good. He was told he had IBS. He was seen by Dr. Licea a week or two ago he denies any further chocking on food and feels he may of had dilation of the esophagus as well. He has remained off of alcohol. He has shortness of breath that is worsening particularly in the ma. He has a cough that is persistent and worse at night as well as am. This is a dry cough. He denies fever, chills, night sweats.            Objective   Vital Signs:   /72   Pulse 74   Ht 177.8 cm (70\")   Wt 79.5 kg (175 lb 3.2 oz)   SpO2 97%   BMI 25.14 kg/m²     Physical Exam  Vitals reviewed.   Constitutional:       Appearance: Normal appearance.   Cardiovascular:      Rate and Rhythm: Normal rate and regular rhythm.   Pulmonary:      Effort: Pulmonary effort is normal.      Breath sounds: Normal breath sounds.   Neurological:      General: No focal deficit present.      Mental Status: He is alert and oriented to person, place, and time.   Psychiatric:         Mood and Affect: " Mood normal.         Behavior: Behavior normal.          Result Review :  The following data was reviewed by: PARISA Lopez on 2022:    Data reviewed: Radiologic studies CT Chest    My interpretation of imaging:  Multiple lung nodules. Emphysema   My interpretation of labs: none    CT Chest     1.  Multiple new small pulmonary nodules measuring up to 5 mm. These may  be infectious or inflammatory in nature but short interval follow-up in  2-3 months is recommended to confirm stability or resolution, given  history of lung cancer.     2.  Stable posttreatment change in the RIGHT upper lobe.     3.  No change in mild RIGHT hilar lymphadenopathy.     4.  Moderate emphysema.  This report was finalized on 2022 09:43 by Dr. Esteban Almanzar MD.  PFT Values        Some values may be hidden. Unless noted otherwise, only the newest values recorded on each date are displayed.         Old Values PFT Results 21   No data to display.      Pre Drug PFT Results 21      FEV1 91   FEF 25-75% 41   FEV1/FVC 62.03      Post Drug PFT Results 21   No data to display.      Other Tests PFT Results 21         DLCO 65   D/VAsb 62           My interpretation of the PFT: no new    Results for orders placed in visit on 21    Full Pulmonary Function Test Without Bronchodilator    Narrative  Full Pulmonary Function Test Without Bronchodilator  Performed by: Alona Voss, RRT  Authorized by: Diego Landry MD    Pre Drug % Predicted  FVC: 115%  FEV1: 91%  FEF 25-75%: 41%  FEV1/FVC: 62.03%  T%  RV: 118%  DLCO: 65%  D/VAsb: 62%    Interpretation  Spirometry  Spirometry shows mild obstruction. There is reduced midflow suggesting small airway/airflow obstruction.  Review of FVL curve  Patient's effort is normal.  Lung Volume Measurements  Measurements show normal results.  Diffusion Capacity  The patient's diffusion capacity is moderately reduced.   Diffusion capacity is moderately reduced when corrected for alveolar volume.      Results for orders placed in visit on 12/21/20    Pulmonary Function Test    [facility] - Pulmonary Function [facility]    [address]  [place]  [place]  [address]  [phone]    Patient : Se Ramirez  MRN : [id number]  CSN : [id number]  Pulmonologist : Diego Landry MD  Date : 1/28/2021    ______________________________________________________________________    Interpretation :  1.  Spirometry is consistent with a mild obstructive ventilatory defect manifested by small airways disease.  2.  There is no significant change in spirometry postbronchodilator.  3.  Lung volumes reveal a decrease in residual volume and otherwise are within normal limits.  4.  There is a moderate diffusion impairment even when corrected for alveolar volume.      Diego Landry MD          Assessment and Plan   Diagnoses and all orders for this visit:    1. Stage 1 mild COPD by GOLD classification ([facility]) (Primary)    2. Mediastinal adenopathy    3. Pulmonary emphysema, unspecified emphysema type ([facility])    4. Hilar adenopathy    5. Tobacco user    6. Malignant neoplasm of upper lobe of right lung (HCC)      He has had worsening of his shortness of breath. Stop Spiriva and begin Anoro. Sample, demonstration and hand out provided. He will call in a couple of weeks if he finds benefit and we will send script to pharmacy. I have reviewed his imaging with him and he has multiple lung nodules. We will await radiology report however in the meantime we will plan a 3 month short term CT follow up. He is advised we may obtain a PET scan but will await report and he will be notified. He is agreeable to the plan.     Addendum: See report noted above. Will keep with plan to follow up with CT of the chest in 3 months.     Follow Up   No follow-ups on file.  Patient was given instructions and counseling regarding his condition or for health  maintenance advice. Please see specific information pulled into the AVS if appropriate.     PARISA Lopez  12/7/2022  09:05 CST

## 2022-12-07 ENCOUNTER — HOSPITAL ENCOUNTER (OUTPATIENT)
Dept: CT IMAGING | Facility: HOSPITAL | Age: 72
Discharge: HOME | End: 2022-12-07
Admitting: NURSE PRACTITIONER

## 2022-12-07 ENCOUNTER — OFFICE VISIT (OUTPATIENT)
Dept: PULMONOLOGY | Facility: CLINIC | Age: 72
End: 2022-12-07

## 2022-12-07 VITALS
DIASTOLIC BLOOD PRESSURE: 72 MMHG | SYSTOLIC BLOOD PRESSURE: 124 MMHG | HEART RATE: 74 BPM | OXYGEN SATURATION: 97 % | BODY MASS INDEX: 25.08 KG/M2 | HEIGHT: 70 IN | WEIGHT: 175.2 LBS

## 2022-12-07 DIAGNOSIS — C34.11 MALIGNANT NEOPLASM OF UPPER LOBE OF RIGHT LUNG: ICD-10-CM

## 2022-12-07 DIAGNOSIS — J43.9 PULMONARY EMPHYSEMA, UNSPECIFIED EMPHYSEMA TYPE: Chronic | ICD-10-CM

## 2022-12-07 DIAGNOSIS — Z72.0 TOBACCO USER: ICD-10-CM

## 2022-12-07 DIAGNOSIS — R59.0 HILAR ADENOPATHY: ICD-10-CM

## 2022-12-07 DIAGNOSIS — J44.9 STAGE 1 MILD COPD BY GOLD CLASSIFICATION: Primary | Chronic | ICD-10-CM

## 2022-12-07 DIAGNOSIS — R59.0 MEDIASTINAL ADENOPATHY: ICD-10-CM

## 2022-12-07 DIAGNOSIS — R91.1 LUNG NODULE: ICD-10-CM

## 2022-12-07 DIAGNOSIS — R91.8 MULTIPLE LUNG NODULES: ICD-10-CM

## 2022-12-07 PROCEDURE — 99214 OFFICE O/P EST MOD 30 MIN: CPT | Performed by: NURSE PRACTITIONER

## 2022-12-07 PROCEDURE — 71250 CT THORAX DX C-: CPT

## 2023-01-05 DIAGNOSIS — J44.9 STAGE 1 MILD COPD BY GOLD CLASSIFICATION: Primary | ICD-10-CM

## 2023-01-05 NOTE — TELEPHONE ENCOUNTER
Patient called requesting a prescription be sent to [facility] for Anoro. He said he got a sample at his last office visit and feels like it is helping.  Rx Refill Note  Requested Prescriptions     Pending Prescriptions Disp Refills   • Umeclidinium-Vilanterol (ANORO ELLIPTA) 62.5-25 MCG/ACT aerosol powder  inhaler 60 each 11     Sig: Inhale 1 puff Daily.      Last office visit with prescribing clinician: 12/7/2022   Last telemedicine visit with prescribing clinician: 3/8/2023   Next office visit with prescribing clinician: 3/8/2023                         Would you like a call back once the refill request has been completed: [] Yes [] No    If the office needs to give you a call back, can they leave a voicemail: [] Yes [] No    Teo Connelly CMA  01/05/23, 11:46 CST

## 2023-03-06 NOTE — PROGRESS NOTES
" PARISA Lopez  [facility]   Pulmonary and Critical Care  [address]  [place] [address]  Phone: [phone]  Fax: [phone]           Chief Complaint  COPD    Subjective    History of Present Illness     Se NJ presents to [facility] PULMONARY & CRITICAL CARE MEDICINE   History of Present Illness  Mr. Nj is a pleasant 72-year-old male patient with known 2.5 cm right upper lobe anterior segment nodule noted on 12/20/2020. He was treated by Dr. 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 He has known emphysema/COPD, history of DVT, history of alcohol abuse and a current every day smoker. He is using the Anoro and finds benefit.  He has an albuterol HFA that he has not had to use since the Anoro. His CT in Dec showed multiple new small nodules measuring up to 5 mm, stable post surgical change in the right upper lobe and stable right hilar lymphadenopathy. His repeat CT showed waxing and waning of the sub 6 mm nodules with resolution of some and development of new 3-4 mm nodules.  He denies fever, chills, night sweats. He denies swelling.  He has a cough that is dry. He has dyspnea that is better with Anoro.              Objective   Vital Signs:   /80   Pulse 84   Resp 16   Ht 177.8 cm (70\")   Wt 82.1 kg (181 lb)   SpO2 96% Comment: RA  BMI 25.97 kg/m²     Physical Exam  Vitals reviewed.   Constitutional:       Appearance: Normal appearance.   Cardiovascular:      Rate and Rhythm: Normal rate and regular rhythm.   Pulmonary:      Effort: Pulmonary effort is normal.      Breath sounds: Normal breath sounds.   Neurological:      General: No focal deficit present.      Mental Status: He is alert and oriented to person, place, and time.   Psychiatric:         Mood and Affect: Mood normal.         Behavior: Behavior normal.          Result Review :  The following data was " reviewed by: PARISA Lopez on 2023:    Data reviewed: Radiologic studies CT chest 3/8/2023   My interpretation of imaging:  As in HPI  My interpretation of labs: none  CT Chest Without Contrast Diagnostic (2022 07:59)  CT Chest Without Contrast Diagnostic (2023 08:45)    PFT Values        Some values may be hidden. Unless noted otherwise, only the newest values recorded on each date are displayed.         Old Values PFT Results 21   No data to display.      Pre Drug PFT Results 21      FEV1 91   FEF 25-75% 41   FEV1/FVC 62.03      Post Drug PFT Results 21   No data to display.      Other Tests PFT Results 21         DLCO 65   D/VAsb 62           My interpretation of the PFT: no new    Results for orders placed in visit on 21    Full Pulmonary Function Test Without Bronchodilator    Narrative  Full Pulmonary Function Test Without Bronchodilator  Performed by: Alona Voss, RRT  Authorized by: Diego Landry MD    Pre Drug % Predicted  FVC: 115%  FEV1: 91%  FEF 25-75%: 41%  FEV1/FVC: 62.03%  T%  RV: 118%  DLCO: 65%  D/VAsb: 62%    Interpretation  Spirometry  Spirometry shows mild obstruction. There is reduced midflow suggesting small airway/airflow obstruction.  Review of FVL curve  Patient's effort is normal.  Lung Volume Measurements  Measurements show normal results.  Diffusion Capacity  The patient's diffusion capacity is moderately reduced.  Diffusion capacity is moderately reduced when corrected for alveolar volume.      Results for orders placed in visit on 20    Pulmonary Function Test    [facility] - Pulmonary Function Test    [address] [place] [address]  [place]  [address]  [phone]    Patient : Se Ramirez  MRN : [id number]  CSN : [id number]  Pulmonologist : Diego Landry MD  Date :  1/28/2021    ______________________________________________________________________    Interpretation :  1.  Spirometry is consistent with a mild obstructive ventilatory defect manifested by small airways disease.  2.  There is no significant change in spirometry postbronchodilator.  3.  Lung volumes reveal a decrease in residual volume and otherwise are within normal limits.  4.  There is a moderate diffusion impairment even when corrected for alveolar volume.      Diego Landry MD          Assessment and Plan   Diagnoses and all orders for this visit:    1. Stage 1 mild COPD by GOLD classification (HCC) (Primary)    2. Pulmonary emphysema, unspecified emphysema type (HCC)    3. Malignant neoplasm of upper lobe of right lung (HCC)    4. Hilar adenopathy    5. Multiple lung nodules    6. Current every day smoker      I have reviewed his CT scan with him. We will plan another CT scan in 3 months. He is agreeable to have an Alpha 1 swab today. He is encouraged to work on his smoking cessation. He will continue Anoro and his rescue inhaler. He is also agreeable to have a FVL with DLCO when he returns. Return in 3 months.     Alpha 1: Will check today.       Follow Up   No follow-ups on file.  Patient was given instructions and counseling regarding his condition or for health maintenance advice. Please see specific information pulled into the AVS if appropriate.     Mallory Bryan, PARISA  3/8/2023  09:02 CST

## 2023-03-07 ENCOUNTER — HOSPITAL ENCOUNTER (OUTPATIENT)
Dept: CT IMAGING | Facility: HOSPITAL | Age: 73
Discharge: HOME | End: 2023-03-07
Admitting: NURSE PRACTITIONER
Payer: MEDICARE

## 2023-03-07 DIAGNOSIS — R91.8 MULTIPLE LUNG NODULES: ICD-10-CM

## 2023-03-07 DIAGNOSIS — C34.11 MALIGNANT NEOPLASM OF UPPER LOBE OF RIGHT LUNG: ICD-10-CM

## 2023-03-07 PROCEDURE — 71250 CT THORAX DX C-: CPT

## 2023-03-08 ENCOUNTER — OFFICE VISIT (OUTPATIENT)
Dept: PULMONOLOGY | Facility: CLINIC | Age: 73
End: 2023-03-08
Payer: MEDICARE

## 2023-03-08 VITALS
WEIGHT: 181 LBS | OXYGEN SATURATION: 96 % | HEART RATE: 84 BPM | BODY MASS INDEX: 25.91 KG/M2 | HEIGHT: 70 IN | SYSTOLIC BLOOD PRESSURE: 136 MMHG | DIASTOLIC BLOOD PRESSURE: 80 MMHG | RESPIRATION RATE: 16 BRPM

## 2023-03-08 DIAGNOSIS — J44.9 STAGE 1 MILD COPD BY GOLD CLASSIFICATION: Primary | Chronic | ICD-10-CM

## 2023-03-08 DIAGNOSIS — J43.9 PULMONARY EMPHYSEMA, UNSPECIFIED EMPHYSEMA TYPE: Chronic | ICD-10-CM

## 2023-03-08 DIAGNOSIS — R91.8 MULTIPLE LUNG NODULES: ICD-10-CM

## 2023-03-08 DIAGNOSIS — C34.11 MALIGNANT NEOPLASM OF UPPER LOBE OF RIGHT LUNG: ICD-10-CM

## 2023-03-08 DIAGNOSIS — F17.200 CURRENT EVERY DAY SMOKER: ICD-10-CM

## 2023-03-08 DIAGNOSIS — R59.0 HILAR ADENOPATHY: ICD-10-CM

## 2023-03-08 PROCEDURE — 99214 OFFICE O/P EST MOD 30 MIN: CPT | Performed by: NURSE PRACTITIONER

## 2023-03-17 DIAGNOSIS — J44.9 STAGE 1 MILD COPD BY GOLD CLASSIFICATION: Chronic | ICD-10-CM

## 2023-04-26 DIAGNOSIS — K21.9 GASTROESOPHAGEAL REFLUX DISEASE, UNSPECIFIED WHETHER ESOPHAGITIS PRESENT: ICD-10-CM

## 2023-04-28 RX ORDER — PANTOPRAZOLE SODIUM 40 MG/1
40 TABLET, DELAYED RELEASE ORAL DAILY
Qty: 90 TABLET | Refills: 3 | Status: SHIPPED | OUTPATIENT
Start: 2023-04-28

## 2023-06-12 NOTE — PROGRESS NOTES
Primary Physician: Diego Lucio MD    Chief Complaint   Patient presents with    Follow-up     Pt presents today for weight loss and diarrhea follow up-states overall he is feeling good but had an episode of constipation and abdominal pain recently        Subjective     Se NJ is a 72 y.o. male.  Follow up form office appt in 11/2022.    HPI  Diarrhea-resolved  Nonbloody watery diarrhea started in early 2022, nonresponsive to 2 courses of Flagyl.  Colonoscopy 6/2022 showed adenomatous polyp and biopsies from the colon showed nonspecific inflammation.  Liada 3 tablets daily was started.  He is much better now.  No diarrhea, and recently had constipation!     Weight loss  Endoscopy with small bowel biopsies normal 6/2022.  Follow-up colonoscopy showed nonspecific inflammation for which she was started on Lialda 3 tablets daily.  His weight has stabilized.     Dysphagia  Endoscopy 6/2022 was unremarkable.  No dysphagia.  Taking protonix.      Colon polyps  He has had adenomatous colon polyps removed intermittently over the years most recently 6/2022.  Repeat colonoscopy 6/2027.      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;  Service: Cardiothoracic;  Laterality: N/A;    CATARACT EXTRACTION      COLONOSCOPY  11/20/2012    One 1cm tubular adenomatous polyp in the sigmoid colon; One 5mm hyperplastic polyp in the rectum; Diverticulosis; Repeat 4 years     COLONOSCOPY N/A 03/30/2017    Two 4-6mm  tubular adenomatous polyps at the hepatic flexure; One 5mm tubular adenomatous polyp in the transverse colon; One 11mm tubular adenomatous polyp in the sigmoid colon; One 6mm tubular adenomatous polyp in the rectum; Diverticulosis in the left colon; Repeat 3 years     COLONOSCOPY  12/17/2009    One less than 5mm tubular adenomatous polyp in the cecum; One 5mm hyperplastic polyp in the transverse; One less than 3mm hyperplastic polyp in the rectum; Diverticulosis; Repeat 3 years     COLONOSCOPY N/A 07/02/2020    Two 5-7mm tubular adenomatous polyps at the hepatic flexure; One 6mm tubular adenomatous polyp in the transverse colon; Diverticulosis in the left colon; The entire examined colon is normal on direct and retroflexion views; Repeat 5 years    COLONOSCOPY N/A 06/29/2022    Diverticulosis in the left colon; One 6mm tubular adenomatous polyp in the descending colon; One 4mm tubular adenomatous polyp in the descending colon; Congested mucosa in the entire examined colon-biopsied; Repeat 5 years    ENDOSCOPY N/A 03/27/2019    Medium-sized HH; Normal stomach; Normal examined duodenum-biopsied    ENDOSCOPY N/A 06/09/2022    Small HH; Non-erosive gastritis-biopsied; Normal examined duodenum-biopsied    FOOT SURGERY Left     ORIF    HERNIA REPAIR      INNER EAR SURGERY      MEDIASTINOSCOPY N/A 02/15/2021    Procedure: CERVICAL MEDIASTINOSCOPY WITH LYMPH NODE DISECTION;  Surgeon: Bruce Murillo MD;  Location: [facility] OR;  Service: Cardiothoracic;  Laterality: N/A;    THORACOSCOPY Right 02/15/2021    Procedure: THORACOSCOPY, WEDGE RESECTION OF RIGHT UPPER LOBE WITH FROZEN,  , MEDIASTINAL LYMPH NODE DISSECTION;  Surgeon: Bruce Murillo MD;  Location:  [facility] OR;  Service: Cardiothoracic;  Laterality: Right;    TONSILLECTOMY          Current Outpatient Medications:     albuterol sulfate HFA (ProAir HFA) 108 (90 Base) MCG/ACT inhaler, Inhale 2 puffs Every 4 (Four) Hours As Needed for Wheezing., Disp: 18 g, Rfl: 3     "atorvastatin (LIPITOR) 10 MG tablet, Take 1 tablet by mouth Daily., Disp: , Rfl:     mesalamine (LIALDA) 1.2 g EC tablet, Take 3 tablets by mouth Daily., Disp: 90 tablet, Rfl: 11    pantoprazole (PROTONIX) 40 MG EC tablet, TAKE 1 TABLET BY MOUTH  DAILY, Disp: 90 tablet, Rfl: 3    Umeclidinium-Vilanterol (ANORO ELLIPTA) 62.5-25 MCG/ACT aerosol powder  inhaler, Inhale 1 puff Daily., Disp: 60 each, Rfl: 11    No Known Allergies    Social History     Socioeconomic History    Marital status: Significant Other   Tobacco Use    Smoking status: Every Day     Packs/day: 0.50     Years: 53.00     Pack years: 26.50     Types: Cigarettes     Start date: 1/1/1969     Passive exposure: Current    Smokeless tobacco: Never    Tobacco comments:     Pt states about 7-8 cigarettes per day   Vaping Use    Vaping Use: Never used   Substance and Sexual Activity    Alcohol use: Yes     Alcohol/week: 3.0 standard drinks     Types: 3 Shots of liquor per week     Comment: sober as of 06/01/2022    Drug use: Yes     Types: Marijuana    Sexual activity: Yes     Partners: Female       Family History   Problem Relation Age of Onset    Colon polyps Mother         Unknown age    Diabetes Mother     Colon cancer Neg Hx     Esophageal cancer Neg Hx     Liver cancer Neg Hx     Liver disease Neg Hx     Rectal cancer Neg Hx     Stomach cancer Neg Hx        Review of Systems   Respiratory:  Positive for shortness of breath (had lung cancer--had CT today.).    Cardiovascular:  Negative for chest pain.     Objective     /84 (BP Location: Left arm, Patient Position: Sitting, Cuff Size: Adult)   Pulse 85   Temp 97.5 °F (36.4 °C) (Infrared)   Ht 177.8 cm (70\")   Wt 80.7 kg (178 lb)   SpO2 95%   BMI 25.54 kg/m²     Physical Exam  Constitutional:       Appearance: He is well-developed.   Pulmonary:      Effort: Pulmonary effort is normal.   Musculoskeletal:         General: Normal range of motion.   Skin:     General: Skin is warm.   Neurological: "      Mental Status: He is alert and oriented to person, place, and time.   Psychiatric:         Behavior: Behavior normal.       Lab Results - Last 18 Months   Lab Units 05/10/22  1848 05/10/22  1304 03/22/22  1515   GLUCOSE mg/dL 85 181*  --    BUN mg/dL 14 15  --    CREATININE mg/dL 0.68* 0.75*  --    SODIUM mmol/L 141 142  --    POTASSIUM mmol/L 3.8 3.9  --    CHLORIDE mmol/L 104 105  --    CO2 mmol/L 24.0 29.0  --    TOTAL PROTEIN g/dL 6.1 6.6  --    ALBUMIN g/dL 3.10* 3.30*  --    ALT (SGPT) U/L 21 25  --    AST (SGOT) U/L 30 38  --    ALK PHOS U/L 81 94  --    BILIRUBIN mg/dL 0.4 0.4  --    GLOBULIN gm/dL 3.0 3.3  --    CRP mg/dL  --  2.11* 0.37   SED RATE mm/Hr  --   --  2       Lab Results - Last 18 Months   Lab Units 05/10/22  1848 05/10/22  1304 05/10/22  1304 03/22/22  1515   HEMOGLOBIN g/dL 14.3  --  14.4 16.1   HEMATOCRIT % 43.5  --  44.3 49.5   MCV fL 98.4*  --  100.0* 99.0*   WBC 10*3/mm3 9.12  --  7.55 7.9   RDW % 12.2*  --  12.2* 12.3   MPV fL 10.8  --  11.1 10.8   PLATELETS 10*3/mm3 192  --  187 164   INR  1.40*   < > 1.60*  --     < > = values in this interval not displayed.       Lab Results - Last 18 Months   Lab Units 05/10/22  1304   IRON mcg/dL 73   TIBC mcg/dL 226*   IRON SATURATION (TSAT) % 32   FERRITIN ng/mL 336.00   TSH uIU/mL 1.690        Lab Results - Last 18 Months   Lab Units 05/10/22  1304   FERRITIN ng/mL 336.00     EXAM/TECHNIQUE: CT chest without contrast     INDICATION: multiple lung nodules, hx of SCC lung cancer;  C34.11-Malignant neoplasm of upper lobe, right bronchus or lung;  R91.8-Other nonspecific abnormal finding of lung field     COMPARISON: 12/07/2022     DLP: 364 mGy cm. Automated exposure control was also utilized to  decrease patient radiation dose.     FINDINGS:     The central airways are clear. No consolidation or pleural effusion.  Moderate centrilobular emphysema. Mild bibasilar atelectasis. Wedge  resection suture line in the RIGHT upper lobe. No change in  mild nodular  scarring along the suture line.      Several of the pulmonary nodules in the prior chest CT have resolved.  For example, the RIGHT lower lobe 3 mm pulmonary nodule on image 101 of  the prior chest CT has resolved. Several new bilateral sub-6 mm  pulmonary nodules have developed. For example, New 4 mm anterior RIGHT  upper lobe pulmonary nodule, image 37.     No new or enlarging lymph nodes in the chest. No change in prominent  RIGHT hilar lymph node. The main pulmonary artery is nondilated.  Thoracic aorta is normal in caliber and contains atherosclerotic  calcification. No pericardial effusion. Heavy coronary artery  atherosclerotic calcification.     No acute soft tissue finding. Upper abdomen appears unremarkable.  Multiple chronic mild anterior wedge deformities in the thoracic spine.  The L1 vertebral body hemangioma. No acute osseous finding.     IMPRESSION:     1.  Postoperative change of RIGHT upper lobe wedge resection without  definite evidence of recurrent or metastatic disease.     2.  Waxing and waning of sub-6 mm pulmonary nodules. Several pulmonary  nodules present on the prior study have now resolved. However, multiple  new 3 to 4 mm pulmonary nodules have developed. Favor an infectious or  inflammatory process but recommend continued imaging surveillance.     3.  Moderate emphysema.  This report was finalized on 03/07/2023 09:00 by Dr. Esteban Almanzar MD.      IMPRESSION/PLAN:    Assessment & Plan      Problem List Items Addressed This Visit          Endocrine and Metabolic    Weight loss    Overview     Endoscopy with small bowel biopsies normal 6/2022.  Follow-up colonoscopy showed nonspecific inflammation for which she was started on Lialda 3 tablets daily.  TSH normal 5/2022.  Now that the diarrhea is in control, his weight has actually increased.  He has gained 5 pounds since June 2022.  He is at an ideal weight now.            Gastrointestinal Abdominal     Bloating    Overview      Endoscopy with small bowel biopsies negative for celiac disease 6/2022.         History of adenomatous polyp of colon    Overview     Adenomatous colon polyps removed 6/2022.  Repeat colonoscopy 6/2027.         Diarrhea    Overview     Nonbloody watery diarrhea started in early 2022 nonresponsive to 2 courses of Flagyl.  TSH normal 5/2022.  Colonoscopy 6/2022 showed adenomatous polyp and biopsies from the colon showed nonspecific inflammation.  Liada 3 tablets daily was started.  No further complaints of diarrhea since starting Lialda.  We will continue.  If gets constipated on regular basis, can decrease lialda to 2 daily.         Esophageal dysphagia - Primary    Overview     Intermittent.  Endoscopy 6/2022 unremarkable. No problems now.            MEDICAL COMPLEXITY must have 2 out of 3   Moderate Complexity Level 4                                                                                                              1 of the following medical problems:                                                                                               []One chronic illness with mild exacerbation                                                                                [x]Two or more stable chronic illness                                                                                              []One new problem  []One acute illness with systemic symptoms     Complexity of Data  Reviewed (1 out of the 3 following categories)                                             Category 1 tests, documents, historian (must have 3 points)                                                      []Review of prior external records  []Review of results of unique tests  []Ordering unique tests   []Assessment requires an independent historian   Category 2 Interpretation of tests     []Independent interpretation of test read by another doc   Category 3 Discuss Management/tests  []Discussion with external physician     Risk of  complications and/or morbidity                                                                                           [x]Prescription Drug Management     []Decision for elective endoscopic procedure              RTC 1 year      Kat Licea MD  06/14/23  14:03 CDT    Part of this note may be an electronic transcription/translation of spoken language to printed text.

## 2023-06-14 ENCOUNTER — OFFICE VISIT (OUTPATIENT)
Dept: GASTROENTEROLOGY | Facility: CLINIC | Age: 73
End: 2023-06-14
Payer: MEDICARE

## 2023-06-14 ENCOUNTER — HOSPITAL ENCOUNTER (OUTPATIENT)
Dept: CT IMAGING | Facility: HOSPITAL | Age: 73
Discharge: HOME | End: 2023-06-14
Admitting: NURSE PRACTITIONER
Payer: MEDICARE

## 2023-06-14 VITALS
OXYGEN SATURATION: 95 % | TEMPERATURE: 97.5 F | HEART RATE: 85 BPM | SYSTOLIC BLOOD PRESSURE: 128 MMHG | BODY MASS INDEX: 25.48 KG/M2 | HEIGHT: 70 IN | DIASTOLIC BLOOD PRESSURE: 84 MMHG | WEIGHT: 178 LBS

## 2023-06-14 DIAGNOSIS — R91.8 MULTIPLE LUNG NODULES: ICD-10-CM

## 2023-06-14 DIAGNOSIS — R63.4 WEIGHT LOSS: ICD-10-CM

## 2023-06-14 DIAGNOSIS — Z86.010 HISTORY OF ADENOMATOUS POLYP OF COLON: ICD-10-CM

## 2023-06-14 DIAGNOSIS — R59.0 HILAR ADENOPATHY: ICD-10-CM

## 2023-06-14 DIAGNOSIS — C34.11 MALIGNANT NEOPLASM OF UPPER LOBE OF RIGHT LUNG: ICD-10-CM

## 2023-06-14 DIAGNOSIS — R14.0 BLOATING: ICD-10-CM

## 2023-06-14 DIAGNOSIS — R13.19 ESOPHAGEAL DYSPHAGIA: Primary | ICD-10-CM

## 2023-06-14 DIAGNOSIS — R19.7 DIARRHEA, UNSPECIFIED TYPE: ICD-10-CM

## 2023-06-14 DIAGNOSIS — F17.200 CURRENT EVERY DAY SMOKER: ICD-10-CM

## 2023-06-14 PROCEDURE — 99214 OFFICE O/P EST MOD 30 MIN: CPT | Performed by: INTERNAL MEDICINE

## 2023-06-14 PROCEDURE — 71250 CT THORAX DX C-: CPT

## 2023-06-14 NOTE — LETTER
June 14, 2023       No Recipients    Patient: Se NJ   YOB: 1950   Date of Visit: 6/14/2023       Dear Dr. Huston Recipients:    Thank you for referring Se NJ to me for evaluation. Below are the relevant portions of my assessment and plan of care.    If you have questions, please do not hesitate to call me. I look forward to following Se along with you.         Sincerely,        Kat Licea MD        CC:   No Recipients    Kat Licea MD  06/14/23 1404  Sign when Signing Visit  Primary Physician: Diego Lucio MD    Chief Complaint   Patient presents with   • Follow-up     Pt presents today for weight loss and diarrhea follow up-states overall he is feeling good but had an episode of constipation and abdominal pain recently        Subjective    Se NJ is a 72 y.o. male.  Follow up form office appt in 11/2022.    HPI  Diarrhea-resolved  Nonbloody watery diarrhea started in early 2022, nonresponsive to 2 courses of Flagyl.  Colonoscopy 6/2022 showed adenomatous polyp and biopsies from the colon showed nonspecific inflammation.  Liada 3 tablets daily was started.  He is much better now.  No diarrhea, and recently had constipation!     Weight loss  Endoscopy with small bowel biopsies normal 6/2022.  Follow-up colonoscopy showed nonspecific inflammation for which she was started on Lialda 3 tablets daily.  His weight has stabilized.     Dysphagia  Endoscopy 6/2022 was unremarkable.  No dysphagia.  Taking protonix.      Colon polyps  He has had adenomatous colon polyps removed intermittently over the years most recently 6/2022.  Repeat colonoscopy 6/2027.      Past Medical History:   Diagnosis Date   • Bronchitis    • COPD (chronic obstructive pulmonary disease)    • Diverticulosis    • Family history of colonic polyps    • GERD (gastroesophageal reflux disease)    • Hearing loss     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   • History of adenomatous polyp of  colon    • History of colon polyps    • History of lung cancer     Right lung   • Hyperlipidemia        Past Surgical History:   Procedure Laterality Date   • BRONCHOSCOPY N/A 02/15/2021    Procedure: BRONCHOSCOPY;  Surgeon: Bruce Murillo MD;  Location: [facility];  Service: Cardiothoracic;  Laterality: N/A;   • CATARACT EXTRACTION     • COLONOSCOPY  11/20/2012    One 1cm tubular adenomatous polyp in the sigmoid colon; One 5mm hyperplastic polyp in the rectum; Diverticulosis; Repeat 4 years    • COLONOSCOPY N/A 03/30/2017    Two 4-6mm tubular adenomatous polyps at the hepatic flexure; One 5mm tubular adenomatous polyp in the transverse colon; One 11mm tubular adenomatous polyp in the sigmoid colon; One 6mm tubular adenomatous polyp in the rectum; Diverticulosis in the left colon; Repeat 3 years    • COLONOSCOPY  12/17/2009    One less than 5mm tubular adenomatous polyp in the cecum; One 5mm hyperplastic polyp in the transverse; One less than 3mm hyperplastic polyp in the rectum; Diverticulosis; Repeat 3 years    • COLONOSCOPY N/A 07/02/2020    Two 5-7mm tubular adenomatous polyps at the hepatic flexure; One 6mm tubular adenomatous polyp in the transverse colon; Diverticulosis in the left colon; The entire examined colon is normal on direct and retroflexion views; Repeat 5 years   • COLONOSCOPY N/A 06/29/2022    Diverticulosis in the left colon; One 6mm tubular adenomatous polyp in the descending colon; One 4mm tubular adenomatous polyp in the descending colon; Congested mucosa in the entire examined colon-biopsied; Repeat 5 years   • ENDOSCOPY N/A 03/27/2019    Medium-sized HH; Normal stomach; Normal examined duodenum-biopsied   • ENDOSCOPY N/A 06/09/2022    Small HH; Non-erosive gastritis-biopsied; Normal examined duodenum-biopsied   • FOOT SURGERY Left     ORIF   • HERNIA REPAIR     • INNER EAR SURGERY     • MEDIASTINOSCOPY N/A 02/15/2021    Procedure: CERVICAL MEDIASTINOSCOPY WITH LYMPH NODE DISECTION;   Surgeon: Bruce Murillo MD;  Location:  [facility];  Service: Cardiothoracic;  Laterality: N/A;   • THORACOSCOPY Right 02/15/2021    Procedure: THORACOSCOPY, WEDGE RESECTION OF RIGHT UPPER LOBE WITH FROZEN,  , MEDIASTINAL LYMPH NODE DISSECTION;  Surgeon: Bruce Murillo MD;  Location:  [facility];  Service: Cardiothoracic;  Laterality: Right;   • TONSILLECTOMY          Current Outpatient Medications:   •  albuterol sulfate HFA (ProAir HFA) 108 (90 Base) MCG/ACT inhaler, Inhale 2 puffs Every 4 (Four) Hours As Needed for Wheezing., Disp: 18 g, Rfl: 3  •  atorvastatin (LIPITOR) 10 MG tablet, Take 1 tablet by mouth Daily., Disp: , Rfl:   •  mesalamine (LIALDA) 1.2 g EC tablet, Take 3 tablets by mouth Daily., Disp: 90 tablet, Rfl: 11  •  pantoprazole (PROTONIX) 40 MG EC tablet, TAKE 1 TABLET BY MOUTH  DAILY, Disp: 90 tablet, Rfl: 3  •  Umeclidinium-Vilanterol (ANORO ELLIPTA) 62.5-25 MCG/ACT aerosol powder  inhaler, Inhale 1 puff Daily., Disp: 60 each, Rfl: 11    No Known Allergies    Social History     Socioeconomic History   • Marital status: Significant Other   Tobacco Use   • Smoking status: Every Day     Packs/day: 0.50     Years: 53.00     Pack years: 26.50     Types: Cigarettes     Start date: 1/1/1969     Passive exposure: Current   • Smokeless tobacco: Never   • Tobacco comments:     Pt states about 7-8 cigarettes per day   Vaping Use   • Vaping Use: Never used   Substance and Sexual Activity   • Alcohol use: Yes     Alcohol/week: 3.0 standard drinks     Types: 3 Shots of liquor per week     Comment: sober as of 06/01/2022   • Drug use: Yes     Types: Marijuana   • Sexual activity: Yes     Partners: Female       Family History   Problem Relation Age of Onset   • Colon polyps Mother         Unknown age   • Diabetes Mother    • Colon cancer Neg Hx    • Esophageal cancer Neg Hx    • Liver cancer Neg Hx    • Liver disease Neg Hx    • Rectal cancer Neg Hx    • Stomach cancer Neg Hx        Review of Systems  "  Respiratory:  Positive for shortness of breath (had lung cancer--had CT today.).    Cardiovascular:  Negative for chest pain.     Objective    /84 (BP Location: Left arm, Patient Position: Sitting, Cuff Size: Adult)   Pulse 85   Temp 97.5 °F (36.4 °C) (Infrared)   Ht 177.8 cm (70\")   Wt 80.7 kg (178 lb)   SpO2 95%   BMI 25.54 kg/m²     Physical Exam  Constitutional:       Appearance: He is well-developed.   Pulmonary:      Effort: Pulmonary effort is normal.   Musculoskeletal:         General: Normal range of motion.   Skin:     General: Skin is warm.   Neurological:      Mental Status: He is alert and oriented to person, place, and time.   Psychiatric:         Behavior: Behavior normal.       Lab Results - Last 18 Months   Lab Units 05/10/22  1848 05/10/22  1304 03/22/22  1515   GLUCOSE mg/dL 85 181*  --    BUN mg/dL 14 15  --    CREATININE mg/dL 0.68* 0.75*  --    SODIUM mmol/L 141 142  --    POTASSIUM mmol/L 3.8 3.9  --    CHLORIDE mmol/L 104 105  --    CO2 mmol/L 24.0 29.0  --    TOTAL PROTEIN g/dL 6.1 6.6  --    ALBUMIN g/dL 3.10* 3.30*  --    ALT (SGPT) U/L 21 25  --    AST (SGOT) U/L 30 38  --    ALK PHOS U/L 81 94  --    BILIRUBIN mg/dL 0.4 0.4  --    GLOBULIN gm/dL 3.0 3.3  --    CRP mg/dL  --  2.11* 0.37   SED RATE mm/Hr  --   --  2       Lab Results - Last 18 Months   Lab Units 05/10/22  1848 05/10/22  1304 05/10/22  1304 03/22/22  1515   HEMOGLOBIN g/dL 14.3  --  14.4 16.1   HEMATOCRIT % 43.5  --  44.3 49.5   MCV fL 98.4*  --  100.0* 99.0*   WBC 10*3/mm3 9.12  --  7.55 7.9   RDW % 12.2*  --  12.2* 12.3   MPV fL 10.8  --  11.1 10.8   PLATELETS 10*3/mm3 192  --  187 164   INR  1.40*   < > 1.60*  --     < > = values in this interval not displayed.       Lab Results - Last 18 Months   Lab Units 05/10/22  1304   IRON mcg/dL 73   TIBC mcg/dL 226*   IRON SATURATION (TSAT) % 32   FERRITIN ng/mL 336.00   TSH uIU/mL 1.690        Lab Results - Last 18 Months   Lab Units 05/10/22  1304   FERRITIN ng/mL " 336.00     EXAM/TECHNIQUE: CT chest without contrast     INDICATION: multiple lung nodules, hx of SCC lung cancer;  C34.11-Malignant neoplasm of upper lobe, right bronchus or lung;  R91.8-Other nonspecific abnormal finding of lung field     COMPARISON: 12/07/2022     DLP: 364 mGy cm. Automated exposure control was also utilized to  decrease patient radiation dose.     FINDINGS:     The central airways are clear. No consolidation or pleural effusion.  Moderate centrilobular emphysema. Mild bibasilar atelectasis. Wedge  resection suture line in the RIGHT upper lobe. No change in mild nodular  scarring along the suture line.      Several of the pulmonary nodules in the prior chest CT have resolved.  For example, the RIGHT lower lobe 3 mm pulmonary nodule on image 101 of  the prior chest CT has resolved. Several new bilateral sub-6 mm  pulmonary nodules have developed. For example, New 4 mm anterior RIGHT  upper lobe pulmonary nodule, image 37.     No new or enlarging lymph nodes in the chest. No change in prominent  RIGHT hilar lymph node. The main pulmonary artery is nondilated.  Thoracic aorta is normal in caliber and contains atherosclerotic  calcification. No pericardial effusion. Heavy coronary artery  atherosclerotic calcification.     No acute soft tissue finding. Upper abdomen appears unremarkable.  Multiple chronic mild anterior wedge deformities in the thoracic spine.  The L1 vertebral body hemangioma. No acute osseous finding.     IMPRESSION:     1.  Postoperative change of RIGHT upper lobe wedge resection without  definite evidence of recurrent or metastatic disease.     2.  Waxing and waning of sub-6 mm pulmonary nodules. Several pulmonary  nodules present on the prior study have now resolved. However, multiple  new 3 to 4 mm pulmonary nodules have developed. Favor an infectious or  inflammatory process but recommend continued imaging surveillance.     3.  Moderate emphysema.  This report was finalized on  03/07/2023 09:00 by Dr. Esteban Almanzar MD.      IMPRESSION/PLAN:    Assessment & Plan     Problem List Items Addressed This Visit          Endocrine and Metabolic    Weight loss    Overview     Endoscopy with small bowel biopsies normal 6/2022.  Follow-up colonoscopy showed nonspecific inflammation for which she was started on Lialda 3 tablets daily.  TSH normal 5/2022.  Now that the diarrhea is in control, his weight has actually increased.  He has gained 5 pounds since June 2022.  He is at an ideal weight now.            Gastrointestinal Abdominal     Bloating    Overview     Endoscopy with small bowel biopsies negative for celiac disease 6/2022.         History of adenomatous polyp of colon    Overview     Adenomatous colon polyps removed 6/2022.  Repeat colonoscopy 6/2027.         Diarrhea    Overview     Nonbloody watery diarrhea started in early 2022 nonresponsive to 2 courses of Flagyl.  TSH normal 5/2022.  Colonoscopy 6/2022 showed adenomatous polyp and biopsies from the colon showed nonspecific inflammation.  Liada 3 tablets daily was started.  No further complaints of diarrhea since starting Lialda.  We will continue.  If gets constipated on regular basis, can decrease lialda to 2 daily.         Esophageal dysphagia - Primary    Overview     Intermittent.  Endoscopy 6/2022 unremarkable. No problems now.            MEDICAL COMPLEXITY must have 2 out of 3   Moderate Complexity Level 4                                                                                                              1 of the following medical problems:                                                                                               []One chronic illness with mild exacerbation                                                                                [x]Two or more stable chronic illness                                                                                              []One new problem  []One acute illness  with systemic symptoms     Complexity of Data  Reviewed (1 out of the 3 following categories)                                             Category 1 tests, documents, historian (must have 3 points)                                                      []Review of prior external records  []Review of results of unique tests  []Ordering unique tests   []Assessment requires an independent historian   Category 2 Interpretation of tests     []Independent interpretation of test read by another doc   Category 3 Discuss Management/tests  []Discussion with external physician     Risk of complications and/or morbidity                                                                                           [x]Prescription Drug Management     []Decision for elective endoscopic procedure              RTC 1 year      Kat Licea MD  06/14/23  14:03 CDT    Part of this note may be an electronic transcription/translation of spoken language to printed text.

## 2023-08-14 DIAGNOSIS — R19.7 DIARRHEA, UNSPECIFIED TYPE: Primary | ICD-10-CM

## 2023-08-14 RX ORDER — MESALAMINE 1.2 G/1
3.6 TABLET, DELAYED RELEASE ORAL DAILY
Qty: 90 TABLET | Refills: 6 | Status: SHIPPED | OUTPATIENT
Start: 2023-08-14

## 2023-08-14 NOTE — TELEPHONE ENCOUNTER
Rx Refill Note  Requested Prescriptions     Pending Prescriptions Disp Refills    mesalamine (LIALDA) 1.2 g EC tablet [Pharmacy Med Name: MESALAMINE DR 1.2GM TABLET DELAYED RELEASE] 90 tablet 6     Sig: TAKE 3 TABLETS BY MOUTH DAILY.      Last office visit with prescribing clinician: 6/14/2023     Last telemedicine visit with prescribing clinician: Visit date not found     Next office visit with prescribing clinician: Visit date not found     Pt just seen in office-pended refills to Dr. Licea.                           Would you like a call back once the refill request has been completed: [] Yes [] No    If the office needs to give you a call back, can they leave a voicemail: [] Yes [] No    Maggie Su MA  08/14/23, 12:20 CDT

## 2023-09-20 ENCOUNTER — HOSPITAL ENCOUNTER (OUTPATIENT)
Dept: CT IMAGING | Facility: HOSPITAL | Age: 73
Discharge: HOME | End: 2023-09-20
Admitting: NURSE PRACTITIONER
Payer: MEDICARE

## 2023-09-20 DIAGNOSIS — R59.0 MEDIASTINAL ADENOPATHY: ICD-10-CM

## 2023-09-20 DIAGNOSIS — R91.8 MULTIPLE LUNG NODULES: ICD-10-CM

## 2023-09-20 DIAGNOSIS — R59.0 HILAR ADENOPATHY: ICD-10-CM

## 2023-09-20 PROCEDURE — 71250 CT THORAX DX C-: CPT

## 2023-09-22 PROBLEM — R91.8 MULTIPLE LUNG NODULES: Chronic | Status: ACTIVE | Noted: 2023-09-22

## 2023-09-22 NOTE — PROGRESS NOTES
"PARISA Lopez  [facility]   Pulmonary and Critical Care  [address] [place] [address]  Phone: [phone]  Fax: [phone]           Chief Complaint  Stage 1 mild COPD by GOLD classification    Subjective    History of Present Illness     Se NJ presents to [facility] PULMONARY & CRITICAL CARE MEDICINE   History of Present Illness  Mr. Nj is a pleasant 73-year-old male patient with known 2.5 cm right upper lobe anterior segment nodule noted on 12/20/2020. He was treated by Dr. 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 He has known emphysema/COPD, history of DVT, history of alcohol abuse and a current every day smoker. He is trying to quit but still about a half a pack. He is using the Anoro and finds benefit.  He has an albuterol HFA that he uses and finds benefit. His repeat CT showed showed either resolved or decreasing in size nodules and no new nodules. He denies fever, chills, night sweats.  He denies swelling. He has a cough that is dry. He has dyspnea that is better with Anoro however he is in his donut hole and not been using it appropriately in an effort to make it last. He was sick a month or two ago and talked to his brother who is retired ER physician who gave him steroids and antibiotics. He states for about a moth after he could breath better.                 Objective   Vital Signs:   /78   Pulse 72   Resp 16   Ht 172.7 cm (68\")   Wt 81.2 kg (179 lb)   SpO2 96% Comment: RA  BMI 27.22 kg/m²     Physical Exam  Vitals reviewed.   Constitutional:       Appearance: Normal appearance.   Cardiovascular:      Rate and Rhythm: Normal rate and regular rhythm.   Pulmonary:      Effort: Pulmonary effort is normal.      Breath sounds: Normal breath sounds.   Neurological:      General: No focal deficit present.      Mental Status: He is alert and oriented to " person, place, and time.   Psychiatric:         Mood and Affect: Mood normal.         Behavior: Behavior normal.        Result Review :  The following data was reviewed by: PARISA Lopez on 2023:    Data reviewed : Radiologic studies CT chest     My interpretation of imaging:   as in HPI  My interpretation of labs: no new   CT Chest Without Contrast Diagnostic (2023 10:06)   PFT Values          2021    09:30 2023    09:30   Pre Drug PFT Results    120   FEV1 91 94   FEF 25-75% 41 43   FEV1/FVC 62.03 59   Other Tests PFT Results           DLCO 65 57   D/VAsb 62 54     My interpretation of the PFT : No new     Results for orders placed in visit on 23    Pulmonary Function Test    Narrative  Pulmonary Function Test  Performed by: Alona Voss, RRT  Authorized by: Mallory Bryan APRN    Pre Drug % Predicted  FVC: 120%  FEV1: 94%  FEF 25-75%: 43%  FEV1/FVC: 59%  DLCO: 57%  D/VAsb: 54%    Interpretation  Spirometry  Spirometry shows mild obstruction. There is reduced midflow suggesting small airway/airflow obstruction.  Review of FVL curve  Patient's effort is normal.  Diffusion Capacity  The patient's diffusion capacity is moderately reduced.  Diffusion capacity is moderately reduced when corrected for alveolar volume.      Results for orders placed in visit on 21    Full Pulmonary Function Test Without Bronchodilator    Narrative  Full Pulmonary Function Test Without Bronchodilator  Performed by: Alona Voss, RRT  Authorized by: Diego Landry MD    Pre Drug % Predicted  FVC: 115%  FEV1: 91%  FEF 25-75%: 41%  FEV1/FVC: 62.03%  T%  RV: 118%  DLCO: 65%  D/VAsb: 62%    Interpretation  Spirometry  Spirometry shows mild obstruction. There is reduced midflow suggesting small airway/airflow obstruction.  Review of FVL curve  Patient's effort is normal.  Lung Volume Measurements  Measurements show normal  results.  Diffusion Capacity  The patient's diffusion capacity is moderately reduced.  Diffusion capacity is moderately reduced when corrected for alveolar volume.      Results for orders placed in visit on 12/21/20    Pulmonary Function Test    [facility] - Pulmonary Function Test    [address] [place] [address] [place].  [place]  [place]  [address]  [phone]    Patient : Se Ramirez  MRN : [id number]  CSN : [id number]  Pulmonologist : Diego Landry MD  Date : 1/28/2021    ______________________________________________________________________    Interpretation :  1.  Spirometry is consistent with a mild obstructive ventilatory defect manifested by small airways disease.  2.  There is no significant change in spirometry postbronchodilator.  3.  Lung volumes reveal a decrease in residual volume and otherwise are within normal limits.  4.  There is a moderate diffusion impairment even when corrected for alveolar volume.      Diego Landry MD          Assessment and Plan   Diagnoses and all orders for this visit:    1. Stage 1 mild COPD by GOLD classification (Primary)    2. Pulmonary emphysema, unspecified emphysema type    3. Malignant neoplasm of upper lobe of right lung    4. Hilar adenopathy    5. Mediastinal adenopathy    6. Multiple lung nodules    7. Tobacco user          He will change from Anoro to samples of Stiolto as he is in his donut hole and can not afford the Anoro at this time. He is provided a demonstration of use. Continue as needed Albuterol. Discussed continue to work on smoking cessation. Repeat CT in 6 months.       Follow Up   No follow-ups on file.  Patient was given instructions and counseling regarding his condition or for health maintenance advice. Please see specific information pulled into the AVS if appropriate.     Mallory Bryan, PARISA  9/27/2023  10:08 CDT

## 2023-09-27 ENCOUNTER — OFFICE VISIT (OUTPATIENT)
Dept: PULMONOLOGY | Facility: CLINIC | Age: 73
End: 2023-09-27
Payer: MEDICARE

## 2023-09-27 VITALS
WEIGHT: 179 LBS | HEART RATE: 72 BPM | BODY MASS INDEX: 27.13 KG/M2 | SYSTOLIC BLOOD PRESSURE: 126 MMHG | RESPIRATION RATE: 16 BRPM | OXYGEN SATURATION: 96 % | DIASTOLIC BLOOD PRESSURE: 78 MMHG | HEIGHT: 68 IN

## 2023-09-27 DIAGNOSIS — C34.11 MALIGNANT NEOPLASM OF UPPER LOBE OF RIGHT LUNG: ICD-10-CM

## 2023-09-27 DIAGNOSIS — R91.8 MULTIPLE LUNG NODULES: Chronic | ICD-10-CM

## 2023-09-27 DIAGNOSIS — R59.0 HILAR ADENOPATHY: ICD-10-CM

## 2023-09-27 DIAGNOSIS — J43.9 PULMONARY EMPHYSEMA, UNSPECIFIED EMPHYSEMA TYPE: Chronic | ICD-10-CM

## 2023-09-27 DIAGNOSIS — J44.9 STAGE 1 MILD COPD BY GOLD CLASSIFICATION: Primary | Chronic | ICD-10-CM

## 2023-09-27 DIAGNOSIS — Z72.0 TOBACCO USER: ICD-10-CM

## 2023-09-27 DIAGNOSIS — R59.0 MEDIASTINAL ADENOPATHY: ICD-10-CM

## 2023-09-27 RX ORDER — TIOTROPIUM BROMIDE AND OLODATEROL 3.124; 2.736 UG/1; UG/1
2 SPRAY, METERED RESPIRATORY (INHALATION)
Qty: 3 EACH | Refills: 0 | COMMUNITY
Start: 2023-09-27

## 2023-12-27 RX ORDER — TIOTROPIUM BROMIDE INHALATION SPRAY 3.12 UG/1
2 SPRAY, METERED RESPIRATORY (INHALATION) DAILY
Qty: 4 G | Refills: 2 | OUTPATIENT
Start: 2023-12-27

## 2024-01-01 NOTE — PROGRESS NOTES
Patient Education    [employer] or caregiver.  Ask us questions about  Spoke with patient and relayed test results. Patient voiced understanding.  times per day; let him decide how much to eat.  Avoid raw honey or large chunks of food that could cause choking.  Consider introducing all other foods, including eggs and peanut butter, because research shows they may actually prevent individual food allergies.  To prevent choking, give your baby only very soft, small bites of finger foods.  Wash fruits and vegetables before serving.  Introduce your baby to a cup with water, breast milk, or formula.  Avoid feeding your baby too much; follow baby s signs of fullness, such as  Leaning back  Turning away  Don t force your baby to eat or finish foods.  It may take 10 to 15 times of offering your baby a type of food to try before he likes it.    HEALTHY TEETH  Ask us about the need for fluoride.  Clean gums and teeth (as soon as you see the first tooth) 2 times per day with a soft cloth or soft toothbrush and a small smear of fluoride toothpaste (no more than a grain of rice).  Don t give your baby a bottle in the crib. Never prop the bottle.  Don t use foods or juices that your baby sucks out of a pouch.  Don t share spoons or clean the pacifier in your mouth.    SAFETY  Use a rear-facing-only [phone]  Poison Help Line:  [phone]  

## 2024-01-29 DIAGNOSIS — J44.9 STAGE 1 MILD COPD BY GOLD CLASSIFICATION: Primary | ICD-10-CM

## 2024-01-29 NOTE — TELEPHONE ENCOUNTER
Rx Refill Note  Requested Prescriptions     Pending Prescriptions Disp Refills    tiotropium bromide-olodaterol (Stiolto Respimat) 2.5-2.5 MCG/ACT aerosol solution inhaler 3 each 11     Sig: Inhale 2 puffs Daily.      Last office visit with prescribing clinician: 9/27/2023   Last telemedicine visit with prescribing clinician: Visit date not found   Next office visit with prescribing clinician: 3/27/2024                         Would you like a call back once the refill request has been completed: [] Yes [] No    If the office needs to give you a call back, can they leave a voicemail: [] Yes [] No    Maryana Heredia MA  01/29/24, 10:57 CST

## 2024-01-30 RX ORDER — TIOTROPIUM BROMIDE AND OLODATEROL 3.124; 2.736 UG/1; UG/1
2 SPRAY, METERED RESPIRATORY (INHALATION)
Qty: 3 EACH | Refills: 11 | Status: SHIPPED | OUTPATIENT
Start: 2024-01-30

## 2024-02-29 ENCOUNTER — HOSPITAL ENCOUNTER (OUTPATIENT)
Dept: ULTRASOUND IMAGING | Facility: HOSPITAL | Age: 74
Discharge: HOME | End: 2024-02-29
Payer: MEDICARE

## 2024-02-29 ENCOUNTER — TRANSCRIBE ORDERS (OUTPATIENT)
Dept: ADMINISTRATIVE | Facility: HOSPITAL | Age: 74
End: 2024-02-29
Payer: MEDICARE

## 2024-02-29 ENCOUNTER — HOSPITAL ENCOUNTER (OUTPATIENT)
Dept: GENERAL RADIOLOGY | Facility: HOSPITAL | Age: 74
Discharge: HOME | End: 2024-02-29
Payer: MEDICARE

## 2024-02-29 DIAGNOSIS — M79.641 PAIN IN RIGHT HAND: ICD-10-CM

## 2024-02-29 DIAGNOSIS — L03.011 CELLULITIS OF FINGER OF RIGHT HAND: ICD-10-CM

## 2024-02-29 DIAGNOSIS — L03.011 CELLULITIS OF FINGER OF RIGHT HAND: Primary | ICD-10-CM

## 2024-02-29 PROCEDURE — 73130 X-RAY EXAM OF HAND: CPT

## 2024-02-29 PROCEDURE — 93971 EXTREMITY STUDY: CPT

## 2024-03-11 ENCOUNTER — TRANSCRIBE ORDERS (OUTPATIENT)
Dept: ADMINISTRATIVE | Facility: HOSPITAL | Age: 74
End: 2024-03-11
Payer: MEDICARE

## 2024-03-11 DIAGNOSIS — R22.1 LOCALIZED SWELLING, MASS AND LUMP, NECK: Primary | ICD-10-CM

## 2024-03-25 ENCOUNTER — HOSPITAL ENCOUNTER (OUTPATIENT)
Dept: CT IMAGING | Facility: HOSPITAL | Age: 74
Discharge: HOME | End: 2024-03-25
Admitting: NURSE PRACTITIONER
Payer: MEDICARE

## 2024-03-25 DIAGNOSIS — Z72.0 TOBACCO USER: ICD-10-CM

## 2024-03-25 DIAGNOSIS — J43.9 PULMONARY EMPHYSEMA, UNSPECIFIED EMPHYSEMA TYPE: Chronic | ICD-10-CM

## 2024-03-25 DIAGNOSIS — J44.9 STAGE 1 MILD COPD BY GOLD CLASSIFICATION: Chronic | ICD-10-CM

## 2024-03-25 DIAGNOSIS — R59.0 HILAR ADENOPATHY: ICD-10-CM

## 2024-03-25 DIAGNOSIS — C34.11 MALIGNANT NEOPLASM OF UPPER LOBE OF RIGHT LUNG: ICD-10-CM

## 2024-03-25 DIAGNOSIS — R59.0 MEDIASTINAL ADENOPATHY: ICD-10-CM

## 2024-03-25 DIAGNOSIS — R91.8 MULTIPLE LUNG NODULES: Chronic | ICD-10-CM

## 2024-03-25 LAB — CREAT BLDA-MCNC: 0.9 MG/DL (ref 0.6–1.3)

## 2024-03-25 PROCEDURE — 82565 ASSAY OF CREATININE: CPT

## 2024-03-25 PROCEDURE — 71260 CT THORAX DX C+: CPT

## 2024-03-25 PROCEDURE — 25510000001 IOPAMIDOL 61 % SOLUTION: Performed by: NURSE PRACTITIONER

## 2024-03-25 RX ADMIN — IOPAMIDOL 100 ML: 612 INJECTION, SOLUTION INTRAVENOUS at 08:42

## 2024-03-26 NOTE — PROGRESS NOTES
" PARISA Lopez  [facility]   Pulmonary and Critical Care  [address] [place] [address]  Phone: [phone]  Fax: [phone]           Chief Complaint  COPD    Subjective    History of Present Illness     Se NJ presents to [facility] PULMONARY & CRITICAL CARE MEDICINE   History of Present Illness  Mr. Nj is a pleasant 73-year-old male patient with known 2.5 cm right upper lobe anterior segment nodule noted on 12/20/2020. He was treated by Dr. 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 He has known emphysema/COPD, history of DVT, history of alcohol abuse and a current every day smoker. He is trying to quit but still about a half a pack. He has an albuterol HFA that he uses and finds benefit. He was taken off of Anoro at last visit due to cost and donut hole. He was given Stiolto for which he finds good benefit. He denies fever, chills, night sweats.  He denies swelling. He has a cough that is improved and productive at times. He notes he is having a CT of the neck with contrast due to persistent sore throat. He has been using Mucinex and gargling salt water which has helped.  CT of the chest shows nodules that are stable or smaller and one new 3 mm nodule, emphysematous changes and lymphadenopathy.           Objective   Vital Signs:   /86   Pulse 98   Ht 172.7 cm (68\")   Wt 85 kg (187 lb 6.4 oz)   SpO2 93% Comment: RA  BMI 28.49 kg/m²     Physical Exam  Vitals reviewed.   Constitutional:       Appearance: Normal appearance.   Cardiovascular:      Rate and Rhythm: Normal rate and regular rhythm.   Pulmonary:      Effort: Pulmonary effort is normal.      Breath sounds: Normal breath sounds.   Neurological:      General: No focal deficit present.      Mental Status: He is alert and oriented to person, place, and time.   Psychiatric:         Mood and Affect: Mood normal.   "       Behavior: Behavior normal.          Result Review :  The following data was reviewed by: PARISA Lopez on 2024:    Data reviewed : Radiologic studies CT chest     My interpretation of imaging:  as in HPI  My interpretation of labs: none  CT Chest With Contrast Diagnostic (2024 08:41)   PFT Values          2023    09:30   Pre Drug PFT Results      FEV1 94   FEF 25-75% 43   FEV1/FVC 59   Other Tests PFT Results   DLCO 57   D/VAsb 54     My interpretation of the PFT : no new     Results for orders placed in visit on 23    Pulmonary Function Test    Narrative  Pulmonary Function Test  Performed by: Alona Voss, RRT  Authorized by: Mallory Bryan APRN    Pre Drug % Predicted  FVC: 120%  FEV1: 94%  FEF 25-75%: 43%  FEV1/FVC: 59%  DLCO: 57%  D/VAsb: 54%    Interpretation  Spirometry  Spirometry shows mild obstruction. There is reduced midflow suggesting small airway/airflow obstruction.  Review of FVL curve  Patient's effort is normal.  Diffusion Capacity  The patient's diffusion capacity is moderately reduced.  Diffusion capacity is moderately reduced when corrected for alveolar volume.      Results for orders placed in visit on 21    Full Pulmonary Function Test Without Bronchodilator    Narrative  Full Pulmonary Function Test Without Bronchodilator  Performed by: Alona Voss, RRT  Authorized by: Diego Landry MD    Pre Drug % Predicted  FVC: 115%  FEV1: 91%  FEF 25-75%: 41%  FEV1/FVC: 62.03%  T%  RV: 118%  DLCO: 65%  D/VAsb: 62%    Interpretation  Spirometry  Spirometry shows mild obstruction. There is reduced midflow suggesting small airway/airflow obstruction.  Review of FVL curve  Patient's effort is normal.  Lung Volume Measurements  Measurements show normal results.  Diffusion Capacity  The patient's diffusion capacity is moderately reduced.  Diffusion capacity is moderately reduced when corrected for alveolar  volume.      Results for orders placed in visit on 12/21/20    Pulmonary Function Test    [facility] - Pulmonary Function Test    [address]  [place]  [place]  [address]  [phone]    Patient : Se Ramirez  MRN : [id number]  CSN : [id number]  Pulmonologist : Diego Landry MD  Date : 1/28/2021    ______________________________________________________________________    Interpretation :  1.  Spirometry is consistent with a mild obstructive ventilatory defect manifested by small airways disease.  2.  There is no significant change in spirometry postbronchodilator.  3.  Lung volumes reveal a decrease in residual volume and otherwise are within normal limits.  4.  There is a moderate diffusion impairment even when corrected for alveolar volume.      Diego Landry MD        Assessment and Plan   Diagnoses and all orders for this visit:    1. Stage 1 mild COPD by GOLD classification (Primary)    2. Pulmonary emphysema, unspecified emphysema type    3. Multiple lung nodules  -     CT Chest With Contrast Diagnostic; Future    4. Malignant neoplasm of upper lobe of right lung  -     CT Chest With Contrast Diagnostic; Future  -     POC Creatinine; Future    5. Hilar adenopathy  -     CT Chest With Contrast Diagnostic; Future  -     POC Creatinine; Future    6. Mediastinal adenopathy  -     CT Chest With Contrast Diagnostic; Future  -     POC Creatinine; Future    7. Tobacco user  -     CT Chest With Contrast Diagnostic; Future        I have reviewed his CT with him today. I also reviewed this with his prior CTA from 2022 with Dr. Landry and this hilar node appears to be stable. We will repeat a CT with contrast in 3 months and have the radiologist compare to the CTA from 2022 as well. If stable will plan annual no contrast CT. Patient will check with pharmacy in regards to his Anoro and if still too expensive he will let us know what his insurance will cover. He also notes that starting  June 1 inhalers max out of pocket is going to $35 a month as part of a government program.     Alpha 1: MM, normal   LDCT: getting routine imaging for history of lung cancer   Smoking Cessation: encouraged smoking cessation   Vaccinations:  Declines vaccinations     Follow Up   Return in about 3 months (around 6/27/2024) for CT.  Patient was given instructions and counseling regarding his condition or for health maintenance advice. Please see specific information pulled into the AVS if appropriate.     Mallory Bryan, APRN  3/27/2024  11:41 CDT

## 2024-03-27 ENCOUNTER — OFFICE VISIT (OUTPATIENT)
Dept: PULMONOLOGY | Facility: CLINIC | Age: 74
End: 2024-03-27
Payer: MEDICARE

## 2024-03-27 VITALS
DIASTOLIC BLOOD PRESSURE: 86 MMHG | WEIGHT: 187.4 LBS | OXYGEN SATURATION: 93 % | BODY MASS INDEX: 28.4 KG/M2 | HEIGHT: 68 IN | SYSTOLIC BLOOD PRESSURE: 138 MMHG | HEART RATE: 98 BPM

## 2024-03-27 DIAGNOSIS — C34.11 MALIGNANT NEOPLASM OF UPPER LOBE OF RIGHT LUNG: ICD-10-CM

## 2024-03-27 DIAGNOSIS — R59.0 HILAR ADENOPATHY: ICD-10-CM

## 2024-03-27 DIAGNOSIS — J44.9 STAGE 1 MILD COPD BY GOLD CLASSIFICATION: Primary | Chronic | ICD-10-CM

## 2024-03-27 DIAGNOSIS — R91.8 MULTIPLE LUNG NODULES: Chronic | ICD-10-CM

## 2024-03-27 DIAGNOSIS — Z72.0 TOBACCO USER: ICD-10-CM

## 2024-03-27 DIAGNOSIS — R59.0 MEDIASTINAL ADENOPATHY: ICD-10-CM

## 2024-03-27 DIAGNOSIS — J43.9 PULMONARY EMPHYSEMA, UNSPECIFIED EMPHYSEMA TYPE: Chronic | ICD-10-CM

## 2024-03-27 RX ORDER — ERGOCALCIFEROL 1.25 MG/1
1 CAPSULE ORAL WEEKLY
COMMUNITY
Start: 2024-03-09

## 2024-04-24 ENCOUNTER — HOSPITAL ENCOUNTER (OUTPATIENT)
Dept: CT IMAGING | Facility: HOSPITAL | Age: 74
Discharge: HOME | End: 2024-04-24
Admitting: NURSE PRACTITIONER
Payer: MEDICARE

## 2024-04-24 DIAGNOSIS — R22.1 LOCALIZED SWELLING, MASS AND LUMP, NECK: ICD-10-CM

## 2024-04-24 LAB — CREAT BLDA-MCNC: 0.9 MG/DL (ref 0.6–1.3)

## 2024-04-24 PROCEDURE — 25510000001 IOPAMIDOL 61 % SOLUTION: Performed by: NURSE PRACTITIONER

## 2024-04-24 PROCEDURE — 82565 ASSAY OF CREATININE: CPT

## 2024-04-24 PROCEDURE — 70491 CT SOFT TISSUE NECK W/DYE: CPT

## 2024-04-24 RX ADMIN — IOPAMIDOL 100 ML: 612 INJECTION, SOLUTION INTRAVENOUS at 09:05

## 2024-04-30 NOTE — PROGRESS NOTES
YOB: 1950  Location: [facility]  Location Address: [address] [place] [address],  3, [address] [place] [address]  Location Phone: [phone]    Chief Complaint   Patient presents with    Dysphonia     Feels like something is in throat. Had issues with swallowing but that has gotten better. Changed medication for reflux and did not get better.        History of Present Illness  Se NJ is a 73 y.o. male.  Se NJ is here for evaluation of ENT complaints. The patient has had problems with globus sensation, sore throat, and hoarseness. He has been on protonix daily before breakfast. He has had a CT neck that revealed mild asymmetrical nodular prominence of the left aryepiglottic fold for which ENT consultation and visualization is recommended. No  pathologic lymphadenopathy.    He is smoking 1/2 PPD.    Reviewed:    Study Result    Narrative & Impression   CT SOFT TISSUE NECK W CONTRAST- 2024 7:52 AM     HISTORY: R22.1; R22.1-Localized swelling, mass and lump, neck      COMPARISON: None     TOTAL DOSE LENGTH PRODUCT: 280.37 mGy.cm. Automated exposure control was  also utilized to decrease patient radiation dose.     TECHNIQUE: Axial images of the neck are performed following IV contrast     FINDINGS: Parotid and submandibular salivary glands are unremarkable.  The parapharyngeal spaces are preserved. No enhancing lesions seen  within the base of the mouth.  There is a mild nonspecific nodular asymmetrical prominence of the left  aryepiglottic fold. The laryngeal structures are unremarkable. No  dominant thyroid nodule. No pathologic lymphadenopathy identified within  the neck.     Moderate advanced emphysema within the lung apices with postoperative  changes at the right lung apex.     There is moderate right and mild left calcified plaque of the carotid  bulbs resulting in less than 50% stenosis of the proximal internal  carotid arteries. Vertebral arteries appear patent.     Moderate  diffuse degenerative changes of the cervical spine.     IMPRESSION:  1. Mild asymmetrical nodular prominence of the left aryepiglottic fold  for which ENT consultation and visualization is recommended. No  pathologic lymphadenopathy.  2. Moderate to advanced emphysema with postoperative changes right lung  apex.     This report was signed and finalized on 4/24/2024 9:28 AM by Dr. Jazmín Cheatham MD.        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    History of adenomatous polyp of colon     History of colon polyps     History of lung cancer     Right lung    Hyperlipidemia        Past Surgical History:   Procedure Laterality Date    BRONCHOSCOPY N/A 02/15/2021    Procedure: BRONCHOSCOPY;  Surgeon: Bruce Murillo MD;  Location: [facility] OR;  Service: Cardiothoracic;  Laterality: N/A;    CATARACT EXTRACTION      COLONOSCOPY  11/20/2012    One 1cm tubular adenomatous polyp in the sigmoid colon; One 5mm hyperplastic polyp in the rectum; Diverticulosis; Repeat 4 years     COLONOSCOPY N/A 03/30/2017    Two 4-6mm tubular adenomatous polyps at the hepatic flexure; One 5mm tubular adenomatous polyp in the transverse colon; One 11mm tubular adenomatous polyp in the sigmoid colon; One 6mm tubular adenomatous polyp in the rectum; Diverticulosis in the left colon; Repeat 3 years     COLONOSCOPY  12/17/2009    One less than 5mm tubular adenomatous polyp in the cecum; One 5mm hyperplastic polyp in the transverse; One less than 3mm hyperplastic polyp in the rectum; Diverticulosis; Repeat 3 years     COLONOSCOPY N/A 07/02/2020    Two 5-7mm tubular adenomatous polyps at the hepatic flexure; One 6mm tubular adenomatous polyp in the transverse colon; Diverticulosis in the left colon; The entire examined colon is  normal on direct and retroflexion views; Repeat 5 years    COLONOSCOPY N/A 06/29/2022    Diverticulosis in the left colon; One 6mm tubular adenomatous polyp in the descending colon; One 4mm tubular adenomatous polyp in the descending colon; Congested mucosa in the entire examined colon-biopsied; Repeat 5 years    ENDOSCOPY N/A 03/27/2019    Medium-sized HH; Normal stomach; Normal examined duodenum-biopsied    ENDOSCOPY N/A 06/09/2022    Small HH; Non-erosive gastritis-biopsied; Normal examined duodenum-biopsied    FOOT SURGERY Left     ORIF    HERNIA REPAIR      INNER EAR SURGERY      MEDIASTINOSCOPY N/A 02/15/2021    Procedure: CERVICAL MEDIASTINOSCOPY WITH LYMPH NODE DISECTION;  Surgeon: Bruce Murillo MD;  Location:  [facility] OR;  Service: Cardiothoracic;  Laterality: N/A;    THORACOSCOPY Right 02/15/2021    Procedure: THORACOSCOPY, WEDGE RESECTION OF RIGHT UPPER LOBE WITH FROZEN,  , MEDIASTINAL LYMPH NODE DISSECTION;  Surgeon: Bruce Murillo MD;  Location:  [facility] OR;  Service: Cardiothoracic;  Laterality: Right;    TONSILLECTOMY         Outpatient Medications Marked as Taking for the 5/2/24 encounter (Office Visit) with Antelmo Dangelo MD   Medication Sig Dispense Refill    albuterol sulfate HFA (ProAir HFA) 108 (90 Base) MCG/ACT inhaler Inhale 2 puffs Every 4 (Four) Hours As Needed for Wheezing. 18 g 3    atorvastatin (LIPITOR) 10 MG tablet Take 1 tablet by mouth Daily.      pantoprazole (PROTONIX) 40 MG EC tablet TAKE 1 TABLET BY MOUTH  DAILY 90 tablet 3    vitamin D (ERGOCALCIFEROL) 1.25 MG (77210 UT) capsule capsule Take 1 capsule by mouth 1 (One) Time Per Week.         Patient has no known allergies.    Family History   Problem Relation Age of Onset    Colon polyps Mother         Unknown age    Diabetes Mother     Colon cancer Neg Hx     Esophageal cancer Neg Hx     Liver cancer Neg Hx     Liver disease Neg Hx     Rectal cancer Neg Hx     Stomach cancer Neg Hx        Social History      Socioeconomic History    Marital status: Significant Other   Tobacco Use    Smoking status: Every Day     Current packs/day: 0.50     Average packs/day: 0.5 packs/day for 55.3 years (27.7 ttl pk-yrs)     Types: Cigarettes     Start date: 1/1/1969     Passive exposure: Current    Smokeless tobacco: Never    Tobacco comments:     Pt states about 7-8 cigarettes per day 3/27   Vaping Use    Vaping status: Never Used   Substance and Sexual Activity    Alcohol use: Yes     Alcohol/week: 3.0 standard drinks of alcohol     Types: 3 Shots of liquor per week     Comment: sober as of 06/01/2022    Drug use: Yes     Types: Marijuana    Sexual activity: Yes     Partners: Female       Review of Systems   Constitutional: Negative.    HENT:  Positive for sore throat and voice change.        Vitals:    05/02/24 1319   BP: 150/90   Pulse: 91   Temp: 98.7 °F (37.1 °C)       Body mass index is 28.33 kg/m².    Objective     Physical Exam  Constitutional:       Appearance: Normal appearance.   HENT:      Head: Normocephalic.      Right Ear: Tympanic membrane, ear canal and external ear normal.      Left Ear: Tympanic membrane, ear canal and external ear normal.      Nose: Nose normal.      Mouth/Throat:      Lips: Pink.      Mouth: Mucous membranes are moist.      Comments: See endoscopy  Neurological:      Mental Status: He is alert.   Psychiatric:         Behavior: Behavior is cooperative.         Assessment & Plan   Diagnoses and all orders for this visit:    1. Hoarseness (Primary)    2. Laryngopharyngeal reflux (LPR)    3. Tobacco use    Other orders  -     varenicline (Chantix) 0.5 MG tablet; Take 1 tablet by mouth 2 (Two) Times a Day for 15 days.  Dispense: 30 tablet; Refill: 0  -     varenicline (Chantix) 0.5 MG tablet; Take 2 tablets by mouth 2 (Two) Times a Day for 30 days.  Dispense: 120 tablet; Refill: 4      * Surgery not found *  No orders of the defined types were placed in this encounter.    Se NJ  reports  that he has been smoking cigarettes. He started smoking about 55 years ago. He has a 27.7 pack-year smoking history. He has been exposed to tobacco smoke. He has never used smokeless tobacco. I have educated him on the risk of diseases from using tobacco products such as cancer, COPD, and heart disease.     I advised him to quit and he is willing to quit. We have discussed the following method/s for tobacco cessation:  Prescription Medicaiton.  Together we have set a quit date for  6 months .  He will follow up with me in 3 months or sooner to check on his progress.    I spent 5 minutes counseling the patient.    Chantix discussed  Increase protonix to twice daily before breakfast and dinner  Reflux precautions     Dr. Dangelo examined and discussed care with patient and agrees with treatment plan.     Return in about 3 months (around 8/2/2024) for Recheck.       Patient Instructions   Gastroesophageal Reflux Disease (Laryngopharyngeal Reflux), Adult  Gastroesophageal reflux disease (GERD) and/or Laryngopharyngeal Reflux, (LPR) happens when acid from your stomach flows up into the esophagus and/or throat and voicebox or larynx. When acid comes in contact with the these organs, the acid can cause soreness (inflammation). Over time, GERD may create small holes (ulcers) in the lining of the esophagus and may lead to the development of hoarseness, difficulty swallowing,   feeling of something stuck in the throat, increased mucous or drainage and even predispose to the development of malignancies, (cancer).    CAUSES   Increased body weight. This puts pressure on the stomach, making acid rise from the stomach into the esophagus.  Smoking. This increases acid production in the stomach.  Drinking alcohol. This causes decreased pressure in the lower esophageal sphincter (valve or ring of muscle between the esophagus and stomach), allowing acid from the stomach into the esophagus.  Late evening meals and a full stomach. This  increases pressure and acid production in the stomach.  A malformed lower esophageal sphincter  Diet which can include avoidance of gluten and dairy products  Age  SYMPTOMS   Burning pain in the lower part of the mid-chest behind the breastbone and in the mid-stomach area. This may occur twice a week or more often.  Trouble swallowing.  Sore throat.  Dry cough.  Asthma-like symptoms including chest tightness, shortness of breath, or wheezing.  Globus sensation-something stuck in the throat/fullness  Hoarseness  DIAGNOSIS   Your caregiver may be able to diagnose GERD based on your symptoms. In some cases, X-rays and other tests may be done to check for complications or to check the condition of your stomach and esophagus.  You may need to see another doctor.  TREATMENT   Over-the-counter or prescription medicines to help decrease acid production.   Dietary and behavioral modifications or changes may be also recommended.  HOME CARE INSTRUCTIONS   Change the factors that you can control. Ask your caregiver for guidance concerning weight loss, quitting smoking, and alcohol consumption.  Avoid foods and drinks that make your symptoms worse, and MAY include such as:  Caffeine or alcoholic drinks.  Chocolate.  Gluten containing foods  Dairy  Peppermint or mint flavorings.  Garlic and onions.  Spicy foods.  Citrus fruits, such as oranges, timi, or limes.  Tomato-based foods such as sauce, chili, salsa, and pizza.  Fried and fatty foods.  Avoid lying down for the 3 hours prior to your bedtime or prior to taking a nap.  Eat small, frequent meals instead of large meals.  Wear loose-fitting clothing. Do not wear anything tight around your waist that causes pressure on your stomach.  Raise the head of your bed 6 to 8 inches with wood blocks to help you sleep. Extra pillows will not help.  Only take over-the-counter or prescription medicines for pain, discomfort, or fever as directed by your caregiver.  Do not take aspirin,  ibuprofen, or other nonsteroidal anti-inflammatory drugs if possible (NSAIDs).  SEEK IMMEDIATE MEDICAL CARE IF:   You have pain in your arms, neck, jaw, teeth, or back.  Your pain increases or changes in intensity or duration.  You develop nausea, vomiting, or sweating (diaphoresis).  You develop shortness of breath, or you faint.  Your vomit is green, yellow, black, or looks like coffee grounds or blood.  Your stool is red, bloody, or black.  These symptoms could be signs of other problems, such as heart disease, gastric bleeding, or esophageal bleeding.  MAKE SURE YOU:   Understand these instructions.  Will watch your condition.  Will get help right away if you are not doing well or get worse.     This information is not intended to replace advice given to you by your physician. Make sure you discuss any questions you have with your health care provider.     Modified by Antelmo Dangelo MD, FACS 2016.  Document Released: 2006 Document Revised: 2016 Document Reviewed: 2016  [employer] Interactive Patient Education © Elsevier Inc.     CONTACT INFORMATION:  The main office phone number is [phone]. For emergencies after hours and on weekends, this number will convert over to our answering service and the on call provider will answer. Please try to keep non emergent phone calls/ questions to office hours 9am-5pm Monday through Friday.      myaNUMBER  As an alternative, you can sign up and use the Epic MyChart system for more direct and quicker access for non emergent questions/ problems.  [employer] allows you to send messages to your doctor, view your test results, renew your prescriptions, schedule appointments, and more. To sign up, go to [employer] and click on the Sign Up Now link in the New User? box. Enter your myaNUMBER Activation Code exactly as it appears below along with the last four digits of your Social Security Number and your Date of Birth () to  complete the sign-up process. If you do not sign up before the expiration date, you must request a new code.     [employer] Activation Code: Activation code not generated  Current [employer] Status: Active     If you have questions, you can email [email or web address] or call [phone] to talk to our [employer] staff. Remember, MyChart is NOT to be used for urgent needs. For medical emergencies, dial 911.     IF YOU SMOKE OR USE TOBACCO PLEASE READ THE FOLLOWING:  Why is smoking bad for me?  Smoking increases the risk of heart disease, lung disease, vascular disease, stroke, and cancer. If you smoke, STOP!        IF YOU SMOKE OR USE TOBACCO PLEASE READ THE FOLLOWING:  Why is smoking bad for me?  Smoking increases the risk of heart disease, lung disease, vascular disease, stroke, and cancer. If you smoke, STOP!     For more information:  Quit Now [place]  1-800-QUIT-NOW  https://[place].quitlogix.org/en-US/

## 2024-05-02 ENCOUNTER — OFFICE VISIT (OUTPATIENT)
Dept: OTOLARYNGOLOGY | Facility: CLINIC | Age: 74
End: 2024-05-02
Payer: MEDICARE

## 2024-05-02 VITALS
DIASTOLIC BLOOD PRESSURE: 90 MMHG | TEMPERATURE: 98.7 F | BODY MASS INDEX: 28.24 KG/M2 | SYSTOLIC BLOOD PRESSURE: 150 MMHG | HEART RATE: 91 BPM | HEIGHT: 68 IN | WEIGHT: 186.31 LBS

## 2024-05-02 DIAGNOSIS — K21.9 LARYNGOPHARYNGEAL REFLUX (LPR): ICD-10-CM

## 2024-05-02 DIAGNOSIS — R49.0 HOARSENESS: Primary | ICD-10-CM

## 2024-05-02 DIAGNOSIS — Z72.0 TOBACCO USE: ICD-10-CM

## 2024-05-02 RX ORDER — VARENICLINE TARTRATE 0.5 MG/1
1 TABLET, FILM COATED ORAL 2 TIMES DAILY
Qty: 120 TABLET | Refills: 4 | Status: SHIPPED | OUTPATIENT
Start: 2024-05-02 | End: 2024-06-01

## 2024-05-02 RX ORDER — VARENICLINE TARTRATE 0.5 MG/1
0.5 TABLET, FILM COATED ORAL 2 TIMES DAILY
Qty: 30 TABLET | Refills: 0 | Status: SHIPPED | OUTPATIENT
Start: 2024-05-02 | End: 2024-05-17

## 2024-05-02 NOTE — PATIENT INSTRUCTIONS
Gastroesophageal Reflux Disease (Laryngopharyngeal Reflux), Adult  Gastroesophageal reflux disease (GERD) and/or Laryngopharyngeal Reflux, (LPR) happens when acid from your stomach flows up into the esophagus and/or throat and voicebox or larynx. When acid comes in contact with the these organs, the acid can cause soreness (inflammation). Over time, GERD may create small holes (ulcers) in the lining of the esophagus and may lead to the development of hoarseness, difficulty swallowing,   feeling of something stuck in the throat, increased mucous or drainage and even predispose to the development of malignancies, (cancer).    CAUSES   Increased body weight. This puts pressure on the stomach, making acid rise from the stomach into the esophagus.  Smoking. This increases acid production in the stomach.  Drinking alcohol. This causes decreased pressure in the lower esophageal sphincter (valve or ring of muscle between the esophagus and stomach), allowing acid from the stomach into the esophagus.  Late evening meals and a full stomach. This increases pressure and acid production in the stomach.  A malformed lower esophageal sphincter  Diet which can include avoidance of gluten and dairy products  Age  SYMPTOMS   Burning pain in the lower part of the mid-chest behind the breastbone and in the mid-stomach area. This may occur twice a week or more often.  Trouble swallowing.  Sore throat.  Dry cough.  Asthma-like symptoms including chest tightness, shortness of breath, or wheezing.  Globus sensation-something stuck in the throat/fullness  Hoarseness  DIAGNOSIS   Your caregiver may be able to diagnose GERD based on your symptoms. In some cases, X-rays and other tests may be done to check for complications or to check the condition of your stomach and esophagus.  You may need to see another doctor.  TREATMENT   Over-the-counter or prescription medicines to help decrease acid production.   Dietary and behavioral modifications  or changes may be also recommended.  HOME CARE INSTRUCTIONS   Change the factors that you can control. Ask your caregiver for guidance concerning weight loss, quitting smoking, and alcohol consumption.  Avoid foods and drinks that make your symptoms worse, and MAY include such as:  Caffeine or alcoholic drinks.  Chocolate.  Gluten containing foods  Dairy  Peppermint or mint flavorings.  Garlic and onions.  Spicy foods.  Citrus fruits, such as oranges, timi, or limes.  Tomato-based foods such as sauce, chili, salsa, and pizza.  Fried and fatty foods.  Avoid lying down for the 3 hours prior to your bedtime or prior to taking a nap.  Eat small, frequent meals instead of large meals.  Wear loose-fitting clothing. Do not wear anything tight around your waist that causes pressure on your stomach.  Raise the head of your bed 6 to 8 inches with wood blocks to help you sleep. Extra pillows will not help.  Only take over-the-counter or prescription medicines for pain, discomfort, or fever as directed by your caregiver.  Do not take aspirin, ibuprofen, or other nonsteroidal anti-inflammatory drugs if possible (NSAIDs).  SEEK IMMEDIATE MEDICAL CARE IF:   You have pain in your arms, neck, jaw, teeth, or back.  Your pain increases or changes in intensity or duration.  You develop nausea, vomiting, or sweating (diaphoresis).  You develop shortness of breath, or you faint.  Your vomit is green, yellow, black, or looks like coffee grounds or blood.  Your stool is red, bloody, or black.  These symptoms could be signs of other problems, such as heart disease, gastric bleeding, or esophageal bleeding.  MAKE SURE YOU:   Understand these instructions.  Will watch your condition.  Will get help right away if you are not doing well or get worse.     This information is not intended to replace advice given to you by your physician. Make sure you discuss any questions you have with your health care provider.     Modified by Antelmo Dangelo,  MD, PORSHA 2016.  Document Released: 2006 Document Revised: 2016 Document Reviewed: 2016  [employer] Interactive Patient Education  Elsevier Inc.     CONTACT INFORMATION:  The main office phone number is [phone]. For emergencies after hours and on weekends, this number will convert over to our answering service and the on call provider will answer. Please try to keep non emergent phone calls/ questions to office hours 9am-5pm Monday through Friday.      [employer]  As an alternative, you can sign up and use the Epic MyChart system for more direct and quicker access for non emergent questions/ problems.  [employer] allows you to send messages to your doctor, view your test results, renew your prescriptions, schedule appointments, and more. To sign up, go to [employer] and click on the Sign Up Now link in the New User? box. Enter your [employer] Activation Code exactly as it appears below along with the last four digits of your Social Security Number and your Date of Birth () to complete the sign-up process. If you do not sign up before the expiration date, you must request a new code.     [employer] Activation Code: Activation code not generated  Current [employer] Status: Active     If you have questions, you can email [email or web address] or call [phone] to talk to our [employer] staff. Remember, [employer] is NOT to be used for urgent needs. For medical emergencies, dial 911.     IF YOU SMOKE OR USE TOBACCO PLEASE READ THE FOLLOWING:  Why is smoking bad for me?  Smoking increases the risk of heart disease, lung disease, vascular disease, stroke, and cancer. If you smoke, STOP!        IF YOU SMOKE OR USE TOBACCO PLEASE READ THE FOLLOWING:  Why is smoking bad for me?  Smoking increases the risk of heart disease, lung disease, vascular disease, stroke, and cancer. If you smoke, STOP!     For more information:  Quit Now  [place]  1-800-QUIT-NOW  https://[place].quitlogix.org/en-US/

## 2024-05-02 NOTE — PROGRESS NOTES
OPERATIVE NOTE:  Se NJ    DATE OF PROCEDURE: 5/2/2024    PROCEDURE:   Flexible Fiberoptic Laryngoscopy    ANESTHESIA:  None    REASON FOR PROCEDURE:  Procedure was recommended for persistant hoarseness, suspicious clinical behavior, and globus sensation  Risks, benefits and alternatives were discussed.      DETAILS of OPERATION:  The patient was seated in the exam chair.  A flexible fiberoptic laryngoscopy was performed through the oral cavity.  The scope was introduced into the oral cavity and directed to the level of the glottis, examining the structures of the oropharynx, base of tongue, vallecula, supraglottic larynx, glottic larynx, and hypopharynx.      FINDINGS:  Mucosal surfaces:   The mucosal surfaces demonstrated normal mucosa surfaces with moderate inflammation    Base of tongue:  The base of tongue was found to have no mass or lesion.    Epiglottis:  The epiglottis was found to have no mass or lesion.    Aryepiglottic fold:  The AE folds were found to have no mass or lesion.    False Vocal Fold:  The false cords were found to have no mass or lesion.    True Vocal Cord:  The true vocal cords were found to have no mass or lesion though both cords were symmetrically thickened and mildly erythematous. Both true vocal cords adducted and abducted normally    Arytenoid:   The arytenoids were found to have moderate inter-arytenoid edema with mild erythema.    Hypopharynx:  The hypopharynx was found to have no mass or lesion.    The patient tolerated procedure well.

## 2024-06-26 ENCOUNTER — HOSPITAL ENCOUNTER (OUTPATIENT)
Dept: CT IMAGING | Facility: HOSPITAL | Age: 74
Discharge: HOME | End: 2024-06-26
Admitting: NURSE PRACTITIONER
Payer: MEDICARE

## 2024-06-26 DIAGNOSIS — R59.0 MEDIASTINAL ADENOPATHY: ICD-10-CM

## 2024-06-26 DIAGNOSIS — Z72.0 TOBACCO USER: ICD-10-CM

## 2024-06-26 DIAGNOSIS — R91.8 MULTIPLE LUNG NODULES: Chronic | ICD-10-CM

## 2024-06-26 DIAGNOSIS — R59.0 HILAR ADENOPATHY: ICD-10-CM

## 2024-06-26 DIAGNOSIS — C34.11 MALIGNANT NEOPLASM OF UPPER LOBE OF RIGHT LUNG: ICD-10-CM

## 2024-06-26 LAB — CREAT BLDA-MCNC: 0.9 MG/DL (ref 0.6–1.3)

## 2024-06-26 PROCEDURE — 82565 ASSAY OF CREATININE: CPT

## 2024-06-26 PROCEDURE — 25510000001 IOPAMIDOL 61 % SOLUTION: Performed by: NURSE PRACTITIONER

## 2024-06-26 PROCEDURE — 71260 CT THORAX DX C+: CPT

## 2024-06-26 RX ADMIN — IOPAMIDOL 100 ML: 612 INJECTION, SOLUTION INTRAVENOUS at 09:06

## 2024-06-27 ENCOUNTER — TELEPHONE (OUTPATIENT)
Dept: GASTROENTEROLOGY | Facility: CLINIC | Age: 74
End: 2024-06-27
Payer: MEDICARE

## 2024-06-27 DIAGNOSIS — K80.50 CHOLEDOCHOLITHIASIS: Primary | ICD-10-CM

## 2024-06-27 NOTE — TELEPHONE ENCOUNTER
I was sent a copy of his recent CT scan ordered for other reasons.  Pertinent to GI is the following:    Intrahepatic and extrahepatic biliary ductal dilatation extending  down to the ampulla where a 10 mm filling defect is present, likely  representing choledocholithiasis. Recommend GI referral for ERCP.    His nurse practitioner asked him to follow-up with us.  This will not be of any help as we do not do EUS or ERCP to assess the bile duct problem here at [facility].    Please let him know that we need to refer him to [facility]--Dr. Dangelo.  I placed the order for the referral.  Please contact Dr. Dangelo's office to let them know that he has choledocholithiasis which should be dealt with in a timely manner.    If patient were to develop right upper quadrant pain or fever, he will need to present to [facility] emergency room for evaluation.    Kat Licea MD

## 2024-06-27 NOTE — TELEPHONE ENCOUNTER
Called pt and spoke to him re: results and Dr. Licea's recommendations-he VU and is agreeable to referral to Dr. Buzz Dangelo. I advised him I would fax records to their office and that he should be hearing from them to schedule either an appt or an EUS/ERCP. He knows to call me back if he hasn't heard from them next week. Pt tells me he currently is not having any symptoms, but knows to go to ER at [facility] with any RUQ pain or fever.     I did call Dr. Dangelo office to expedite the referral-spoke to Toma. She tells me he is out of office this week and most likely won't be able to review anything until next week. She is going to keep an eye out for my fax and with have his NP review it since this is more urgent than that. I have faxed records to [phone]. Pt and Dr. Dangelo office were both advised to call me back with any further questions/problems.

## 2024-06-30 PROBLEM — Z87.891 PERSONAL HISTORY OF NICOTINE DEPENDENCE: Status: ACTIVE | Noted: 2024-06-30

## 2024-06-30 NOTE — PROGRESS NOTES
" PARISA Lopez  [facility]   Pulmonary and Critical Care  [address]  [place] [address]  Phone: [phone]  Fax: [phone]           Chief Complaint  COPD    Subjective    History of Present Illness     Se NJ presents to [facility] PULMONARY & CRITICAL CARE MEDICINE   History of Present Illness  Mr. Nj is a pleasant 73-year-old male patient with known 2.5 cm right upper lobe anterior segment nodule noted on 12/20/2020. He was treated by Dr. 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 He has known emphysema/COPD, history of DVT, history of alcohol abuse and a current every day smoker. He is down to 1/2 PPD.  He has an albuterol HFA that he uses rarely. He uses Stiolto with some benefit. He has been given Trelegy sample by PCP and finds better results with this. His partner notes he coughs less with the Trelegy.  He denies fever, chills, night sweats.  He denies swelling. He has a cough that is improved and productive at times. His repeat CT with contrast showed stable bilateral hilar lymphadenopathy stable to 5/21/2021. New 4 mm RUL and new 3mm RLL nodules. Gallstone findings sent to GI who has referred him to [facility] for possible ERCP.          Objective   Vital Signs:   /88   Pulse 81   Ht 172.7 cm (68\")   Wt 79.2 kg (174 lb 9.6 oz)   SpO2 93% Comment: RA  BMI 26.55 kg/m²     Physical Exam  Vitals reviewed.   Constitutional:       Appearance: Normal appearance.   Cardiovascular:      Rate and Rhythm: Normal rate and regular rhythm.   Pulmonary:      Effort: Pulmonary effort is normal.      Breath sounds: Normal breath sounds.   Neurological:      General: No focal deficit present.      Mental Status: He is alert and oriented to person, place, and time.   Psychiatric:         Mood and Affect: Mood normal.         Behavior: Behavior normal.          Result Review " :  The following data was reviewed by: PARISA Lopez on 2024:    Data reviewed : Radiologic studies CT chest     My interpretation of imaging:  as in HPI   My interpretation of labs: none  CT Chest With Contrast Diagnostic (2024 09:05)   PFT Values          2023    09:30   Pre Drug PFT Results      FEV1 94   FEF 25-75% 43   FEV1/FVC 59   Other Tests PFT Results   DLCO 57   D/VAsb 54     My interpretation of the PFT : no new    Results for orders placed in visit on 23    Pulmonary Function Test    Narrative  Pulmonary Function Test  Performed by: Alona Voss, RRT  Authorized by: Mallory Bryan APRN    Pre Drug % Predicted  FVC: 120%  FEV1: 94%  FEF 25-75%: 43%  FEV1/FVC: 59%  DLCO: 57%  D/VAsb: 54%    Interpretation  Spirometry  Spirometry shows mild obstruction. There is reduced midflow suggesting small airway/airflow obstruction.  Review of FVL curve  Patient's effort is normal.  Diffusion Capacity  The patient's diffusion capacity is moderately reduced.  Diffusion capacity is moderately reduced when corrected for alveolar volume.      Results for orders placed in visit on 21    Full Pulmonary Function Test Without Bronchodilator    Narrative  Full Pulmonary Function Test Without Bronchodilator  Performed by: Alona Voss, RRT  Authorized by: Diego Landry MD    Pre Drug % Predicted  FVC: 115%  FEV1: 91%  FEF 25-75%: 41%  FEV1/FVC: 62.03%  T%  RV: 118%  DLCO: 65%  D/VAsb: 62%    Interpretation  Spirometry  Spirometry shows mild obstruction. There is reduced midflow suggesting small airway/airflow obstruction.  Review of FVL curve  Patient's effort is normal.  Lung Volume Measurements  Measurements show normal results.  Diffusion Capacity  The patient's diffusion capacity is moderately reduced.  Diffusion capacity is moderately reduced when corrected for alveolar volume.      Results for orders placed in visit on  12/21/20    Pulmonary Function Test    [facility] - Pulmonary Function Test    [facility]  [place]  [place]  [address]  [phone]    Patient : Se Ramirez  MRN : [id number]  CSN : [id number]  Pulmonologist : Diego Landry MD  Date : 1/28/2021    ______________________________________________________________________    Interpretation :  1.  Spirometry is consistent with a mild obstructive ventilatory defect manifested by small airways disease.  2.  There is no significant change in spirometry postbronchodilator.  3.  Lung volumes reveal a decrease in residual volume and otherwise are within normal limits.  4.  There is a moderate diffusion impairment even when corrected for alveolar volume.      Diego Landry MD          Assessment and Plan   Diagnoses and all orders for this visit:    1. Stage 1 mild COPD by GOLD classification (Primary)  -     Spirometry with Diffusion Capacity; Future    2. Pulmonary emphysema, unspecified emphysema type  -     Spirometry with Diffusion Capacity; Future    3. Multiple lung nodules  -     CT Chest Low Dose Follow Up Without Contrast; Future    4. Malignant neoplasm of upper lobe of right lung    5. Hilar adenopathy    6. Mediastinal adenopathy    7. Personal history of nicotine dependence        Overall he is stable at this time.  He is run out of the sample of Trelegy given to him by his PCP and I do not have any samples today.  He is back to using the Stiolto however he notes better results with the Trelegy.  He is going to check cost with his insurance today and let me know what dose he is on and we can send in a prescription to his pharmacy.  He will continue his as needed albuterol.  We reviewed and discussed his CT results.  We will plan a repeat CT scan to follow-up on the lung nodules in 6 months.  His mediastinal adenopathy was stable going back to May 2021.  Patient has 1 kidney and we will not repeat the CT with IV contrast.  He  is waiting a return call from Dr. Dangelo GI at [facility] in regards to possible ERCP.  If he does not hear from them today he is going to follow-up with his GI doctor, Dr. Licea.  We also discussed using his Mucinex 600 to 1200 mg twice daily with full glass of water to help thin his mucus.    Alpha 1: MM, normal   LDCT: getting routine imaging for history of lung cancer   Smoking Cessation: encouraged smoking cessation   Vaccinations:  Declines vaccinations            Follow Up   Return in about 6 months (around 1/3/2025) for CT, FVL w DIF.  Patient was given instructions and counseling regarding his condition or for health maintenance advice. Please see specific information pulled into the AVS if appropriate.     Mallory Bryan, APRN  7/3/2024  09:49 CDT

## 2024-07-03 ENCOUNTER — OFFICE VISIT (OUTPATIENT)
Dept: PULMONOLOGY | Facility: CLINIC | Age: 74
End: 2024-07-03
Payer: MEDICARE

## 2024-07-03 VITALS
HEIGHT: 68 IN | OXYGEN SATURATION: 93 % | SYSTOLIC BLOOD PRESSURE: 148 MMHG | BODY MASS INDEX: 26.46 KG/M2 | WEIGHT: 174.6 LBS | HEART RATE: 81 BPM | DIASTOLIC BLOOD PRESSURE: 88 MMHG

## 2024-07-03 DIAGNOSIS — C34.11 MALIGNANT NEOPLASM OF UPPER LOBE OF RIGHT LUNG: ICD-10-CM

## 2024-07-03 DIAGNOSIS — J44.9 STAGE 1 MILD COPD BY GOLD CLASSIFICATION: Primary | Chronic | ICD-10-CM

## 2024-07-03 DIAGNOSIS — Z87.891 PERSONAL HISTORY OF NICOTINE DEPENDENCE: ICD-10-CM

## 2024-07-03 DIAGNOSIS — R91.8 MULTIPLE LUNG NODULES: Chronic | ICD-10-CM

## 2024-07-03 DIAGNOSIS — J43.9 PULMONARY EMPHYSEMA, UNSPECIFIED EMPHYSEMA TYPE: Chronic | ICD-10-CM

## 2024-07-03 DIAGNOSIS — R59.0 MEDIASTINAL ADENOPATHY: ICD-10-CM

## 2024-07-03 DIAGNOSIS — R59.0 HILAR ADENOPATHY: ICD-10-CM

## 2024-07-03 PROCEDURE — 99214 OFFICE O/P EST MOD 30 MIN: CPT | Performed by: NURSE PRACTITIONER

## 2024-07-03 PROCEDURE — 1159F MED LIST DOCD IN RCRD: CPT | Performed by: NURSE PRACTITIONER

## 2024-07-03 PROCEDURE — 1160F RVW MEDS BY RX/DR IN RCRD: CPT | Performed by: NURSE PRACTITIONER

## 2024-07-03 RX ORDER — ESOMEPRAZOLE MAGNESIUM 40 MG/1
40 CAPSULE, DELAYED RELEASE ORAL
COMMUNITY

## 2024-07-03 RX ORDER — ASPIRIN 81 MG/1
81 TABLET ORAL DAILY
COMMUNITY

## 2024-07-08 PROBLEM — K52.9 NONSPECIFIC COLITIS: Status: ACTIVE | Noted: 2022-06-09

## 2024-07-08 NOTE — PROGRESS NOTES
"Primary Physician: Diego Lucio MD    Chief Complaint   Patient presents with    Follow-up     Pt presents today for weight loss, dysphagia, and bloating follow up-states overall he feels good but does feel like he has some swelling in his epigastric area; Pt saw Dr. Buzz Dangelo in the office yesterday for abnormality seen on CT Chest recently-is scheduled for EUS/ERCP at the end of the month and had labs for him yesterday that showed elevated LFT's       Subjective     Se NJ is a 73 y.o. male.    HPI  Microscopic colitis  Nonbloody watery diarrhea started in early 2022, nonresponsive to 2 courses of Flagyl.  Colonoscopy 6/2022 showed adenomatous polyp and biopsies from the colon showed nonspecific inflammation.  Liada 3 tablets daily was started.  He is much better now.  No diarrhea, and recently had constipation!  He stopped liada in early 2024 and still doing well.    Choledocholithiasis/elevated LFTS-new problem found on imaging  CT 6/2024:  Intrahepatic and extrahepatic biliary ductal dilatation extending  down to the ampulla where a 10 mm filling defect is present, likely  representing choledocholithiasis. Recommend GI referral for ERCP.    Referred to Dr. Dangelo.  He is scheduled to have EUS/ERCP later this month.  He had LFTs done yesterday--AST/ALT and ALP elevated.     Weight loss  Endoscopy with small bowel biopsies normal 6/2022.  Follow-up colonoscopy showed nonspecific inflammation for which she was started on Lialda 3 tablets daily.  His weight has stabilized.     Dysphagia  Endoscopy 6/2022 was unremarkable.  No dysphagia.  Taking protonix, but was changed nexium 40mg BID due to increasing heartburn after \"bourbin\".  \"I'm a controlled alcoholic\".  Now doing well.     Colon polyps  He has had adenomatous colon polyps removed intermittently over the years most recently 6/2022.  Repeat colonoscopy 6/2027.      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    History of adenomatous polyp of colon     History of colon polyps     History of lung cancer     Right lung    Hyperlipidemia        Past Surgical History:   Procedure Laterality Date    BRONCHOSCOPY N/A 02/15/2021    Procedure: BRONCHOSCOPY;  Surgeon: Bruce Murillo MD;  Location: [facility];  Service: Cardiothoracic;  Laterality: N/A;    CATARACT EXTRACTION      COLONOSCOPY  11/20/2012    One 1cm tubular adenomatous polyp in the sigmoid colon; One 5mm hyperplastic polyp in the rectum; Diverticulosis; Repeat 4 years     COLONOSCOPY N/A 03/30/2017    Two 4-6mm tubular adenomatous polyps at the hepatic flexure; One 5mm tubular adenomatous polyp in the transverse colon; One 11mm tubular adenomatous polyp in the sigmoid colon; One 6mm tubular adenomatous polyp in the rectum; Diverticulosis in the left colon; Repeat 3 years     COLONOSCOPY  12/17/2009    One less than 5mm tubular adenomatous polyp in the cecum; One 5mm hyperplastic polyp in the transverse; One less than 3mm hyperplastic polyp in the rectum; Diverticulosis; Repeat 3 years     COLONOSCOPY N/A 07/02/2020    Two 5-7mm tubular adenomatous polyps at the hepatic flexure; One 6mm tubular adenomatous polyp in the transverse colon; Diverticulosis in the left colon; The entire examined colon is normal on direct and retroflexion views; Repeat 5 years    COLONOSCOPY N/A 06/29/2022    Diverticulosis in the left colon; One 6mm tubular adenomatous polyp in the descending colon; One 4mm tubular adenomatous polyp in the descending colon; Congested mucosa in the entire examined colon-biopsied; Repeat 5 years    ENDOSCOPY N/A 03/27/2019    Medium-sized HH; Normal stomach; Normal examined duodenum-biopsied    ENDOSCOPY N/A 06/09/2022    Small HH; Non-erosive gastritis-biopsied;  Normal examined duodenum-biopsied    FOOT SURGERY Left     ORIF    HERNIA REPAIR      INNER EAR SURGERY      MEDIASTINOSCOPY N/A 02/15/2021    Procedure: CERVICAL MEDIASTINOSCOPY WITH LYMPH NODE DISECTION;  Surgeon: Bruce Murillo MD;  Location:  [facility];  Service: Cardiothoracic;  Laterality: N/A;    THORACOSCOPY Right 02/15/2021    Procedure: THORACOSCOPY, WEDGE RESECTION OF RIGHT UPPER LOBE WITH FROZEN,  , MEDIASTINAL LYMPH NODE DISSECTION;  Surgeon: Bruce Murillo MD;  Location:  [facility];  Service: Cardiothoracic;  Laterality: Right;    TONSILLECTOMY          Current Outpatient Medications:     aspirin 81 MG EC tablet, Take 1 tablet by mouth Daily., Disp: , Rfl:     atorvastatin (LIPITOR) 10 MG tablet, Take 1 tablet by mouth Daily., Disp: , Rfl:     esomeprazole (nexIUM) 40 MG capsule, Take 1 capsule by mouth Every Morning Before Breakfast., Disp: , Rfl:     tiotropium bromide-olodaterol (Stiolto Respimat) 2.5-2.5 MCG/ACT aerosol solution inhaler, Inhale 2 puffs Daily., Disp: 3 each, Rfl: 11    vitamin D (ERGOCALCIFEROL) 1.25 MG (74984 UT) capsule capsule, Take 1 capsule by mouth 1 (One) Time Per Week., Disp: , Rfl:     albuterol sulfate HFA (ProAir HFA) 108 (90 Base) MCG/ACT inhaler, Inhale 2 puffs Every 4 (Four) Hours As Needed for Wheezing. (Patient not taking: Reported on 7/3/2024), Disp: 18 g, Rfl: 3    No Known Allergies    Social History     Socioeconomic History    Marital status: Significant Other   Tobacco Use    Smoking status: Every Day     Current packs/day: 0.50     Average packs/day: 0.5 packs/day for 55.5 years (27.8 ttl pk-yrs)     Types: Cigarettes     Start date: 1/1/1969     Passive exposure: Current    Smokeless tobacco: Never    Tobacco comments:     Pt states about 7-8 cigarettes per day 7/3   Vaping Use    Vaping status: Never Used   Substance and Sexual Activity    Alcohol use: Yes     Alcohol/week: 3.0 standard drinks of alcohol     Types: 3 Shots of liquor per week      "Comment: sober as of 06/01/2022    Drug use: Yes     Types: Marijuana    Sexual activity: Yes     Partners: Female       Family History   Problem Relation Age of Onset    Colon polyps Mother         Unknown age    Diabetes Mother     Colon cancer Neg Hx     Esophageal cancer Neg Hx     Liver cancer Neg Hx     Liver disease Neg Hx     Rectal cancer Neg Hx     Stomach cancer Neg Hx        Review of Systems   Respiratory:  Negative for shortness of breath.    Cardiovascular:  Negative for chest pain.       Objective     /72 (BP Location: Left arm, Patient Position: Sitting, Cuff Size: Adult)   Pulse 79   Temp 98.2 °F (36.8 °C) (Infrared)   Ht 172.7 cm (68\")   Wt 78.9 kg (174 lb)   SpO2 95%   BMI 26.46 kg/m²     Physical Exam  Constitutional:       Appearance: He is well-developed.   Pulmonary:      Effort: Pulmonary effort is normal.   Musculoskeletal:         General: Normal range of motion.   Skin:     General: Skin is warm.   Neurological:      Mental Status: He is alert and oriented to person, place, and time.   Psychiatric:         Behavior: Behavior normal.         Lab Results - Last 18 Months   Lab Units 07/09/24  1549 06/26/24  0851 04/24/24  0849 03/25/24  0828   GLUCOSE mg/dL 100  --   --   --    BUN mg/dL 18  --   --   --    CREATININE mg/dL 0.7 0.90 0.90 0.90   SODIUM mmol/L 137  --   --   --    POTASSIUM mmol/L 4.3  --   --   --    CHLORIDE mmol/L 98  --   --   --    TOTAL CO2 mmol/L 26  --   --   --    TOTAL PROTEIN g/dL 7.7  --   --   --    ALBUMIN g/dL 4.2  --   --   --    ALT (SGPT) U/L 105*  --   --   --    AST (SGOT) U/L 58*  --   --   --    ALK PHOS U/L 433*  --   --   --    BILIRUBIN mg/dL 0.9  --   --   --            IMPRESSION/PLAN:    Assessment & Plan      Problem List Items Addressed This Visit          Endocrine and Metabolic    Weight loss    Overview     Endoscopy with small bowel biopsies normal 6/2022.  Follow-up colonoscopy showed nonspecific inflammation for which she was " started on Lialda 3 tablets daily.  TSH normal 5/2022.  Now that the diarrhea is in control, his weight has actually increased.  He has gained 5 pounds since June 2022.  He is at an ideal weight now.            Gastrointestinal Abdominal     History of adenomatous polyp of colon    Overview     Adenomatous colon polyps removed 6/2022.  Repeat colonoscopy 6/2027.         Gastroesophageal reflux disease without esophagitis    Overview     Martinez's or esophagitis 6/2022.  Continue pantoprazole 40 mg daily.  Had increasing symtoms so pantoprazole changed to Nexium twice a day.  He notes an increase in symptoms if he drinks bourbon.    Anti-reflux measures were reviewed and discussed with patient.  Pt advised to refrain from chocolate, alcohol, smoking, peppermint and caffeine.  Also advised to limit fatty foods, large meals, and eating late at nighttime.  Advised to reach an ideal body mass index.         Nonspecific colitis - Primary    Overview     Nonbloody watery diarrhea started in early 2022 nonresponsive to 2 courses of Flagyl.  TSH normal 5/2022.  Colonoscopy 6/2022 showed adenomatous polyp and biopsies from the colon showed nonspecific inflammation.  Liada 3 tablets daily was started.  No further complaints of diarrhea since starting Lialda.  We will continue.  If gets constipated on regular basis, can decrease lialda to 2 daily.    Stopped liada in early 2024 and doing well.         Choledocholithiasis    Overview     CT 6/2024:  Intrahepatic and extrahepatic biliary ductal dilatation extending  down to the ampulla where a 10 mm filling defect is present, likely  representing choledocholithiasis. Recommend GI referral for ERCP.    To have ERCP/EUS by Dr. Dangelo 7/2024.  Liver function tests are elevated.  Will need to monitor after procedures to see if they normalize.         Elevated liver function tests    Current Assessment & Plan     New problem found on blood work yesterday 7/2024.  Probably related to  possible bile duct stone for which he is scheduled to have ERCP/EUS.  Patient also admits to drinking alcohol on a regular basis so could be related to that.  Will need to monitor liver function test after ERCP/EUS.          MEDICAL COMPLEXITY must have 2 out of 3   Moderate Complexity Level 4                                                                                                              1 of the following medical problems:                                                                                               [x]One chronic illness with mild exacerbation                                                                                [x]Two or more stable chronic illness                                                                                              []One new problem  []One acute illness with systemic symptoms     Complexity of Data  Reviewed (1 out of the 3 following categories)                                             Category 1 tests, documents, historian (must have 3 points)                                                      []Review of prior external records  [x]Review of results of unique tests  []Ordering unique tests   []Assessment requires an independent historian   Category 2 Interpretation of tests     []Independent interpretation of test read by another doc   Category 3 Discuss Management/tests  []Discussion with external physician     Risk of complications and/or morbidity                                                                                           [x]Prescription Drug Management     [x]Decision for elective endoscopic procedure              RTC 3 months to follow up on LFTs      Kat Licea MD  07/10/24  15:17 CDT    Part of this note may be an electronic transcription/translation of spoken language to printed text.

## 2024-07-09 ENCOUNTER — OFFICE VISIT (OUTPATIENT)
Dept: GASTROENTEROLOGY | Age: 74
End: 2024-07-09
Payer: MEDICARE

## 2024-07-09 VITALS
WEIGHT: 177.2 LBS | BODY MASS INDEX: 25.37 KG/M2 | OXYGEN SATURATION: 96 % | HEIGHT: 70 IN | HEART RATE: 86 BPM | SYSTOLIC BLOOD PRESSURE: 122 MMHG | DIASTOLIC BLOOD PRESSURE: 72 MMHG

## 2024-07-09 DIAGNOSIS — K83.8 DILATION OF BILIARY TRACT: ICD-10-CM

## 2024-07-09 DIAGNOSIS — R93.2 ABNORMAL FINDING ON IMAGING OF LIVER: ICD-10-CM

## 2024-07-09 DIAGNOSIS — R93.2 ABNORMAL FINDING ON IMAGING OF LIVER: Primary | ICD-10-CM

## 2024-07-09 LAB
ALBUMIN SERPL-MCNC: 4.2 G/DL (ref 3.5–5.2)
ALP SERPL-CCNC: 433 U/L (ref 40–130)
ALT SERPL-CCNC: 105 U/L (ref 5–41)
ANION GAP SERPL CALCULATED.3IONS-SCNC: 13 MMOL/L (ref 7–19)
AST SERPL-CCNC: 58 U/L (ref 5–40)
BILIRUB SERPL-MCNC: 0.9 MG/DL (ref 0.2–1.2)
BUN SERPL-MCNC: 18 MG/DL (ref 8–23)
CALCIUM SERPL-MCNC: 9.5 MG/DL (ref 8.8–10.2)
CHLORIDE SERPL-SCNC: 98 MMOL/L (ref 98–111)
CO2 SERPL-SCNC: 26 MMOL/L (ref 22–29)
CREAT SERPL-MCNC: 0.7 MG/DL (ref 0.5–1.2)
GLUCOSE SERPL-MCNC: 100 MG/DL (ref 74–109)
POTASSIUM SERPL-SCNC: 4.3 MMOL/L (ref 3.5–5)
PROT SERPL-MCNC: 7.7 G/DL (ref 6.6–8.7)
SODIUM SERPL-SCNC: 137 MMOL/L (ref 136–145)

## 2024-07-09 PROCEDURE — 1123F ACP DISCUSS/DSCN MKR DOCD: CPT | Performed by: INTERNAL MEDICINE

## 2024-07-09 PROCEDURE — 99204 OFFICE O/P NEW MOD 45 MIN: CPT | Performed by: INTERNAL MEDICINE

## 2024-07-09 PROCEDURE — G8427 DOCREV CUR MEDS BY ELIG CLIN: HCPCS | Performed by: INTERNAL MEDICINE

## 2024-07-09 PROCEDURE — G8420 CALC BMI NORM PARAMETERS: HCPCS | Performed by: INTERNAL MEDICINE

## 2024-07-09 PROCEDURE — 4004F PT TOBACCO SCREEN RCVD TLK: CPT | Performed by: INTERNAL MEDICINE

## 2024-07-09 PROCEDURE — 3017F COLORECTAL CA SCREEN DOC REV: CPT | Performed by: INTERNAL MEDICINE

## 2024-07-09 RX ORDER — ASPIRIN 81 MG/1
81 TABLET ORAL DAILY
COMMUNITY

## 2024-07-09 RX ORDER — ERGOCALCIFEROL 1.25 MG/1
50000 CAPSULE, LIQUID FILLED ORAL WEEKLY
COMMUNITY
Start: 2024-03-09

## 2024-07-09 RX ORDER — ESOMEPRAZOLE MAGNESIUM 40 MG/1
40 CAPSULE, DELAYED RELEASE ORAL
COMMUNITY

## 2024-07-09 NOTE — PROGRESS NOTES
'    Jose Enrique Landeros  Primary Care Provider George Puentes MD  Referral Source Abby Srivastava MD Donalbert JULIAN Tigre is a 73 y.o.     Chief Complaint  Chief Complaint   Patient presents with    GI Problem     CBD stone on Chest CT         ASSESSMENT/PLAN:  1. Abnormal finding on imaging of liver  2. Dilation of biliary tract    - ERCP to be scheduled     - Check CMP today       Long discussion with drawings/examples and anatomy model regarding procedure, need, risk and side effects. He was agreeable to continue.   I have discussed the benefits, alternatives, and risks (including bleeding, perforation and death)  for pursuing Endoscopy (EGD/Colonscopy/EUS/ERCP) with the patient and they are willing to continue. We also discussed the need for anesthesia, IV access, proper dietary changes, medication changes if necessary, and need for bowel prep (if ordered) prior to their Endoscopic procedure.  They are aware they must have someone accompany them to their scheduled procedure to drive them home - they agree to the above and are willing to continue.                   HPI    74-year-old male referred for evaluation of abnormal imaging.  He was referred from Dr. Srivastava's office for an abnormal CT scan of the chest which was actually done on June 26    CT scan shows questionable filling defects in the distal common bile duct as well as biliary dilatation    Patient denies any significant abdominal pain.  He does occasionally note some light-colored stool.  He has had a change in appetite over the past few months but otherwise no focal symptoms.  No fevers chills no unexplained nausea or vomiting    He was referred for consideration of ERCP. he has no history of pancreatic disease that he is aware of    No history of pancreatitis    CT Chest With Contrast Diagnostic    Anatomical Region Laterality Modality   Chest -- Computed Tomography     Impression      1. Postoperative change of right upper lobe wedge resection

## 2024-07-10 ENCOUNTER — OFFICE VISIT (OUTPATIENT)
Dept: GASTROENTEROLOGY | Facility: CLINIC | Age: 74
End: 2024-07-10
Payer: MEDICARE

## 2024-07-10 VITALS
HEIGHT: 68 IN | BODY MASS INDEX: 26.37 KG/M2 | DIASTOLIC BLOOD PRESSURE: 72 MMHG | OXYGEN SATURATION: 95 % | TEMPERATURE: 98.2 F | HEART RATE: 79 BPM | WEIGHT: 174 LBS | SYSTOLIC BLOOD PRESSURE: 126 MMHG

## 2024-07-10 DIAGNOSIS — K21.9 GASTROESOPHAGEAL REFLUX DISEASE WITHOUT ESOPHAGITIS: ICD-10-CM

## 2024-07-10 DIAGNOSIS — R63.4 WEIGHT LOSS: ICD-10-CM

## 2024-07-10 DIAGNOSIS — K52.9 NONSPECIFIC COLITIS: Primary | ICD-10-CM

## 2024-07-10 DIAGNOSIS — R79.89 ELEVATED LIVER FUNCTION TESTS: ICD-10-CM

## 2024-07-10 DIAGNOSIS — Z86.010 HISTORY OF ADENOMATOUS POLYP OF COLON: ICD-10-CM

## 2024-07-10 DIAGNOSIS — K80.50 CHOLEDOCHOLITHIASIS: ICD-10-CM

## 2024-07-10 PROCEDURE — 1159F MED LIST DOCD IN RCRD: CPT | Performed by: INTERNAL MEDICINE

## 2024-07-10 PROCEDURE — 1160F RVW MEDS BY RX/DR IN RCRD: CPT | Performed by: INTERNAL MEDICINE

## 2024-07-10 PROCEDURE — 99214 OFFICE O/P EST MOD 30 MIN: CPT | Performed by: INTERNAL MEDICINE

## 2024-07-10 NOTE — ADDENDUM NOTE
Addended by: SUNDEEP PEDRO on: 7/10/2024 03:20 PM     Modules accepted: Level of Service    
on unit

## 2024-07-10 NOTE — LETTER
July 10, 2024       No Recipients    Patient: Se NJ   YOB: 1950   Date of Visit: 7/10/2024     Dear Diego Lucio MD:       Thank you for referring Se NJ to me for evaluation. Below are the relevant portions of my assessment and plan of care.    If you have questions, please do not hesitate to call me. I look forward to following Se along with you.         Sincerely,        Kta Licea MD        CC:   No Recipients    Kat Licea MD  07/10/24 1518  Sign when Signing Visit  Primary Physician: Diego Lucio MD    Chief Complaint   Patient presents with   • Follow-up     Pt presents today for weight loss, dysphagia, and bloating follow up-states overall he feels good but does feel like he has some swelling in his epigastric area; Pt saw Dr. Buzz Dangelo in the office yesterday for abnormality seen on CT Chest recently-is scheduled for EUS/ERCP at the end of the month and had labs for him yesterday that showed elevated LFT's       Subjective    Se NJ is a 73 y.o. male.    HPI  Microscopic colitis  Nonbloody watery diarrhea started in early 2022, nonresponsive to 2 courses of Flagyl.  Colonoscopy 6/2022 showed adenomatous polyp and biopsies from the colon showed nonspecific inflammation.  Liada 3 tablets daily was started.  He is much better now.  No diarrhea, and recently had constipation!  He stopped liada in early 2024 and still doing well.    Choledocholithiasis/elevated LFTS-new problem found on imaging  CT 6/2024:  Intrahepatic and extrahepatic biliary ductal dilatation extending  down to the ampulla where a 10 mm filling defect is present, likely  representing choledocholithiasis. Recommend GI referral for ERCP.    Referred to Dr. Dangelo.  He is scheduled to have EUS/ERCP later this month.  He had LFTs done yesterday--AST/ALT and ALP elevated.     Weight loss  Endoscopy with small bowel biopsies normal 6/2022.  Follow-up colonoscopy showed nonspecific  "inflammation for which she was started on Lialda 3 tablets daily.  His weight has stabilized.     Dysphagia  Endoscopy 6/2022 was unremarkable.  No dysphagia.  Taking protonix, but was changed nexium 40mg BID due to increasing heartburn after \"bourbin\".  \"I'm a controlled alcoholic\".  Now doing well.     Colon polyps  He has had adenomatous colon polyps removed intermittently over the years most recently 6/2022.  Repeat colonoscopy 6/2027.      Past Medical History:   Diagnosis Date   • Bronchitis    • COPD (chronic obstructive pulmonary disease)    • Diverticulosis    • Family history of colonic polyps    • GERD (gastroesophageal reflux disease)    • Hearing loss     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   • History of adenomatous polyp of colon    • History of colon polyps    • History of lung cancer     Right lung   • Hyperlipidemia        Past Surgical History:   Procedure Laterality Date   • BRONCHOSCOPY N/A 02/15/2021    Procedure: BRONCHOSCOPY;  Surgeon: Bruce Murillo MD;  Location: [facility];  Service: Cardiothoracic;  Laterality: N/A;   • CATARACT EXTRACTION     • COLONOSCOPY  11/20/2012    One 1cm tubular adenomatous polyp in the sigmoid colon; One 5mm hyperplastic polyp in the rectum; Diverticulosis; Repeat 4 years    • COLONOSCOPY N/A 03/30/2017    Two 4-6mm tubular adenomatous polyps at the hepatic flexure; One 5mm tubular adenomatous polyp in the transverse colon; One 11mm tubular adenomatous polyp in the sigmoid colon; One 6mm tubular adenomatous polyp in the rectum; Diverticulosis in the left colon; Repeat 3 years    • COLONOSCOPY  12/17/2009    One less than 5mm tubular adenomatous polyp in the cecum; One 5mm hyperplastic polyp in the transverse; One less than 3mm hyperplastic polyp in the rectum; Diverticulosis; Repeat 3 years    • COLONOSCOPY N/A 07/02/2020    Two 5-7mm tubular adenomatous polyps at the hepatic flexure; One 6mm tubular adenomatous polyp in the transverse colon; " Diverticulosis in the left colon; The entire examined colon is normal on direct and retroflexion views; Repeat 5 years   • COLONOSCOPY N/A 06/29/2022    Diverticulosis in the left colon; One 6mm tubular adenomatous polyp in the descending colon; One 4mm tubular adenomatous polyp in the descending colon; Congested mucosa in the entire examined colon-biopsied; Repeat 5 years   • ENDOSCOPY N/A 03/27/2019    Medium-sized HH; Normal stomach; Normal examined duodenum-biopsied   • ENDOSCOPY N/A 06/09/2022    Small HH; Non-erosive gastritis-biopsied; Normal examined duodenum-biopsied   • FOOT SURGERY Left     ORIF   • HERNIA REPAIR     • INNER EAR SURGERY     • MEDIASTINOSCOPY N/A 02/15/2021    Procedure: CERVICAL MEDIASTINOSCOPY WITH LYMPH NODE DISECTION;  Surgeon: Bruce Murillo MD;  Location: [facility] OR;  Service: Cardiothoracic;  Laterality: N/A;   • THORACOSCOPY Right 02/15/2021    Procedure: THORACOSCOPY, WEDGE RESECTION OF RIGHT UPPER LOBE WITH FROZEN,  , MEDIASTINAL LYMPH NODE DISSECTION;  Surgeon: Bruce Murillo MD;  Location: [facility] OR;  Service: Cardiothoracic;  Laterality: Right;   • TONSILLECTOMY          Current Outpatient Medications:   •  aspirin 81 MG EC tablet, Take 1 tablet by mouth Daily., Disp: , Rfl:   •  atorvastatin (LIPITOR) 10 MG tablet, Take 1 tablet by mouth Daily., Disp: , Rfl:   •  esomeprazole (nexIUM) 40 MG capsule, Take 1 capsule by mouth Every Morning Before Breakfast., Disp: , Rfl:   •  tiotropium bromide-olodaterol (Stiolto Respimat) 2.5-2.5 MCG/ACT aerosol solution inhaler, Inhale 2 puffs Daily., Disp: 3 each, Rfl: 11  •  vitamin D (ERGOCALCIFEROL) 1.25 MG (97193 UT) capsule capsule, Take 1 capsule by mouth 1 (One) Time Per Week., Disp: , Rfl:   •  albuterol sulfate HFA (ProAir HFA) 108 (90 Base) MCG/ACT inhaler, Inhale 2 puffs Every 4 (Four) Hours As Needed for Wheezing. (Patient not taking: Reported on 7/3/2024), Disp: 18 g, Rfl: 3    No Known Allergies    Social History  "    Socioeconomic History   • Marital status: Significant Other   Tobacco Use   • Smoking status: Every Day     Current packs/day: 0.50     Average packs/day: 0.5 packs/day for 55.5 years (27.8 ttl pk-yrs)     Types: Cigarettes     Start date: 1/1/1969     Passive exposure: Current   • Smokeless tobacco: Never   • Tobacco comments:     Pt states about 7-8 cigarettes per day 7/3   Vaping Use   • Vaping status: Never Used   Substance and Sexual Activity   • Alcohol use: Yes     Alcohol/week: 3.0 standard drinks of alcohol     Types: 3 Shots of liquor per week     Comment: sober as of 06/01/2022   • Drug use: Yes     Types: Marijuana   • Sexual activity: Yes     Partners: Female       Family History   Problem Relation Age of Onset   • Colon polyps Mother         Unknown age   • Diabetes Mother    • Colon cancer Neg Hx    • Esophageal cancer Neg Hx    • Liver cancer Neg Hx    • Liver disease Neg Hx    • Rectal cancer Neg Hx    • Stomach cancer Neg Hx        Review of Systems   Respiratory:  Negative for shortness of breath.    Cardiovascular:  Negative for chest pain.       Objective    /72 (BP Location: Left arm, Patient Position: Sitting, Cuff Size: Adult)   Pulse 79   Temp 98.2 °F (36.8 °C) (Infrared)   Ht 172.7 cm (68\")   Wt 78.9 kg (174 lb)   SpO2 95%   BMI 26.46 kg/m²     Physical Exam  Constitutional:       Appearance: He is well-developed.   Pulmonary:      Effort: Pulmonary effort is normal.   Musculoskeletal:         General: Normal range of motion.   Skin:     General: Skin is warm.   Neurological:      Mental Status: He is alert and oriented to person, place, and time.   Psychiatric:         Behavior: Behavior normal.         Lab Results - Last 18 Months   Lab Units 07/09/24  1549 06/26/24  0851 04/24/24  0849 03/25/24  0828   GLUCOSE mg/dL 100  --   --   --    BUN mg/dL 18  --   --   --    CREATININE mg/dL 0.7 0.90 0.90 0.90   SODIUM mmol/L 137  --   --   --    POTASSIUM mmol/L 4.3  --   --   -- "    CHLORIDE mmol/L 98  --   --   --    TOTAL CO2 mmol/L 26  --   --   --    TOTAL PROTEIN g/dL 7.7  --   --   --    ALBUMIN g/dL 4.2  --   --   --    ALT (SGPT) U/L 105*  --   --   --    AST (SGOT) U/L 58*  --   --   --    ALK PHOS U/L 433*  --   --   --    BILIRUBIN mg/dL 0.9  --   --   --            IMPRESSION/PLAN:    Assessment & Plan     Problem List Items Addressed This Visit          Endocrine and Metabolic    Weight loss    Overview     Endoscopy with small bowel biopsies normal 6/2022.  Follow-up colonoscopy showed nonspecific inflammation for which she was started on Lialda 3 tablets daily.  TSH normal 5/2022.  Now that the diarrhea is in control, his weight has actually increased.  He has gained 5 pounds since June 2022.  He is at an ideal weight now.            Gastrointestinal Abdominal     History of adenomatous polyp of colon    Overview     Adenomatous colon polyps removed 6/2022.  Repeat colonoscopy 6/2027.         Gastroesophageal reflux disease without esophagitis    Overview     Martinez's or esophagitis 6/2022.  Continue pantoprazole 40 mg daily.  Had increasing symtoms so pantoprazole changed to Nexium twice a day.  He notes an increase in symptoms if he drinks bourbon.    Anti-reflux measures were reviewed and discussed with patient.  Pt advised to refrain from chocolate, alcohol, smoking, peppermint and caffeine.  Also advised to limit fatty foods, large meals, and eating late at nighttime.  Advised to reach an ideal body mass index.         Nonspecific colitis - Primary    Overview     Nonbloody watery diarrhea started in early 2022 nonresponsive to 2 courses of Flagyl.  TSH normal 5/2022.  Colonoscopy 6/2022 showed adenomatous polyp and biopsies from the colon showed nonspecific inflammation.  Liada 3 tablets daily was started.  No further complaints of diarrhea since starting Lialda.  We will continue.  If gets constipated on regular basis, can decrease lialda to 2 daily.    Stopped liada  in early 2024 and doing well.         Choledocholithiasis    Overview     CT 6/2024:  Intrahepatic and extrahepatic biliary ductal dilatation extending  down to the ampulla where a 10 mm filling defect is present, likely  representing choledocholithiasis. Recommend GI referral for ERCP.    To have ERCP/EUS by Dr. Dangelo 7/2024.  Liver function tests are elevated.  Will need to monitor after procedures to see if they normalize.         Elevated liver function tests    Current Assessment & Plan     New problem found on blood work yesterday 7/2024.  Probably related to possible bile duct stone for which he is scheduled to have ERCP/EUS.  Patient also admits to drinking alcohol on a regular basis so could be related to that.  Will need to monitor liver function test after ERCP/EUS.          MEDICAL COMPLEXITY must have 2 out of 3   Moderate Complexity Level 4                                                                                                              1 of the following medical problems:                                                                                               [x]One chronic illness with mild exacerbation                                                                                [x]Two or more stable chronic illness                                                                                              []One new problem  []One acute illness with systemic symptoms     Complexity of Data  Reviewed (1 out of the 3 following categories)                                             Category 1 tests, documents, historian (must have 3 points)                                                      []Review of prior external records  [x]Review of results of unique tests  []Ordering unique tests   []Assessment requires an independent historian   Category 2 Interpretation of tests     []Independent interpretation of test read by another doc   Category 3 Discuss Management/tests  []Discussion  with external physician     Risk of complications and/or morbidity                                                                                           [x]Prescription Drug Management     [x]Decision for elective endoscopic procedure              RTC 3 months to follow up on LFTs      Kat Licea MD  07/10/24  15:17 CDT    Part of this note may be an electronic transcription/translation of spoken language to printed text.

## 2024-07-10 NOTE — ASSESSMENT & PLAN NOTE
New problem found on blood work yesterday 7/2024.  Probably related to possible bile duct stone for which he is scheduled to have ERCP/EUS.  Patient also admits to drinking alcohol on a regular basis so could be related to that.  Will need to monitor liver function test after ERCP/EUS.

## 2024-07-16 ENCOUNTER — NURSE TRIAGE (OUTPATIENT)
Dept: CALL CENTER | Facility: HOSPITAL | Age: 74
End: 2024-07-16
Payer: MEDICARE

## 2024-07-16 NOTE — TELEPHONE ENCOUNTER
New Med Request  07/16/2024  Calling to request inhaler samples that she was able to give him at last appointment. Asking if she can get additional samples for him. I requested he call back after lunch to make sure the message was received.

## 2024-07-16 NOTE — TELEPHONE ENCOUNTER
Reason for Disposition   [1] Caller has NON-URGENT medicine question about med that PCP prescribed AND [2] triager unable to answer question    Additional Information   Negative: [1] Intentional drug overdose AND [2] suicidal thoughts or ideas   Negative: Drug overdose and triager unable to answer question   Negative: Caller requesting information unrelated to medicine   Negative: Caller requesting information about COVID-19 Vaccine   Negative: Caller requesting information about Emergency Contraception   Negative: Caller requesting information about Combined Birth Control Pills   Negative: Caller requesting information about Progestin Birth Control Pills   Negative: Caller requesting information about Post-Op pain or medicines   Negative: Caller requesting a prescription antibiotic (such as Penicillin) for Strep throat and has a positive culture result   Negative: Caller requesting a prescription anti-viral med (such as Tamiflu) and has influenza (flu) symptoms   Negative: Immunization reaction suspected   Negative: Rash while taking a medicine or within 3 days of stopping it   Negative: [1] Asthma and [2] having symptoms of asthma (cough, wheezing, etc.)   Negative: [1] Symptom of illness (e.g., headache, abdominal pain, earache, vomiting) AND [2] more than mild   Negative: Breastfeeding questions about mother's medicines and diet   Caller requesting a renewal or refill of a medicine patient is currently taking   Negative: New-onset or worsening symptoms, see that guideline (e.g., diarrhea, runny nose, sore throat)   Negative: Medicine question not related to refill or renewal   Negative: Caller (e.g., patient or pharmacist) requesting information about a new medicine   Negative: Caller requesting information unrelated to medicine   Negative: [1] Prescription refill request for ESSENTIAL medicine (i.e., likelihood of harm to patient if not taken) AND [2] triager unable to refill per department policy   Negative:  "[1] Prescription not at pharmacy AND [2] was prescribed by PCP recently  (Exception: Triager has access to EMR and prescription is recorded there. Go to Home Care and confirm for pharmacy.)   Negative: [1] Pharmacy calling with prescription questions AND [2] triager unable to answer question   Negative: Prescription request for new medicine (not a refill)   Negative: Caller requesting a CONTROLLED substance prescription refill (e.g., narcotics, ADHD medicines)   Negative: [1] Prescription refill request for NON-ESSENTIAL medicine (i.e., no harm to patient if med not taken) AND [2] triager unable to refill per department policy    Answer Assessment - Initial Assessment Questions  1. NAME of MEDICINE: \"What medicine(s) are you calling about?\"      Inhalers  2. QUESTION: \"What is your question?\" (e.g., double dose of medicine, side effect)      Asking if Rubina can get him more samples like the ones he got at his last appointment  3. PRESCRIBER: \"Who prescribed the medicine?\" Reason: if prescribed by specialist, call should be referred to that group.      Rubina   4. SYMPTOMS: \"Do you have any symptoms?\" If Yes, ask: \"What symptoms are you having?\"  \"How bad are the symptoms (e.g., mild, moderate, severe)      Shortness of breath  5. PREGNANCY:  \"Is there any chance that you are pregnant?\" \"When was your last menstrual period?\"      na    Answer Assessment - Initial Assessment Questions  1. DRUG NAME: \"What medicine do you need to have refilled?\"      Inhalers   2. REFILLS REMAINING: \"How many refills are remaining?\" (Note: The label on the medicine or pill bottle will show how many refills are remaining. If there are no refills remaining, then a renewal may be needed.)      None he was given samples at this last office visit   3. EXPIRATION DATE: \"What is the expiration date?\" (Note: The label states when the prescription will , and thus can no longer be refilled.)      He has run out  4. PRESCRIBING HCP: \"Who " "prescribed it?\" Reason: If prescribed by specialist, call should be referred to that group.       Rubina  5. SYMPTOMS: \"Do you have any symptoms?\"      Shortness of breath  6. PREGNANCY: \"Is there any chance that you are pregnant?\" \"When was your last menstrual period?\"      *No Answer*    Protocols used: Medication Question Call-ADULT-, Medication Refill and Renewal Call-ADULT-    "

## 2024-07-29 ENCOUNTER — TELEPHONE (OUTPATIENT)
Dept: GASTROENTEROLOGY | Age: 74
End: 2024-07-29

## 2024-07-29 NOTE — TELEPHONE ENCOUNTER
Called patient to remind them of their procedure with Dr. Virgil Simms  at [facility]  on 7/30/24  to arrive at 1030AM     PHONE NOT ACCEPTING CALLS

## 2024-07-30 ENCOUNTER — HOSPITAL ENCOUNTER (OUTPATIENT)
Age: 74
Setting detail: OUTPATIENT SURGERY
Discharge: HOME | End: 2024-07-30
Attending: INTERNAL MEDICINE | Admitting: INTERNAL MEDICINE
Payer: MEDICARE

## 2024-07-30 ENCOUNTER — ANESTHESIA (OUTPATIENT)
Dept: ENDOSCOPY | Age: 74
End: 2024-07-30
Payer: MEDICARE

## 2024-07-30 ENCOUNTER — APPOINTMENT (OUTPATIENT)
Dept: GENERAL RADIOLOGY | Age: 74
End: 2024-07-30
Attending: INTERNAL MEDICINE
Payer: MEDICARE

## 2024-07-30 ENCOUNTER — ANESTHESIA EVENT (OUTPATIENT)
Dept: ENDOSCOPY | Age: 74
End: 2024-07-30
Payer: MEDICARE

## 2024-07-30 VITALS
SYSTOLIC BLOOD PRESSURE: 140 MMHG | DIASTOLIC BLOOD PRESSURE: 70 MMHG | RESPIRATION RATE: 16 BRPM | OXYGEN SATURATION: 95 % | WEIGHT: 173 LBS | TEMPERATURE: 97.9 F | HEART RATE: 55 BPM | HEIGHT: 70 IN | BODY MASS INDEX: 24.77 KG/M2

## 2024-07-30 DIAGNOSIS — K80.50 CHOLEDOCHOLITHIASIS: ICD-10-CM

## 2024-07-30 DIAGNOSIS — D37.09 NEOPLASM OF UNCERTAIN BEHAVIOR OF VESTIBULE OF MOUTH: Primary | ICD-10-CM

## 2024-07-30 PROCEDURE — C2625 STENT, NON-COR, TEM W/DEL SY: HCPCS | Performed by: INTERNAL MEDICINE

## 2024-07-30 PROCEDURE — 3700000001 HC ADD 15 MINUTES (ANESTHESIA): Performed by: INTERNAL MEDICINE

## 2024-07-30 PROCEDURE — 43264 ERCP REMOVE DUCT CALCULI: CPT | Performed by: INTERNAL MEDICINE

## 2024-07-30 PROCEDURE — 6370000000 HC RX 637 (ALT 250 FOR IP): Performed by: INTERNAL MEDICINE

## 2024-07-30 PROCEDURE — 7100000001 HC PACU RECOVERY - ADDTL 15 MIN: Performed by: INTERNAL MEDICINE

## 2024-07-30 PROCEDURE — 7100000000 HC PACU RECOVERY - FIRST 15 MIN: Performed by: INTERNAL MEDICINE

## 2024-07-30 PROCEDURE — 7100000011 HC PHASE II RECOVERY - ADDTL 15 MIN: Performed by: INTERNAL MEDICINE

## 2024-07-30 PROCEDURE — 3700000000 HC ANESTHESIA ATTENDED CARE: Performed by: INTERNAL MEDICINE

## 2024-07-30 PROCEDURE — 43274 ERCP DUCT STENT PLACEMENT: CPT | Performed by: INTERNAL MEDICINE

## 2024-07-30 PROCEDURE — 3609015100 HC ERCP STENT PLACEMENT BILIARY/PANCREATIC DUCT: Performed by: INTERNAL MEDICINE

## 2024-07-30 PROCEDURE — 3609015200 HC ERCP REMOVE CALCULI/DEBRIS BILIARY/PANCREAS DUCT: Performed by: INTERNAL MEDICINE

## 2024-07-30 PROCEDURE — 43277 ERCP EA DUCT/AMPULLA DILATE: CPT | Performed by: INTERNAL MEDICINE

## 2024-07-30 PROCEDURE — 2580000003 HC RX 258: Performed by: INTERNAL MEDICINE

## 2024-07-30 PROCEDURE — C2617 STENT, NON-COR, TEM W/O DEL: HCPCS | Performed by: INTERNAL MEDICINE

## 2024-07-30 PROCEDURE — C1769 GUIDE WIRE: HCPCS | Performed by: INTERNAL MEDICINE

## 2024-07-30 PROCEDURE — 74328 X-RAY BILE DUCT ENDOSCOPY: CPT

## 2024-07-30 PROCEDURE — 2500000003 HC RX 250 WO HCPCS: Performed by: NURSE ANESTHETIST, CERTIFIED REGISTERED

## 2024-07-30 PROCEDURE — 6360000002 HC RX W HCPCS: Performed by: NURSE ANESTHETIST, CERTIFIED REGISTERED

## 2024-07-30 PROCEDURE — 7100000010 HC PHASE II RECOVERY - FIRST 15 MIN: Performed by: INTERNAL MEDICINE

## 2024-07-30 PROCEDURE — 2709999900 HC NON-CHARGEABLE SUPPLY: Performed by: INTERNAL MEDICINE

## 2024-07-30 PROCEDURE — C1726 CATH, BAL DIL, NON-VASCULAR: HCPCS | Performed by: INTERNAL MEDICINE

## 2024-07-30 PROCEDURE — 2720000010 HC SURG SUPPLY STERILE: Performed by: INTERNAL MEDICINE

## 2024-07-30 PROCEDURE — 74328 X-RAY BILE DUCT ENDOSCOPY: CPT | Performed by: INTERNAL MEDICINE

## 2024-07-30 PROCEDURE — 2580000003 HC RX 258: Performed by: NURSE ANESTHETIST, CERTIFIED REGISTERED

## 2024-07-30 PROCEDURE — 3609014300 HC ERCP BALLOON DILATE BILIARY/PANC DUCT/AMPULLA EA: Performed by: INTERNAL MEDICINE

## 2024-07-30 DEVICE — BILIARY STENT WITH NAVIFLEXTM RX DELIVERY SYSTEM
Type: IMPLANTABLE DEVICE | Site: BILE DUCT | Status: FUNCTIONAL
Brand: ADVANIX™ BILIARY

## 2024-07-30 DEVICE — PANCREATIC STENT
Type: IMPLANTABLE DEVICE | Site: BILE DUCT | Status: FUNCTIONAL
Brand: ADVANIX™ PANCREATIC STENT

## 2024-07-30 RX ORDER — HYDROMORPHONE HYDROCHLORIDE 1 MG/ML
0.5 INJECTION, SOLUTION INTRAMUSCULAR; INTRAVENOUS; SUBCUTANEOUS EVERY 5 MIN PRN
Status: DISCONTINUED | OUTPATIENT
Start: 2024-07-30 | End: 2024-07-30 | Stop reason: HOSPADM

## 2024-07-30 RX ORDER — LIDOCAINE HYDROCHLORIDE 10 MG/ML
INJECTION, SOLUTION INFILTRATION; PERINEURAL PRN
Status: DISCONTINUED | OUTPATIENT
Start: 2024-07-30 | End: 2024-07-30 | Stop reason: SDUPTHER

## 2024-07-30 RX ORDER — ONDANSETRON 2 MG/ML
INJECTION INTRAMUSCULAR; INTRAVENOUS PRN
Status: DISCONTINUED | OUTPATIENT
Start: 2024-07-30 | End: 2024-07-30 | Stop reason: SDUPTHER

## 2024-07-30 RX ORDER — SODIUM CHLORIDE, SODIUM LACTATE, POTASSIUM CHLORIDE, CALCIUM CHLORIDE 600; 310; 30; 20 MG/100ML; MG/100ML; MG/100ML; MG/100ML
INJECTION, SOLUTION INTRAVENOUS CONTINUOUS
Status: DISCONTINUED | OUTPATIENT
Start: 2024-07-30 | End: 2024-07-30 | Stop reason: HOSPADM

## 2024-07-30 RX ORDER — NALOXONE HYDROCHLORIDE 0.4 MG/ML
INJECTION, SOLUTION INTRAMUSCULAR; INTRAVENOUS; SUBCUTANEOUS PRN
Status: DISCONTINUED | OUTPATIENT
Start: 2024-07-30 | End: 2024-07-30 | Stop reason: HOSPADM

## 2024-07-30 RX ORDER — SODIUM CHLORIDE 9 MG/ML
INJECTION, SOLUTION INTRAVENOUS PRN
Status: DISCONTINUED | OUTPATIENT
Start: 2024-07-30 | End: 2024-07-30 | Stop reason: HOSPADM

## 2024-07-30 RX ORDER — SODIUM CHLORIDE, SODIUM LACTATE, POTASSIUM CHLORIDE, CALCIUM CHLORIDE 600; 310; 30; 20 MG/100ML; MG/100ML; MG/100ML; MG/100ML
INJECTION, SOLUTION INTRAVENOUS CONTINUOUS PRN
Status: DISCONTINUED | OUTPATIENT
Start: 2024-07-30 | End: 2024-07-30 | Stop reason: SDUPTHER

## 2024-07-30 RX ORDER — FENTANYL CITRATE 50 UG/ML
INJECTION, SOLUTION INTRAMUSCULAR; INTRAVENOUS PRN
Status: DISCONTINUED | OUTPATIENT
Start: 2024-07-30 | End: 2024-07-30 | Stop reason: SDUPTHER

## 2024-07-30 RX ORDER — HYDROMORPHONE HYDROCHLORIDE 1 MG/ML
0.25 INJECTION, SOLUTION INTRAMUSCULAR; INTRAVENOUS; SUBCUTANEOUS EVERY 5 MIN PRN
Status: DISCONTINUED | OUTPATIENT
Start: 2024-07-30 | End: 2024-07-30 | Stop reason: HOSPADM

## 2024-07-30 RX ORDER — PROPOFOL 10 MG/ML
INJECTION, EMULSION INTRAVENOUS PRN
Status: DISCONTINUED | OUTPATIENT
Start: 2024-07-30 | End: 2024-07-30 | Stop reason: SDUPTHER

## 2024-07-30 RX ORDER — INDOMETHACIN 100 MG
SUPPOSITORY, RECTAL RECTAL PRN
Status: DISCONTINUED | OUTPATIENT
Start: 2024-07-30 | End: 2024-07-30 | Stop reason: HOSPADM

## 2024-07-30 RX ORDER — SODIUM CHLORIDE 0.9 % (FLUSH) 0.9 %
5-40 SYRINGE (ML) INJECTION PRN
Status: DISCONTINUED | OUTPATIENT
Start: 2024-07-30 | End: 2024-07-30 | Stop reason: HOSPADM

## 2024-07-30 RX ORDER — ROCURONIUM BROMIDE 10 MG/ML
INJECTION, SOLUTION INTRAVENOUS PRN
Status: DISCONTINUED | OUTPATIENT
Start: 2024-07-30 | End: 2024-07-30 | Stop reason: SDUPTHER

## 2024-07-30 RX ORDER — ONDANSETRON 2 MG/ML
4 INJECTION INTRAMUSCULAR; INTRAVENOUS
Status: DISCONTINUED | OUTPATIENT
Start: 2024-07-30 | End: 2024-07-30 | Stop reason: HOSPADM

## 2024-07-30 RX ORDER — INDOMETHACIN 100 MG
100 SUPPOSITORY, RECTAL RECTAL EVERY 12 HOURS PRN
Status: DISCONTINUED | OUTPATIENT
Start: 2024-07-30 | End: 2024-07-30 | Stop reason: HOSPADM

## 2024-07-30 RX ORDER — SODIUM CHLORIDE 0.9 % (FLUSH) 0.9 %
5-40 SYRINGE (ML) INJECTION EVERY 12 HOURS SCHEDULED
Status: DISCONTINUED | OUTPATIENT
Start: 2024-07-30 | End: 2024-07-30 | Stop reason: HOSPADM

## 2024-07-30 RX ADMIN — PROPOFOL 150 MG: 10 INJECTION, EMULSION INTRAVENOUS at 13:03

## 2024-07-30 RX ADMIN — SODIUM CHLORIDE, SODIUM LACTATE, POTASSIUM CHLORIDE, AND CALCIUM CHLORIDE: 600; 310; 30; 20 INJECTION, SOLUTION INTRAVENOUS at 13:01

## 2024-07-30 RX ADMIN — SUGAMMADEX 300 MG: 100 INJECTION, SOLUTION INTRAVENOUS at 14:03

## 2024-07-30 RX ADMIN — SODIUM CHLORIDE, SODIUM LACTATE, POTASSIUM CHLORIDE, AND CALCIUM CHLORIDE: 600; 310; 30; 20 INJECTION, SOLUTION INTRAVENOUS at 11:12

## 2024-07-30 RX ADMIN — ONDANSETRON 4 MG: 2 INJECTION INTRAMUSCULAR; INTRAVENOUS at 14:03

## 2024-07-30 RX ADMIN — ROCURONIUM BROMIDE 50 MG: 10 INJECTION, SOLUTION INTRAVENOUS at 13:04

## 2024-07-30 RX ADMIN — GLUCAGON HYDROCHLORIDE 1 MG: KIT at 13:23

## 2024-07-30 RX ADMIN — LIDOCAINE HYDROCHLORIDE 50 MG: 10 INJECTION, SOLUTION INFILTRATION; PERINEURAL at 13:03

## 2024-07-30 RX ADMIN — FENTANYL CITRATE 100 MCG: 50 INJECTION INTRAMUSCULAR; INTRAVENOUS at 13:03

## 2024-07-30 ASSESSMENT — LIFESTYLE VARIABLES: SMOKING_STATUS: 1

## 2024-07-30 ASSESSMENT — PAIN - FUNCTIONAL ASSESSMENT
PAIN_FUNCTIONAL_ASSESSMENT: 0-10
PAIN_FUNCTIONAL_ASSESSMENT: NONE - DENIES PAIN
PAIN_FUNCTIONAL_ASSESSMENT: 0-10

## 2024-07-30 NOTE — H&P
Patient Name: Jose Enrique Landeros  : 1950  MRN: [id number]  DATE: 24    Allergies: No Known Allergies     ENDOSCOPY  History and Physical    Procedure:    [] Diagnostic Colonoscopy       [] Screening Colonoscopy  [] EGD      [x] ERCP      [] EUS       [] Other    [x] Previous office notes/History and Physical reviewed from the patients chart. Please see EMR for further details of HPI. I have examined the patient's status immediately prior to the procedure and:      Indications/HPI:    []Abdominal Pain   []Barretts  []Screening/Surveillance   []History of Polyps  []Dysphagia            [] +Cologard/DNA testing  [x]Abnormal Imaging              []EOE Hx              [] Family Hx of CRC/Polyps  []Anemia                            []Food Impaction       []Recent Poor Prep  []GI Bleed             []Lymphadenopathy  []History of Polyps  []Change in bowel habits []Heartburn/Reflux  []Cancer- GI/Lung  []Chest Pain - Non Cardiac []Heme (+) Stool []Ulcers  []Constipation  []Hemoptysis  []Incontinence    []Diarrhea  []Hypoxemia  []Rectal Bleed (BRBPR)  []Nausea/Vomiting   [] Varices  []Crohns/Colitis  []Pancreatic Cyst   [] Cirrhosis   []Pancreatitis    []Abnormal MRCP  [x]Elevated LFT [] Stent Removal, Previous ERCP  []Other:     Anesthesia:   [x] MAC [] Moderate Sedation   [] General   [] None     ROS: 12 pt Review of Symptoms was negative unless mentioned above    Medications:   Prior to Admission medications    Medication Sig Start Date End Date Taking? Authorizing Provider   esomeprazole (NEXIUM) 40 MG delayed release capsule Take 1 capsule by mouth every morning (before breakfast)    Mandy Vitale MD   aspirin 81 MG EC tablet Take 1 tablet by mouth daily    Mandy Vitale MD   tiotropium-olodaterol (STIOLTO RESPIMAT) 2.5-2.5 MCG/ACT AERS Inhale 2 puffs into the lungs in the morning. 24   Mandy Vitale MD   vitamin D (ERGOCALCIFEROL) 1.25 MG (66822 UT) CAPS capsule Take 1 capsule

## 2024-07-30 NOTE — OP NOTE
Endoscopic Procedure Note    Patient: Jose Enrique Landeros : 1950  Med Rec#: [id number] Acc#: [id number]     Primary Care Provider George Puentes MD  Referring Provider: Loren JAIME    Endoscopist: Virgil Simms MD    Date of Procedure:  2024     Procedure:   1. ERCP with stone removal  2. ERCP with prophylactic pancreatic stent placement   3. ERCP with biliary stent placement   4. ERCP with balloon ampullary sphincteroplasty   5. Intra procedure interpretation of biliary/pancreatic fluoroscopy 08502    Indications:   1. Abnormal imaging, abdominal pain, elevated LFTs  2. Recent chills.     Anesthesia:  General     Estimated Blood Loss: minimal    Procedure:   Prior to the procedure the patient's chart was reviewed and informed consent was obtained.  Risk and Benefits (Risks including but not limited to bleeding, perforation, infection, 8 to 10% risk of post ERCP pancreatitis, and even death) were discussed with the patient. They were agreeable to continue.     Patient was brought to the operating room, underwent general anesthesia, and placed in the prone position.  A side-viewing duodenoscope was advanced from the oropharynx down to the distal duodenum and reduced into a short position opposite the ampulla. The ampulla appeared edematous with a large deepak-ampullary diverticulum. A  film was obtained which appeared normal.     Initial cannulation attempts resulted in pancreatic ductal cannulation x 2.  After the second cannulation a 2 wire approach was used.  The initial wire was left in the pancreatic duct and attempts at biliary cannulation were made with a 0.025 revolution Jagwire.  These again were unsuccessful with recurrent pancreatic duct cannulations.  At this point I elected to use a prophylactic pancreatic stent to help anchor the pancreatic orifice and place.  The main pancreatic duct was stented with a 5 Bhutanese x 5 cm prophylactic stent with an external pigtail and no internal

## 2024-07-30 NOTE — DISCHARGE INSTRUCTIONS
RECOMMENDATIONS:    1.  Clear liquid diet today with advancing diet tomorrow as tolerated.   2.  Repeat ERCP in 3 months for biliary stent removal  3.  Check abdominal x-ray in 2 weeks to confirm passage of prophylactic pancreatic stent  4.  Patient will need general surgery referral for consideration of cholecystectomy        Endoscopic Retrograde Cholangiopancreatogram (ERCP): What to Expect at Home  Your Recovery  After you have an endoscopic retrograde cholangiopancreatogram (ERCP), you probably will stay at the hospital or clinic for 1 to 2 hours. This will allow the medicine to wear off. You will be able to go home after your doctor or a nurse checks to make sure you are not having any problems. If you stay in the hospital overnight, you may go home the next day.  You may have a sore throat for a day or two after the procedure.  This care sheet gives you a general idea about how long it will take for you to recover. But each person recovers at a different pace. Follow the steps below to get better as quickly as possible.  How can you care for yourself at home?  Activity    Rest as much as you need to after you go home.     You should be able to go back to your usual activities the day after the procedure.   Diet    Follow your doctor's directions for eating after the procedure.     Drink plenty of fluids (unless your doctor tells you not to).   Medicines    Your doctor will tell you if and when you can restart your medicines. The doctor will also give you instructions about taking any new medicines.     If you stopped taking aspirin or some other blood thinner, your doctor will tell you when to start taking it again.     If you have a sore throat the next day, use an over-the-counter spray to numb your throat. Be safe with medicines. Read and follow all instructions on the label.   Follow-up care is a key part of your treatment and safety. Be sure to make and go to all appointments, and call your doctor if you

## 2024-07-30 NOTE — PROGRESS NOTES
Pt eating ice chips. No n/v, throat discomfort ot abd pain. Instrucyed on CL diet until tomorrow morning.

## 2024-07-30 NOTE — ANESTHESIA PRE PROCEDURE
Department of Anesthesiology  Preprocedure Note       Name:  Jose Enrique Landeros   Age:  73 y.o.  :  1950                                          MRN:  [id number]         Date:  2024      Surgeon: Surgeon(s):  Virgil Simms MD    Procedure: Procedure(s):  ENDOSCOPIC RETROGRADE CHOLANGIOPANCREATOGRAPHY DIAGNOSTIC    Medications prior to admission:   Prior to Admission medications    Medication Sig Start Date End Date Taking? Authorizing Provider   esomeprazole (NEXIUM) 40 MG delayed release capsule Take 1 capsule by mouth every morning (before breakfast)    Mandy Vitale MD   aspirin 81 MG EC tablet Take 1 tablet by mouth daily    Mandy Vitale MD   tiotropium-olodaterol (STIOLTO RESPIMAT) 2.5-2.5 MCG/ACT AERS Inhale 2 puffs into the lungs in the morning. 24   Mandy Vitale MD   vitamin D (ERGOCALCIFEROL) 1.25 MG (30963 UT) CAPS capsule Take 1 capsule by mouth once a week 3/9/24   Mandy Vitale MD   atorvastatin (LIPITOR) 10 MG tablet atorvastatin 10 mg tablet   Take 1 tablet every day by oral route. 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

## 2024-07-30 NOTE — ANESTHESIA POSTPROCEDURE EVALUATION
Department of Anesthesiology  Postprocedure Note    Patient: Jose Enrique Landeros  MRN: [id number]  YOB: 1950  Date of evaluation: 7/30/2024    Procedure Summary       Date: 07/30/24 Room / Location: [place] [address] / [facility]    Anesthesia Start: 1301 Anesthesia Stop: 1417    Procedures:       ENDOSCOPIC RETROGRADE CHOLANGIOPANCREATOGRAPHY DIAGNOSTIC (Abdomen)      ENDOSCOPIC RETROGRADE CHOLANGIOPANCREATOGRAPHY DILATION BALLOON (Abdomen)      ENDOSCOPIC RETROGRADE CHOLANGIOPANCREATOGRAPHY STONE REMOVAL (Abdomen) Diagnosis:       Choledocholithiasis      (Choledocholithiasis [K80.50])    Surgeons: Virgil Simms MD Responsible Provider: Anjel Lopez APRN - CRNA    Anesthesia Type: general ASA Status: 3            Anesthesia Type: No value filed.    Jai Phase I: Jai Score: 10    Jai Phase II:      Anesthesia Post Evaluation    Patient location during evaluation: PACU  Patient participation: complete - patient participated  Level of consciousness: sleepy but conscious  Pain score: 0  Airway patency: patent  Nausea & Vomiting: no nausea and no vomiting  Cardiovascular status: blood pressure returned to baseline  Respiratory status: acceptable  Pain management: adequate        No notable events documented.

## 2024-07-31 ENCOUNTER — TELEPHONE (OUTPATIENT)
Dept: GASTROENTEROLOGY | Age: 74
End: 2024-07-31

## 2024-07-31 DIAGNOSIS — K83.8 DILATION OF BILIARY TRACT: ICD-10-CM

## 2024-07-31 DIAGNOSIS — R93.2 ABNORMAL FINDING ON IMAGING OF LIVER: Primary | ICD-10-CM

## 2024-07-31 DIAGNOSIS — Z98.890 HISTORY OF BILIARY STENT INSERTION: ICD-10-CM

## 2024-07-31 NOTE — TELEPHONE ENCOUNTER
Jaime,    Per Dr Simms:    IMPRESSION:  1.  Choledocholithiasis status post balloon sweep extraction with balloon assisted sphincteroplasty  2.  Placement of biliary stent as well as prophylactic pancreatic stent     RECOMMENDATIONS:    1.  Clear liquid diet today with advancing diet tomorrow as tolerated.   2.  Repeat ERCP in 3 months for biliary stent removal  3.  Check abdominal x-ray in 2 weeks to confirm passage of prophylactic pancreatic stent  4.  Patient will need general surgery referral for consideration of cholecystectomy        The results were discussed with the patient and family.  A copy of the images obtained were given to the patient.      Virgil Simms MD  7/30/2024  2:05 PM            Electronically signed by Virgil Simms MD at 7/30/2024  2:14 PM

## 2024-08-07 ENCOUNTER — OFFICE VISIT (OUTPATIENT)
Dept: OTOLARYNGOLOGY | Facility: CLINIC | Age: 74
End: 2024-08-07
Payer: MEDICARE

## 2024-08-07 VITALS
SYSTOLIC BLOOD PRESSURE: 133 MMHG | HEIGHT: 68 IN | BODY MASS INDEX: 26.52 KG/M2 | DIASTOLIC BLOOD PRESSURE: 78 MMHG | WEIGHT: 175 LBS | TEMPERATURE: 97.4 F | HEART RATE: 88 BPM

## 2024-08-07 DIAGNOSIS — Z72.0 TOBACCO USE: ICD-10-CM

## 2024-08-07 DIAGNOSIS — R49.0 HOARSENESS: Primary | ICD-10-CM

## 2024-08-07 DIAGNOSIS — K21.9 LARYNGOPHARYNGEAL REFLUX (LPR): ICD-10-CM

## 2024-08-07 NOTE — PATIENT INSTRUCTIONS
CONTACT INFORMATION:  The main office phone number is [phone]. For emergencies after hours and on weekends, this number will convert over to our answering service and the on call provider will answer. Please try to keep non emergent phone calls/ questions to office hours 9am-5pm Monday through Friday.      [employer]  As an alternative, you can sign up and use the Epic MyChart system for more direct and quicker access for non emergent questions/ problems.  [employer] allows you to send messages to your doctor, view your test results, renew your prescriptions, schedule appointments, and more. To sign up, go to [employer] and click on the Sign Up Now link in the New User? box. Enter your [employer] Activation Code exactly as it appears below along with the last four digits of your Social Security Number and your Date of Birth () to complete the sign-up process. If you do not sign up before the expiration date, you must request a new code.     [employer] Activation Code: Activation code not generated  Current [employer] Status: Active     If you have questions, you can email [email or web address] or call [phone] to talk to our [employer] staff. Remember, [employer] is NOT to be used for urgent needs. For medical emergencies, dial 911.     IF YOU SMOKE OR USE TOBACCO PLEASE READ THE FOLLOWING:  Why is smoking bad for me?  Smoking increases the risk of heart disease, lung disease, vascular disease, stroke, and cancer. If you smoke, STOP!        IF YOU SMOKE OR USE TOBACCO PLEASE READ THE FOLLOWING:  Why is smoking bad for me?  Smoking increases the risk of heart disease, lung disease, vascular disease, stroke, and cancer. If you smoke, STOP!     For more information:  Quit Now [place]  -QUIT-NOW  https://[email or web address].[email or web address]/en-[email or web address]/

## 2024-08-07 NOTE — PROGRESS NOTES
YOB: 1950  Location: [facility]  Location Address: [address] [place] [address], [facility], [address] [place] [address]  Location Phone: [phone]    Chief Complaint   Patient presents with    Hoarse       History of Present Illness  Se NJ is a 73 y.o. male.  Se NJ is here for follow up of ENT complaints. The patient has had problems with globus sensation, sore throat, and hoarseness. At the last office visit, Protonix was increased to twice daily before breakfast and dinner.  He states that he is still with intermittent hoarseness.  He has had a CT neck in the past that was correlated in office with direct visualization-this was normal.     He has had an endoscopy in  by Dr. Licea.    He is scheduled for a gallbladder evaluation in a few weeks and would like to hold off on any further treatment through ENT until this is taken care of.    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    History of adenomatous polyp of colon     History of colon polyps     History of lung cancer     Right lung    Hyperlipidemia        Past Surgical History:   Procedure Laterality Date    BRONCHOSCOPY N/A 02/15/2021    Procedure: BRONCHOSCOPY;  Surgeon: Bruce Murillo MD;  Location: [facility];  Service: Cardiothoracic;  Laterality: N/A;    CATARACT EXTRACTION      COLONOSCOPY  2012    One 1cm tubular adenomatous polyp in the sigmoid colon; One 5mm hyperplastic polyp in the rectum; Diverticulosis; Repeat 4 years     COLONOSCOPY N/A 2017    Two 4-6mm tubular adenomatous polyps at the hepatic flexure; One 5mm tubular adenomatous polyp in the transverse colon; One 11mm tubular adenomatous polyp in the sigmoid colon; One 6mm tubular adenomatous polyp in the rectum; Diverticulosis in  the left colon; Repeat 3 years     COLONOSCOPY  12/17/2009    One less than 5mm tubular adenomatous polyp in the cecum; One 5mm hyperplastic polyp in the transverse; One less than 3mm hyperplastic polyp in the rectum; Diverticulosis; Repeat 3 years     COLONOSCOPY N/A 07/02/2020    Two 5-7mm tubular adenomatous polyps at the hepatic flexure; One 6mm tubular adenomatous polyp in the transverse colon; Diverticulosis in the left colon; The entire examined colon is normal on direct and retroflexion views; Repeat 5 years    COLONOSCOPY N/A 06/29/2022    Diverticulosis in the left colon; One 6mm tubular adenomatous polyp in the descending colon; One 4mm tubular adenomatous polyp in the descending colon; Congested mucosa in the entire examined colon-biopsied; Repeat 5 years    ENDOSCOPY N/A 03/27/2019    Medium-sized HH; Normal stomach; Normal examined duodenum-biopsied    ENDOSCOPY N/A 06/09/2022    Small HH; Non-erosive gastritis-biopsied; Normal examined duodenum-biopsied    FOOT SURGERY Left     ORIF    HERNIA REPAIR      INNER EAR SURGERY      MEDIASTINOSCOPY N/A 02/15/2021    Procedure: CERVICAL MEDIASTINOSCOPY WITH LYMPH NODE DISECTION;  Surgeon: Bruce Murillo MD;  Location: [facility];  Service: Cardiothoracic;  Laterality: N/A;    THORACOSCOPY Right 02/15/2021    Procedure: THORACOSCOPY, WEDGE RESECTION OF RIGHT UPPER LOBE WITH FROZEN,  , MEDIASTINAL LYMPH NODE DISSECTION;  Surgeon: Bruce Murillo MD;  Location: [facility];  Service: Cardiothoracic;  Laterality: Right;    TONSILLECTOMY         Outpatient Medications Marked as Taking for the 8/7/24 encounter (Office Visit) with Edmundo Infante APRN   Medication Sig Dispense Refill    aspirin 81 MG EC tablet Take 1 tablet by mouth Daily.      atorvastatin (LIPITOR) 10 MG tablet Take 1 tablet by mouth Daily.      vitamin D (ERGOCALCIFEROL) 1.25 MG (83902 UT) capsule capsule Take 1 capsule by mouth 1 (One) Time Per Week.      [DISCONTINUED] esomeprazole  (nexIUM) 40 MG capsule Take 1 capsule by mouth Every Morning Before Breakfast.         Patient has no known allergies.    Family History   Problem Relation Age of Onset    Colon polyps Mother         Unknown age    Diabetes Mother     Colon cancer Neg Hx     Esophageal cancer Neg Hx     Liver cancer Neg Hx     Liver disease Neg Hx     Rectal cancer Neg Hx     Stomach cancer Neg Hx        Social History     Socioeconomic History    Marital status: Significant Other   Tobacco Use    Smoking status: Every Day     Current packs/day: 0.50     Average packs/day: 0.5 packs/day for 55.6 years (27.8 ttl pk-yrs)     Types: Cigarettes     Start date: 1/1/1969     Passive exposure: Current    Smokeless tobacco: Never    Tobacco comments:     Pt states about 7-8 cigarettes per day 7/3   Vaping Use    Vaping status: Never Used   Substance and Sexual Activity    Alcohol use: Yes     Alcohol/week: 3.0 standard drinks of alcohol     Types: 3 Shots of liquor per week     Comment: sober as of 06/01/2022    Drug use: Yes     Types: Marijuana    Sexual activity: Yes     Partners: Female       Review of Systems   Constitutional: Negative.    HENT:  Positive for voice change. Negative for trouble swallowing.    Respiratory: Negative.         Vitals:    08/07/24 1401   BP: 133/78   Pulse: 88   Temp: 97.4 °F (36.3 °C)       Body mass index is 26.61 kg/m².    Objective     Physical Exam  Vitals reviewed.   Constitutional:       Appearance: Normal appearance.   HENT:      Head: Normocephalic.      Right Ear: Tympanic membrane, ear canal and external ear normal.      Left Ear: Tympanic membrane, ear canal and external ear normal.      Nose: Nose normal.      Mouth/Throat:      Lips: Pink.      Mouth: Mucous membranes are moist.      Dentition: Has dentures.      Pharynx: Oropharynx is clear.   Neurological:      Mental Status: He is alert.   Psychiatric:         Behavior: Behavior is cooperative.         Assessment & Plan   Diagnoses and all  orders for this visit:    1. Hoarseness (Primary)    2. Laryngopharyngeal reflux (LPR)    3. Tobacco use    Other orders  -     esomeprazole (nexIUM) 20 MG capsule; Take 2 capsules by mouth 2 (Two) Times a Day for 30 days.  Dispense: 120 capsule; Refill: 3      * Surgery not found *  No orders of the defined types were placed in this encounter.    Continue reflux medication and precautions  Return for problems     Return in about 5 months (around 2025) for Recheck.       Patient Instructions   CONTACT INFORMATION:  The main office phone number is [phone]. For emergencies after hours and on weekends, this number will convert over to our answering service and the on call provider will answer. Please try to keep non emergent phone calls/ questions to office hours 9am-5pm Monday through Friday.      [employer]  As an alternative, you can sign up and use the Epic MyChart system for more direct and quicker access for non emergent questions/ problems.  [employer] allows you to send messages to your doctor, view your test results, renew your prescriptions, schedule appointments, and more. To sign up, go to [employer] and click on the Sign Up Now link in the New User? box. Enter your [employer] Activation Code exactly as it appears below along with the last four digits of your Social Security Number and your Date of Birth () to complete the sign-up process. If you do not sign up before the expiration date, you must request a new code.     [employer] Activation Code: Activation code not generated  Current [employer] Status: Active     If you have questions, you can email [email or web address] or call [phone] to talk to our [employer] staff. Remember, [employer] is NOT to be used for urgent needs. For medical emergencies, dial 911.     IF YOU SMOKE OR USE TOBACCO PLEASE READ THE FOLLOWING:  Why is smoking bad for me?  Smoking increases the risk of heart disease, lung disease, vascular disease,  stroke, and cancer. If you smoke, STOP!        IF YOU SMOKE OR USE TOBACCO PLEASE READ THE FOLLOWING:  Why is smoking bad for me?  Smoking increases the risk of heart disease, lung disease, vascular disease, stroke, and cancer. If you smoke, STOP!     For more information:  Quit Now [place]  1-800-QUIT-NOW  https://[email or web address].quitlogix.org/en-US/

## 2024-08-12 ENCOUNTER — TELEPHONE (OUTPATIENT)
Dept: GASTROENTEROLOGY | Age: 74
End: 2024-08-12

## 2024-08-12 NOTE — TELEPHONE ENCOUNTER
LVM for pt that he is due for KUB this week; call with any questions.     ----- Message from KINGA WILDER MA sent at 7/31/2024  9:29 AM CDT -----  Regarding: Pt due for KUB around 8/13, mailed order

## 2024-08-14 ENCOUNTER — HOSPITAL ENCOUNTER (OUTPATIENT)
Dept: GENERAL RADIOLOGY | Age: 74
Discharge: HOME | End: 2024-08-14
Payer: MEDICARE

## 2024-08-14 DIAGNOSIS — R93.2 ABNORMAL FINDING ON IMAGING OF LIVER: ICD-10-CM

## 2024-08-14 DIAGNOSIS — K83.8 DILATION OF BILIARY TRACT: ICD-10-CM

## 2024-08-14 DIAGNOSIS — Z98.890 HISTORY OF BILIARY STENT INSERTION: ICD-10-CM

## 2024-08-14 PROCEDURE — 74018 RADEX ABDOMEN 1 VIEW: CPT

## 2024-08-21 ENCOUNTER — PREP FOR PROCEDURE (OUTPATIENT)
Dept: SURGERY | Age: 74
End: 2024-08-21

## 2024-08-21 ENCOUNTER — OFFICE VISIT (OUTPATIENT)
Dept: SURGERY | Age: 74
End: 2024-08-21
Payer: MEDICARE

## 2024-08-21 VITALS
HEART RATE: 74 BPM | HEIGHT: 70 IN | WEIGHT: 174.5 LBS | OXYGEN SATURATION: 95 % | BODY MASS INDEX: 24.98 KG/M2 | TEMPERATURE: 97.8 F

## 2024-08-21 DIAGNOSIS — K80.50 CHOLEDOCHOLITHIASIS: Primary | ICD-10-CM

## 2024-08-21 PROCEDURE — G8419 CALC BMI OUT NRM PARAM NOF/U: HCPCS | Performed by: SURGERY

## 2024-08-21 PROCEDURE — 4004F PT TOBACCO SCREEN RCVD TLK: CPT | Performed by: SURGERY

## 2024-08-21 PROCEDURE — G8427 DOCREV CUR MEDS BY ELIG CLIN: HCPCS | Performed by: SURGERY

## 2024-08-21 PROCEDURE — 3017F COLORECTAL CA SCREEN DOC REV: CPT | Performed by: SURGERY

## 2024-08-21 PROCEDURE — 99204 OFFICE O/P NEW MOD 45 MIN: CPT | Performed by: SURGERY

## 2024-08-21 PROCEDURE — 1123F ACP DISCUSS/DSCN MKR DOCD: CPT | Performed by: SURGERY

## 2024-08-21 RX ORDER — SODIUM CHLORIDE 0.9 % (FLUSH) 0.9 %
5-40 SYRINGE (ML) INJECTION PRN
OUTPATIENT
Start: 2024-08-21

## 2024-08-21 RX ORDER — ACETAMINOPHEN 325 MG/1
1000 TABLET ORAL ONCE
OUTPATIENT
Start: 2024-08-21 | End: 2024-08-21

## 2024-08-21 RX ORDER — SODIUM CHLORIDE 9 MG/ML
INJECTION, SOLUTION INTRAVENOUS PRN
OUTPATIENT
Start: 2024-08-21

## 2024-08-21 RX ORDER — SODIUM CHLORIDE 0.9 % (FLUSH) 0.9 %
5-40 SYRINGE (ML) INJECTION EVERY 12 HOURS SCHEDULED
OUTPATIENT
Start: 2024-08-21

## 2024-08-21 RX ORDER — SODIUM CHLORIDE, SODIUM LACTATE, POTASSIUM CHLORIDE, CALCIUM CHLORIDE 600; 310; 30; 20 MG/100ML; MG/100ML; MG/100ML; MG/100ML
INJECTION, SOLUTION INTRAVENOUS CONTINUOUS
OUTPATIENT
Start: 2024-08-21

## 2024-08-21 RX ORDER — CELECOXIB 100 MG/1
100 CAPSULE ORAL ONCE
OUTPATIENT
Start: 2024-08-21 | End: 2024-08-21

## 2024-08-21 ASSESSMENT — ENCOUNTER SYMPTOMS
SORE THROAT: 1
ABDOMINAL PAIN: 1
COUGH: 1
ABDOMINAL DISTENTION: 1
CHEST TIGHTNESS: 1

## 2024-08-21 NOTE — PROGRESS NOTES
SUBJECTIVE:  Mr. Landeros is a 74 y.o. male who presents today with recent findings of choledocholithiasis. He underwent ERCP and stent placement and is here to discuss cholecystectomy. He states since stent placement he has had a couple of episodes of epigastric and right upper abdominal pain that is sharp and radiates through to his back. The common duct stones were found on screening CT chest.       Past Medical History:   Diagnosis Date    Absent kidney     born with one kidney    Acid reflux     Choledocholithiasis     COPD (chronic obstructive pulmonary disease) (HCC)     Diverticulosis     Family history of colon polyps, unspecified     GERD (gastroesophageal reflux disease)     Hard of hearing     Hearing loss     deaf left, partial right with hearing aid    Hilar adenopathy     History of adenomatous polyp of colon     History of lung cancer     R    Hyperlipidemia     Malignant neoplasm of upper lobe of right lung (HCC)     Mediastinal adenopathy     Multiple lung nodules     Tobacco user      Past Surgical History:   Procedure Laterality Date    BRONCHOSCOPY  02/15/2021    Dr Calle    CATARACT EXTRACTION      COLONOSCOPY  06/29/2022    Dr Fields x 2, diverticulosis, nonspecific inflammation, 5 yr recall    COLONOSCOPY  11/20/2012    Dr Fields x 1, HP x 1, diverticulosis, 4 yr recall    COLONOSCOPY  03/30/2017    Dr Fields x 4, diveticulosis, 3 yr recall    COLONOSCOPY  12/17/2009    Dr Fields x 1, HP x 2, 3 yr recall    COLONOSCOPY  07/02/2020    Dr Fields x 3, diverticulosis, 5 yr recall    ERCP  07/30/2024    Dr ROSEY Simms-w/balloon ampullary sphincteroplasty, placement of a 5F x 5 cm prophylactic stent w/an external pigtail and no internal flange, 1 cm biliary sphincterotomy, stone removal, placement of a 10F x 9 cm plastic temporary stent    ERCP N/A 07/30/2024    ENDOSCOPIC RETROGRADE CHOLANGIOPANCREATOGRAPHY STENT INSERTION performed by Virgil Simms MD at [facility]    ERCP N/A 07/30/2024

## 2024-08-21 NOTE — H&P (VIEW-ONLY)
SUBJECTIVE:  Mr. Landeros is a 74 y.o. male who presents today with recent findings of choledocholithiasis. He underwent ERCP and stent placement and is here to discuss cholecystectomy. He states since stent placement he has had a couple of episodes of epigastric and right upper abdominal pain that is sharp and radiates through to his back. The common duct stones were found on screening CT chest.       Past Medical History:   Diagnosis Date    Absent kidney     born with one kidney    Acid reflux     Choledocholithiasis     COPD (chronic obstructive pulmonary disease) (HCC)     Diverticulosis     Family history of colon polyps, unspecified     GERD (gastroesophageal reflux disease)     Hard of hearing     Hearing loss     deaf left, partial right with hearing aid    Hilar adenopathy     History of adenomatous polyp of colon     History of lung cancer     R    Hyperlipidemia     Malignant neoplasm of upper lobe of right lung (HCC)     Mediastinal adenopathy     Multiple lung nodules     Tobacco user      Past Surgical History:   Procedure Laterality Date    BRONCHOSCOPY  02/15/2021    Dr Calle    CATARACT EXTRACTION      COLONOSCOPY  06/29/2022    Dr Fields x 2, diverticulosis, nonspecific inflammation, 5 yr recall    COLONOSCOPY  11/20/2012    Dr Fields x 1, HP x 1, diverticulosis, 4 yr recall    COLONOSCOPY  03/30/2017    Dr Fields x 4, diveticulosis, 3 yr recall    COLONOSCOPY  12/17/2009    Dr Fields x 1, HP x 2, 3 yr recall    COLONOSCOPY  07/02/2020    Dr Fields x 3, diverticulosis, 5 yr recall    ERCP  07/30/2024    Dr ROSEY Simms-w/balloon ampullary sphincteroplasty, placement of a 5F x 5 cm prophylactic stent w/an external pigtail and no internal flange, 1 cm biliary sphincterotomy, stone removal, placement of a 10F x 9 cm plastic temporary stent    ERCP N/A 07/30/2024    ENDOSCOPIC RETROGRADE CHOLANGIOPANCREATOGRAPHY STENT INSERTION performed by Virgil Simms MD at [facility]    ERCP N/A 07/30/2024     for ear pain, hearing loss and sore throat.    Respiratory:  Positive for cough and chest tightness.    Gastrointestinal:  Positive for abdominal distention and abdominal pain.   Genitourinary:  Positive for frequency.   All other systems reviewed and are negative.      OBJECTIVE:  Pulse 74   Temp 97.8 °F (36.6 °C) (Temporal)   Ht 1.778 m (5' 10\")   Wt 79.2 kg (174 lb 8 oz)   SpO2 95%   BMI 25.04 kg/m²   Physical Exam  Constitutional:       General: He is not in acute distress.     Appearance: Normal appearance. He is normal weight. He is not ill-appearing.   Cardiovascular:      Rate and Rhythm: Normal rate.   Pulmonary:      Effort: Pulmonary effort is normal.   Abdominal:      Palpations: Abdomen is soft.      Tenderness: There is abdominal tenderness in the right upper quadrant and epigastric area.   Neurological:      Mental Status: He is alert.         CT Chest   Impression      1. Postoperative change of right upper lobe wedge resection without  definite evidence of recurrent or metastatic disease in the chest.    2. New indeterminate 4 mm right upper lobe and 3 mm right lower lobe  pulmonary nodules. Recommend a follow-up chest CT in 6 months to confirm  stability or resolution and exclude neoplastic potential.    3. Bilateral hilar lymphadenopathy, similar to multiple prior studies  dating back to 5/21/2021. Given stability, suspect these are related to  an indolent/chronic infectious or inflammatory process.    4. Intrahepatic and extrahepatic biliary ductal dilatation extending  down to the ampulla where a 10 mm filling defect is present, likely  representing choledocholithiasis. Recommend GI referral for ERCP.    This report was signed and finalized on 6/26/2024 9:52 AM by Dr. Mike Gaspar MD.     ERCP   IMPRESSION:  1.  Choledocholithiasis status post balloon sweep extraction with balloon assisted sphincteroplasty  2.  Placement of biliary stent as well as prophylactic pancreatic

## 2024-08-29 ENCOUNTER — HOSPITAL ENCOUNTER (OUTPATIENT)
Dept: PREADMISSION TESTING | Age: 74
Discharge: HOME | End: 2024-09-02
Payer: MEDICARE

## 2024-08-29 VITALS — HEIGHT: 70 IN | WEIGHT: 173.6 LBS | BODY MASS INDEX: 24.85 KG/M2

## 2024-08-29 LAB
ALBUMIN SERPL-MCNC: 4.4 G/DL (ref 3.5–5.2)
ALP SERPL-CCNC: 93 U/L (ref 40–129)
ALT SERPL-CCNC: 13 U/L (ref 5–41)
ANION GAP SERPL CALCULATED.3IONS-SCNC: 12 MMOL/L (ref 7–19)
AST SERPL-CCNC: 21 U/L (ref 5–40)
BASOPHILS # BLD: 0.1 K/UL (ref 0–0.2)
BASOPHILS NFR BLD: 1 % (ref 0–1)
BILIRUB SERPL-MCNC: 0.8 MG/DL (ref 0.2–1.2)
BUN SERPL-MCNC: 16 MG/DL (ref 8–23)
CALCIUM SERPL-MCNC: 9.7 MG/DL (ref 8.8–10.2)
CHLORIDE SERPL-SCNC: 103 MMOL/L (ref 98–111)
CO2 SERPL-SCNC: 27 MMOL/L (ref 22–29)
CREAT SERPL-MCNC: 0.7 MG/DL (ref 0.7–1.2)
EOSINOPHIL # BLD: 0.3 K/UL (ref 0–0.6)
EOSINOPHIL NFR BLD: 3.7 % (ref 0–5)
ERYTHROCYTE [DISTWIDTH] IN BLOOD BY AUTOMATED COUNT: 12.2 % (ref 11.5–14.5)
GLUCOSE SERPL-MCNC: 80 MG/DL (ref 70–99)
HCT VFR BLD AUTO: 49.7 % (ref 42–52)
HGB BLD-MCNC: 16 G/DL (ref 14–18)
IMM GRANULOCYTES # BLD: 0 K/UL
LYMPHOCYTES # BLD: 2.1 K/UL (ref 1.1–4.5)
LYMPHOCYTES NFR BLD: 26.5 % (ref 20–40)
MCH RBC QN AUTO: 31.7 PG (ref 27–31)
MCHC RBC AUTO-ENTMCNC: 32.2 G/DL (ref 33–37)
MCV RBC AUTO: 98.6 FL (ref 80–94)
MONOCYTES # BLD: 0.5 K/UL (ref 0–0.9)
MONOCYTES NFR BLD: 6.5 % (ref 0–10)
NEUTROPHILS # BLD: 4.8 K/UL (ref 1.5–7.5)
NEUTS SEG NFR BLD: 61.9 % (ref 50–65)
PLATELET # BLD AUTO: 160 K/UL (ref 130–400)
PMV BLD AUTO: 11.4 FL (ref 9.4–12.4)
POTASSIUM SERPL-SCNC: 4.5 MMOL/L (ref 3.5–5)
PROT SERPL-MCNC: 7.1 G/DL (ref 6.4–8.3)
RBC # BLD AUTO: 5.04 M/UL (ref 4.7–6.1)
SODIUM SERPL-SCNC: 142 MMOL/L (ref 136–145)
WBC # BLD AUTO: 7.8 K/UL (ref 4.8–10.8)

## 2024-08-29 PROCEDURE — 80053 COMPREHEN METABOLIC PANEL: CPT

## 2024-08-29 PROCEDURE — 93005 ELECTROCARDIOGRAM TRACING: CPT | Performed by: ANESTHESIOLOGY

## 2024-08-29 PROCEDURE — 85025 COMPLETE CBC W/AUTO DIFF WBC: CPT

## 2024-08-29 ASSESSMENT — ENCOUNTER SYMPTOMS
TROUBLE SWALLOWING: 0
SHORTNESS OF BREATH: 0
BLOOD IN STOOL: 0
DIARRHEA: 0
VOICE CHANGE: 0
NAUSEA: 0
ABDOMINAL PAIN: 0
CONSTIPATION: 0
RECTAL PAIN: 0
COUGH: 0
SORE THROAT: 0
VOMITING: 0
ALLERGIC/IMMUNOLOGIC NEGATIVE: 1
BACK PAIN: 0
CHEST TIGHTNESS: 0
EYES NEGATIVE: 1

## 2024-08-29 NOTE — DISCHARGE INSTRUCTIONS
You will be contacted by someone from same-day surgery between 12-3 pm the day before your surgery regarding your arrival time. Please check your voicemail as they may leave a message with that information.  If you do not receive a call or voicemail or you have a problem please call [phone].    If you are running late the morning of your surgery or you wake up with signs/symptoms of COVID call [phone] for instructions    You will be scheduled to arrive 11/2-2 hours prior to your surgery time. Do not come earlier than when you are told to arrive.  You will come to the [facility] to register and be taken to the outpatient services area.    Do Not eat or drink anything after midnight. That includes candy, gum and mints, coffee, tea and water.    If appropriate, you may have a small sip of water with medications as instructed by your nurse/surgeon.  This is for your protection and to avoid food and liquid from getting into your lungs.     The morning of surgery take the following medications with a small sip of water:    Nexium  Stiolto inhaler    Always, follow your surgeon's instructions on any blood thinner or aspirin use before surgery.         .    Brush your teeth and shower the morning of surgery.    Chlorhexidine Gluconate 4% Solution    Patient should shower with this soap the night before surgery and the morning of surgery washing from the neck down (avoiding contact with genitalia).      DO NOT WASH YOUR HAIR OR FACE WITH THIS SOAP.  When washing with this soap, apply enough to suds up the body thoroughly, turn the water away from your body and allow the soap suds to remain on the body for 2 full minutes, then rinse body completely.      After using this soap on the body, please do not apply powders or lotions to your body.  After the shower the night before surgery, please dry off with a new towel, sleep in new freshly laundered pj's, and change your bed linen before going to sleep.

## 2024-08-30 LAB
EKG P AXIS: 58 DEGREES
EKG P-R INTERVAL: 226 MS
EKG Q-T INTERVAL: 382 MS
EKG QRS DURATION: 88 MS
EKG QTC CALCULATION (BAZETT): 383 MS
EKG T AXIS: 46 DEGREES

## 2024-09-05 ENCOUNTER — HOSPITAL ENCOUNTER (OUTPATIENT)
Age: 74
Setting detail: OUTPATIENT SURGERY
Discharge: HOME | End: 2024-09-05
Attending: SURGERY | Admitting: SURGERY
Payer: MEDICARE

## 2024-09-05 ENCOUNTER — ANESTHESIA EVENT (OUTPATIENT)
Dept: OPERATING ROOM | Age: 74
End: 2024-09-05
Payer: MEDICARE

## 2024-09-05 ENCOUNTER — ANESTHESIA (OUTPATIENT)
Dept: OPERATING ROOM | Age: 74
End: 2024-09-05
Payer: MEDICARE

## 2024-09-05 VITALS
TEMPERATURE: 97.4 F | SYSTOLIC BLOOD PRESSURE: 122 MMHG | WEIGHT: 173 LBS | DIASTOLIC BLOOD PRESSURE: 71 MMHG | HEIGHT: 70 IN | HEART RATE: 61 BPM | OXYGEN SATURATION: 96 % | RESPIRATION RATE: 16 BRPM | BODY MASS INDEX: 24.77 KG/M2

## 2024-09-05 DIAGNOSIS — K80.50 CHOLEDOCHOLITHIASIS: ICD-10-CM

## 2024-09-05 PROCEDURE — 6370000000 HC RX 637 (ALT 250 FOR IP): Performed by: SURGERY

## 2024-09-05 PROCEDURE — 2580000003 HC RX 258: Performed by: ANESTHESIOLOGY

## 2024-09-05 PROCEDURE — 2500000003 HC RX 250 WO HCPCS: Performed by: NURSE ANESTHETIST, CERTIFIED REGISTERED

## 2024-09-05 PROCEDURE — 2580000003 HC RX 258: Performed by: SURGERY

## 2024-09-05 PROCEDURE — 2709999900 HC NON-CHARGEABLE SUPPLY: Performed by: SURGERY

## 2024-09-05 PROCEDURE — C1889 IMPLANT/INSERT DEVICE, NOC: HCPCS | Performed by: SURGERY

## 2024-09-05 PROCEDURE — 3600000009 HC SURGERY ROBOT BASE: Performed by: SURGERY

## 2024-09-05 PROCEDURE — 88304 TISSUE EXAM BY PATHOLOGIST: CPT

## 2024-09-05 PROCEDURE — 3700000000 HC ANESTHESIA ATTENDED CARE: Performed by: SURGERY

## 2024-09-05 PROCEDURE — 7100000011 HC PHASE II RECOVERY - ADDTL 15 MIN: Performed by: SURGERY

## 2024-09-05 PROCEDURE — 3700000001 HC ADD 15 MINUTES (ANESTHESIA): Performed by: SURGERY

## 2024-09-05 PROCEDURE — 2580000003 HC RX 258: Performed by: NURSE ANESTHETIST, CERTIFIED REGISTERED

## 2024-09-05 PROCEDURE — 6360000002 HC RX W HCPCS: Performed by: NURSE ANESTHETIST, CERTIFIED REGISTERED

## 2024-09-05 PROCEDURE — 2500000003 HC RX 250 WO HCPCS: Performed by: ANESTHESIOLOGY

## 2024-09-05 PROCEDURE — 6360000002 HC RX W HCPCS: Performed by: SURGERY

## 2024-09-05 PROCEDURE — S2900 ROBOTIC SURGICAL SYSTEM: HCPCS | Performed by: SURGERY

## 2024-09-05 PROCEDURE — 47562 LAPAROSCOPIC CHOLECYSTECTOMY: CPT | Performed by: SURGERY

## 2024-09-05 PROCEDURE — 7100000001 HC PACU RECOVERY - ADDTL 15 MIN: Performed by: SURGERY

## 2024-09-05 PROCEDURE — 7100000010 HC PHASE II RECOVERY - FIRST 15 MIN: Performed by: SURGERY

## 2024-09-05 PROCEDURE — C9290 INJ, BUPIVACAINE LIPOSOME: HCPCS | Performed by: SURGERY

## 2024-09-05 PROCEDURE — 3600000019 HC SURGERY ROBOT ADDTL 15MIN: Performed by: SURGERY

## 2024-09-05 PROCEDURE — 2500000003 HC RX 250 WO HCPCS: Performed by: SURGERY

## 2024-09-05 PROCEDURE — 7100000000 HC PACU RECOVERY - FIRST 15 MIN: Performed by: SURGERY

## 2024-09-05 DEVICE — CLIP INT L POLYMER LOK LIG HEM O LOK (6EA/PK): Type: IMPLANTABLE DEVICE | Status: FUNCTIONAL

## 2024-09-05 RX ORDER — CELECOXIB 100 MG/1
100 CAPSULE ORAL ONCE
Status: COMPLETED | OUTPATIENT
Start: 2024-09-05 | End: 2024-09-05

## 2024-09-05 RX ORDER — SODIUM CHLORIDE, SODIUM LACTATE, POTASSIUM CHLORIDE, CALCIUM CHLORIDE 600; 310; 30; 20 MG/100ML; MG/100ML; MG/100ML; MG/100ML
INJECTION, SOLUTION INTRAVENOUS CONTINUOUS
Status: DISCONTINUED | OUTPATIENT
Start: 2024-09-05 | End: 2024-09-05 | Stop reason: HOSPADM

## 2024-09-05 RX ORDER — MIDAZOLAM HYDROCHLORIDE 1 MG/ML
INJECTION INTRAMUSCULAR; INTRAVENOUS PRN
Status: DISCONTINUED | OUTPATIENT
Start: 2024-09-05 | End: 2024-09-05 | Stop reason: SDUPTHER

## 2024-09-05 RX ORDER — ONDANSETRON 2 MG/ML
4 INJECTION INTRAMUSCULAR; INTRAVENOUS
Status: DISCONTINUED | OUTPATIENT
Start: 2024-09-05 | End: 2024-09-05 | Stop reason: HOSPADM

## 2024-09-05 RX ORDER — OXYCODONE AND ACETAMINOPHEN 5; 325 MG/1; MG/1
1 TABLET ORAL
Status: COMPLETED | OUTPATIENT
Start: 2024-09-05 | End: 2024-09-05

## 2024-09-05 RX ORDER — HYDROMORPHONE HYDROCHLORIDE 1 MG/ML
0.25 INJECTION, SOLUTION INTRAMUSCULAR; INTRAVENOUS; SUBCUTANEOUS EVERY 5 MIN PRN
Status: DISCONTINUED | OUTPATIENT
Start: 2024-09-05 | End: 2024-09-05 | Stop reason: HOSPADM

## 2024-09-05 RX ORDER — SODIUM CHLORIDE 9 MG/ML
INJECTION, SOLUTION INTRAVENOUS PRN
Status: DISCONTINUED | OUTPATIENT
Start: 2024-09-05 | End: 2024-09-05 | Stop reason: HOSPADM

## 2024-09-05 RX ORDER — SODIUM CHLORIDE 9 MG/ML
INJECTION, SOLUTION INTRAMUSCULAR; INTRAVENOUS; SUBCUTANEOUS PRN
Status: DISCONTINUED | OUTPATIENT
Start: 2024-09-05 | End: 2024-09-05 | Stop reason: HOSPADM

## 2024-09-05 RX ORDER — SODIUM CHLORIDE 0.9 % (FLUSH) 0.9 %
5-40 SYRINGE (ML) INJECTION PRN
Status: DISCONTINUED | OUTPATIENT
Start: 2024-09-05 | End: 2024-09-05 | Stop reason: HOSPADM

## 2024-09-05 RX ORDER — ONDANSETRON 2 MG/ML
INJECTION INTRAMUSCULAR; INTRAVENOUS PRN
Status: DISCONTINUED | OUTPATIENT
Start: 2024-09-05 | End: 2024-09-05 | Stop reason: SDUPTHER

## 2024-09-05 RX ORDER — DEXAMETHASONE SODIUM PHOSPHATE 10 MG/ML
INJECTION, SOLUTION INTRAMUSCULAR; INTRAVENOUS PRN
Status: DISCONTINUED | OUTPATIENT
Start: 2024-09-05 | End: 2024-09-05 | Stop reason: SDUPTHER

## 2024-09-05 RX ORDER — OXYCODONE AND ACETAMINOPHEN 5; 325 MG/1; MG/1
1 TABLET ORAL EVERY 6 HOURS PRN
Qty: 12 TABLET | Refills: 0 | Status: SHIPPED | OUTPATIENT
Start: 2024-09-05 | End: 2024-09-08

## 2024-09-05 RX ORDER — FENTANYL CITRATE 50 UG/ML
INJECTION, SOLUTION INTRAMUSCULAR; INTRAVENOUS PRN
Status: DISCONTINUED | OUTPATIENT
Start: 2024-09-05 | End: 2024-09-05 | Stop reason: SDUPTHER

## 2024-09-05 RX ORDER — ROCURONIUM BROMIDE 10 MG/ML
INJECTION, SOLUTION INTRAVENOUS PRN
Status: DISCONTINUED | OUTPATIENT
Start: 2024-09-05 | End: 2024-09-05 | Stop reason: SDUPTHER

## 2024-09-05 RX ORDER — NALOXONE HYDROCHLORIDE 0.4 MG/ML
INJECTION, SOLUTION INTRAMUSCULAR; INTRAVENOUS; SUBCUTANEOUS PRN
Status: DISCONTINUED | OUTPATIENT
Start: 2024-09-05 | End: 2024-09-05 | Stop reason: HOSPADM

## 2024-09-05 RX ORDER — PROPOFOL 10 MG/ML
INJECTION, EMULSION INTRAVENOUS PRN
Status: DISCONTINUED | OUTPATIENT
Start: 2024-09-05 | End: 2024-09-05 | Stop reason: SDUPTHER

## 2024-09-05 RX ORDER — SODIUM CHLORIDE 0.9 % (FLUSH) 0.9 %
5-40 SYRINGE (ML) INJECTION EVERY 12 HOURS SCHEDULED
Status: DISCONTINUED | OUTPATIENT
Start: 2024-09-05 | End: 2024-09-05 | Stop reason: HOSPADM

## 2024-09-05 RX ORDER — INDOCYANINE GREEN AND WATER 25 MG
KIT INJECTION PRN
Status: DISCONTINUED | OUTPATIENT
Start: 2024-09-05 | End: 2024-09-05 | Stop reason: HOSPADM

## 2024-09-05 RX ORDER — ACETAMINOPHEN 500 MG
1000 TABLET ORAL ONCE
Status: COMPLETED | OUTPATIENT
Start: 2024-09-05 | End: 2024-09-05

## 2024-09-05 RX ORDER — HYDROMORPHONE HYDROCHLORIDE 1 MG/ML
0.5 INJECTION, SOLUTION INTRAMUSCULAR; INTRAVENOUS; SUBCUTANEOUS EVERY 5 MIN PRN
Status: DISCONTINUED | OUTPATIENT
Start: 2024-09-05 | End: 2024-09-05 | Stop reason: HOSPADM

## 2024-09-05 RX ORDER — SODIUM CHLORIDE, SODIUM LACTATE, POTASSIUM CHLORIDE, CALCIUM CHLORIDE 600; 310; 30; 20 MG/100ML; MG/100ML; MG/100ML; MG/100ML
INJECTION, SOLUTION INTRAVENOUS CONTINUOUS PRN
Status: DISCONTINUED | OUTPATIENT
Start: 2024-09-05 | End: 2024-09-05 | Stop reason: SDUPTHER

## 2024-09-05 RX ORDER — LIDOCAINE HYDROCHLORIDE 10 MG/ML
INJECTION, SOLUTION EPIDURAL; INFILTRATION; INTRACAUDAL; PERINEURAL PRN
Status: DISCONTINUED | OUTPATIENT
Start: 2024-09-05 | End: 2024-09-05 | Stop reason: SDUPTHER

## 2024-09-05 RX ORDER — BUPIVACAINE HYDROCHLORIDE 2.5 MG/ML
INJECTION, SOLUTION INFILTRATION; PERINEURAL PRN
Status: DISCONTINUED | OUTPATIENT
Start: 2024-09-05 | End: 2024-09-05 | Stop reason: HOSPADM

## 2024-09-05 RX ORDER — KETOROLAC TROMETHAMINE 30 MG/ML
INJECTION, SOLUTION INTRAMUSCULAR; INTRAVENOUS PRN
Status: DISCONTINUED | OUTPATIENT
Start: 2024-09-05 | End: 2024-09-05 | Stop reason: SDUPTHER

## 2024-09-05 RX ORDER — DEXAMETHASONE SODIUM PHOSPHATE 10 MG/ML
8 INJECTION, SOLUTION INTRAMUSCULAR; INTRAVENOUS ONCE
Status: DISCONTINUED | OUTPATIENT
Start: 2024-09-05 | End: 2024-09-05 | Stop reason: HOSPADM

## 2024-09-05 RX ADMIN — FAMOTIDINE 20 MG: 10 INJECTION, SOLUTION INTRAVENOUS at 09:52

## 2024-09-05 RX ADMIN — ONDANSETRON 4 MG: 2 INJECTION INTRAMUSCULAR; INTRAVENOUS at 10:35

## 2024-09-05 RX ADMIN — SUGAMMADEX 200 MG: 100 INJECTION, SOLUTION INTRAVENOUS at 11:06

## 2024-09-05 RX ADMIN — PROPOFOL 150 MG: 10 INJECTION, EMULSION INTRAVENOUS at 10:29

## 2024-09-05 RX ADMIN — OXYCODONE HYDROCHLORIDE AND ACETAMINOPHEN 1 TABLET: 5; 325 TABLET ORAL at 13:10

## 2024-09-05 RX ADMIN — ROCURONIUM BROMIDE 50 MG: 10 INJECTION, SOLUTION INTRAVENOUS at 10:29

## 2024-09-05 RX ADMIN — KETOROLAC TROMETHAMINE 30 MG: 30 INJECTION, SOLUTION INTRAMUSCULAR; INTRAVENOUS at 10:35

## 2024-09-05 RX ADMIN — MIDAZOLAM 2 MG: 1 INJECTION INTRAMUSCULAR; INTRAVENOUS at 10:22

## 2024-09-05 RX ADMIN — CEFAZOLIN 2000 MG: 2 INJECTION, POWDER, FOR SOLUTION INTRAMUSCULAR; INTRAVENOUS at 10:30

## 2024-09-05 RX ADMIN — DEXAMETHASONE SODIUM PHOSPHATE 10 MG: 10 INJECTION, SOLUTION INTRAMUSCULAR; INTRAVENOUS at 10:35

## 2024-09-05 RX ADMIN — CELECOXIB 100 MG: 100 CAPSULE ORAL at 09:52

## 2024-09-05 RX ADMIN — ACETAMINOPHEN 1000 MG: 500 TABLET ORAL at 09:52

## 2024-09-05 RX ADMIN — SODIUM CHLORIDE, SODIUM LACTATE, POTASSIUM CHLORIDE, AND CALCIUM CHLORIDE: 600; 310; 30; 20 INJECTION, SOLUTION INTRAVENOUS at 10:22

## 2024-09-05 RX ADMIN — FENTANYL CITRATE 50 MCG: 0.05 INJECTION, SOLUTION INTRAMUSCULAR; INTRAVENOUS at 10:29

## 2024-09-05 RX ADMIN — FENTANYL CITRATE 50 MCG: 0.05 INJECTION, SOLUTION INTRAMUSCULAR; INTRAVENOUS at 10:35

## 2024-09-05 RX ADMIN — LIDOCAINE HYDROCHLORIDE 50 MG: 10 INJECTION, SOLUTION EPIDURAL; INFILTRATION; INTRACAUDAL; PERINEURAL at 10:29

## 2024-09-05 ASSESSMENT — LIFESTYLE VARIABLES: SMOKING_STATUS: 1

## 2024-09-05 ASSESSMENT — PAIN DESCRIPTION - LOCATION: LOCATION: ABDOMEN

## 2024-09-05 ASSESSMENT — PAIN - FUNCTIONAL ASSESSMENT
PAIN_FUNCTIONAL_ASSESSMENT: NONE - DENIES PAIN
PAIN_FUNCTIONAL_ASSESSMENT: 0-10
PAIN_FUNCTIONAL_ASSESSMENT: NONE - DENIES PAIN

## 2024-09-05 ASSESSMENT — PAIN SCALES - GENERAL: PAINLEVEL_OUTOF10: 6

## 2024-09-05 NOTE — OP NOTE
Operative Note      Patient: Jose Enrique Landeros  YOB: 1950  MRN: [id number]    Date of Procedure: 9/5/2024    Pre-Op Diagnosis Codes:      * Choledocholithiasis [K80.50]    Post-Op Diagnosis: Same       Procedure(s):  ROBOTIC LAPAROSCOPIC CHOLECYSTECTOMY    Surgeon(s):  Sharla Mccabe DO    Assistant:   First Assistant: Millie Gustafson RN    Anesthesia: General    Estimated Blood Loss (mL): Minimal    Complications: None    Specimens:   * No specimens in log *    Implants:  Implant Name Type Inv. Item Serial No.  Lot No. LRB No. Used Action   CLIP INT L POLYMER MARTA LIG HEM O MARTA (6EA/PK) - [id number]  CLIP INT L POLYMER MARTA LIG HEM O MARTA (6EA/PK)  [employer]- [id number] N/A 1 Implanted         Drains: * No LDAs found *    Findings:  Infection Present At Time Of Surgery (PATOS) (choose all levels that have infection present):  No infection present  Other Findings: critical view of safety obtained.     Detailed Description of Procedure:   INDICATIONS  Mr. Landeros presented to the office with abdominal pain. he was found to have choledocholithiasis and has elected for cholecystectomy.    DESCRIPTION OF PROCEDURE  Mr. Landeros was taken to the main operating room and placed on the operating table supine.  After the induction of adequate general endotracheal anesthesia the abdomen was prepped in the usual sterile fashion.  Time out performed.     Local anesthestic was administered to the inferior portion of the umbilicus, and incision was made.  Disection was performed to the fasica and a 8 mm robotic trocar was inserted.  The laparoscope was advanced and inspection undertaken. There was no evidence of trauma from the trocar insertion.  The liver was normal in appearance.  The gallbladder was not initially seen.      Eight millimeter Working ports were placed, 10 mm lateral to the umbilicus on each side and on the right anterior axillary line.  The patient was placed in reverse  9/5/2024 at 11:06 AM

## 2024-09-05 NOTE — ANESTHESIA POSTPROCEDURE EVALUATION
Department of Anesthesiology  Postprocedure Note    Patient: Jose Enrique Landeros  MRN: [id number]  YOB: 1950  Date of evaluation: 9/5/2024    Procedure Summary       Date: 09/05/24 Room / Location: [address]    Anesthesia Start: 1022 Anesthesia Stop: 1124    Procedure: ROBOTIC LAPAROSCOPIC CHOLECYSTECTOMY Diagnosis:       Choledocholithiasis      (Choledocholithiasis [K80.50])    Surgeons: Sharla Mccabe DO Responsible Provider: Tyrone Geiger APRN - CRNA    Anesthesia Type: general ASA Status: 2            Anesthesia Type: No value filed.    Jai Phase I:      Jai Phase II:      Anesthesia Post Evaluation    Patient location during evaluation: bedside  Patient participation: complete - patient participated  Level of consciousness: awake and alert  Pain score: 0  Airway patency: patent  Nausea & Vomiting: no nausea  Cardiovascular status: hemodynamically stable  Respiratory status: acceptable  Hydration status: euvolemic  Comments: BP (!) 149/78   Pulse 86   Temp 98 °F (36.7 °C)   Resp 14   Ht 1.778 m (5' 10\")   Wt 78.5 kg (173 lb)   SpO2 91%   BMI 24.82 kg/m²       No notable events documented.

## 2024-09-05 NOTE — INTERVAL H&P NOTE
Update History & Physical    The patient's History and Physical of August 21, 2024 was reviewed with the patient and I examined the patient. There was no change. The surgical site was confirmed by the patient and me.     Plan: The risks, benefits, expected outcome, and alternative to the recommended procedure have been discussed with the patient. Patient understands and wants to proceed with the procedure.     Electronically signed by Sharla Mccabe DO on 9/5/2024 at 10:06 AM

## 2024-09-05 NOTE — ANESTHESIA PRE PROCEDURE
Department of Anesthesiology  Preprocedure Note       Name:  Jose Enrique Landeros   Age:  74 y.o.  :  1950                                          MRN:  [id number]         Date:  2024      Surgeon: Surgeon(s):  Sharla Mccabe DO    Procedure: Procedure(s):  ROBOTIC LAPAROSCOPIC CHOLECYSTECTOMY    Medications prior to admission:   Prior to Admission medications    Medication Sig Start Date End Date Taking? Authorizing Provider   esomeprazole (NEXIUM) 40 MG delayed release capsule Take 1 capsule by mouth every morning (before breakfast)    Mandy Vitale MD   aspirin 81 MG EC tablet Take 1 tablet by mouth daily    Mandy Vitale MD   tiotropium-olodaterol (STIOLTO RESPIMAT) 2.5-2.5 MCG/ACT AERS Inhale 2 puffs into the lungs in the morning. 24   Mandy Vitale MD   vitamin D (ERGOCALCIFEROL) 1.25 MG (16970 UT) CAPS capsule Take 1 capsule by mouth once a week 3/9/24   Mandy Vitale MD   atorvastatin (LIPITOR) 10 MG tablet Take 1 tablet by mouth daily 3/19/19   Mandy Vitale MD       Current medications:    Current Facility-Administered Medications   Medication Dose Route Frequency Provider Last Rate Last Admin   • acetaminophen (TYLENOL) tablet 1,000 mg  1,000 mg Oral Once Sharla Mccabe DO       • celecoxib (CELEBREX) capsule 100 mg  100 mg Oral Once Sharla Mccabe DO       • dexAMETHasone (PF) (DECADRON) injection 8 mg  8 mg IntraVENous Once Sharla Mccabe DO       • lactated ringers IV soln infusion   IntraVENous Continuous Sharla Mccabe DO       • sodium chloride flush 0.9 % injection 5-40 mL  5-40 mL IntraVENous 2 times per day Sharla Mccabe DO       • sodium chloride flush 0.9 % injection 5-40 mL  5-40 mL IntraVENous PRN Sharla Mccabe DO       • 0.9 % sodium chloride infusion   IntraVENous PRN Sharla Mccabe DO       • ceFAZolin (ANCEF) 2,000 mg in sterile water 20 mL IV syringe  2,000 mg IntraVENous On Call to OR Sharla Mccabe DO

## 2024-09-05 NOTE — PROGRESS NOTES
Patient to room 1. He is alert, oriented and able to make needs known. VSS. He denies pain S.O, Ying Bajwa called. She is going to  meds at pharmacy then come to room for discharge instructions and transport patient home.

## 2024-09-05 NOTE — PROGRESS NOTES
CLINICAL PHARMACY NOTE: MEDS TO BEDS    Total # of Prescriptions Filled: 1   The following medications were delivered to the patient:  Current Discharge Medication List        START taking these medications    Details   oxyCODONE-acetaminophen (PERCOCET) 5-325 MG per tablet Take 1 tablet by mouth every 6 hours as needed for Pain for up to 3 days. Intended supply: 3 days. Take lowest dose possible to manage pain Max Daily Amount: 4 tablets  Qty: 12 tablet, Refills: 0    Comments: Reduce doses taken as pain becomes manageable  Associated Diagnoses: Choledocholithiasis               Additional Documentation:    Handed script to patients family at [facility]. Paid with cash.

## 2024-09-18 ENCOUNTER — OFFICE VISIT (OUTPATIENT)
Dept: SURGERY | Age: 74
End: 2024-09-18

## 2024-09-18 VITALS
TEMPERATURE: 97.8 F | HEART RATE: 71 BPM | HEIGHT: 70 IN | OXYGEN SATURATION: 93 % | BODY MASS INDEX: 24.91 KG/M2 | WEIGHT: 174 LBS

## 2024-09-18 DIAGNOSIS — Z09 POSTOPERATIVE EXAMINATION: Primary | ICD-10-CM

## 2024-09-18 PROCEDURE — 99024 POSTOP FOLLOW-UP VISIT: CPT | Performed by: SURGERY

## 2024-10-01 PROBLEM — K80.50 CHOLEDOCHOLITHIASIS: Status: RESOLVED | Noted: 2024-06-27 | Resolved: 2024-10-01

## 2024-10-01 NOTE — PROGRESS NOTES
"Primary Physician: Diego Lucio MD    Chief Complaint   Patient presents with    Follow-up       Subjective     Se NJ is a 74 y.o. male.    HPI  Microscopic colitis  Nonbloody watery diarrhea started in early 2022, nonresponsive to 2 courses of Flagyl.  Colonoscopy 6/2022 showed adenomatous polyp and biopsies from the colon showed nonspecific inflammation.  Liada 3 tablets daily was started.  He is much better now.  No diarrhea, and recently had constipation!  He stopped liada in early 2024 and still doing well.     Choledocholithiasis/elevated LFTS--resolved since last seen  CT 6/2024:  Intrahepatic and extrahepatic biliary ductal dilatation extending  down to the ampulla where a 10 mm filling defect is present, likely  representing choledocholithiasis. Recommend GI referral for ERCP.     ERCP done [facility] Dr. Dangelo 7/30/2024.  ERCP/EUS/PD and CBD stent placement with stone extraction performed.  Will need repeat ERCP with biliary stent removal in 3 months.  Cholecystectomy done at [facility] 9/5/2024.  Stents to be removed later this month.     Weight loss  Endoscopy with small bowel biopsies normal 6/2022.  Follow-up colonoscopy showed nonspecific inflammation for which he was started on Lialda 3 tablets daily. His weight has stabilized and he is now off of lialda and doing well.  Weight is actually increased slightly 10/2024.     Dysphagia/GERD  Endoscopy 6/2022 was unremarkable.  No dysphagia.  Taking protonix, but was changed nexium 40mg BID due to increasing heartburn after \"bourbin\".  \"I'm a controlled alcoholic\".  Now doing well.  He stopped drinking in 10/2024.  There isn't a PPI on his med list, but he thinks that he is taking 40mg 4 a day.  He was told that NP in ENT told him to do this.  He is NOT having any heartburn.  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     Colon polyps  He has had adenomatous colon polyps removed intermittently over the years most recently 6/2022.  Repeat " colonoscopy 6/2027.      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    History of adenomatous polyp of colon     History of colon polyps     History of lung cancer     Right lung    Hyperlipidemia        Past Surgical History:   Procedure Laterality Date    BRONCHOSCOPY N/A 02/15/2021    Procedure: BRONCHOSCOPY;  Surgeon: Bruce Murillo MD;  Location: [facility] OR;  Service: Cardiothoracic;  Laterality: N/A;    CATARACT EXTRACTION      CHOLECYSTECTOMY      COLONOSCOPY  11/20/2012    One 1cm tubular adenomatous polyp in the sigmoid colon; One 5mm hyperplastic polyp in the rectum; Diverticulosis; Repeat 4 years     COLONOSCOPY N/A 03/30/2017    Two 4-6mm tubular adenomatous polyps at the hepatic flexure; One 5mm tubular adenomatous polyp in the transverse colon; One 11mm tubular adenomatous polyp in the sigmoid colon; One 6mm tubular adenomatous polyp in the rectum; Diverticulosis in the left colon; Repeat 3 years     COLONOSCOPY  12/17/2009    One less than 5mm tubular adenomatous polyp in the cecum; One 5mm hyperplastic polyp in the transverse; One less than 3mm hyperplastic polyp in the rectum; Diverticulosis; Repeat 3 years     COLONOSCOPY N/A 07/02/2020    Two 5-7mm tubular adenomatous polyps at the hepatic flexure; One 6mm tubular adenomatous polyp in the transverse colon; Diverticulosis in the left colon; The entire examined colon is normal on direct and retroflexion views; Repeat 5 years    COLONOSCOPY N/A 06/29/2022    Diverticulosis in the left colon; One 6mm tubular adenomatous polyp in the descending colon; One 4mm tubular adenomatous polyp in the descending colon; Congested mucosa in the entire examined colon-biopsied; Repeat 5 years    ENDOSCOPY N/A 03/27/2019    Medium-sized HH; Normal  stomach; Normal examined duodenum-biopsied    ENDOSCOPY N/A 06/09/2022    Small HH; Non-erosive gastritis-biopsied; Normal examined duodenum-biopsied    ERCP  07/30/2024    Dr. Buzz Dangelo-Choledocholithiasis status post balloon sweep extraction wtih balloon assisted sphincteroplasty; Placement of biliary stent as well as prophylactic pancreatic stent; Repeat ERCP 3 months for biliary stent removal    FOOT SURGERY Left     ORIF    HERNIA REPAIR      INNER EAR SURGERY      MEDIASTINOSCOPY N/A 02/15/2021    Procedure: CERVICAL MEDIASTINOSCOPY WITH LYMPH NODE DISECTION;  Surgeon: Bruce Murillo MD;  Location: [facility] OR;  Service: Cardiothoracic;  Laterality: N/A;    THORACOSCOPY Right 02/15/2021    Procedure: THORACOSCOPY, WEDGE RESECTION OF RIGHT UPPER LOBE WITH FROZEN,  , MEDIASTINAL LYMPH NODE DISSECTION;  Surgeon: Bruce Murillo MD;  Location: [facility] OR;  Service: Cardiothoracic;  Laterality: Right;    TONSILLECTOMY          Current Outpatient Medications:     albuterol sulfate HFA (ProAir HFA) 108 (90 Base) MCG/ACT inhaler, Inhale 2 puffs Every 4 (Four) Hours As Needed for Wheezing., Disp: 18 g, Rfl: 3    aspirin 81 MG EC tablet, Take 1 tablet by mouth Daily., Disp: , Rfl:     atorvastatin (LIPITOR) 10 MG tablet, Take 1 tablet by mouth Daily., Disp: , Rfl:     vitamin D (ERGOCALCIFEROL) 1.25 MG (52766 UT) capsule capsule, Take 1 capsule by mouth 1 (One) Time Per Week., Disp: , Rfl:     tiotropium bromide-olodaterol (Stiolto Respimat) 2.5-2.5 MCG/ACT aerosol solution inhaler, Inhale 2 puffs Daily. (Patient not taking: Reported on 10/9/2024), Disp: 3 each, Rfl: 11    No Known Allergies    Social History     Socioeconomic History    Marital status: Significant Other   Tobacco Use    Smoking status: Every Day     Current packs/day: 0.50     Average packs/day: 0.5 packs/day for 55.8 years (27.9 ttl pk-yrs)     Types: Cigarettes     Start date: 1/1/1969     Passive exposure: Current    Smokeless tobacco: Never     "Tobacco comments:     Pt states about 7-8 cigarettes per day 7/3   Vaping Use    Vaping status: Never Used   Substance and Sexual Activity    Alcohol use: Not Currently     Alcohol/week: 3.0 standard drinks of alcohol     Types: 3 Shots of liquor per week     Comment: sober as of 06/01/2022    Drug use: Yes     Types: Marijuana    Sexual activity: Yes     Partners: Female       Family History   Problem Relation Age of Onset    Colon polyps Mother         Unknown age    Diabetes Mother     Colon cancer Neg Hx     Esophageal cancer Neg Hx     Liver cancer Neg Hx     Liver disease Neg Hx     Rectal cancer Neg Hx     Stomach cancer Neg Hx        Review of Systems   HENT:  Positive for sore throat.    Respiratory:  Negative for shortness of breath.    Cardiovascular:  Negative for chest pain.       Objective     /70   Pulse 82   Temp 97.3 °F (36.3 °C)   Ht 177.8 cm (70\")   Wt 79.8 kg (176 lb)   SpO2 98%   BMI 25.25 kg/m²     Physical Exam  Constitutional:       Appearance: He is well-developed.   Pulmonary:      Effort: Pulmonary effort is normal.   Musculoskeletal:         General: Normal range of motion.   Skin:     General: Skin is warm.   Neurological:      Mental Status: He is alert and oriented to person, place, and time.   Psychiatric:         Behavior: Behavior normal.         Lab Results - Last 18 Months   Lab Units 08/29/24  0802 07/09/24  1549 06/26/24  0851 04/24/24  0849 03/25/24  0828   GLUCOSE mg/dL 80 100  --   --   --    BUN mg/dL 16 18  --   --   --    CREATININE mg/dL 0.7 0.7 0.90 0.90 0.90   SODIUM mmol/L 142 137  --   --   --    POTASSIUM mmol/L 4.5 4.3  --   --   --    CHLORIDE mmol/L 103 98  --   --   --    TOTAL CO2 mmol/L 27 26  --   --   --    TOTAL PROTEIN g/dL 7.1 7.7  --   --   --    ALBUMIN g/dL 4.4 4.2  --   --   --    ALT (SGPT) U/L 13 105*  --   --   --    AST (SGOT) U/L 21 58*  --   --   --    ALK PHOS U/L 93 433*  --   --   --    BILIRUBIN mg/dL 0.8 0.9  --   --   --  "       Lab Results - Last 18 Months   Lab Units 08/29/24  0802   HEMOGLOBIN g/dL 16.0   HEMATOCRIT % 49.7   MCV fL 98.6*   WBC K/uL 7.8   RDW % 12.2   MPV fL 11.4   PLATELETS K/uL 160         IMPRESSION/PLAN:    Assessment & Plan      Problem List Items Addressed This Visit          Endocrine and Metabolic    RESOLVED: Weight loss    Overview     Endoscopy with small bowel biopsies normal 6/2022.  Follow-up colonoscopy showed nonspecific inflammation for which she was started on Lialda 3 tablets daily.  TSH normal 5/2022.  Now that the diarrhea is in control, his weight has actually increased.  He has gained 5 pounds since June 2022.  He is at an ideal weight now.    Weight has been stable through 10/2024.  No longer an active problem.            Gastrointestinal Abdominal     History of adenomatous polyp of colon    Overview     Adenomatous colon polyps removed 6/2022.  Repeat colonoscopy 6/2027.         Esophageal dysphagia    Overview     Intermittent.  Endoscopy 6/2022 unremarkable. No problems now.         RESOLVED: Choledocholithiasis    Overview     CT 6/2024:  Intrahepatic and extrahepatic biliary ductal dilatation extending  down to the ampulla where a 10 mm filling defect is present, likely  representing choledocholithiasis. Recommend GI referral for ERCP.    ERCP done  Dr. Dangelo 7/30/2024.  ERCP/EUS/PD and CBD stent placement with stone extraction performed.  Will need repeat ERCP with biliary stent removal in 3 months.  Cholecystectomy done at  9/5/2024.         Gastroesophageal reflux disease without esophagitis    Overview     Martinez's or esophagitis 6/2022.  Continue pantoprazole 40 mg daily.  Had increasing symtoms so pantoprazole changed to Nexium twice a day.  He notes an increase in symptoms if he drinks bourbon.    Today, 10/2024, he reports that he was seen by ENT who increased his Nexium.  He reports that he has been taking 40 mg 4 times a day.  Patient has only 1  kidney congenitally.  He denies any heartburn.  I have advised to decrease back to 40 mg daily in the morning before breakfast.  He is asymptomatic from a GI point of view.    Anti-reflux measures were reviewed and discussed with patient.  Pt advised to refrain from chocolate, alcohol, smoking, peppermint and caffeine.  Also advised to limit fatty foods, large meals, and eating late at nighttime.  Advised to reach an ideal body mass index.         RESOLVED: Nonspecific colitis - Primary    Overview     Nonbloody watery diarrhea started in early 2022 nonresponsive to 2 courses of Flagyl.  TSH normal 5/2022.  Colonoscopy 6/2022 showed adenomatous polyp and biopsies from the colon showed nonspecific inflammation.  Liada 3 tablets daily was started.  No further complaints of diarrhea since starting Lialda.  We will continue.  If gets constipated on regular basis, can decrease lialda to 2 daily.    Stopped liada in early 2024 and doing well.          MEDICAL COMPLEXITY must have 2 out of 3   Moderate Complexity Level 4                                                                                                              1 of the following medical problems:                                                                                               []One chronic illness with mild exacerbation                                                                                [x]Two or more stable chronic illness                                                                                              []One new problem  []One acute illness with systemic symptoms     Complexity of Data  Reviewed (1 out of the 3 following categories)                                             Category 1 tests, documents, historian (must have 3 points)                                                      []Review of prior external records  []Review of results of unique tests  []Ordering unique tests   []Assessment requires an  independent historian   Category 2 Interpretation of tests     []Independent interpretation of test read by another doc   Category 3 Discuss Management/tests  []Discussion with external physician     Risk of complications and/or morbidity                                                                                           [x]Prescription Drug Management     []Decision for elective endoscopic procedure              RTC 1 year      Kat Licea MD  10/09/24  15:02 CDT    Part of this note may be an electronic transcription/translation of spoken language to printed text.

## 2024-10-09 ENCOUNTER — OFFICE VISIT (OUTPATIENT)
Dept: GASTROENTEROLOGY | Facility: CLINIC | Age: 74
End: 2024-10-09
Payer: MEDICARE

## 2024-10-09 VITALS
DIASTOLIC BLOOD PRESSURE: 70 MMHG | OXYGEN SATURATION: 98 % | SYSTOLIC BLOOD PRESSURE: 126 MMHG | HEIGHT: 70 IN | TEMPERATURE: 97.3 F | HEART RATE: 82 BPM | WEIGHT: 176 LBS | BODY MASS INDEX: 25.2 KG/M2

## 2024-10-09 DIAGNOSIS — K80.50 CHOLEDOCHOLITHIASIS: ICD-10-CM

## 2024-10-09 DIAGNOSIS — K52.9 NONSPECIFIC COLITIS: Primary | ICD-10-CM

## 2024-10-09 DIAGNOSIS — R13.19 ESOPHAGEAL DYSPHAGIA: ICD-10-CM

## 2024-10-09 DIAGNOSIS — K21.9 GASTROESOPHAGEAL REFLUX DISEASE WITHOUT ESOPHAGITIS: ICD-10-CM

## 2024-10-09 DIAGNOSIS — Z86.0101 HISTORY OF ADENOMATOUS POLYP OF COLON: ICD-10-CM

## 2024-10-09 DIAGNOSIS — R63.4 WEIGHT LOSS: ICD-10-CM

## 2024-10-09 PROCEDURE — 1159F MED LIST DOCD IN RCRD: CPT | Performed by: INTERNAL MEDICINE

## 2024-10-09 PROCEDURE — 1160F RVW MEDS BY RX/DR IN RCRD: CPT | Performed by: INTERNAL MEDICINE

## 2024-10-09 PROCEDURE — 99214 OFFICE O/P EST MOD 30 MIN: CPT | Performed by: INTERNAL MEDICINE

## 2024-10-24 ENCOUNTER — TELEPHONE (OUTPATIENT)
Dept: GASTROENTEROLOGY | Age: 74
End: 2024-10-24

## 2024-10-24 NOTE — TELEPHONE ENCOUNTER
Called patient to remind them of their procedure with Dr. Virgil Simms  at [facility]  on 10/29/24  to arrive at 1100     CONFIRMED  PATIENT  is aware that their  will need to remain during procedure

## 2024-11-12 ENCOUNTER — ANESTHESIA (OUTPATIENT)
Dept: ENDOSCOPY | Age: 74
End: 2024-11-12
Payer: MEDICARE

## 2024-11-12 ENCOUNTER — APPOINTMENT (OUTPATIENT)
Dept: GENERAL RADIOLOGY | Age: 74
End: 2024-11-12
Attending: INTERNAL MEDICINE
Payer: MEDICARE

## 2024-11-12 ENCOUNTER — HOSPITAL ENCOUNTER (OUTPATIENT)
Age: 74
Setting detail: OUTPATIENT SURGERY
Discharge: HOME | End: 2024-11-12
Attending: INTERNAL MEDICINE | Admitting: INTERNAL MEDICINE
Payer: MEDICARE

## 2024-11-12 ENCOUNTER — ANCILLARY PROCEDURE (OUTPATIENT)
Dept: ENDOSCOPY | Age: 74
End: 2024-11-12
Attending: INTERNAL MEDICINE
Payer: MEDICARE

## 2024-11-12 ENCOUNTER — ANESTHESIA EVENT (OUTPATIENT)
Dept: ENDOSCOPY | Age: 74
End: 2024-11-12
Payer: MEDICARE

## 2024-11-12 VITALS
DIASTOLIC BLOOD PRESSURE: 73 MMHG | RESPIRATION RATE: 16 BRPM | SYSTOLIC BLOOD PRESSURE: 142 MMHG | HEIGHT: 70 IN | WEIGHT: 174 LBS | OXYGEN SATURATION: 100 % | BODY MASS INDEX: 24.91 KG/M2 | TEMPERATURE: 97.3 F | HEART RATE: 58 BPM

## 2024-11-12 PROCEDURE — 6360000002 HC RX W HCPCS: Performed by: NURSE ANESTHETIST, CERTIFIED REGISTERED

## 2024-11-12 PROCEDURE — 74328 X-RAY BILE DUCT ENDOSCOPY: CPT | Performed by: INTERNAL MEDICINE

## 2024-11-12 PROCEDURE — 2500000003 HC RX 250 WO HCPCS: Performed by: NURSE ANESTHETIST, CERTIFIED REGISTERED

## 2024-11-12 PROCEDURE — 6370000000 HC RX 637 (ALT 250 FOR IP): Performed by: NURSE ANESTHETIST, CERTIFIED REGISTERED

## 2024-11-12 PROCEDURE — 3700000000 HC ANESTHESIA ATTENDED CARE: Performed by: INTERNAL MEDICINE

## 2024-11-12 PROCEDURE — 2709999900 HC NON-CHARGEABLE SUPPLY: Performed by: INTERNAL MEDICINE

## 2024-11-12 PROCEDURE — 74328 X-RAY BILE DUCT ENDOSCOPY: CPT

## 2024-11-12 PROCEDURE — 2580000003 HC RX 258: Performed by: INTERNAL MEDICINE

## 2024-11-12 PROCEDURE — 7100000010 HC PHASE II RECOVERY - FIRST 15 MIN: Performed by: INTERNAL MEDICINE

## 2024-11-12 PROCEDURE — 3700000001 HC ADD 15 MINUTES (ANESTHESIA): Performed by: INTERNAL MEDICINE

## 2024-11-12 PROCEDURE — 7100000011 HC PHASE II RECOVERY - ADDTL 15 MIN: Performed by: INTERNAL MEDICINE

## 2024-11-12 PROCEDURE — 43275 ERCP REMOVE FORGN BODY DUCT: CPT | Performed by: INTERNAL MEDICINE

## 2024-11-12 PROCEDURE — 2720000010 HC SURG SUPPLY STERILE: Performed by: INTERNAL MEDICINE

## 2024-11-12 PROCEDURE — 6370000000 HC RX 637 (ALT 250 FOR IP): Performed by: INTERNAL MEDICINE

## 2024-11-12 PROCEDURE — C1769 GUIDE WIRE: HCPCS | Performed by: INTERNAL MEDICINE

## 2024-11-12 PROCEDURE — 43264 ERCP REMOVE DUCT CALCULI: CPT | Performed by: INTERNAL MEDICINE

## 2024-11-12 PROCEDURE — 3609015000 HC ERCP REMOVE FOREIGN BODY/STENT BILIARY/PANC DUCT: Performed by: INTERNAL MEDICINE

## 2024-11-12 RX ORDER — ALBUTEROL SULFATE 90 UG/1
INHALANT RESPIRATORY (INHALATION)
Status: DISCONTINUED | OUTPATIENT
Start: 2024-11-12 | End: 2024-11-12 | Stop reason: SDUPTHER

## 2024-11-12 RX ORDER — INDOMETHACIN 100 MG
100 SUPPOSITORY, RECTAL RECTAL ONCE
Status: DISCONTINUED | OUTPATIENT
Start: 2024-11-12 | End: 2024-11-12 | Stop reason: HOSPADM

## 2024-11-12 RX ORDER — FENTANYL CITRATE 50 UG/ML
INJECTION, SOLUTION INTRAMUSCULAR; INTRAVENOUS
Status: DISCONTINUED | OUTPATIENT
Start: 2024-11-12 | End: 2024-11-12 | Stop reason: SDUPTHER

## 2024-11-12 RX ORDER — SODIUM CHLORIDE, SODIUM LACTATE, POTASSIUM CHLORIDE, CALCIUM CHLORIDE 600; 310; 30; 20 MG/100ML; MG/100ML; MG/100ML; MG/100ML
INJECTION, SOLUTION INTRAVENOUS CONTINUOUS
Status: DISCONTINUED | OUTPATIENT
Start: 2024-11-12 | End: 2024-11-12 | Stop reason: HOSPADM

## 2024-11-12 RX ORDER — INDOMETHACIN 100 MG
SUPPOSITORY, RECTAL RECTAL PRN
Status: DISCONTINUED | OUTPATIENT
Start: 2024-11-12 | End: 2024-11-12 | Stop reason: HOSPADM

## 2024-11-12 RX ORDER — LIDOCAINE HYDROCHLORIDE 10 MG/ML
INJECTION, SOLUTION INFILTRATION; PERINEURAL
Status: DISCONTINUED | OUTPATIENT
Start: 2024-11-12 | End: 2024-11-12 | Stop reason: SDUPTHER

## 2024-11-12 RX ORDER — GLYCOPYRROLATE 0.2 MG/ML
INJECTION INTRAMUSCULAR; INTRAVENOUS
Status: DISCONTINUED | OUTPATIENT
Start: 2024-11-12 | End: 2024-11-12 | Stop reason: SDUPTHER

## 2024-11-12 RX ORDER — PROPOFOL 10 MG/ML
INJECTION, EMULSION INTRAVENOUS
Status: DISCONTINUED | OUTPATIENT
Start: 2024-11-12 | End: 2024-11-12 | Stop reason: SDUPTHER

## 2024-11-12 RX ADMIN — FENTANYL CITRATE 50 MCG: 50 INJECTION, SOLUTION INTRAMUSCULAR; INTRAVENOUS at 12:22

## 2024-11-12 RX ADMIN — PROPOFOL 150 MCG/KG/MIN: 10 INJECTION, EMULSION INTRAVENOUS at 12:25

## 2024-11-12 RX ADMIN — PROPOFOL 100 MG: 10 INJECTION, EMULSION INTRAVENOUS at 12:22

## 2024-11-12 RX ADMIN — SODIUM CHLORIDE, POTASSIUM CHLORIDE, SODIUM LACTATE AND CALCIUM CHLORIDE: 600; 310; 30; 20 INJECTION, SOLUTION INTRAVENOUS at 11:45

## 2024-11-12 RX ADMIN — GLYCOPYRROLATE 0.1 MG: 0.2 INJECTION, SOLUTION INTRAMUSCULAR; INTRAVENOUS at 12:27

## 2024-11-12 RX ADMIN — LIDOCAINE HYDROCHLORIDE 50 MG: 10 INJECTION, SOLUTION INFILTRATION; PERINEURAL at 12:22

## 2024-11-12 RX ADMIN — ALBUTEROL SULFATE 2 PUFF: 90 AEROSOL, METERED RESPIRATORY (INHALATION) at 12:28

## 2024-11-12 RX ADMIN — ALBUTEROL SULFATE 2 PUFF: 90 AEROSOL, METERED RESPIRATORY (INHALATION) at 12:26

## 2024-11-12 ASSESSMENT — PAIN - FUNCTIONAL ASSESSMENT: PAIN_FUNCTIONAL_ASSESSMENT: 0-10

## 2024-11-12 ASSESSMENT — LIFESTYLE VARIABLES: SMOKING_STATUS: 1

## 2024-11-12 NOTE — H&P
ENDOSCOPIC RETROGRADE CHOLANGIOPANCREATOGRAPHY DILATION BALLOON performed by Virgil Simms MD at [facility]    ERCP N/A 07/30/2024    ENDOSCOPIC RETROGRADE CHOLANGIOPANCREATOGRAPHY STONE REMOVAL performed by Virgil Simms MD at [facility]    FOOT SURGERY Left     FOOT SURGERY Left     HERNIA REPAIR      INNER EAR SURGERY      LUNG CANCER SURGERY  02/2021    MEDIASTINOSCOPY  02/15/2021    Dr Calle    THORACOSCOPY Right 02/15/2021    Dr Calle    TONSILLECTOMY      UPPER GASTROINTESTINAL ENDOSCOPY  06/09/2022    Dr Srivastava-Small HH, non erosive gastritis, normal duodenum    UPPER GASTROINTESTINAL ENDOSCOPY  03/27/2019    Dr Srivastava-Medium sized HH, normal stomach and duodenum    WRIST SURGERY         Social History:  Social History     Tobacco Use    Smoking status: Every Day     Current packs/day: 0.50     Types: Cigarettes    Smokeless tobacco: Never   Vaping Use    Vaping status: Never Used   Substance Use Topics    Alcohol use: Yes     Alcohol/week: 3.0 standard drinks of alcohol     Types: 3 Shots of liquor per week     Comment: daily    Drug use: Yes     Frequency: 7.0 times per week     Types: Marijuana (Weed)     Comment: couple a night       Vital Signs:   Vitals:    11/12/24 1129   BP: (!) 146/95   Pulse: 79   Resp: 18   Temp: 97.3 °F (36.3 °C)   SpO2: 96%        Physical Exam:  Cardiac:  [x]WNL  []Comments:  Pulmonary:  [x]WNL   []Comments:  Neuro/Mental Status:  [x]WNL  []Comments:  Abdominal:  [x]WNL    []Comments:  Other:   []WNL  []Comments:    Informed Consent:  The risks and benefits of the procedure have been discussed with either the patient or if they cannot consent, their representative.    Assessment:  Patient examined and appropriate for planned sedation and procedure.     Plan:  Proceed with planned sedation and procedure as above.         Virgil Simms MD

## 2024-11-12 NOTE — ANESTHESIA POSTPROCEDURE EVALUATION
Department of Anesthesiology  Postprocedure Note    Patient: Jose Enrique Landeros  MRN: [id number]  YOB: 1950  Date of evaluation: 11/12/2024    Procedure Summary       Date: 11/12/24 Room / Location: [place] [address] / [facility]    Anesthesia Start: 1216 Anesthesia Stop:     Procedure: ENDOSCOPIC RETROGRADE CHOLANGIOPANCREATOGRAPHY STENT REMOVAL (Abdomen) Diagnosis:       Encounter for removal of biliary stent      (Encounter for removal of biliary stent [Z46.89])    Surgeons: Virgil Simms MD Responsible Provider: Whitney Mcelroy APRN - CRNA    Anesthesia Type: general, TIVA ASA Status: 2            Anesthesia Type: No value filed.    Jai Phase I: Jai Score: 10    Jai Phase II:      Anesthesia Post Evaluation    Patient location during evaluation: bedside  Patient participation: complete - patient participated  Level of consciousness: awake  Pain score: 0  Airway patency: patent  Nausea & Vomiting: no nausea and no vomiting  Cardiovascular status: blood pressure returned to baseline and hemodynamically stable  Respiratory status: acceptable, room air, nonlabored ventilation and spontaneous ventilation  Hydration status: euvolemic  Comments: Vital signs are within acceptable limits and stable, SBAR handoff/transfer of care to RN.    Patient Handoff Status:to [facility] RN  Level of Response: --awake  Oxygen device: --RA  Airway Status: --patent  BP: --105/65  Pulse: --80  SpO2: --94%  Respirations: --18  Temp: --97.6  Temp Source: --temporal         Multimodal analgesia pain management approach    No notable events documented.

## 2024-11-12 NOTE — DISCHARGE INSTRUCTIONS
RECOMMENDATIONS:    1.  Clear liquid diet today with advancing diet tomorrow as tolerated.   2.  Please call with any questions/concerns.    Endoscopic Ultrasound (Oral): What to Expect At Home  Your Recovery  After you have an endoscopic ultrasound--a test to look for problems in the stomach, liver, gallbladder, and other organs--you will stay until the sedation begins to wear off. You will be able to go home after your doctor or nurse checks to make sure you are not having any problems.  You may have a sore throat for a day or two after the test.  This care sheet gives you a general idea about what to expect after the test.    How can you care for yourself at home?  Activity    Rest as much as you need to after you go home.     You should be able to go back to your usual activities the day after the test.   Diet    Follow your doctor's directions for eating after the test.     Drink plenty of fluids (unless your doctor has told you not to).   Medicines    If you have a sore throat the day after the test, use an over-the-counter spray to numb your throat.   Other instructions    Do not drive or operate any heavy equipment for the remainder of the day.     Do not sign legal documents or make major decisions until the medicine effects are gone and you can think clearly. The anesthesia medicine can make it hard for you to fully understand what you are agreeing to.     When should you call for help?   Call 911 anytime you think you may need emergency care. For example, call if:    You passed out (lost consciousness).     You have trouble breathing.   Call your doctor now or seek immediate medical care if:    You are vomiting.     You have new or worse belly pain.     You have a fever.     You cannot pass stools or gas.   Watch closely for any changes in your health, and be sure to contact your doctor if:    You do not get better as expected.

## 2024-11-12 NOTE — OP NOTE
Endoscopic Procedure Note    Patient: Jose Enrique Landeros : 1950  Med Rec#: [id number] Acc#: [id number]     Primary Care Provider George Puentes MD  Referring Provider: LAURA Mccabe DO    Endoscopist: Virgil Simms MD    Date of Procedure:  2024     Procedure:   1. ERCP with stent removal  2. ERCP with sludge removal  3. Intra procedure interpretation of biliary fluoroscopy 08150    Indications:   1. Previous ERCP with stent placement   2. History of choledocholithiasis     Anesthesia:  General     Estimated Blood Loss: minimal    Procedure:   Prior to the procedure the patient's chart was reviewed and informed consent was obtained.  Risk and Benefits (Risks including but not limited to bleeding, perforation, infection, 8 to 10% risk of post ERCP pancreatitis, and even death) were discussed with the patient. They were agreeable to continue.     Patient was brought to the operating room, underwent general anesthesia, and placed in the prone position.  A side-viewing duodenoscope was advanced from the oropharynx down to the distal duodenum and reduced into a short position opposite the ampulla. The ampulla appeared c/w previous stenting. A  film was obtained which showed stent in proper position.     Stent removal:  An existing stent was seen emanating from the ampulla. This was grasped and removed with a polypectomy snare and removed in its entirety through the scope.     The bile duct was then cannulated using a 0.035 x 260 cm straight Dreamwire. A short nose traction sphincterotome was advanced over the wire and the bile duct was deeply cannulated.  Contrast was injected.  I personally interpreted the bile duct images.  The flow of contrast was adequate.  Contrast injection showed small amount of filling defects in the distal CBD. The pancreatic duct was not cannulated nor injected.     The bile duct was swept multiple times starting the bifurcation with a 12mm biliary extraction balloon. A small

## 2024-11-12 NOTE — ANESTHESIA PRE PROCEDURE
80 08/29/2024 08:02 AM    CALCIUM 9.7 08/29/2024 08:02 AM    BILITOT 0.8 08/29/2024 08:02 AM    ALKPHOS 93 08/29/2024 08:02 AM    AST 21 08/29/2024 08:02 AM    ALT 13 08/29/2024 08:02 AM       POC Tests: No results for input(s): \"POCGLU\", \"POCNA\", \"POCK\", \"POCCL\", \"POCBUN\", \"POCHEMO\", \"POCHCT\" in the last 72 hours.    Coags: No results found for: \"PROTIME\", \"INR\", \"APTT\"    HCG (If Applicable): No results found for: \"PREGTESTUR\", \"PREGSERUM\", \"HCG\", \"HCGQUANT\"     ABGs: No results found for: \"PHART\", \"PO2ART\", \"XVL9BYD\", \"MYU8YTC\", \"BEART\", \"M3ZEUXOZ\"     Type & Screen (If Applicable):  No results found for: \"LABABO\"    Drug/Infectious Status (If Applicable):  No results found for: \"HIV\", \"HEPCAB\"    COVID-19 Screening (If Applicable): No results found for: \"COVID19\"        Anesthesia Evaluation  Patient summary reviewed  Airway: Mallampati: I  TM distance: >3 FB   Neck ROM: full  Mouth opening: > = 3 FB   Dental:    (+) upper dentures, lower dentures and edentulous      Pulmonary:normal exam  breath sounds clear to auscultation  (+)  COPD:          current smoker          Patient smoked on day of surgery.                 Cardiovascular:  Exercise tolerance: good (>4 METS)        ECG reviewed  Rhythm: regular  Rate: normal           Beta Blocker:  Not on Beta Blocker         Neuro/Psych:               GI/Hepatic/Renal:   (+) GERD: well controlled, hepatitis (Remote, no longer positive): C          Endo/Other:                     Abdominal:             Vascular:   + DVT (4 yrs ago).       Other Findings:         Anesthesia Plan      general and TIVA     ASA 2     (Preop famotidine)  Induction: intravenous.      Anesthetic plan and risks discussed with patient.      Plan discussed with surgical team.                YENI Lopez - DYLAN   11/12/2024

## 2024-12-27 ENCOUNTER — OFFICE VISIT (OUTPATIENT)
Dept: OTOLARYNGOLOGY | Facility: CLINIC | Age: 74
End: 2024-12-27
Payer: MEDICARE

## 2024-12-27 VITALS
HEIGHT: 70 IN | SYSTOLIC BLOOD PRESSURE: 123 MMHG | TEMPERATURE: 98.7 F | BODY MASS INDEX: 26.48 KG/M2 | WEIGHT: 185 LBS | DIASTOLIC BLOOD PRESSURE: 88 MMHG | HEART RATE: 89 BPM

## 2024-12-27 DIAGNOSIS — R49.0 HOARSENESS: ICD-10-CM

## 2024-12-27 DIAGNOSIS — R13.10 DYSPHAGIA, UNSPECIFIED TYPE: Primary | ICD-10-CM

## 2024-12-27 DIAGNOSIS — Z72.0 TOBACCO USE: ICD-10-CM

## 2024-12-27 DIAGNOSIS — R13.10 DYSPHAGIA, UNSPECIFIED TYPE: ICD-10-CM

## 2024-12-27 DIAGNOSIS — R49.0 HOARSENESS: Primary | ICD-10-CM

## 2024-12-27 DIAGNOSIS — K21.9 LARYNGOPHARYNGEAL REFLUX (LPR): ICD-10-CM

## 2024-12-27 NOTE — PROGRESS NOTES
YOB: 1950  Location: [facility]  Location Address: [address] [place] [address], [facility], [address] [place] [address]  Location Phone: [phone]    Chief Complaint   Patient presents with    Sore Throat       History of Present Illness  Se NJ is a 74 y.o. male.      History of Present Illness  The patient presents for evaluation of persistent hoarseness.    He continues to experience hoarseness, which he had hoped would improve following his recent cholecystectomy, but it has not. He has reduced his smoking habit to approximately three-quarters of a pack per day. He is not interested in Chantix at this time. He is currently on a regimen of Nexium, taking two tablets once daily in the morning before meals. He reports no current symptoms of heartburn. He also reports difficulty swallowing, with occasional instances of food impaction. He has not undergone a swallow study. A CT scan of his neck has not been performed. He is scheduled for a CT scan of his lungs next week at [facility]. His last endoscopy was conducted in  by Dr. Licea.  He has a diagnosis of COPD and experiences coughing and expectoration, which he believes may be contributing to his throat irritation.    SOCIAL HISTORY  The patient admits to smoking about three-quarters of a pack a day.      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    History of adenomatous polyp of colon     History of colon polyps     History of lung cancer     Right lung    Hyperlipidemia        Past Surgical History:   Procedure Laterality Date    BRONCHOSCOPY N/A 02/15/2021    Procedure: BRONCHOSCOPY;  Surgeon: Bruce Murillo MD;  Location: [facility];  Service: Cardiothoracic;  Laterality: N/A;    CATARACT EXTRACTION       CHOLECYSTECTOMY      COLONOSCOPY  11/20/2012    One 1cm tubular adenomatous polyp in the sigmoid colon; One 5mm hyperplastic polyp in the rectum; Diverticulosis; Repeat 4 years     COLONOSCOPY N/A 03/30/2017    Two 4-6mm tubular adenomatous polyps at the hepatic flexure; One 5mm tubular adenomatous polyp in the transverse colon; One 11mm tubular adenomatous polyp in the sigmoid colon; One 6mm tubular adenomatous polyp in the rectum; Diverticulosis in the left colon; Repeat 3 years     COLONOSCOPY  12/17/2009    One less than 5mm tubular adenomatous polyp in the cecum; One 5mm hyperplastic polyp in the transverse; One less than 3mm hyperplastic polyp in the rectum; Diverticulosis; Repeat 3 years     COLONOSCOPY N/A 07/02/2020    Two 5-7mm tubular adenomatous polyps at the hepatic flexure; One 6mm tubular adenomatous polyp in the transverse colon; Diverticulosis in the left colon; The entire examined colon is normal on direct and retroflexion views; Repeat 5 years    COLONOSCOPY N/A 06/29/2022    Diverticulosis in the left colon; One 6mm tubular adenomatous polyp in the descending colon; One 4mm tubular adenomatous polyp in the descending colon; Congested mucosa in the entire examined colon-biopsied; Repeat 5 years    ENDOSCOPY N/A 03/27/2019    Medium-sized HH; Normal stomach; Normal examined duodenum-biopsied    ENDOSCOPY N/A 06/09/2022    Small HH; Non-erosive gastritis-biopsied; Normal examined duodenum-biopsied    ERCP  07/30/2024    Dr. Buzz Dangelo-Choledocholithiasis status post balloon sweep extraction wtih balloon assisted sphincteroplasty; Placement of biliary stent as well as prophylactic pancreatic stent; Repeat ERCP 3 months for biliary stent removal    FOOT SURGERY Left     ORIF    HERNIA REPAIR      INNER EAR SURGERY      MEDIASTINOSCOPY N/A 02/15/2021    Procedure: CERVICAL MEDIASTINOSCOPY WITH LYMPH NODE DISECTION;  Surgeon: Bruce Murillo MD;  Location: [facility];  Service: Cardiothoracic;   Laterality: N/A;    THORACOSCOPY Right 02/15/2021    Procedure: THORACOSCOPY, WEDGE RESECTION OF RIGHT UPPER LOBE WITH FROZEN,  , MEDIASTINAL LYMPH NODE DISSECTION;  Surgeon: Bruce Murillo MD;  Location: [facility];  Service: Cardiothoracic;  Laterality: Right;    TONSILLECTOMY         Outpatient Medications Marked as Taking for the 12/27/24 encounter (Office Visit) with Edmundo Infante APRN   Medication Sig Dispense Refill    aspirin 81 MG EC tablet Take 1 tablet by mouth Daily.      atorvastatin (LIPITOR) 10 MG tablet Take 1 tablet by mouth Daily.      vitamin D (ERGOCALCIFEROL) 1.25 MG (26154 UT) capsule capsule Take 1 capsule by mouth 1 (One) Time Per Week.      [DISCONTINUED] esomeprazole (nexIUM) 20 MG capsule TAKE 2 CAPSULES BY MOUTH 2 (TWO) TIMES A DAY FOR 30 DAYS.         Patient has no known allergies.    Family History   Problem Relation Age of Onset    Colon polyps Mother         Unknown age    Diabetes Mother     Colon cancer Neg Hx     Esophageal cancer Neg Hx     Liver cancer Neg Hx     Liver disease Neg Hx     Rectal cancer Neg Hx     Stomach cancer Neg Hx        Social History     Socioeconomic History    Marital status: Significant Other   Tobacco Use    Smoking status: Every Day     Current packs/day: 0.50     Average packs/day: 0.5 packs/day for 56.0 years (28.0 ttl pk-yrs)     Types: Cigarettes     Start date: 1/1/1969     Passive exposure: Current    Smokeless tobacco: Never    Tobacco comments:     Pt states about 7-8 cigarettes per day 7/3   Vaping Use    Vaping status: Never Used   Substance and Sexual Activity    Alcohol use: Not Currently     Alcohol/week: 3.0 standard drinks of alcohol     Types: 3 Shots of liquor per week     Comment: sober as of 06/01/2022    Drug use: Yes     Types: Marijuana    Sexual activity: Yes     Partners: Female       Review of Systems   Constitutional: Negative.    HENT:  Positive for trouble swallowing and voice change.        Vitals:    12/27/24 1005    BP: 123/88   Pulse: 89   Temp: 98.7 °F (37.1 °C)       Body mass index is 26.54 kg/m².    Objective     Physical Exam  Throat was examined.  Neck was palpated.    Physical Exam  Vitals reviewed.   Constitutional:       Appearance: Normal appearance.   HENT:      Head: Normocephalic.      Right Ear: External ear normal.      Left Ear: External ear normal.      Ears:      Comments: Right hearing aid noted     Nose: Nose normal.      Mouth/Throat:      Lips: Pink.      Mouth: Mucous membranes are moist.      Dentition: Has dentures.      Pharynx: Oropharynx is clear.   Musculoskeletal:      Cervical back: Full passive range of motion without pain.   Lymphadenopathy:      Cervical: No cervical adenopathy.   Neurological:      Mental Status: He is alert.   Psychiatric:         Behavior: Behavior is cooperative.           Assessment & Plan   Diagnoses and all orders for this visit:    1. Hoarseness (Primary)  -     CT Soft Tissue Neck With Contrast; Future  -     FL Video Swallow With Speech Single Contrast; Future    2. Laryngopharyngeal reflux (LPR)  -     CT Soft Tissue Neck With Contrast; Future  -     FL Video Swallow With Speech Single Contrast; Future    3. Tobacco use  -     CT Soft Tissue Neck With Contrast; Future  -     FL Video Swallow With Speech Single Contrast; Future    4. Dysphagia, unspecified type  -     CT Soft Tissue Neck With Contrast; Future  -     FL Video Swallow With Speech Single Contrast; Future    Other orders  -     esomeprazole (nexIUM) 20 MG capsule; Take 1 capsule by mouth 2 (Two) Times a Day for 30 days.  Dispense: 60 capsule; Refill: 3  -     Miracle Mouthwash (MIRACLE MOUTHWASH) 900 mL suspension; Take 10 mL by mouth 4 (Four) Times a Day for 10 days.  Dispense: 400 mL; Refill: 1      * Surgery not found *  Orders Placed This Encounter   Procedures    CT Soft Tissue Neck With Contrast     Standing Status:   Future     Standing Expiration Date:   12/27/2025     Scheduling Instructions:       To be obtained with Ct chest     Order Specific Question:   Reason for Exam:     Answer:   peristent hoarseness     Order Specific Question:   Release to patient     Answer:   Routine Release [[id number]]    FL Video Swallow With Speech Single Contrast     Standing Status:   Future     Standing Expiration Date:   12/27/2025     Order Specific Question:   Reason for Exam:     Answer:   dysphagia     Order Specific Question:   Release to patient     Answer:   Routine Release [[id number]]     Assessment & Plan  1. Persistent hoarseness.  He continues to experience hoarseness, which he had hoped would improve following his recent cholecystectomy, but it has not. He has reduced his smoking habit to approximately three-quarters of a pack per day. He is currently on a regimen of Nexium, taking two tablets once daily in the morning before meals. He reports no current symptoms of heartburn. He also reports difficulty swallowing, with occasional instances of food impaction. He has not undergone a swallow study. A CT scan of his neck has not been performed. He is scheduled for a CT scan of his lungs next week at [facility]. A CT scan of the neck will be ordered to be obtained concurrently with the scheduled CT chest. A swallow study will also be conducted. The dosage of Nexium will be increased to one tablet twice daily, to be taken before breakfast and dinner. It is recommended to eliminate milk and dairy from diet.      Return in about 3 months (around 3/27/2025) for Recheck, CT.       Patient Instructions   Gastroesophageal Reflux Disease (Laryngopharyngeal Reflux), Adult  Gastroesophageal reflux disease (GERD) and/or Laryngopharyngeal Reflux, (LPR) happens when acid from your stomach flows up into the esophagus and/or throat and voicebox or larynx. When acid comes in contact with the these organs, the acid can cause soreness (inflammation). Over time, GERD may create small holes (ulcers) in the lining of the esophagus  and may lead to the development of hoarseness, difficulty swallowing,   feeling of something stuck in the throat, increased mucous or drainage and even predispose to the development of malignancies, (cancer).    CAUSES   Increased body weight. This puts pressure on the stomach, making acid rise from the stomach into the esophagus.  Smoking. This increases acid production in the stomach.  Drinking alcohol. This causes decreased pressure in the lower esophageal sphincter (valve or ring of muscle between the esophagus and stomach), allowing acid from the stomach into the esophagus.  Late evening meals and a full stomach. This increases pressure and acid production in the stomach.  A malformed lower esophageal sphincter  Diet which can include avoidance of gluten and dairy products  Age  SYMPTOMS   Burning pain in the lower part of the mid-chest behind the breastbone and in the mid-stomach area. This may occur twice a week or more often.  Trouble swallowing.  Sore throat.  Dry cough.  Asthma-like symptoms including chest tightness, shortness of breath, or wheezing.  Globus sensation-something stuck in the throat/fullness  Hoarseness  DIAGNOSIS   Your caregiver may be able to diagnose GERD based on your symptoms. In some cases, X-rays and other tests may be done to check for complications or to check the condition of your stomach and esophagus.  You may need to see another doctor.  TREATMENT   Over-the-counter or prescription medicines to help decrease acid production.   Dietary and behavioral modifications or changes may be also recommended.  HOME CARE INSTRUCTIONS   Change the factors that you can control. Ask your caregiver for guidance concerning weight loss, quitting smoking, and alcohol consumption.  Avoid foods and drinks that make your symptoms worse, and MAY include such as:  Caffeine or alcoholic drinks.  Chocolate.  Gluten containing foods  Dairy  Peppermint or mint flavorings.  Garlic and onions.  Spicy  foods.  Citrus fruits, such as oranges, timi, or limes.  Tomato-based foods such as sauce, chili, salsa, and pizza.  Fried and fatty foods.  Avoid lying down for the 3 hours prior to your bedtime or prior to taking a nap.  Eat small, frequent meals instead of large meals.  Wear loose-fitting clothing. Do not wear anything tight around your waist that causes pressure on your stomach.  Raise the head of your bed 6 to 8 inches with wood blocks to help you sleep. Extra pillows will not help.  Only take over-the-counter or prescription medicines for pain, discomfort, or fever as directed by your caregiver.  Do not take aspirin, ibuprofen, or other nonsteroidal anti-inflammatory drugs if possible (NSAIDs).  SEEK IMMEDIATE MEDICAL CARE IF:   You have pain in your arms, neck, jaw, teeth, or back.  Your pain increases or changes in intensity or duration.  You develop nausea, vomiting, or sweating (diaphoresis).  You develop shortness of breath, or you faint.  Your vomit is green, yellow, black, or looks like coffee grounds or blood.  Your stool is red, bloody, or black.  These symptoms could be signs of other problems, such as heart disease, gastric bleeding, or esophageal bleeding.  MAKE SURE YOU:   Understand these instructions.  Will watch your condition.  Will get help right away if you are not doing well or get worse.     This information is not intended to replace advice given to you by your physician. Make sure you discuss any questions you have with your health care provider.     Modified by Antelmo Dangelo MD, FACS 9/8/2016.  Document Released: 09/27/2006 Document Revised: 01/08/2016 Document Reviewed: 04/13/2016  [employer] Interactive Patient Education ©2016 [employer]     CONTACT INFORMATION:  The main office phone number is [phone]. For emergencies after hours and on weekends, this number will convert over to our answering service and the on call provider will answer. Please try to keep non emergent  phone calls/ questions to office hours 9am-5pm Monday through Friday.      Make My plate  As an alternative, you can sign up and use the Epic MyChart system for more direct and quicker access for non emergent questions/ problems.  [facility] Make My plate allows you to send messages to your doctor, view your test results, renew your prescriptions, schedule appointments, and more. To sign up, go to [employer] and click on the Sign Up Now link in the New User? box. Enter your Make My plate Activation Code exactly as it appears below along with the last four digits of your Social Security Number and your Date of Birth () to complete the sign-up process. If you do not sign up before the expiration date, you must request a new code.     Make My plate Activation Code: Activation code not generated  Current Make My plate Status: Active     If you have questions, you can email [email or web address] or call [phone] to talk to our Make My plate staff. Remember, Make My plate is NOT to be used for urgent needs. For medical emergencies, dial 911.     IF YOU SMOKE OR USE TOBACCO PLEASE READ THE FOLLOWING:  Why is smoking bad for me?  Smoking increases the risk of heart disease, lung disease, vascular disease, stroke, and cancer. If you smoke, STOP!        IF YOU SMOKE OR USE TOBACCO PLEASE READ THE FOLLOWING:  Why is smoking bad for me?  Smoking increases the risk of heart disease, lung disease, vascular disease, stroke, and cancer. If you smoke, STOP!     For more information:  Quit Now [place]  -QUIT-NOW  [email or web address]     Patient or patient representative verbalized consent for the use of Ambient Listening during the visit with  PARISA Mathews for chart documentation. 2024  10:24 CST

## 2024-12-27 NOTE — PATIENT INSTRUCTIONS
Gastroesophageal Reflux Disease (Laryngopharyngeal Reflux), Adult  Gastroesophageal reflux disease (GERD) and/or Laryngopharyngeal Reflux, (LPR) happens when acid from your stomach flows up into the esophagus and/or throat and voicebox or larynx. When acid comes in contact with the these organs, the acid can cause soreness (inflammation). Over time, GERD may create small holes (ulcers) in the lining of the esophagus and may lead to the development of hoarseness, difficulty swallowing,   feeling of something stuck in the throat, increased mucous or drainage and even predispose to the development of malignancies, (cancer).    CAUSES   Increased body weight. This puts pressure on the stomach, making acid rise from the stomach into the esophagus.  Smoking. This increases acid production in the stomach.  Drinking alcohol. This causes decreased pressure in the lower esophageal sphincter (valve or ring of muscle between the esophagus and stomach), allowing acid from the stomach into the esophagus.  Late evening meals and a full stomach. This increases pressure and acid production in the stomach.  A malformed lower esophageal sphincter  Diet which can include avoidance of gluten and dairy products  Age  SYMPTOMS   Burning pain in the lower part of the mid-chest behind the breastbone and in the mid-stomach area. This may occur twice a week or more often.  Trouble swallowing.  Sore throat.  Dry cough.  Asthma-like symptoms including chest tightness, shortness of breath, or wheezing.  Globus sensation-something stuck in the throat/fullness  Hoarseness  DIAGNOSIS   Your caregiver may be able to diagnose GERD based on your symptoms. In some cases, X-rays and other tests may be done to check for complications or to check the condition of your stomach and esophagus.  You may need to see another doctor.  TREATMENT   Over-the-counter or prescription medicines to help decrease acid production.   Dietary and behavioral modifications  or changes may be also recommended.  HOME CARE INSTRUCTIONS   Change the factors that you can control. Ask your caregiver for guidance concerning weight loss, quitting smoking, and alcohol consumption.  Avoid foods and drinks that make your symptoms worse, and MAY include such as:  Caffeine or alcoholic drinks.  Chocolate.  Gluten containing foods  Dairy  Peppermint or mint flavorings.  Garlic and onions.  Spicy foods.  Citrus fruits, such as oranges, timi, or limes.  Tomato-based foods such as sauce, chili, salsa, and pizza.  Fried and fatty foods.  Avoid lying down for the 3 hours prior to your bedtime or prior to taking a nap.  Eat small, frequent meals instead of large meals.  Wear loose-fitting clothing. Do not wear anything tight around your waist that causes pressure on your stomach.  Raise the head of your bed 6 to 8 inches with wood blocks to help you sleep. Extra pillows will not help.  Only take over-the-counter or prescription medicines for pain, discomfort, or fever as directed by your caregiver.  Do not take aspirin, ibuprofen, or other nonsteroidal anti-inflammatory drugs if possible (NSAIDs).  SEEK IMMEDIATE MEDICAL CARE IF:   You have pain in your arms, neck, jaw, teeth, or back.  Your pain increases or changes in intensity or duration.  You develop nausea, vomiting, or sweating (diaphoresis).  You develop shortness of breath, or you faint.  Your vomit is green, yellow, black, or looks like coffee grounds or blood.  Your stool is red, bloody, or black.  These symptoms could be signs of other problems, such as heart disease, gastric bleeding, or esophageal bleeding.  MAKE SURE YOU:   Understand these instructions.  Will watch your condition.  Will get help right away if you are not doing well or get worse.     This information is not intended to replace advice given to you by your physician. Make sure you discuss any questions you have with your health care provider.     Modified by Antelmo Dangelo,  MD, PORSHA 2016.  Document Released: 2006 Document Revised: 2016 Document Reviewed: 2016  [employer] Interactive Patient Education  [employer]     CONTACT INFORMATION:  The main office phone number is [phone]. For emergencies after hours and on weekends, this number will convert over to our answering service and the on call provider will answer. Please try to keep non emergent phone calls/ questions to office hours 9am-5pm Monday through Friday.      [employer]  As an alternative, you can sign up and use the Epic MyChart system for more direct and quicker access for non emergent questions/ problems.  [employer] allows you to send messages to your doctor, view your test results, renew your prescriptions, schedule appointments, and more. To sign up, go to [employer] and click on the Sign Up Now link in the New User? box. Enter your [employer] Activation Code exactly as it appears below along with the last four digits of your Social Security Number and your Date of Birth () to complete the sign-up process. If you do not sign up before the expiration date, you must request a new code.     [employer] Activation Code: Activation code not generated  Current [employer] Status: Active     If you have questions, you can email [email or web address] or call [phone] to talk to our [employer] staff. Remember, [employer] is NOT to be used for urgent needs. For medical emergencies, dial 911.     IF YOU SMOKE OR USE TOBACCO PLEASE READ THE FOLLOWING:  Why is smoking bad for me?  Smoking increases the risk of heart disease, lung disease, vascular disease, stroke, and cancer. If you smoke, STOP!        IF YOU SMOKE OR USE TOBACCO PLEASE READ THE FOLLOWING:  Why is smoking bad for me?  Smoking increases the risk of heart disease, lung disease, vascular disease, stroke, and cancer. If you smoke, STOP!     For more information:  Quit Now  [place]  1-800-QUIT-NOW  https://[place].[employer]logix.org/en-[place]/

## 2024-12-30 ENCOUNTER — TELEPHONE (OUTPATIENT)
Dept: PULMONOLOGY | Facility: CLINIC | Age: 74
End: 2024-12-30
Payer: MEDICARE

## 2024-12-30 ENCOUNTER — HOSPITAL ENCOUNTER (OUTPATIENT)
Dept: CT IMAGING | Facility: HOSPITAL | Age: 74
Discharge: HOME | End: 2024-12-30
Payer: MEDICARE

## 2024-12-30 ENCOUNTER — TELEPHONE (OUTPATIENT)
Dept: OTOLARYNGOLOGY | Facility: CLINIC | Age: 74
End: 2024-12-30
Payer: MEDICARE

## 2024-12-30 DIAGNOSIS — R91.8 MULTIPLE LUNG NODULES: Chronic | ICD-10-CM

## 2024-12-30 DIAGNOSIS — R49.0 HOARSENESS: ICD-10-CM

## 2024-12-30 DIAGNOSIS — Z72.0 TOBACCO USE: ICD-10-CM

## 2024-12-30 DIAGNOSIS — R13.10 DYSPHAGIA, UNSPECIFIED TYPE: ICD-10-CM

## 2024-12-30 LAB — CREAT BLDA-MCNC: 0.9 MG/DL (ref 0.6–1.3)

## 2024-12-30 PROCEDURE — 82565 ASSAY OF CREATININE: CPT

## 2024-12-30 PROCEDURE — 25510000001 IOPAMIDOL 61 % SOLUTION: Performed by: NURSE PRACTITIONER

## 2024-12-30 PROCEDURE — 71250 CT THORAX DX C-: CPT

## 2024-12-30 PROCEDURE — 70491 CT SOFT TISSUE NECK W/DYE: CPT

## 2024-12-30 RX ORDER — IOPAMIDOL 612 MG/ML
100 INJECTION, SOLUTION INTRAVASCULAR
Status: COMPLETED | OUTPATIENT
Start: 2024-12-30 | End: 2024-12-30

## 2024-12-30 RX ADMIN — IOPAMIDOL 100 ML: 612 INJECTION, SOLUTION INTRAVENOUS at 09:57

## 2024-12-30 NOTE — TELEPHONE ENCOUNTER
----- Message from Mallory Bryan sent at 12/30/2024  3:39 PM CST -----  Please let patient know that his CT showed resolution of the new nodules. No new findings. Keep follow up as scheduled and we will review further.

## 2024-12-30 NOTE — TELEPHONE ENCOUNTER
----- Message from Edmundo Infante sent at 12/30/2024 10:34 AM CST -----  He needs a soon appt with Dr. Dangelo

## 2025-01-02 ENCOUNTER — OFFICE VISIT (OUTPATIENT)
Dept: OTOLARYNGOLOGY | Facility: CLINIC | Age: 75
End: 2025-01-02
Payer: MEDICARE

## 2025-01-02 VITALS
WEIGHT: 185 LBS | HEART RATE: 74 BPM | BODY MASS INDEX: 26.48 KG/M2 | SYSTOLIC BLOOD PRESSURE: 120 MMHG | TEMPERATURE: 98 F | DIASTOLIC BLOOD PRESSURE: 69 MMHG | HEIGHT: 70 IN

## 2025-01-02 DIAGNOSIS — R49.0 HOARSENESS: ICD-10-CM

## 2025-01-02 DIAGNOSIS — K21.9 LARYNGOPHARYNGEAL REFLUX (LPR): ICD-10-CM

## 2025-01-02 DIAGNOSIS — R13.10 DYSPHAGIA, UNSPECIFIED TYPE: ICD-10-CM

## 2025-01-02 DIAGNOSIS — Z72.0 TOBACCO USE: ICD-10-CM

## 2025-01-02 DIAGNOSIS — R93.89 ABNORMAL CT SCAN: ICD-10-CM

## 2025-01-02 DIAGNOSIS — J38.7 LESION OF EPIGLOTTIS: Primary | ICD-10-CM

## 2025-01-02 NOTE — PATIENT INSTRUCTIONS
DIRECT LARYNGOSCOPY WITH BIOPSY: The risks and benefits were explained including but not limited to pain, bleeding, infection, (including possible mediastinitis), the risks of the general anesthesia, pain, temporary or permanent hoarseness, airway loss, and/or tooth injury. Questions were answered. No guarantees were made or implied.

## 2025-01-02 NOTE — PROGRESS NOTES
YOB: 1950  Location: [facility]  Location Address: [address] [place] [address], [facility] [address], [address] [place] [address]  Location Phone: [phone]    Chief Complaint   Patient presents with    Discuss CT    Hoarse       History of Present Illness  Se Ramirez is a 74 y.o. male.  Se Ramirez is here for follow up of ENT complaints. The patient has had problems with hoarseness. He has reduced his smoking habit to approximately three-quarters of a pack per day. He is not interested in Chantix at this time. He is currently on a regimen of Nexium, taking one before breakfast and before dinner. He has had a CT neck that revealed concerns for a 1/5 cm ulcerated area. His last endoscopy was conducted in  by Dr. Licea. He has a diagnosis of COPD and experiences coughing and expectoration, which he believes may be contributing to his throat irritation.           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    History of adenomatous polyp of colon     History of colon polyps     History of lung cancer     Right lung    Hyperlipidemia        Past Surgical History:   Procedure Laterality Date    BRONCHOSCOPY N/A 02/15/2021    Procedure: BRONCHOSCOPY;  Surgeon: Bruce Murillo MD;  Location: [facility];  Service: Cardiothoracic;  Laterality: N/A;    CATARACT EXTRACTION      CHOLECYSTECTOMY      COLONOSCOPY  2012    One 1cm tubular adenomatous polyp in the sigmoid colon; One 5mm hyperplastic polyp in the rectum; Diverticulosis; Repeat 4 years     COLONOSCOPY N/A 2017    Two 4-6mm tubular adenomatous polyps at the hepatic flexure; One 5mm tubular adenomatous polyp in the transverse colon; One 11mm tubular adenomatous polyp in the sigmoid colon; One 6mm tubular adenomatous polyp in the  rectum; Diverticulosis in the left colon; Repeat 3 years     COLONOSCOPY  12/17/2009    One less than 5mm tubular adenomatous polyp in the cecum; One 5mm hyperplastic polyp in the transverse; One less than 3mm hyperplastic polyp in the rectum; Diverticulosis; Repeat 3 years     COLONOSCOPY N/A 07/02/2020    Two 5-7mm tubular adenomatous polyps at the hepatic flexure; One 6mm tubular adenomatous polyp in the transverse colon; Diverticulosis in the left colon; The entire examined colon is normal on direct and retroflexion views; Repeat 5 years    COLONOSCOPY N/A 06/29/2022    Diverticulosis in the left colon; One 6mm tubular adenomatous polyp in the descending colon; One 4mm tubular adenomatous polyp in the descending colon; Congested mucosa in the entire examined colon-biopsied; Repeat 5 years    ENDOSCOPY N/A 03/27/2019    Medium-sized HH; Normal stomach; Normal examined duodenum-biopsied    ENDOSCOPY N/A 06/09/2022    Small HH; Non-erosive gastritis-biopsied; Normal examined duodenum-biopsied    ERCP  07/30/2024    Dr. Buzz Dangelo-Choledocholithiasis status post balloon sweep extraction wtih balloon assisted sphincteroplasty; Placement of biliary stent as well as prophylactic pancreatic stent; Repeat ERCP 3 months for biliary stent removal    FOOT SURGERY Left     ORIF    HERNIA REPAIR      INNER EAR SURGERY      MEDIASTINOSCOPY N/A 02/15/2021    Procedure: CERVICAL MEDIASTINOSCOPY WITH LYMPH NODE DISECTION;  Surgeon: Bruce Murillo MD;  Location: [facility] OR;  Service: Cardiothoracic;  Laterality: N/A;    THORACOSCOPY Right 02/15/2021    Procedure: THORACOSCOPY, WEDGE RESECTION OF RIGHT UPPER LOBE WITH FROZEN,  , MEDIASTINAL LYMPH NODE DISSECTION;  Surgeon: Bruce Murillo MD;  Location: [facility] OR;  Service: Cardiothoracic;  Laterality: Right;    TONSILLECTOMY         Outpatient Medications Marked as Taking for the 1/2/25 encounter (Office Visit) with Antelmo Dangelo MD   Medication Sig Dispense Refill     aspirin 81 MG EC tablet Take 1 tablet by mouth Daily.      atorvastatin (LIPITOR) 10 MG tablet Take 1 tablet by mouth Daily.      esomeprazole (nexIUM) 20 MG capsule Take 1 capsule by mouth 2 (Two) Times a Day for 30 days. 60 capsule 3    vitamin D (ERGOCALCIFEROL) 1.25 MG (40719 UT) capsule capsule Take 1 capsule by mouth 1 (One) Time Per Week.         Patient has no known allergies.    Family History   Problem Relation Age of Onset    Colon polyps Mother         Unknown age    Diabetes Mother     Colon cancer Neg Hx     Esophageal cancer Neg Hx     Liver cancer Neg Hx     Liver disease Neg Hx     Rectal cancer Neg Hx     Stomach cancer Neg Hx        Social History     Socioeconomic History    Marital status: Significant Other   Tobacco Use    Smoking status: Passive Smoke Exposure - Never Smoker     Passive exposure: Current    Smokeless tobacco: Never    Tobacco comments:     Pt states about 7-8 cigarettes per day 7/3   Vaping Use    Vaping status: Never Used   Substance and Sexual Activity    Alcohol use: Not Currently     Alcohol/week: 3.0 standard drinks of alcohol     Types: 3 Shots of liquor per week     Comment: sober as of 06/01/2022    Drug use: Yes     Types: Marijuana    Sexual activity: Yes     Partners: Female       Review of Systems   Constitutional: Negative.    HENT:  Positive for voice change.    Respiratory: Negative.         Vitals:    01/02/25 1518   BP: 120/69   Pulse: 74   Temp: 98 °F (36.7 °C)       Body mass index is 26.54 kg/m².    Objective     Physical Exam  Vitals reviewed.   Constitutional:       Appearance: Normal appearance.   HENT:      Head: Normocephalic.      Right Ear: External ear normal.      Left Ear: External ear normal.      Ears:      Comments: Right hearing aid noted     Nose: Nose normal.      Mouth/Throat:      Lips: Pink.      Mouth: Mucous membranes are moist.      Dentition: Has dentures.      Pharynx: Oropharynx is clear.      Comments: See  endoscopy  Musculoskeletal:      Cervical back: Full passive range of motion without pain.   Lymphadenopathy:      Cervical: No cervical adenopathy.   Neurological:      Mental Status: He is alert.   Psychiatric:         Behavior: Behavior is cooperative.         Assessment & Plan   Diagnoses and all orders for this visit:    1. Lesion of epiglottis (Primary)  -     Case Request; Standing  -     Follow Anesthesia Guidelines / Protocol; Future  -     Provide Patient With Instructions on NPO Status  -     Comprehensive Metabolic Panel; Future  -     CBC and Differential; Future  -     ECG 12 Lead; Future  -     XR Chest 2 View; Future  -     Case Request    2. Dysphagia, unspecified type  -     Case Request; Standing  -     Follow Anesthesia Guidelines / Protocol; Future  -     Provide Patient With Instructions on NPO Status  -     Comprehensive Metabolic Panel; Future  -     CBC and Differential; Future  -     ECG 12 Lead; Future  -     XR Chest 2 View; Future  -     Case Request    3. Hoarseness  -     Case Request; Standing  -     Follow Anesthesia Guidelines / Protocol; Future  -     Provide Patient With Instructions on NPO Status  -     Comprehensive Metabolic Panel; Future  -     CBC and Differential; Future  -     ECG 12 Lead; Future  -     XR Chest 2 View; Future  -     Case Request    4. Laryngopharyngeal reflux (LPR)    5. Tobacco use  -     Case Request; Standing  -     Follow Anesthesia Guidelines / Protocol; Future  -     Provide Patient With Instructions on NPO Status  -     Comprehensive Metabolic Panel; Future  -     CBC and Differential; Future  -     ECG 12 Lead; Future  -     XR Chest 2 View; Future  -     Case Request    6. Abnormal CT scan  -     Case Request; Standing  -     Follow Anesthesia Guidelines / Protocol; Future  -     Provide Patient With Instructions on NPO Status  -     Comprehensive Metabolic Panel; Future  -     CBC and Differential; Future  -     ECG 12 Lead; Future  -     XR  Chest 2 View; Future  -     Case Request    Other orders  -     Follow Anesthesia Guidelines / Protocol; Standing  -     Verify NPO Status; Standing  -     SCD (Sequential Compression Device) - To Be Placed on Patient in Pre-Op; Standing  -     Patient to Void Prior to Transfer to OR; Standing  -     Instructions for Nursing; Standing      DIRECT LARYNGOSCOPY WITH EXCISION OF LESION (N/A)  Orders Placed This Encounter   Procedures    XR Chest 2 View     Standing Status:   Future     Standing Expiration Date:   1/2/2026     Order Specific Question:   Reason for Exam:     Answer:   preop testing     Order Specific Question:   Release to patient     Answer:   Routine Release [[id number]]    Comprehensive Metabolic Panel     Standing Status:   Future     Standing Expiration Date:   1/2/2026     Order Specific Question:   Release to patient     Answer:   Routine Release [[id number]]    Provide Patient With Instructions on NPO Status    ECG 12 Lead     Standing Status:   Future     Standing Expiration Date:   1/2/2026     Order Specific Question:   Reason for Exam:     Answer:   preop testing     Order Specific Question:   Release to patient     Answer:   Routine Release [[id number]]    CBC and Differential     Standing Status:   Future     Standing Expiration Date:   1/2/2026     Order Specific Question:   Manual Differential     Answer:   No     Order Specific Question:   Release to patient     Answer:   Routine Release [[id number]]     DIRECT LARYNGOSCOPY WITH BIOPSY: The risks and benefits were explained including but not limited to pain, bleeding, infection, (including possible mediastinitis), the risks of the general anesthesia, pain, temporary or permanent hoarseness, airway loss, and/or tooth injury. Questions were answered. No guarantees were made or implied.       Dr. Dangelo examined and discussed care with patient and agrees with treatment plan.      Return for post op.       Patient Instructions   DIRECT  LARYNGOSCOPY WITH BIOPSY: The risks and benefits were explained including but not limited to pain, bleeding, infection, (including possible mediastinitis), the risks of the general anesthesia, pain, temporary or permanent hoarseness, airway loss, and/or tooth injury. Questions were answered. No guarantees were made or implied.

## 2025-01-02 NOTE — PROGRESS NOTES
OPERATIVE NOTE:  Se Ramirez    DATE OF PROCEDURE: 1/2/2025    PROCEDURE:   Flexible Fiberoptic Laryngoscopy    ANESTHESIA:  None    REASON FOR PROCEDURE:  Procedure was recommended for persistant hoarseness and suspicious clinical behavior.  Risks, benefits and alternatives were discussed.      DETAILS of OPERATION:  The patient was seated in the exam chair.  A flexible fiberoptic laryngoscopy was performed through the oral cavity.  The scope was introduced into the oral cavity and directed to the level of the glottis, examining the structures of the oropharynx, base of tongue, vallecula, supraglottic larynx, glottic larynx, and hypopharynx.      FINDINGS:  Mucosal surfaces:   The mucosal surfaces demonstrated normal mucosa surfaces with moderately severe inflammation    Base of tongue:  The base of tongue was found to have no mass or lesion.    Epiglottis:  The epiglottis was found to have no mass or lesion.  The vallecula is clear but the laryngeal surface of the epiglottis does not have an obvious lesion discernible.    Aryepiglottic fold:  The AE folds were found to have no mass or lesion no moderate erythema is noted throughout.    False Vocal Fold:  The false cords were found to have no mass or lesion.    True Vocal Cord:  The true vocal cords were found to have no mass or lesion. Both true vocal cords adduct and abduct normally.  There is significant erythema in the posterior one third of both true vocal folds without mass identifiable.    Arytenoid:   The arytenoids were found to have Moderately severe interarytenoid edema with erythema.    Hypopharynx:  The hypopharynx was found to have no mass or lesion.    The patient tolerated procedure well.

## 2025-01-02 NOTE — H&P (VIEW-ONLY)
YOB: 1950  Location: [facility]  Location Address: [address] [place] [address], [facility], [address] [place] [address]  Location Phone: [phone]    Chief Complaint   Patient presents with    Discuss CT    Hoarse       History of Present Illness  Se Ramirez is a 74 y.o. male.  Se Ramirez is here for follow up of ENT complaints. The patient has had problems with hoarseness. He has reduced his smoking habit to approximately three-quarters of a pack per day. He is not interested in Chantix at this time. He is currently on a regimen of Nexium, taking one before breakfast and before dinner. He has had a CT neck that revealed concerns for a 1/5 cm ulcerated area. His last endoscopy was conducted in  by Dr. Licea. He has a diagnosis of COPD and experiences coughing and expectoration, which he believes may be contributing to his throat irritation.           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    History of adenomatous polyp of colon     History of colon polyps     History of lung cancer     Right lung    Hyperlipidemia        Past Surgical History:   Procedure Laterality Date    BRONCHOSCOPY N/A 02/15/2021    Procedure: BRONCHOSCOPY;  Surgeon: Bruce Murillo MD;  Location: [facility];  Service: Cardiothoracic;  Laterality: N/A;    CATARACT EXTRACTION      CHOLECYSTECTOMY      COLONOSCOPY  2012    One 1cm tubular adenomatous polyp in the sigmoid colon; One 5mm hyperplastic polyp in the rectum; Diverticulosis; Repeat 4 years     COLONOSCOPY N/A 2017    Two 4-6mm tubular adenomatous polyps at the hepatic flexure; One 5mm tubular adenomatous polyp in the transverse colon; One 11mm tubular adenomatous polyp in the sigmoid colon; One 6mm tubular adenomatous polyp in the  rectum; Diverticulosis in the left colon; Repeat 3 years     COLONOSCOPY  12/17/2009    One less than 5mm tubular adenomatous polyp in the cecum; One 5mm hyperplastic polyp in the transverse; One less than 3mm hyperplastic polyp in the rectum; Diverticulosis; Repeat 3 years     COLONOSCOPY N/A 07/02/2020    Two 5-7mm tubular adenomatous polyps at the hepatic flexure; One 6mm tubular adenomatous polyp in the transverse colon; Diverticulosis in the left colon; The entire examined colon is normal on direct and retroflexion views; Repeat 5 years    COLONOSCOPY N/A 06/29/2022    Diverticulosis in the left colon; One 6mm tubular adenomatous polyp in the descending colon; One 4mm tubular adenomatous polyp in the descending colon; Congested mucosa in the entire examined colon-biopsied; Repeat 5 years    ENDOSCOPY N/A 03/27/2019    Medium-sized HH; Normal stomach; Normal examined duodenum-biopsied    ENDOSCOPY N/A 06/09/2022    Small HH; Non-erosive gastritis-biopsied; Normal examined duodenum-biopsied    ERCP  07/30/2024    Dr. Buzz Dangelo-Choledocholithiasis status post balloon sweep extraction wtih balloon assisted sphincteroplasty; Placement of biliary stent as well as prophylactic pancreatic stent; Repeat ERCP 3 months for biliary stent removal    FOOT SURGERY Left     ORIF    HERNIA REPAIR      INNER EAR SURGERY      MEDIASTINOSCOPY N/A 02/15/2021    Procedure: CERVICAL MEDIASTINOSCOPY WITH LYMPH NODE DISECTION;  Surgeon: Bruce Murillo MD;  Location: [facility] OR;  Service: Cardiothoracic;  Laterality: N/A;    THORACOSCOPY Right 02/15/2021    Procedure: THORACOSCOPY, WEDGE RESECTION OF RIGHT UPPER LOBE WITH FROZEN,  , MEDIASTINAL LYMPH NODE DISSECTION;  Surgeon: Bruce Murillo MD;  Location: [facility] OR;  Service: Cardiothoracic;  Laterality: Right;    TONSILLECTOMY         Outpatient Medications Marked as Taking for the 1/2/25 encounter (Office Visit) with Antelmo Dangelo MD   Medication Sig Dispense Refill     aspirin 81 MG EC tablet Take 1 tablet by mouth Daily.      atorvastatin (LIPITOR) 10 MG tablet Take 1 tablet by mouth Daily.      esomeprazole (nexIUM) 20 MG capsule Take 1 capsule by mouth 2 (Two) Times a Day for 30 days. 60 capsule 3    vitamin D (ERGOCALCIFEROL) 1.25 MG (97246 UT) capsule capsule Take 1 capsule by mouth 1 (One) Time Per Week.         Patient has no known allergies.    Family History   Problem Relation Age of Onset    Colon polyps Mother         Unknown age    Diabetes Mother     Colon cancer Neg Hx     Esophageal cancer Neg Hx     Liver cancer Neg Hx     Liver disease Neg Hx     Rectal cancer Neg Hx     Stomach cancer Neg Hx        Social History     Socioeconomic History    Marital status: Significant Other   Tobacco Use    Smoking status: Passive Smoke Exposure - Never Smoker     Passive exposure: Current    Smokeless tobacco: Never    Tobacco comments:     Pt states about 7-8 cigarettes per day 7/3   Vaping Use    Vaping status: Never Used   Substance and Sexual Activity    Alcohol use: Not Currently     Alcohol/week: 3.0 standard drinks of alcohol     Types: 3 Shots of liquor per week     Comment: sober as of 06/01/2022    Drug use: Yes     Types: Marijuana    Sexual activity: Yes     Partners: Female       Review of Systems   Constitutional: Negative.    HENT:  Positive for voice change.    Respiratory: Negative.         Vitals:    01/02/25 1518   BP: 120/69   Pulse: 74   Temp: 98 °F (36.7 °C)       Body mass index is 26.54 kg/m².    Objective     Physical Exam  Vitals reviewed.   Constitutional:       Appearance: Normal appearance.   HENT:      Head: Normocephalic.      Right Ear: External ear normal.      Left Ear: External ear normal.      Ears:      Comments: Right hearing aid noted     Nose: Nose normal.      Mouth/Throat:      Lips: Pink.      Mouth: Mucous membranes are moist.      Dentition: Has dentures.      Pharynx: Oropharynx is clear.      Comments: See  endoscopy  Musculoskeletal:      Cervical back: Full passive range of motion without pain.   Lymphadenopathy:      Cervical: No cervical adenopathy.   Neurological:      Mental Status: He is alert.   Psychiatric:         Behavior: Behavior is cooperative.         Assessment & Plan   Diagnoses and all orders for this visit:    1. Lesion of epiglottis (Primary)  -     Case Request; Standing  -     Follow Anesthesia Guidelines / Protocol; Future  -     Provide Patient With Instructions on NPO Status  -     Comprehensive Metabolic Panel; Future  -     CBC and Differential; Future  -     ECG 12 Lead; Future  -     XR Chest 2 View; Future  -     Case Request    2. Dysphagia, unspecified type  -     Case Request; Standing  -     Follow Anesthesia Guidelines / Protocol; Future  -     Provide Patient With Instructions on NPO Status  -     Comprehensive Metabolic Panel; Future  -     CBC and Differential; Future  -     ECG 12 Lead; Future  -     XR Chest 2 View; Future  -     Case Request    3. Hoarseness  -     Case Request; Standing  -     Follow Anesthesia Guidelines / Protocol; Future  -     Provide Patient With Instructions on NPO Status  -     Comprehensive Metabolic Panel; Future  -     CBC and Differential; Future  -     ECG 12 Lead; Future  -     XR Chest 2 View; Future  -     Case Request    4. Laryngopharyngeal reflux (LPR)    5. Tobacco use  -     Case Request; Standing  -     Follow Anesthesia Guidelines / Protocol; Future  -     Provide Patient With Instructions on NPO Status  -     Comprehensive Metabolic Panel; Future  -     CBC and Differential; Future  -     ECG 12 Lead; Future  -     XR Chest 2 View; Future  -     Case Request    6. Abnormal CT scan  -     Case Request; Standing  -     Follow Anesthesia Guidelines / Protocol; Future  -     Provide Patient With Instructions on NPO Status  -     Comprehensive Metabolic Panel; Future  -     CBC and Differential; Future  -     ECG 12 Lead; Future  -     XR  Chest 2 View; Future  -     Case Request    Other orders  -     Follow Anesthesia Guidelines / Protocol; Standing  -     Verify NPO Status; Standing  -     SCD (Sequential Compression Device) - To Be Placed on Patient in Pre-Op; Standing  -     Patient to Void Prior to Transfer to OR; Standing  -     Instructions for Nursing; Standing      DIRECT LARYNGOSCOPY WITH EXCISION OF LESION (N/A)  Orders Placed This Encounter   Procedures    XR Chest 2 View     Standing Status:   Future     Standing Expiration Date:   1/2/2026     Order Specific Question:   Reason for Exam:     Answer:   preop testing     Order Specific Question:   Release to patient     Answer:   Routine Release [[id number]]    Comprehensive Metabolic Panel     Standing Status:   Future     Standing Expiration Date:   1/2/2026     Order Specific Question:   Release to patient     Answer:   Routine Release [[id number]]    Provide Patient With Instructions on NPO Status    ECG 12 Lead     Standing Status:   Future     Standing Expiration Date:   1/2/2026     Order Specific Question:   Reason for Exam:     Answer:   preop testing     Order Specific Question:   Release to patient     Answer:   Routine Release [[id number]]    CBC and Differential     Standing Status:   Future     Standing Expiration Date:   1/2/2026     Order Specific Question:   Manual Differential     Answer:   No     Order Specific Question:   Release to patient     Answer:   Routine Release [[id number]]     DIRECT LARYNGOSCOPY WITH BIOPSY: The risks and benefits were explained including but not limited to pain, bleeding, infection, (including possible mediastinitis), the risks of the general anesthesia, pain, temporary or permanent hoarseness, airway loss, and/or tooth injury. Questions were answered. No guarantees were made or implied.       Dr. Dangelo examined and discussed care with patient and agrees with treatment plan.      Return for post op.       Patient Instructions   DIRECT  LARYNGOSCOPY WITH BIOPSY: The risks and benefits were explained including but not limited to pain, bleeding, infection, (including possible mediastinitis), the risks of the general anesthesia, pain, temporary or permanent hoarseness, airway loss, and/or tooth injury. Questions were answered. No guarantees were made or implied.

## 2025-01-07 NOTE — PROGRESS NOTES
" PARISA Lopez  [facility]   Pulmonary and Critical Care  [address]  [place] [address]  Phone: [phone]  Fax: [phone]           Chief Complaint  Stage 1 mild COPD by GOLD classification and Pre-procedure lab exam    Subjective        Se Ramirez presents to [facility] PULMONARY & CRITICAL CARE MEDICINE     History of Present Illness  Mr. Ramirez is a pleasant 74-year-old male patient with known 2.5 cm right upper lobe anterior segment nodule noted on 12/20/2020. He was treated by Dr. 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 He has known emphysema/COPD, history of DVT, history of alcohol abuse and a current every day smoker.     He has been utilizing Trelegy inhaler for the past 2 months, which he reports has improved his respiratory function and reduced the severity of his cough. He does not experience any expectoration. He has a rescue inhaler available but reports minimal usage. He reports no fevers, chills, or night sweats. His dyspnea has shown improvement.    He admits to smoking about half a pack a day, which had increased to 1.5 packs a day but has since decreased. He has quit drinking again as well.       Objective   Vital Signs:   /64   Pulse 68   Ht 177.8 cm (70\")   Wt 82.6 kg (182 lb)   SpO2 97% Comment: RA  BMI 26.11 kg/m²     Physical Exam  Vitals reviewed.   Constitutional:       Appearance: Normal appearance.   Cardiovascular:      Rate and Rhythm: Normal rate and regular rhythm.   Pulmonary:      Effort: Pulmonary effort is normal.      Breath sounds: Normal breath sounds.   Neurological:      General: No focal deficit present.      Mental Status: He is alert and oriented to person, place, and time.   Psychiatric:         Mood and Affect: Mood normal.         Behavior: Behavior normal.            Result Review :  The following data was reviewed by: " PARISA Lopez on 01/08/2025:  Common labs          7/9/2024    15:49 8/29/2024    08:02 12/30/2024    09:42   Common Labs   Glucose 100     80        BUN 18     16        Creatinine 0.7     0.7     0.90    Sodium 137     142        Potassium 4.3     4.5        Chloride 98     103        Calcium 9.5     9.7        Albumin 4.2     4.4        Total Bilirubin 0.9     0.8        Alkaline Phosphatase 433     93        AST (SGOT) 58     21        ALT (SGPT) 105     13        WBC  7.8        Hemoglobin  16.0        Hematocrit  49.7        Platelets  160           Details          This result is from an external source.             Data reviewed : Radiologic studies Ct chest     My interpretation of imaging:  Resolution of new nodules, no new findings.   My interpretation of labs: absolute eosinophils in August were 300   CT Chest Without Contrast Diagnostic (12/30/2024 08:45)   PFT Values          6/28/2023    09:30 1/8/2025    09:15   Pre Drug PFT Results    108   FEV1 94 86   FEF 25-75% 43 45   FEV1/FVC 59 60   Other Tests PFT Results   DLCO 57 49   D/VAsb 54 46     My interpretation of the PFT : as below    Results for orders placed in visit on 01/08/25    Spirometry with Diffusion Capacity    Narrative  Spirometry with Diffusion Capacity    Performed by: Alona Voss, RRT  Authorized by: Mallory Bryan APRN  Pre Drug % Predicted  FVC: 108%  FEV1: 86%  FEF 25-75%: 45%  FEV1/FVC: 60%  DLCO: 49%  D/VAsb: 46%    Interpretation  Spirometry  Spirometry shows mild obstruction. There is reduced midflow suggesting small airway/airflow obstruction.  Review of FVL curve  Patient's effort is normal.  Diffusion Capacity  The patient's diffusion capacity is moderately reduced.  Diffusion capacity is moderately reduced when corrected for alveolar volume.  Overall comments: To June 2023 FEV1 has decreased from 94 to 86% predicted, FVC has decreased from 120 to 108% predicted.  Diffusion capacity  when corrected for alveolar volume remains moderately decreased however has dropped from 54 to 46% predicted.      Results for orders placed in visit on 23    Pulmonary Function Test    Narrative  Pulmonary Function Test  Performed by: Alona Voss, RRT  Authorized by: Mallory Bryan APRN    Pre Drug % Predicted  FVC: 120%  FEV1: 94%  FEF 25-75%: 43%  FEV1/FVC: 59%  DLCO: 57%  D/VAsb: 54%    Interpretation  Spirometry  Spirometry shows mild obstruction. There is reduced midflow suggesting small airway/airflow obstruction.  Review of FVL curve  Patient's effort is normal.  Diffusion Capacity  The patient's diffusion capacity is moderately reduced.  Diffusion capacity is moderately reduced when corrected for alveolar volume.      Results for orders placed in visit on 21    Full Pulmonary Function Test Without Bronchodilator    Narrative  Full Pulmonary Function Test Without Bronchodilator  Performed by: Alona Voss, KATHY  Authorized by: Diego Landry MD    Pre Drug % Predicted  FVC: 115%  FEV1: 91%  FEF 25-75%: 41%  FEV1/FVC: 62.03%  T%  RV: 118%  DLCO: 65%  D/VAsb: 62%    Interpretation  Spirometry  Spirometry shows mild obstruction. There is reduced midflow suggesting small airway/airflow obstruction.  Review of FVL curve  Patient's effort is normal.  Lung Volume Measurements  Measurements show normal results.  Diffusion Capacity  The patient's diffusion capacity is moderately reduced.  Diffusion capacity is moderately reduced when corrected for alveolar volume.        Assessment and Plan   Diagnoses and all orders for this visit:    1. Stage 1 mild COPD by GOLD classification (Primary)  Assessment & Plan:  25 Reviewed PFT which although remains mildly obstructive he has had a drop in his FEV1 from 94 to 86% for predicted.  He also has had a drop in his diffusion capacity when corrected for alveolar volume from 54 to 46% predicted.    He was given Spiriva at  his last visit however his primary care physician is keeping him with samples of Trelegy for which he finds benefit.  He is encouraged to continue this.  Continue as needed albuterol HFA.    Continue to monitor periodic flow-volume loop and diffusion capacity.      2. Multiple lung nodules  Assessment & Plan:  Several have resolved on recent CT imaging he does have a 3 mm left lower lobe nodule that is stable.  He has been getting routine imaging with his history of lung cancer.      3. Pulmonary emphysema, unspecified emphysema type  Assessment & Plan:  Continue bronchodilators      4. Personal history of nicotine dependence    5. Malignant neoplasm of upper lobe of right lung    6. Hilar adenopathy    7. Mediastinal adenopathy      Se Ramirez  reports that he has been smoking cigarettes. He started smoking about 56 years ago. He has a 28 pack-year smoking history. He has been exposed to tobacco smoke. He has never used smokeless tobacco. I have educated him on the risk of diseases from using tobacco products such as cancer, COPD, and heart disease.     I advised him to quit and he is willing to quit. We have discussed the following method/s for tobacco cessation:   Currently weaning himself .  Together we have set a quit date for  uncertain .  He will follow up with me in 4 months or sooner to check on his progress.    I spent 3  minutes counseling the patient.             Alpha 1: MM, normal   LDCT: getting routine imaging for history of lung cancer   Smoking Cessation: encouraged smoking cessation   Vaccinations:  Declines vaccinations     Follow Up   Return in about 4 months (around 5/8/2025).  Patient was given instructions and counseling regarding his condition or for health maintenance advice. Please see specific information pulled into the AVS if appropriate.     Mallory Bryan, APRN  1/8/2025  09:52 CST    Please note that portions of this note were completed with a voice recognition  program.    Patient or patient representative verbalized consent for the use of Ambient Listening during the visit with  PARISA Lopez for chart documentation. 1/8/2025  09:53 CST

## 2025-01-08 ENCOUNTER — OFFICE VISIT (OUTPATIENT)
Dept: PULMONOLOGY | Facility: CLINIC | Age: 75
End: 2025-01-08
Payer: MEDICARE

## 2025-01-08 ENCOUNTER — PROCEDURE VISIT (OUTPATIENT)
Dept: PULMONOLOGY | Facility: CLINIC | Age: 75
End: 2025-01-08
Payer: MEDICARE

## 2025-01-08 VITALS
HEIGHT: 70 IN | SYSTOLIC BLOOD PRESSURE: 120 MMHG | OXYGEN SATURATION: 97 % | WEIGHT: 182 LBS | DIASTOLIC BLOOD PRESSURE: 64 MMHG | BODY MASS INDEX: 26.05 KG/M2 | HEART RATE: 68 BPM

## 2025-01-08 DIAGNOSIS — R91.8 MULTIPLE LUNG NODULES: Chronic | ICD-10-CM

## 2025-01-08 DIAGNOSIS — R59.0 HILAR ADENOPATHY: ICD-10-CM

## 2025-01-08 DIAGNOSIS — R59.0 MEDIASTINAL ADENOPATHY: ICD-10-CM

## 2025-01-08 DIAGNOSIS — C34.11 MALIGNANT NEOPLASM OF UPPER LOBE OF RIGHT LUNG: ICD-10-CM

## 2025-01-08 DIAGNOSIS — J43.9 PULMONARY EMPHYSEMA, UNSPECIFIED EMPHYSEMA TYPE: Chronic | ICD-10-CM

## 2025-01-08 DIAGNOSIS — J44.9 STAGE 1 MILD COPD BY GOLD CLASSIFICATION: Primary | Chronic | ICD-10-CM

## 2025-01-08 DIAGNOSIS — J44.9 STAGE 1 MILD COPD BY GOLD CLASSIFICATION: Chronic | ICD-10-CM

## 2025-01-08 DIAGNOSIS — Z87.891 PERSONAL HISTORY OF NICOTINE DEPENDENCE: ICD-10-CM

## 2025-01-08 NOTE — ASSESSMENT & PLAN NOTE
1/8/25 Reviewed PFT which although remains mildly obstructive he has had a drop in his FEV1 from 94 to 86% for predicted.  He also has had a drop in his diffusion capacity when corrected for alveolar volume from 54 to 46% predicted.    He was given Spiriva at his last visit however his primary care physician is keeping him with samples of Trelegy for which he finds benefit.  He is encouraged to continue this.  Continue as needed albuterol HFA.    Continue to monitor periodic flow-volume loop and diffusion capacity.

## 2025-01-08 NOTE — PROCEDURES
Spirometry with Diffusion Capacity    Performed by: Alona Voss, RRT  Authorized by: Mallory Bryan APRN     Pre Drug % Predicted    FVC: 108%   FEV1: 86%   FEF 25-75%: 45%   FEV1/FVC: 60%   DLCO: 49%   D/VAsb: 46%    Interpretation   Spirometry   Spirometry shows mild obstruction. There is reduced midflow suggesting small airway/airflow obstruction.   Review of FVL curve   Patient's effort is normal.   Diffusion Capacity  The patient's diffusion capacity is moderately reduced.  Diffusion capacity is moderately reduced when corrected for alveolar volume.   Overall comments: To June 2023 FEV1 has decreased from 94 to 86% predicted, FVC has decreased from 120 to 108% predicted.  Diffusion capacity when corrected for alveolar volume remains moderately decreased however has dropped from 54 to 46% predicted.

## 2025-01-08 NOTE — ASSESSMENT & PLAN NOTE
Several have resolved on recent CT imaging he does have a 3 mm left lower lobe nodule that is stable.  He has been getting routine imaging with his history of lung cancer.

## 2025-01-13 ENCOUNTER — HOSPITAL ENCOUNTER (OUTPATIENT)
Dept: GENERAL RADIOLOGY | Facility: HOSPITAL | Age: 75
Discharge: HOME | End: 2025-01-13
Payer: MEDICARE

## 2025-01-13 ENCOUNTER — PRE-ADMISSION TESTING (OUTPATIENT)
Dept: PREADMISSION TESTING | Facility: HOSPITAL | Age: 75
End: 2025-01-13
Payer: MEDICARE

## 2025-01-13 VITALS
WEIGHT: 184.53 LBS | HEIGHT: 70 IN | BODY MASS INDEX: 26.42 KG/M2 | SYSTOLIC BLOOD PRESSURE: 118 MMHG | DIASTOLIC BLOOD PRESSURE: 72 MMHG | HEART RATE: 71 BPM | OXYGEN SATURATION: 94 % | RESPIRATION RATE: 18 BRPM

## 2025-01-13 DIAGNOSIS — R93.89 ABNORMAL CT SCAN: ICD-10-CM

## 2025-01-13 DIAGNOSIS — R49.0 HOARSENESS: ICD-10-CM

## 2025-01-13 DIAGNOSIS — J38.7 LESION OF EPIGLOTTIS: ICD-10-CM

## 2025-01-13 DIAGNOSIS — R13.10 DYSPHAGIA, UNSPECIFIED TYPE: ICD-10-CM

## 2025-01-13 DIAGNOSIS — Z72.0 TOBACCO USE: ICD-10-CM

## 2025-01-13 LAB
ALBUMIN SERPL-MCNC: 4.4 G/DL (ref 3.5–5.2)
ALBUMIN/GLOB SERPL: 1.5 G/DL
ALP SERPL-CCNC: 82 U/L (ref 39–117)
ALT SERPL W P-5'-P-CCNC: 16 U/L (ref 1–41)
ANION GAP SERPL CALCULATED.3IONS-SCNC: 8 MMOL/L (ref 5–15)
AST SERPL-CCNC: 19 U/L (ref 1–40)
BASOPHILS # BLD AUTO: 0.1 10*3/MM3 (ref 0–0.2)
BASOPHILS NFR BLD AUTO: 1.2 % (ref 0–1.5)
BILIRUB SERPL-MCNC: 0.4 MG/DL (ref 0–1.2)
BUN SERPL-MCNC: 17 MG/DL (ref 8–23)
BUN/CREAT SERPL: 19.8 (ref 7–25)
CALCIUM SPEC-SCNC: 9.4 MG/DL (ref 8.6–10.5)
CHLORIDE SERPL-SCNC: 104 MMOL/L (ref 98–107)
CO2 SERPL-SCNC: 29 MMOL/L (ref 22–29)
CREAT SERPL-MCNC: 0.86 MG/DL (ref 0.76–1.27)
DEPRECATED RDW RBC AUTO: 41.9 FL (ref 37–54)
EGFRCR SERPLBLD CKD-EPI 2021: 90.3 ML/MIN/1.73
EOSINOPHIL # BLD AUTO: 0.23 10*3/MM3 (ref 0–0.4)
EOSINOPHIL NFR BLD AUTO: 2.9 % (ref 0.3–6.2)
ERYTHROCYTE [DISTWIDTH] IN BLOOD BY AUTOMATED COUNT: 11.9 % (ref 12.3–15.4)
GLOBULIN UR ELPH-MCNC: 2.9 GM/DL
GLUCOSE SERPL-MCNC: 92 MG/DL (ref 65–99)
HCT VFR BLD AUTO: 45.4 % (ref 37.5–51)
HGB BLD-MCNC: 14.8 G/DL (ref 13–17.7)
IMM GRANULOCYTES # BLD AUTO: 0.03 10*3/MM3 (ref 0–0.05)
IMM GRANULOCYTES NFR BLD AUTO: 0.4 % (ref 0–0.5)
LYMPHOCYTES # BLD AUTO: 2.27 10*3/MM3 (ref 0.7–3.1)
LYMPHOCYTES NFR BLD AUTO: 28.3 % (ref 19.6–45.3)
MCH RBC QN AUTO: 31 PG (ref 26.6–33)
MCHC RBC AUTO-ENTMCNC: 32.6 G/DL (ref 31.5–35.7)
MCV RBC AUTO: 95 FL (ref 79–97)
MONOCYTES # BLD AUTO: 0.56 10*3/MM3 (ref 0.1–0.9)
MONOCYTES NFR BLD AUTO: 7 % (ref 5–12)
NEUTROPHILS NFR BLD AUTO: 4.82 10*3/MM3 (ref 1.7–7)
NEUTROPHILS NFR BLD AUTO: 60.2 % (ref 42.7–76)
NRBC BLD AUTO-RTO: 0 /100 WBC (ref 0–0.2)
PLATELET # BLD AUTO: 180 10*3/MM3 (ref 140–450)
PMV BLD AUTO: 11.3 FL (ref 6–12)
POTASSIUM SERPL-SCNC: 4.7 MMOL/L (ref 3.5–5.2)
PROT SERPL-MCNC: 7.3 G/DL (ref 6–8.5)
RBC # BLD AUTO: 4.78 10*6/MM3 (ref 4.14–5.8)
SODIUM SERPL-SCNC: 141 MMOL/L (ref 136–145)
WBC NRBC COR # BLD AUTO: 8.01 10*3/MM3 (ref 3.4–10.8)

## 2025-01-13 PROCEDURE — 71046 X-RAY EXAM CHEST 2 VIEWS: CPT

## 2025-01-13 PROCEDURE — 85025 COMPLETE CBC W/AUTO DIFF WBC: CPT

## 2025-01-13 PROCEDURE — 93005 ELECTROCARDIOGRAM TRACING: CPT

## 2025-01-13 PROCEDURE — 80053 COMPREHEN METABOLIC PANEL: CPT

## 2025-01-13 PROCEDURE — 36415 COLL VENOUS BLD VENIPUNCTURE: CPT

## 2025-01-13 NOTE — DISCHARGE INSTRUCTIONS

## 2025-01-15 LAB
QT INTERVAL: 394 MS
QTC INTERVAL: 406 MS

## 2025-01-16 ENCOUNTER — HOSPITAL ENCOUNTER (OUTPATIENT)
Dept: GENERAL RADIOLOGY | Facility: HOSPITAL | Age: 75
Discharge: HOME | End: 2025-01-16
Admitting: NURSE PRACTITIONER
Payer: MEDICARE

## 2025-01-16 ENCOUNTER — TELEPHONE (OUTPATIENT)
Dept: OTOLARYNGOLOGY | Facility: CLINIC | Age: 75
End: 2025-01-16
Payer: COMMERCIAL

## 2025-01-16 DIAGNOSIS — R13.10 DYSPHAGIA, UNSPECIFIED TYPE: ICD-10-CM

## 2025-01-16 DIAGNOSIS — R49.0 HOARSENESS: ICD-10-CM

## 2025-01-16 DIAGNOSIS — K21.9 LARYNGOPHARYNGEAL REFLUX (LPR): ICD-10-CM

## 2025-01-16 DIAGNOSIS — R13.10 DYSPHAGIA, UNSPECIFIED TYPE: Primary | ICD-10-CM

## 2025-01-16 DIAGNOSIS — Z72.0 TOBACCO USE: ICD-10-CM

## 2025-01-16 PROCEDURE — A9270 NON-COVERED ITEM OR SERVICE: HCPCS | Performed by: NURSE PRACTITIONER

## 2025-01-16 PROCEDURE — 92611 MOTION FLUOROSCOPY/SWALLOW: CPT

## 2025-01-16 PROCEDURE — 74230 X-RAY XM SWLNG FUNCJ C+: CPT

## 2025-01-16 PROCEDURE — 63710000001 BARIUM SULFATE 40 % RECONSTITUTED SUSPENSION: Performed by: NURSE PRACTITIONER

## 2025-01-16 PROCEDURE — 63710000001 BARIUM SULFATE 40 % SUSPENSION: Performed by: NURSE PRACTITIONER

## 2025-01-16 PROCEDURE — 63710000001 BARIUM SULFATE 40 % PASTE: Performed by: NURSE PRACTITIONER

## 2025-01-16 RX ADMIN — BARIUM SULFATE 60 ML: 400 SUSPENSION ORAL at 08:50

## 2025-01-16 RX ADMIN — BARIUM SULFATE 25 ML: 400 PASTE ORAL at 08:50

## 2025-01-16 RX ADMIN — BARIUM SULFATE 55 ML: 0.81 POWDER, FOR SUSPENSION ORAL at 08:50

## 2025-01-16 NOTE — MBS/VFSS/FEES
Speech Language Pathology   MBS / Discharge Summary  [facility]       Patient Name: Se Ramirez  : 1950  MRN: [id number]    Today's Date: 2025      Visit Date: 2025     SPEECH-LANGUAGE PATHOLOGY EVALUTION - VFSS  Subjective: The patient was seen on this date for a VFSS(Videofluoroscopic Swallowing Study).  Patient was alert and cooperative.      Significant history: Patient is seen for a video swallow study due to reports of feeling food stick in the pharynx. CT and visualization of the pharynx reveal questionable epiglottic lesion with edema and erythema. Medical history includes RUL nodule, keratizing SCC, emphysema, COPD, hoarseness, GERD, LPR, and hepatitis.     Objective: Risks/benefits were reviewed with the patient, and consent was obtained. The study was completed with SLP and Radiologist present. The patient was seen in lateral view(s). Textures given included  pudding thick liquid, thin liquid, nectar thick liquid, honey thick liquid, mechanical soft consistency, and regular consistency.    Assessment: Difficulties were noted with  all consistencies , characterized by decreased bolus mastication, decreased bolus preparation, weak lingual surface/BOT, slow epiglottic inversion which did allow deep penetration during the swallow with thin liquids with questionable aspiration without a cough noted. No other concerns for penetration or aspiration were noted with any other consistency. A chin tuck  maneuver decreased the amount of penetration, to transient penetration,  with thin liquids.  Bulbous tissue on the base underside of the epiglottis/pyriform sinus area was noted.  Residue after the swallow was noted on the base of tongue, vallecula, undercoating of the epiglottis, posterior pharyngeal wall, and  throughout the pharynx as well as on the lingual surface. Residue was mild to moderate. Regular consistency solids demonstrated the largest amount of vallecula residue of all  consistencies presented. Patient was able to clear pharyngeal residue, although he did not spontaneously demonstrate sensation of the residue post swallow.     Esophageal screen was performed and displayed functional clearance. See radiology report for full details..    SLP Findings: Patient presents with mild to moderate oropharyngeal dysphagia.   Comments: Continued concerns for edema in the pharynx impacting bolus transit. Patient without report of recurrent pneumonia at this time. Therefore concerns for aspiration are considered lower, although deep penetration with questionable aspiration are noted. Patient is able to demonstrate a chin tuck, limiting sip size, and alternating with a thin liquid wash to clear the pharynx.     Recommendations: Diet Textures: thin liquid, regular consistency food. Soften food on the plate with cutting or masking. Add gravy or sauce to moisten.    Recommended Strategies:  Begin and end the meal with small sips of thin liquids to moisten and clear the oropharynx, Upright for PO, small bites and sips, may use straw, and alternate liquids and solids. Oral care before breakfast, after all meals and PRN.    Other Recommended Evaluations: Referral to outpatient speech therapy for strengthening and education as indicated following ENT treatment re: possible epiglottic lesion.       Dysphagia therapy is recommended. Rationale: see above.    Marcy Aguayo, SLP 1/16/2025 11:26 CST         Visit Dx:     ICD-10-CM ICD-9-CM   1. Hoarseness  R49.0 784.42   2. Laryngopharyngeal reflux (LPR)  K21.9 478.79   3. Tobacco use  Z72.0 305.1   4. 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    Tobacco user    Right upper lobe pulmonary nodule    Malignant neoplasm of upper lobe of right lung    Overweight    Hilar adenopathy    Mediastinal adenopathy    Pre-procedure lab exam    Pulmonary emphysema    Esophageal dysphagia    Multiple lung nodules    Personal history of nicotine dependence    Elevated liver function tests    Dysphagia    Hoarseness        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    History of adenomatous polyp of colon     History of colon polyps     History of lung cancer     Right lung    Hyperlipidemia         Past Surgical History:   Procedure Laterality Date    BRONCHOSCOPY N/A 02/15/2021    Procedure: BRONCHOSCOPY;  Surgeon: Bruce Murillo MD;  Location: [facility] OR;  Service: Cardiothoracic;  Laterality: N/A;    CATARACT EXTRACTION      CHOLECYSTECTOMY      COLONOSCOPY  11/20/2012    One 1cm tubular adenomatous polyp in the sigmoid colon; One 5mm hyperplastic polyp in the rectum; Diverticulosis; Repeat 4 years     COLONOSCOPY N/A 03/30/2017    Two 4-6mm tubular adenomatous polyps at the hepatic flexure; One 5mm tubular adenomatous polyp in the transverse colon; One 11mm tubular adenomatous polyp in the sigmoid colon; One 6mm tubular adenomatous polyp in the rectum; Diverticulosis in the left colon; Repeat 3 years     COLONOSCOPY  12/17/2009    One less than 5mm tubular adenomatous polyp in the cecum; One 5mm hyperplastic polyp in the transverse; One less than 3mm hyperplastic polyp in the rectum; Diverticulosis; Repeat 3 years     COLONOSCOPY N/A 07/02/2020    Two 5-7mm tubular adenomatous polyps at the hepatic flexure; One 6mm tubular adenomatous polyp in the transverse colon;  Diverticulosis in the left colon; The entire examined colon is normal on direct and retroflexion views; Repeat 5 years    COLONOSCOPY N/A 06/29/2022    Diverticulosis in the left colon; One 6mm tubular adenomatous polyp in the descending colon; One 4mm tubular adenomatous polyp in the descending colon; Congested mucosa in the entire examined colon-biopsied; Repeat 5 years    ENDOSCOPY N/A 03/27/2019    Medium-sized HH; Normal stomach; Normal examined duodenum-biopsied    ENDOSCOPY N/A 06/09/2022    Small HH; Non-erosive gastritis-biopsied; Normal examined duodenum-biopsied    ERCP  07/30/2024    Dr. Buzz Dangelo-Choledocholithiasis status post balloon sweep extraction wtih balloon assisted sphincteroplasty; Placement of biliary stent as well as prophylactic pancreatic stent; Repeat ERCP 3 months for biliary stent removal    FOOT SURGERY Left     ORIF    HERNIA REPAIR      INNER EAR SURGERY      MEDIASTINOSCOPY N/A 02/15/2021    Procedure: CERVICAL MEDIASTINOSCOPY WITH LYMPH NODE DISECTION;  Surgeon: Bruce Murillo MD;  Location:  [facility] OR;  Service: Cardiothoracic;  Laterality: N/A;    THORACOSCOPY Right 02/15/2021    Procedure: THORACOSCOPY, WEDGE RESECTION OF RIGHT UPPER LOBE WITH FROZEN,  , MEDIASTINAL LYMPH NODE DISSECTION;  Surgeon: Bruce Murillo MD;  Location: [facility] OR;  Service: Cardiothoracic;  Laterality: Right;    TONSILLECTOMY                        SLP Adult Swallow Evaluation       Row Name 01/16/25 0853       Rehab Evaluation    Document Type evaluation  -MD    Subjective Information complains of  difficulty swallowing  -MD    Patient Observations alert;cooperative;agree to therapy  -MD    Patient/Family/Caregiver Comments/Observations No family present  -MD    Patient Effort good  -MD    Symptoms Noted During/After Treatment none  -MD       General Information    Patient Profile Reviewed yes  -MD    Pertinent History Of Current Problem Patient is seen for a video swallow study due to reports  of feeling food stick in the pharynx. CT and visualizationo f the pharynx reveal questionable epiglottic lesion with edema and erythems. Medical history includes RUL nodule, keratizing SCC, emphysema, COPD, hoarseness, GERD, LPR, and hepatitis.  -MD    Current Method of Nutrition regular textures;thin liquids  -MD    Precautions/Limitations, Vision WFL  -MD    Precautions/Limitations, Hearing WFL  -MD    Prior Level of Function-Communication WFL  -MD    Prior Level of Function-Swallowing concerns regarding dehydration;concerns regarding malnutrition;compensations (maneuvers, postures) needed  -MD    Plans/Goals Discussed with patient  -MD    Barriers to Rehab none identified  -MD       Pain    Pretreatment Pain Rating 0/10 - no pain  -MD    Posttreatment Pain Rating 0/10 - no pain  -MD       Oral Motor Structure and Function    Dentition Assessment natural, present and adequate  -MD       General Eating/Swallowing Observations    Respiratory Support Currently in Use room air  -MD       MBS/VFSS    Utensils Used spoon;straw  -MD    Consistencies Trialed regular textures;soft to chew textures;thin liquids;nectar/syrup-thick liquids;honey-thick liquids;pudding thick  -MD       MBS/VFSS Interpretation    Oral Prep Phase impaired oral phase of swallowing  -MD    Oral Transit Phase impaired  -MD    Oral Residue impaired  -MD    VFSS Summary see note  -MD       Oral Preparatory Phase    Oral Preparatory Phase prolonged manipulation  -MD    Prolonged Manipulation regular textures;mechanical soft  -MD       Oral Transit Phase    Impaired Oral Transit Phase increased A-P transit time  -MD    Increased A-P Transit Time mechanical soft;regular textures  -MD       Oral Residue    Impaired Oral Residue lingual residue  -MD    Lingual Residue secondary to reduced lingual strength  -MD    Response to Oral Residue partial residue clearance;with spontaneous subsequent swallow  -MD       Initiation of Pharyngeal Swallow    Initiation  of Pharyngeal Swallow WFL;other (see comments)  valecular aggregation  -MD    Pharyngeal Phase impaired pharyngeal phase of swallowing  -MD    Anatomical abnormalities noted other (see comments)  bulbous tissue in the pyfirorm sinus/sub epiglottic area  -MD    Anatomical abnormalities functional impact functional impact on swallowing  -MD    Penetration During the Swallow thin liquids  -MD    Depth of Penetration deep  -MD    Response to Penetration No  -MD    No spontaneous response to penetration and non-effective laryngeal clearance with cue (see comments)  -MD    Pharyngeal Residue regular textures;nectar-thick liquids;valleculae;pyriform sinuses;posterior pharyngeal wall;base of tongue;thin liquids;honey-thick liquids;pudding/puree;mechanical soft  -MD    Response to Residue partial residue clearance;cleared residue with spontaneous subsequent swallow  -MD    Attempted Compensatory Maneuvers chin tuck;bolus size;multiple swallows;alternate liquids/solids  -MD    Response to Attempted Compensatory Maneuvers prevented penetration;reduced residue  -MD    Successful Compensatory Maneuver Competency patient able to;demonstrate compensations;teach back compensations  -MD       Esophageal Phase    Esophageal Phase no impairments;see radiology report for further details  -MD       SLP Evaluation Clinical Impression    SLP Swallowing Diagnosis suspect chronic;mild-moderate;oral dysphagia;pharyngeal dysphagia  -MD    Functional Impact risk of malnutrition;risk of aspiration/pneumonia;risk of dehydration  -MD    Swallow Criteria for Skilled Therapeutic Interventions Met demonstrates skilled criteria  -MD       Recommendations    Therapy Frequency (Swallow) evaluation only  -MD    SLP Diet Recommendation soft to chew textures;thin liquids  -MD    Recommended Precautions and Strategies upright posture during/after eating;small bites of food and sips of liquid;alternate between small bites of food and sips of liquid;check  mouth frequently for oral residue/pocketing;general aspiration precautions;reflux precautions;fatigue precautions;chin terri  -MD    Oral Care Recommendations Oral Care before breakfast, after meals and PRN  -MD    SLP Rec. for Method of Medication Administration meds whole;with thin liquids  -MD              User Key  (r) = Recorded By, (t) = Taken By, (c) = Cosigned By      Initials Name Provider Type    Marcy Richards, SLP Speech and Language Pathologist                                    OP SLP Education       Row Name 01/16/25 1053       Education    Barriers to Learning No barriers identified  -MD    Education Provided Described results of evaluation;Patient expressed understanding of evaluation;Patient participated in establishing goals and treatment plan;Patient demonstrated recommended strategies;Patient requires further education on strategies, risks  -MD    Teaching Response Verbalized understanding;Demonstrated understanding  -MD              User Key  (r) = Recorded By, (t) = Taken By, (c) = Cosigned By      Initials Name Effective Dates    Marcy Richards, SLP 06/21/22 -                                          Time Calculation:   Untimed Charges  11149-EE Motion Fluoro Eval Swallow Minutes: 115  Total Minutes  Untimed Charges Total Minutes: 115   Total Minutes: 115    Therapy Charges for Today       Code Description Service Date Service Provider Modifiers Qty    [id number]  ST MOTION FLUORO EVAL SWALLOW 8 1/16/2025 Marcy Augayo, SLP GN 1                    ARVIND Childs  1/16/2025

## 2025-01-20 ENCOUNTER — ANESTHESIA (OUTPATIENT)
Dept: PERIOP | Facility: HOSPITAL | Age: 75
End: 2025-01-20
Payer: MEDICARE

## 2025-01-20 ENCOUNTER — ANESTHESIA EVENT (OUTPATIENT)
Dept: PERIOP | Facility: HOSPITAL | Age: 75
End: 2025-01-20
Payer: MEDICARE

## 2025-01-20 ENCOUNTER — HOSPITAL ENCOUNTER (OUTPATIENT)
Facility: HOSPITAL | Age: 75
Setting detail: HOSPITAL OUTPATIENT SURGERY
Discharge: HOME | End: 2025-01-20
Attending: OTOLARYNGOLOGY | Admitting: OTOLARYNGOLOGY
Payer: MEDICARE

## 2025-01-20 VITALS
SYSTOLIC BLOOD PRESSURE: 121 MMHG | HEART RATE: 51 BPM | DIASTOLIC BLOOD PRESSURE: 71 MMHG | RESPIRATION RATE: 18 BRPM | OXYGEN SATURATION: 94 % | TEMPERATURE: 97.1 F

## 2025-01-20 DIAGNOSIS — R49.0 HOARSENESS: ICD-10-CM

## 2025-01-20 DIAGNOSIS — R13.10 DYSPHAGIA, UNSPECIFIED TYPE: ICD-10-CM

## 2025-01-20 DIAGNOSIS — D37.09 NEOPLASM OF UNCERTAIN BEHAVIOR OF VESTIBULE OF MOUTH: Primary | ICD-10-CM

## 2025-01-20 DIAGNOSIS — R13.12 OROPHARYNGEAL DYSPHAGIA: ICD-10-CM

## 2025-01-20 PROCEDURE — 25810000003 LACTATED RINGERS PER 1000 ML: Performed by: OTOLARYNGOLOGY

## 2025-01-20 PROCEDURE — 25010000002 SUGAMMADEX 200 MG/2ML SOLUTION: Performed by: NURSE ANESTHETIST, CERTIFIED REGISTERED

## 2025-01-20 PROCEDURE — 25010000002 EPINEPHRINE 1 MG/ML SOLUTION: Performed by: OTOLARYNGOLOGY

## 2025-01-20 PROCEDURE — 88305 TISSUE EXAM BY PATHOLOGIST: CPT | Performed by: OTOLARYNGOLOGY

## 2025-01-20 PROCEDURE — 31535 LARYNGOSCOPY W/BIOPSY: CPT | Performed by: OTOLARYNGOLOGY

## 2025-01-20 PROCEDURE — 25010000002 PROPOFOL 10 MG/ML EMULSION: Performed by: NURSE ANESTHETIST, CERTIFIED REGISTERED

## 2025-01-20 PROCEDURE — 25010000002 LIDOCAINE PF 2% 2 % SOLUTION: Performed by: NURSE ANESTHETIST, CERTIFIED REGISTERED

## 2025-01-20 RX ORDER — ONDANSETRON 2 MG/ML
4 INJECTION INTRAMUSCULAR; INTRAVENOUS ONCE AS NEEDED
Status: DISCONTINUED | OUTPATIENT
Start: 2025-01-20 | End: 2025-01-20 | Stop reason: HOSPADM

## 2025-01-20 RX ORDER — LIDOCAINE HYDROCHLORIDE 10 MG/ML
0.5 INJECTION, SOLUTION EPIDURAL; INFILTRATION; INTRACAUDAL; PERINEURAL ONCE AS NEEDED
Status: DISCONTINUED | OUTPATIENT
Start: 2025-01-20 | End: 2025-01-20 | Stop reason: HOSPADM

## 2025-01-20 RX ORDER — MAGNESIUM HYDROXIDE 1200 MG/15ML
LIQUID ORAL AS NEEDED
Status: DISCONTINUED | OUTPATIENT
Start: 2025-01-20 | End: 2025-01-20 | Stop reason: HOSPADM

## 2025-01-20 RX ORDER — IBUPROFEN 600 MG/1
600 TABLET, FILM COATED ORAL EVERY 6 HOURS PRN
Status: DISCONTINUED | OUTPATIENT
Start: 2025-01-20 | End: 2025-01-20 | Stop reason: HOSPADM

## 2025-01-20 RX ORDER — ONDANSETRON 4 MG/1
4 TABLET, ORALLY DISINTEGRATING ORAL ONCE AS NEEDED
Status: DISCONTINUED | OUTPATIENT
Start: 2025-01-20 | End: 2025-01-20 | Stop reason: HOSPADM

## 2025-01-20 RX ORDER — LIDOCAINE HYDROCHLORIDE 20 MG/ML
INJECTION, SOLUTION EPIDURAL; INFILTRATION; INTRACAUDAL; PERINEURAL AS NEEDED
Status: DISCONTINUED | OUTPATIENT
Start: 2025-01-20 | End: 2025-01-20 | Stop reason: SURG

## 2025-01-20 RX ORDER — PROPOFOL 10 MG/ML
VIAL (ML) INTRAVENOUS AS NEEDED
Status: DISCONTINUED | OUTPATIENT
Start: 2025-01-20 | End: 2025-01-20 | Stop reason: SURG

## 2025-01-20 RX ORDER — SODIUM CHLORIDE 0.9 % (FLUSH) 0.9 %
3 SYRINGE (ML) INJECTION AS NEEDED
Status: DISCONTINUED | OUTPATIENT
Start: 2025-01-20 | End: 2025-01-20 | Stop reason: HOSPADM

## 2025-01-20 RX ORDER — FENTANYL CITRATE 50 UG/ML
50 INJECTION, SOLUTION INTRAMUSCULAR; INTRAVENOUS
Status: DISCONTINUED | OUTPATIENT
Start: 2025-01-20 | End: 2025-01-20 | Stop reason: HOSPADM

## 2025-01-20 RX ORDER — FLUMAZENIL 0.1 MG/ML
0.2 INJECTION INTRAVENOUS AS NEEDED
Status: DISCONTINUED | OUTPATIENT
Start: 2025-01-20 | End: 2025-01-20 | Stop reason: HOSPADM

## 2025-01-20 RX ORDER — LABETALOL HYDROCHLORIDE 5 MG/ML
5 INJECTION, SOLUTION INTRAVENOUS
Status: DISCONTINUED | OUTPATIENT
Start: 2025-01-20 | End: 2025-01-20 | Stop reason: HOSPADM

## 2025-01-20 RX ORDER — HYDROCODONE BITARTRATE AND ACETAMINOPHEN 5; 325 MG/1; MG/1
1 TABLET ORAL ONCE AS NEEDED
Status: DISCONTINUED | OUTPATIENT
Start: 2025-01-20 | End: 2025-01-20 | Stop reason: HOSPADM

## 2025-01-20 RX ORDER — NALOXONE HCL 0.4 MG/ML
0.4 VIAL (ML) INJECTION AS NEEDED
Status: DISCONTINUED | OUTPATIENT
Start: 2025-01-20 | End: 2025-01-20 | Stop reason: HOSPADM

## 2025-01-20 RX ORDER — SODIUM CHLORIDE, SODIUM LACTATE, POTASSIUM CHLORIDE, CALCIUM CHLORIDE 600; 310; 30; 20 MG/100ML; MG/100ML; MG/100ML; MG/100ML
1000 INJECTION, SOLUTION INTRAVENOUS CONTINUOUS
Status: DISCONTINUED | OUTPATIENT
Start: 2025-01-20 | End: 2025-01-20 | Stop reason: HOSPADM

## 2025-01-20 RX ORDER — HYDROCODONE BITARTRATE AND ACETAMINOPHEN 10; 325 MG/1; MG/1
1 TABLET ORAL EVERY 4 HOURS PRN
Status: DISCONTINUED | OUTPATIENT
Start: 2025-01-20 | End: 2025-01-20 | Stop reason: HOSPADM

## 2025-01-20 RX ORDER — ROCURONIUM BROMIDE 10 MG/ML
INJECTION, SOLUTION INTRAVENOUS AS NEEDED
Status: DISCONTINUED | OUTPATIENT
Start: 2025-01-20 | End: 2025-01-20 | Stop reason: SURG

## 2025-01-20 RX ORDER — HYDROCODONE BITARTRATE AND ACETAMINOPHEN 5; 325 MG/1; MG/1
1 TABLET ORAL EVERY 4 HOURS PRN
Status: DISCONTINUED | OUTPATIENT
Start: 2025-01-20 | End: 2025-01-20 | Stop reason: HOSPADM

## 2025-01-20 RX ORDER — HYDROCODONE BITARTRATE AND ACETAMINOPHEN 5; 325 MG/1; MG/1
1 TABLET ORAL EVERY 8 HOURS PRN
Qty: 6 TABLET | Refills: 0 | Status: SHIPPED | OUTPATIENT
Start: 2025-01-20

## 2025-01-20 RX ADMIN — LIDOCAINE HYDROCHLORIDE 100 MG: 20 INJECTION, SOLUTION EPIDURAL; INFILTRATION; INTRACAUDAL; PERINEURAL at 12:27

## 2025-01-20 RX ADMIN — PROPOFOL 160 MG: 10 INJECTION, EMULSION INTRAVENOUS at 12:27

## 2025-01-20 RX ADMIN — SUGAMMADEX 200 MG: 100 INJECTION, SOLUTION INTRAVENOUS at 12:53

## 2025-01-20 RX ADMIN — ROCURONIUM 35 MG: 50 INJECTION, SOLUTION INTRAVENOUS at 12:27

## 2025-01-20 RX ADMIN — SODIUM CHLORIDE, POTASSIUM CHLORIDE, SODIUM LACTATE AND CALCIUM CHLORIDE 1000 ML: 600; 310; 30; 20 INJECTION, SOLUTION INTRAVENOUS at 10:36

## 2025-01-20 NOTE — ANESTHESIA PREPROCEDURE EVALUATION
Anesthesia Evaluation     no history of anesthetic complications:   NPO Solid Status: > 8 hours  NPO Liquid Status: > 2 hours           Airway   Mallampati: I  TM distance: >3 FB  Neck ROM: full  No difficulty expected  Dental      Pulmonary    (+) a smoker Current, lung cancer (s/p surgery), COPD,  Cardiovascular   Exercise tolerance: good (4-7 METS)    (+) DVT (diagnosed 1 month ago, took eliquis but stopped for procedur), hyperlipidemia  (-) hypertension      Neuro/Psych  (-) seizures, TIA, CVA  GI/Hepatic/Renal/Endo    (+) GERD, hepatitis (s/p treatment, resolved) C, liver disease    Musculoskeletal     Abdominal    Substance History   (+) drug use     OB/GYN          Other      history of cancer                      Anesthesia Plan    ASA 3     general     intravenous induction     Anesthetic plan, risks, benefits, and alternatives have been provided, discussed and informed consent has been obtained with: patient.        CODE STATUS:

## 2025-01-20 NOTE — ANESTHESIA POSTPROCEDURE EVALUATION
Patient: Se Ramirez    Procedure Summary       Date: 01/20/25 Room / Location:  [facility] OR 03 /  [facility] OR    Anesthesia Start: 1225 Anesthesia Stop: 1302    Procedure: DIRECT LARYNGOSCOPY WITH EXCISION OF LESION Diagnosis:       Dysphagia, unspecified type      Hoarseness      (Dysphagia, unspecified type [R13.10])      (Hoarseness [R49.0])    Surgeons: Antelmo Dangelo MD Provider: Aden Peguero CRNA    Anesthesia Type: general ASA Status: 3            Anesthesia Type: general    Vitals  Vitals Value Taken Time   /65 01/20/25 1326   Temp 97.1 °F (36.2 °C) 01/20/25 1300   Pulse 54 01/20/25 1327   Resp 16 01/20/25 1325   SpO2 95 % 01/20/25 1327   Vitals shown include unfiled device data.        Post Anesthesia Care and Evaluation    Patient location during evaluation: PACU  Patient participation: complete - patient participated  Level of consciousness: awake and alert  Pain management: adequate    Airway patency: patent  Anesthetic complications: No anesthetic complications    Cardiovascular status: acceptable  Respiratory status: acceptable  Hydration status: acceptable    Comments: Blood pressure 130/78, pulse 52, temperature 97.1 °F (36.2 °C), temperature source Temporal, resp. rate 18, SpO2 97%.    Pt discharged from PACU based on benjamín score >8

## 2025-01-20 NOTE — OP NOTE
OPERATIVE NOTE  1/20/2025    NAME: Se Ramirez    YOB: 1950  MRN: [id number]    PRE-OPERATIVE DIAGNOSIS:    Dysphagia, unspecified type [R13.10]  Hoarseness [R49.0]    POST-OPERATIVE DIAGNOSIS:   Post-Op Diagnosis Codes:     * Dysphagia, unspecified type [R13.10]     * Hoarseness [R49.0]    PROCEDURE PERFORMED:   Direct laryngoscopy with biopsy    SURGEON:   Antelmo Dangelo MD    ASSISTANT(S):   None    ANESTHESIA:   General Anesthesia via Endotracheal Tube    INDICATIONS: The patient is a 74 y.o. male with Dysphagia, unspecified type [R13.10]  Hoarseness [R49.0]    PROCEDURE:  The patient was brought to the operating room, given General Anesthesia via Endotracheal Tube, and prepped and draped in the usual manner.     Laryngoscopy was performed and no masses, lesions, ulcerations or other abnormalities were noted in the oral cavity proper or oropharynx.  In the hypopharynx moderate lymphoid hyperplasia at the base of the tongue was noted but this was symmetric symmetric and without evidence of neoplasia.  Piriform sinuses were clear as well as the lingual surface of the epiglottis.  However on the laryngeal surface of the epiglottis just to the left of midline ulceration and mild cobblestoning was noted.  Multiple biopsies were taken of this area and submitted for permanent pathologic examination.  Minimal bleeding was encountered which was controlled with 1-1000 epinephrine on cottonoid pledgets.  The endolaryngeal structures otherwise were within normal limits including both true vocal folds and false vocal folds.    No other abnormalities were appreciated and the procedure terminated.     The patient was transported upon extubation to the postanesthesia care unit in stable condition.      SPECIMENS:  Specimens       ID Source Type Tests Collected By Collected At Frozen?    A Larynx Tissue TISSUE PATHOLOGY EXAM   Antelmo Dangelo MD 1/20/25 7623 No    Description: laryngeal surface of  epiglottis              COMPLICATIONS: NONE    ESTIMATED BLOOD LOSS:  Minimal           Antelmo Dangelo MD  1/20/2025

## 2025-01-20 NOTE — ANESTHESIA PROCEDURE NOTES
Airway  Urgency: elective    Airway not difficult    General Information and Staff    Patient location during procedure: OR  CRNA/CAA: Jairo Birch CRNA    Indications and Patient Condition  Indications for airway management: airway protection    Preoxygenated: yes  Mask difficulty assessment: 1 - vent by mask    Final Airway Details  Final airway type: endotracheal airway      Successful airway: ETT  Cuffed: yes   Successful intubation technique: direct laryngoscopy  Endotracheal tube insertion site: oral  Blade: Zuluaga  Blade size: 2  ETT size (mm): 6.0  Cormack-Lehane Classification: grade I - full view of glottis  Placement verified by: capnometry   Measured from: lips  ETT/EBT  to lips (cm): 23  Number of attempts at approach: 1  Assessment: lips, teeth, and gum same as pre-op and atraumatic intubation

## 2025-01-21 LAB
CYTO UR: NORMAL
LAB AP CASE REPORT: NORMAL
LAB AP DIAGNOSIS COMMENT: NORMAL
LAB AP INTRADEPARTMENTAL CONSULT: NORMAL
Lab: NORMAL
PATH REPORT.FINAL DX SPEC: NORMAL
PATH REPORT.GROSS SPEC: NORMAL

## 2025-01-28 ENCOUNTER — TELEPHONE (OUTPATIENT)
Dept: OTOLARYNGOLOGY | Facility: CLINIC | Age: 75
End: 2025-01-28
Payer: MEDICARE

## 2025-01-28 DIAGNOSIS — J38.7 LESION OF EPIGLOTTIS: Primary | ICD-10-CM

## 2025-01-28 DIAGNOSIS — C10.9 SQUAMOUS CELL CARCINOMA OF OROPHARYNX: ICD-10-CM

## 2025-01-28 PROBLEM — F17.200 CURRENT EVERY DAY SMOKER: Status: ACTIVE | Noted: 2025-01-28

## 2025-01-28 NOTE — TELEPHONE ENCOUNTER
Patient notified of results by junito MCCAULEY----- Message from Junito Hall sent at 1/27/2025 11:52 AM CST -----  Scc   Will need pet scan and referral to oncology and radiation

## 2025-01-29 ENCOUNTER — HOSPITAL ENCOUNTER (OUTPATIENT)
Dept: RADIATION ONCOLOGY | Facility: HOSPITAL | Age: 75
Setting detail: RADIATION/ONCOLOGY SERIES
End: 2025-01-29
Payer: MEDICARE

## 2025-01-29 NOTE — PROGRESS NOTES
[facility]  Radiation Oncology Clinic   Parvez Johnson MD, FACR  Lionel MCCAULEY  _______________________________________________  [facility]  Department of Radiation Oncology  [address] [place] [id number]  Office: [phone]  Fax: [phone]    DATE: 01/30/2025  PATIENT: Se Ramirez  1950                         MEDICAL RECORD #: [id number]    1. Malignant neoplasm of upper lobe of right lung    2. Current every day smoker    3. Squamous cell carcinoma of epiglottis      REASON FOR VISIT:    No chief complaint on file.    Se Ramirez is a pleasant 74-year-old male that has been referred to our office for radiotherapy considerations for a recently diagnosed supraglottic head/neck carcinoma.    On presentation today he reports fatigue, dental problem, sore throat, and voice change. Denies appetite change, unexpected weight change, nausea/vomiting, diarrhea, light-headedness, weakness, and headaches. He follows Mallory Bryan APRN and [facility].     History of Right Lung Cancer:  12/29/2020 - PET Scan:  Solitary hypermetabolic right upper lobe pulmonary nodule which is concerning for primary lung malignancy.   No scintigraphic evidence for hilar or mediastinal paris metastasis or distant metastatic disease.   Solitary kidney.     02/15/2021 - Wedge resection and lymph node excision per :  Lung, right upper lobe, wedge resection:  Invasive keratinizing squamous cell carcinoma.  Maximum tumor diameter is 3.2 cm.  The surgical margins are free of tumor (0.3 cm).  Please see CAP synoptic.  Level 7 lymph node, excision:   1 benign lymph node.  Level 4L lymph node, excision:  1 benign lymph node.  Level 4R lymph node, excision:   1 benign lymph node.  Level 2R lymph node, excision:  1 benign lymph node.    Synoptic Checklist   LUNG   8th Edition - Protocol posted: 2/26/2020LUNG: RESECTION - All Specimens  SPECIMEN    Procedure  Wedge resection   Specimen Laterality  Right   TUMOR   Tumor Site  Upper lobe of lung   Histologic Type  Invasive squamous cell carcinoma, keratinizing   Histologic Grade  G2: Moderately differentiated   Tumor Size     Total Tumor Size (size of entire tumor)  Greatest Dimension (Centimeters): 3.2 cm   Additional Dimension (Centimeters)  2.6 cm     1.3 cm   Tumor Focality  Single focus   Visceral Pleura Invasion  Not identified   Direct Invasion of Adjacent Structures  No adjacent structures present   Treatment Effect  No known presurgical therapy   Lymphovascular Invasion  Not identified   MARGINS   Margins  All margins are uninvolved by tumor   Margins Examined  Parenchymal   Distance of Invasive Carcinoma from Closest Margin (Centimeters)  0.3 cm   Closest Margin  Parenchymal   LYMPH NODES   Number of Lymph Nodes Involved  0   Number of Lymph Nodes Examined  4   Juan David Stations Examined  2R: Upper paratracheal     4R: Lower paratracheal     7: Subcarinal     4L: Lower paratracheal   PATHOLOGIC STAGE CLASSIFICATION (pTNM, AJCC 8th Edition)   Primary Tumor (pT)  pT2a   Regional Lymph Nodes (pN)  pN0   ADDITIONAL FINDINGS   Additional Findings  Emphysema   .      05/21/2021 - CT Chest with contrast:  Since the previous study the patient's undergone wedge resection for a primary neoplasm of the lung within the anterior segment of the right upper lobe. No evidence of localized recurrence. No new lung lesions are present.  Stable subcarinal right hilar lymph nodes. These are enlarged by CT criteria but demonstrate no significant change from previous study of 12/10/2020. No new mediastinal lymphadenopathy is present. There is evidence of remote granulomatous disease.  Stable subtle hypodense lesion within the left lobe of the liver. There is steatosis of the liver. No new lesions are identified within the liver in those portions which are imaged.  Heavy coronary calcifications are present     11/24/2021 - CT  chest with contrast:  Treatment-related changes in the right apex are stable. There is a mild right hilar and mediastinal adenopathy which is also stable and nonspecific. There are some granulomatous calcifications identified in these areas in the right hilum and mediastinum, perhaps old granulomatous inflammation.  5 mm left lower lobe pulmonary nodule slightly larger, previously measuring 4 mm.  Lung emphysema.  Advanced coronary atheromatous calcification.    12/07/2022 - CT Chest with contrast:  Multiple new small pulmonary nodules measuring up to 5 mm. These may be infectious or inflammatory in nature but short interval follow-up in 2-3 months is recommended to confirm stability or resolution, given history of lung cancer.  Stable posttreatment change in the RIGHT upper lobe.  No change in mild RIGHT hilar lymphadenopathy.  Moderate emphysema.    03/07/2023 - CT Chest with contrast:  Postoperative change of RIGHT upper lobe wedge resection without definite evidence of recurrent or metastatic disease.  Waxing and waning of sub-6 mm pulmonary nodules. Several pulmonary nodules present on the prior study have now resolved. However, multiple new 3 to 4 mm pulmonary nodules have developed. Favor an infectious or inflammatory process but recommend continued imaging surveillance.  Moderate emphysema.    06/14/2023 - CT Chest with contrast:  No lung mass is identified, however there are several new nodules within both lungs, none of the new nodules measures more than 6.1 mm. Some the pre-existing nodules have slightly increased in size. This may be related to an ongoing infectious or inflammatory etiology rather than small pulmonary metastases, consider repeat chest CT in 3 months. None of the pulmonary nodules is large enough to attempt percutaneous biopsy. No evidence of intrathoracic lymphadenopathy. There are advanced emphysematous changes as before. Postsurgical changes noted in the right upper lobe.    09/20/2023  - CT Chest with contrast:  Several of the small nodules in the posterior lower lobes have decreased in size or are resolved, compatible with resolved/resolving infectious/inflammatory process. Additional sub-6 mm pulmonary nodules are unchanged in size. No new suspicious pulmonary nodule.  Status post right upper lobe wedge resection without evidence of recurrent disease or convincing evidence of metastatic disease.    03/25/2024 - CT Chest with contrast:  A tiny nodule, 4 mm in maximum dimension, in the right lower lobe, was not seen in the previous study. A follow-up examination in 6 months is recommended to ensure stability or resolution.  The remaining nodules are either stable or have resolved or decreased in size.  Nonspecific prominent right hilar lymph nodes. Etiology and clinical significance is not certain.  Chronic emphysematous lung changes.  The postsurgical/postprocedural changes in the right upper lobe.    06/26/2024 - CT Chest with contrast:  Postoperative change of right upper lobe wedge resection without definite evidence of recurrent or metastatic disease in the chest.  New indeterminate 4 mm right upper lobe and 3 mm right lower lobe pulmonary nodules. Recommend a follow-up chest CT in 6 months to confirm stability or resolution and exclude neoplastic potential.  Bilateral hilar lymphadenopathy, similar to multiple prior studies dating back to 5/21/2021. Given stability, suspect these are related to an indolent/chronic infectious or inflammatory process.  Intrahepatic and extrahepatic biliary ductal dilatation extending down to the ampulla where a 10 mm filling defect is present, likely representing choledocholithiasis. Recommend GI referral for ERCP.    12/30/2024 - CT Chest with contrast:  No evidence of disease recurrence or progression.  Interval resolution of previously described new pulmonary nodules on the prior exam 6/26/2024.  Similar borderline hilar lymph nodes, not optimally evaluated  on this noncontrast exam.  Interval cholecystectomy with resolution of bile duct di    01/08/2025 - Appointment with Mallory Bryan APRN:  Stage 1 mild COPD by GOLD classification (Primary)  1/8/25 Reviewed PFT which although remains mildly obstructive he has had a drop in his FEV1 from 94 to 86% for predicted.  He also has had a drop in his diffusion capacity when corrected for alveolar volume from 54 to 46% predicted.  He was given Spiriva at his last visit however his primary care physician is keeping him with samples of Trelegy for which he finds benefit.  He is encouraged to continue this.  Continue as needed albuterol HFA.  Continue to monitor periodic flow-volume loop and diffusion capacity.  Multiple lung nodules  Several have resolved on recent CT imaging he does have a 3 mm left lower lobe nodule that is stable.  He has been getting routine imaging with his history of lung cancer.  Follow Up   Return in about 4 months (around 5/8/2025).    01/08/2025 - Pulmonary Function Tests:  Spirometry with Diffusion Capacity  Pre Drug % Predicted   FVC: 108%  FEV1: 86%  FEF 25-75%: 45%  FEV1/FVC: 60%  DLCO: 49%  D/VAsb: 46%     Interpretation Spirometry   Spirometry shows mild obstruction. There is reduced midflow suggesting small airway/airflow obstruction.   Review of FVL curve   Patient's effort is normal.   Diffusion Capacity  The patient's diffusion capacity is moderately reduced.  Diffusion capacity is moderately reduced when corrected for alveolar volume.   Overall comments: To June 2023 FEV1 has decreased from 94 to 86% predicted, FVC has decreased from 120 to 108% predicted.  Diffusion capacity when corrected for alveolar volume remains moderately decreased however has dropped from 54 to 46% predicted.    ----------------------------------------------------------------    History of Present Illness:  04/24/2024 - CT Soft tissue neck with contrast:  Mild asymmetrical nodular prominence of the left  aryepiglottic fold for which ENT consultation and visualization is recommended. No pathologic lymphadenopathy.  Moderate to advanced emphysema with postoperative changes right lung apex.    08/07/2024 - Appointment with Edmundo Infante APRN:  The patient has had problems with globus sensation, sore throat, and hoarseness.   At the last office visit, Protonix was increased to twice daily before breakfast and dinner.    He states that he is still with intermittent hoarseness.    He has had a CT neck in the past that was correlated in office with direct visualization-this was normal.   Plan:  Return in about 5 months (around 1/7/2025) for Recheck.    12/27/2024 - Appointment with Edmundo Infante APRN:  Presents for evaluation of persistent hoarseness.   Plan:  Persistent hoarseness.  He continues to experience hoarseness, which he had hoped would improve following his recent cholecystectomy, but it has not.   He has reduced his smoking habit to approximately three-quarters of a pack per day.   He is currently on a regimen of Nexium, taking two tablets once daily in the morning before meals. He reports no current symptoms of heartburn.   He also reports difficulty swallowing, with occasional instances of food impaction. He has not undergone a swallow study.   A CT scan of his neck has not been performed.   He is scheduled for a CT scan of his lungs next week at [facility].   A CT scan of the neck will be ordered to be obtained concurrently with the scheduled CT chest. A swallow study will also be conducted.   The dosage of Nexium will be increased to one tablet twice daily, to be taken before breakfast and dinner. It is recommended to eliminate milk and dairy from diet.  Return in about 3 months (around 3/27/2025) for Recheck, CT.    12/30/2024 - CT soft tissue neck with contrast:  Abnormal appearance and contour of the epiglottis left of midline with CT concerning for a mucosal space lesion arising along its laryngeal  surface, possibly ulcerated and measuring up to 1.5 cm in greatest axial diameter.   No suspicious adenopathy by size criteria.    01/02/2025 - Appointment with :  Direct Laryngoscopy with biopsy recommended.     01/02/2025 - Flexible Fiberoptic Laryngoscopy per :  FINDINGS:  Mucosal surfaces:   The mucosal surfaces demonstrated normal mucosa surfaces with moderately severe inflammation  Base of tongue:  The base of tongue was found to have no mass or lesion.  Epiglottis:  The epiglottis was found to have no mass or lesion.  The vallecula is clear but the laryngeal surface of the epiglottis does not have an obvious lesion discernible.  Aryepiglottic fold:  The AE folds were found to have no mass or lesion no moderate erythema is noted throughout.  False Vocal Fold:  The false cords were found to have no mass or lesion.  True Vocal Cord:  The true vocal cords were found to have no mass or lesion. Both true vocal cords adduct and abduct normally.  There is significant erythema in the posterior one third of both true vocal folds without mass identifiable.  Arytenoid:   The arytenoids were found to have Moderately severe interarytenoid edema with erythema.  Hypopharynx:  The hypopharynx was found to have no mass or lesion.    01/20/2025 - Laryngeal surface of epiglottis, biopsies per :  Invasive keratinizing squamous cell carcinoma.    01/27/2025 - Documentation per 's clinic:  Will need pet scan and referral to oncology and radiation         History obtained from  PATIENT, FAMILY, and CHART    PAST MEDICAL HISTORY  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    History of adenomatous polyp of colon     History of colon polyps     History of lung cancer     Right lung     Hyperlipidemia       PAST SURGICAL HISTORY  Past Surgical History:   Procedure Laterality Date    BRONCHOSCOPY N/A 02/15/2021    Procedure: BRONCHOSCOPY;  Surgeon: Bruce Murillo MD;  Location: [facility] OR;  Service: Cardiothoracic;  Laterality: N/A;    CATARACT EXTRACTION      CHOLECYSTECTOMY      COLONOSCOPY  11/20/2012    One 1cm tubular adenomatous polyp in the sigmoid colon; One 5mm hyperplastic polyp in the rectum; Diverticulosis; Repeat 4 years     COLONOSCOPY N/A 03/30/2017    Two 4-6mm tubular adenomatous polyps at the hepatic flexure; One 5mm tubular adenomatous polyp in the transverse colon; One 11mm tubular adenomatous polyp in the sigmoid colon; One 6mm tubular adenomatous polyp in the rectum; Diverticulosis in the left colon; Repeat 3 years     COLONOSCOPY  12/17/2009    One less than 5mm tubular adenomatous polyp in the cecum; One 5mm hyperplastic polyp in the transverse; One less than 3mm hyperplastic polyp in the rectum; Diverticulosis; Repeat 3 years     COLONOSCOPY N/A 07/02/2020    Two 5-7mm tubular adenomatous polyps at the hepatic flexure; One 6mm tubular adenomatous polyp in the transverse colon; Diverticulosis in the left colon; The entire examined colon is normal on direct and retroflexion views; Repeat 5 years    COLONOSCOPY N/A 06/29/2022    Diverticulosis in the left colon; One 6mm tubular adenomatous polyp in the descending colon; One 4mm tubular adenomatous polyp in the descending colon; Congested mucosa in the entire examined colon-biopsied; Repeat 5 years    ENDOSCOPY N/A 03/27/2019    Medium-sized HH; Normal stomach; Normal examined duodenum-biopsied    ENDOSCOPY N/A 06/09/2022    Small HH; Non-erosive gastritis-biopsied; Normal examined duodenum-biopsied    ERCP  07/30/2024    Dr. Buzz Dangelo-Choledocholithiasis status post balloon sweep extraction wtih balloon assisted sphincteroplasty; Placement of biliary stent as well as prophylactic pancreatic stent; Repeat ERCP 3 months for  biliary stent removal    FOOT SURGERY Left     ORIF    HERNIA REPAIR      INNER EAR SURGERY      LARYNGOSCOPY N/A 1/20/2025    Procedure: DIRECT LARYNGOSCOPY WITH EXCISION OF LESION;  Surgeon: Antelmo Dangelo MD;  Location:  [facility] OR;  Service: ENT;  Laterality: N/A;    MEDIASTINOSCOPY N/A 02/15/2021    Procedure: CERVICAL MEDIASTINOSCOPY WITH LYMPH NODE DISECTION;  Surgeon: Bruce Murillo MD;  Location:  [facility] OR;  Service: Cardiothoracic;  Laterality: N/A;    THORACOSCOPY Right 02/15/2021    Procedure: THORACOSCOPY, WEDGE RESECTION OF RIGHT UPPER LOBE WITH FROZEN,  , MEDIASTINAL LYMPH NODE DISSECTION;  Surgeon: Bruce Murillo MD;  Location:  [facility] OR;  Service: Cardiothoracic;  Laterality: Right;    TONSILLECTOMY        FAMILY HISTORY  family history includes Colon polyps in his mother; Diabetes in his mother.    SOCIAL HISTORY  Social History     Tobacco Use    Smoking status: Every Day     Current packs/day: 0.50     Average packs/day: 0.5 packs/day for 56.1 years (28.0 ttl pk-yrs)     Types: Cigarettes     Start date: 1/1/1969     Passive exposure: Current    Smokeless tobacco: Never    Tobacco comments:     Pt states about 7-8 cigarettes per day 7/3   Vaping Use    Vaping status: Never Used   Substance Use Topics    Alcohol use: Not Currently     Alcohol/week: 3.0 standard drinks of alcohol     Types: 3 Shots of liquor per week     Comment: sober as of 06/01/2022    Drug use: Yes     Types: Marijuana     Comment: daily     ALLERGIES  Patient has no known allergies.     MEDICATIONS    Current Outpatient Medications:     albuterol sulfate HFA (ProAir HFA) 108 (90 Base) MCG/ACT inhaler, Inhale 2 puffs Every 4 (Four) Hours As Needed for Wheezing., Disp: 18 g, Rfl: 3    aspirin 81 MG EC tablet, Take 1 tablet by mouth Daily., Disp: , Rfl:     atorvastatin (LIPITOR) 10 MG tablet, Take 1 tablet by mouth Daily., Disp: , Rfl:     Fluticasone-Umeclidin-Vilant (TRELEGY) 100-62.5-25 MCG/ACT inhaler, Inhale 1  puff Daily., Disp: , Rfl:     HYDROcodone-acetaminophen (NORCO) 5-325 MG per tablet, Take 1 tablet by mouth Every 8 (Eight) Hours As Needed for Moderate Pain or Severe Pain (Pain) for up to 6 doses., Disp: 6 tablet, Rfl: 0    vitamin D (ERGOCALCIFEROL) 1.25 MG (92146 UT) capsule capsule, Take 1 capsule by mouth 1 (One) Time Per Week., Disp: , Rfl:     The following portions of the patient's history were reviewed and updated as appropriate: allergies, current medications, past family history, past medical history, past social history, past surgical history and problem list.    REVIEW OF SYSTEMS  Review of Systems   Constitutional:  Positive for fatigue.   HENT:  Positive for dental problem (Dentures top and bottom), sore throat and voice change (hoarseness).         [place]; bilateral hearing aids      Eyes: Negative.    Respiratory: Negative.     Cardiovascular: Negative.    Gastrointestinal: Negative.    Endocrine: Negative.    Genitourinary: Negative.    Musculoskeletal: Negative.    Skin: Negative.    Allergic/Immunologic: Negative.    Neurological: Negative.    Hematological: Negative.    Psychiatric/Behavioral: Negative.        Implants       Implant    Reload [place] Flex Gst Xthk 4.2mm Blk - [id number] - Implanted  Lung      Inventory item: RELOAD [place]HEL[place] FLEX GST XTHK 4.2MM BLK Model/Cat number: [id number]    : [employer] ENDO [employer]  [place] [employer] J Lot number: [id number]      As of 2/15/2021       Status: Implanted                      Reload [place] Flex Gst Thk 4.1mm Grn - [id number] - Implanted  Lung      Inventory item: RELOAD ECHELON FLEX GST THK 4.1MM GRN Model/Cat number: [id number]    : [employer] ENDO SURGERY  [place] [employer] J Lot number: [id number]      As of 2/15/2021       Status: Implanted                      Reload [place] Flex Gst Xthk 4.2mm Blk - [id number] - Implanted  Lung      Inventory item: RELOAD ECHELON FLEX GST XTHK 4.2MM BLK Model/Cat number: [id number]    : [employer] ENDO SURGERY   "DIV OF J AND J Lot number: [id number]      As of 2/15/2021       Status: Implanted                      Reload [place] Flex Gst Thk 4.1mm Grn - [id number] - Implanted  Lung      Inventory item: RELOAD ECHELON FLEX GST THK 4.1MM GRN Model/Cat number: [id number]    : [employer] ENDO SURGERY  DIV OF J [employer] J Lot number: [id number]      As of 2/15/2021       Status: Implanted                      Reload [place] Flex Gst Reg 3.6mm Jayant - [id number] - Implanted  Lung      Inventory item: RELOAD ECHELON FLEX GST REG 3.6MM JAYANT Model/Cat number: [id number]    : [employer] ENDO SURGERY  DIV OF J AND J Lot number: [id number]      As of 2/15/2021       Status: Implanted                      Sealant Plural Airleak Progel W/Appl 4ml - [id number] - Implanted  Lung      Inventory item: SEALANT PLURAL AIRLEAK PROGEL W/APPL 4ML Model/Cat number: [id number]    : [employer] Lot number: [id number]      As of 2/15/2021       Status: Implanted                            PHYSICAL EXAM  VITAL SIGNS:   Vitals:    01/30/25 1443   BP: 127/81   Pulse: 76   SpO2: 94%  Comment: room air   Weight: 83.8 kg (184 lb 11.2 oz)   Height: 175.3 cm (69\")   PainSc:   5   PainLoc: Throat     Physical Exam  Vitals and nursing note reviewed.   Constitutional:       General: He is not in acute distress.     Appearance: Normal appearance. He is normal weight.   HENT:      Head: Normocephalic.      Mouth/Throat:      Mouth: Mucous membranes are moist.      Dentition: Has dentures.      Tongue: No lesions. Tongue does not deviate from midline.      Pharynx: Oropharynx is clear. Uvula midline.      Comments: Voice is raspy.  Eyes:      Extraocular Movements: Extraocular movements intact.      Pupils: Pupils are equal, round, and reactive to light.   Cardiovascular:      Rate and Rhythm: Normal rate and regular rhythm.      Heart sounds: Normal heart sounds.   Pulmonary:      Effort: Pulmonary effort is normal. No respiratory distress.      Breath sounds: Normal " breath sounds.   Abdominal:      Palpations: Abdomen is soft.      Tenderness: There is no abdominal tenderness.   Musculoskeletal:         General: Normal range of motion.      Cervical back: Normal range of motion.      Right lower leg: No edema.      Left lower leg: No edema.   Lymphadenopathy:      Cervical: No cervical adenopathy.      Upper Body:      Right upper body: No supraclavicular or axillary adenopathy.      Left upper body: No supraclavicular or axillary adenopathy.   Neurological:      General: No focal deficit present.      Mental Status: He is alert and oriented to person, place, and time.      Cranial Nerves: No cranial nerve deficit.   Psychiatric:         Mood and Affect: Mood normal.         Behavior: Behavior normal.         Thought Content: Thought content normal.         Judgment: Judgment normal.         Performance Status: ECOG (0) Fully active, able to carry on all predisease performance without restriction    Clinical Quality Measures  - Pain Assessment: Se Ramirez reports a pain score of 5.  Given his pain assessment as noted, treatment options were discussed and the following options were decided upon as a follow-up plan to address the patient's pain: continuation of current treatment plan for pain and use of non-medical modalities (ice, heat, stretching and/or behavior modifications).    - Body Mass Index Screening and Follow-Up Plan  BMI is >= 25 and <30. (Overweight) The following options were offered after discussion;: none (medical contraindication)    - Tobacco Use: Screening and Cessation Intervention  Social History    Tobacco Use      Smoking status: Every Day        Packs/day: 0.50        Years: 0.5 packs/day for 56.1 years (28.0 ttl pk-yrs)        Types: Cigarettes        Start date: 1/1/1969        Passive exposure: Current      Smokeless tobacco: Never      Tobacco comments: Pt states about 7-8 cigarettes per day 7/3  Smoking cessation information given in after  visit summary.    - Advanced Care Planning Advance Care Planning   ACP discussion was held with the patient during this visit. Patient does not have an advance directive, information provided.    - PHQ-2 Depression Screening:  Little interest or pleasure in doing things? Not at all   Feeling down, depressed, or hopeless? Not at all   PHQ-2 Total Score 0     ASSESSMENT AND PLAN  1. Malignant neoplasm of upper lobe of right lung    2. Current every day smoker    3. 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  He clinically has no grossly identifiable adenopathy.  Completion workup with a PET/CT is pending.  The patient has upper and lower dentures in place.  I have seen, examined and reviewed his medication list, appropriate labs and imaging studies as well as other physician notes. We discussed the goals/plans of care and answered all questions.     We have discussed the indications and rationale of radiation therapy according to the NCCN Guidelines for supraglottic carcinomas.  I reviewed the options of surgical extirpation that would require laryngectomy versus laryngeal conservation/organ preservation approach with chemoradiation.  I have extensively reviewed the risks, benefits and alternatives of therapy and progression of disease in spite of therapy with either local or systemic failure.  Potential complications of head neck irradiation have been  thoroughly reviewed. This includes but is not limited to mucositis, loss of taste, pain, skin erythema, salivary dysfunction (xerostomia), dysphagia with secondary weight loss and other potential complications or side effects.  I have particularly emphasized the need for good dental care and risk of osteoradionecrosis. Long-term salivary gland dysfunction is likely and will exacerbate future dental care problems. Also long-term loss of taste as well as induration and fibrosis of the neck.    We discussed treatment options per the national guidelines could include surgical resection, a definitive course of radiation therapy, with or without concurrent chemotherapy. Prior to treatment, we would recommend dental clearance, nutrition, speech and swallowing evaluations. Long-term salivary gland dysfunction is likely and will exacerbate future dental care problems.  Will order referrals to oncology dietician and speech pathologist for care and management during radiation course.  We also will refer the patient to surgery for prophylactic PEG tube placement for supportive enteral nutrition during treatment with its expected level of pharyngeal and esophagus toxicity    In consideration of diagnostic data and evaluation of the patient,  I recommend a definitive course of chemoradiation therapy, anticipate a total tumor dose of approximately 7735-0430 cGy over 30-35 fractions. Will coordinate care with medical oncology for delivery of chemotherapy per their direction.     The patient and his family verbalize understanding of this discussion, voice no further questions and wishes to proceed with recommendations. Will simulate treatment fields after medical oncology consultation and PET/CT to begin treatment planning. Final course pending.    Se FENG James  reports that he has been smoking cigarettes. He started smoking about 56 years ago. He has a 28 pack-year smoking history. He has been exposed to tobacco smoke. He has  never used smokeless tobacco. I have educated him on the risk of diseases from using tobacco products such as cancer, COPD, and heart disease. I spent >10 minutes counseling the patient.    Thank you for allowing me to assist in this patients care.    Patient Instructions   Proceed with scheduled PET/CT.  Proceed with Med Onc consultation.  Follow-up referral for PEG Tube placement.    Return for CT Simulation after Med Onc appt.    Time Spent: I spent over 60 minutes caring for Se on this date of service. This time includes time spent by me in the following activities: preparing for the visit, reviewing tests, obtaining and/or reviewing a separately obtained history, performing a medically appropriate examination and/or evaluation, counseling and educating the patient/family/caregiver, ordering medications, tests, or procedures, and documenting information in the medical record.   Jerome Johnson MD   01/30/2025

## 2025-01-30 ENCOUNTER — CONSULT (OUTPATIENT)
Age: 75
End: 2025-01-30
Payer: MEDICARE

## 2025-01-30 VITALS
SYSTOLIC BLOOD PRESSURE: 127 MMHG | WEIGHT: 184.7 LBS | DIASTOLIC BLOOD PRESSURE: 81 MMHG | BODY MASS INDEX: 27.36 KG/M2 | OXYGEN SATURATION: 94 % | HEIGHT: 69 IN | HEART RATE: 76 BPM

## 2025-01-30 DIAGNOSIS — F17.200 CURRENT EVERY DAY SMOKER: ICD-10-CM

## 2025-01-30 DIAGNOSIS — C34.11 MALIGNANT NEOPLASM OF UPPER LOBE OF RIGHT LUNG: Primary | ICD-10-CM

## 2025-01-30 DIAGNOSIS — C32.1 SQUAMOUS CELL CARCINOMA OF EPIGLOTTIS: ICD-10-CM

## 2025-01-30 PROCEDURE — G0463 HOSPITAL OUTPT CLINIC VISIT: HCPCS | Performed by: RADIOLOGY

## 2025-01-30 NOTE — PATIENT INSTRUCTIONS
Proceed with scheduled PET/CT.  Proceed with Med Onc consultation.  Follow-up referral for PEG Tube placement.

## 2025-02-02 PROCEDURE — 77470 SPECIAL RADIATION TREATMENT: CPT | Performed by: RADIOLOGY

## 2025-02-02 PROCEDURE — 77263 THER RADIOLOGY TX PLNG CPLX: CPT | Performed by: RADIOLOGY

## 2025-02-04 ENCOUNTER — TELEPHONE (OUTPATIENT)
Dept: OTOLARYNGOLOGY | Facility: CLINIC | Age: 75
End: 2025-02-04
Payer: MEDICARE

## 2025-02-04 ENCOUNTER — HOSPITAL ENCOUNTER (OUTPATIENT)
Dept: CT IMAGING | Facility: HOSPITAL | Age: 75
Discharge: HOME | End: 2025-02-04
Payer: MEDICARE

## 2025-02-04 PROCEDURE — 34310000005 FLUDEOXYGLUCOSE F18 SOLUTION: Performed by: NURSE PRACTITIONER

## 2025-02-04 PROCEDURE — A9552 F18 FDG: HCPCS | Performed by: NURSE PRACTITIONER

## 2025-02-04 PROCEDURE — 78815 PET IMAGE W/CT SKULL-THIGH: CPT

## 2025-02-04 RX ADMIN — FLUDEOXYGLUCOSE F18 1 DOSE: 300 INJECTION INTRAVENOUS at 11:14

## 2025-02-04 NOTE — TELEPHONE ENCOUNTER
Patient notified    ----- Message from Sherie Hall sent at 2/4/2025  3:43 PM CST -----  Call patient with results   Referral has been made to oncology and radiation medicine

## 2025-02-05 ENCOUNTER — HOSPITAL ENCOUNTER (OUTPATIENT)
Facility: HOSPITAL | Age: 75
Setting detail: THERAPIES SERIES
Discharge: HOME | End: 2025-02-05
Payer: MEDICARE

## 2025-02-05 ENCOUNTER — OFFICE VISIT (OUTPATIENT)
Dept: SURGERY | Facility: CLINIC | Age: 75
End: 2025-02-05
Payer: MEDICARE

## 2025-02-05 ENCOUNTER — PATIENT ROUNDING (BHMG ONLY) (OUTPATIENT)
Dept: SURGERY | Facility: CLINIC | Age: 75
End: 2025-02-05
Payer: MEDICARE

## 2025-02-05 VITALS
SYSTOLIC BLOOD PRESSURE: 124 MMHG | WEIGHT: 184 LBS | DIASTOLIC BLOOD PRESSURE: 82 MMHG | HEIGHT: 69 IN | BODY MASS INDEX: 27.25 KG/M2

## 2025-02-05 DIAGNOSIS — E66.3 OVERWEIGHT WITH BODY MASS INDEX (BMI) OF 27 TO 27.9 IN ADULT: ICD-10-CM

## 2025-02-05 DIAGNOSIS — R13.12 OROPHARYNGEAL DYSPHAGIA: Primary | ICD-10-CM

## 2025-02-05 DIAGNOSIS — F17.210 NICOTINE DEPENDENCE, CIGARETTES, UNCOMPLICATED: ICD-10-CM

## 2025-02-05 PROCEDURE — 92610 EVALUATE SWALLOWING FUNCTION: CPT | Performed by: SPEECH-LANGUAGE PATHOLOGIST

## 2025-02-05 NOTE — PATIENT INSTRUCTIONS

## 2025-02-05 NOTE — PROGRESS NOTES
Office New Patient History and Physical:     Referring Provider: Jerome Johnson MD    Chief Complaint   Patient presents with    Peg tube placement       Subjective .     History of present illness:    History of Present Illness  The patient presents for evaluation of a feeding tube.    He has been diagnosed with throat cancer and is scheduled to start radiation therapy next week. He was informed that the insertion of a feeding tube is optional. Currently, he is able to consume food without significant difficulty, although he occasionally experiences a sensation of food getting stuck in his throat. This issue is typically resolved by drinking water. He expresses reluctance towards the idea of a feeding tube. He also reports experiencing a persistent burning sensation in his throat, which is present both at bedtime and upon waking in the morning.    Supplemental Information  His surgical history includes gallbladder removal and hernia repair in the right groin.    SOCIAL HISTORY  The patient admits to smoking about half a pack to a pack a day.    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    History of adenomatous polyp of colon     History of colon polyps     History of lung cancer     Right lung    Hyperlipidemia    ,   Past Surgical History:   Procedure Laterality Date    BRONCHOSCOPY N/A 02/15/2021    Procedure: BRONCHOSCOPY;  Surgeon: Bruce Murillo MD;  Location: [facility];  Service: Cardiothoracic;  Laterality: N/A;    CATARACT EXTRACTION      CHOLECYSTECTOMY      COLONOSCOPY  11/20/2012    One 1cm tubular adenomatous polyp in the sigmoid colon; One 5mm hyperplastic polyp in the rectum; Diverticulosis; Repeat 4 years     COLONOSCOPY N/A 03/30/2017     Two 4-6mm tubular adenomatous polyps at the hepatic flexure; One 5mm tubular adenomatous polyp in the transverse colon; One 11mm tubular adenomatous polyp in the sigmoid colon; One 6mm tubular adenomatous polyp in the rectum; Diverticulosis in the left colon; Repeat 3 years     COLONOSCOPY  12/17/2009    One less than 5mm tubular adenomatous polyp in the cecum; One 5mm hyperplastic polyp in the transverse; One less than 3mm hyperplastic polyp in the rectum; Diverticulosis; Repeat 3 years     COLONOSCOPY N/A 07/02/2020    Two 5-7mm tubular adenomatous polyps at the hepatic flexure; One 6mm tubular adenomatous polyp in the transverse colon; Diverticulosis in the left colon; The entire examined colon is normal on direct and retroflexion views; Repeat 5 years    COLONOSCOPY N/A 06/29/2022    Diverticulosis in the left colon; One 6mm tubular adenomatous polyp in the descending colon; One 4mm tubular adenomatous polyp in the descending colon; Congested mucosa in the entire examined colon-biopsied; Repeat 5 years    ENDOSCOPY N/A 03/27/2019    Medium-sized HH; Normal stomach; Normal examined duodenum-biopsied    ENDOSCOPY N/A 06/09/2022    Small HH; Non-erosive gastritis-biopsied; Normal examined duodenum-biopsied    ERCP  07/30/2024    Dr. Buzz Dangelo-Choledocholithiasis status post balloon sweep extraction wtih balloon assisted sphincteroplasty; Placement of biliary stent as well as prophylactic pancreatic stent; Repeat ERCP 3 months for biliary stent removal    FOOT SURGERY Left     ORIF    HERNIA REPAIR      INNER EAR SURGERY      LARYNGOSCOPY N/A 1/20/2025    Procedure: DIRECT LARYNGOSCOPY WITH EXCISION OF LESION;  Surgeon: Antelmo Dangelo MD;  Location: [facility] OR;  Service: ENT;  Laterality: N/A;    MEDIASTINOSCOPY N/A 02/15/2021    Procedure: CERVICAL MEDIASTINOSCOPY WITH LYMPH NODE DISECTION;  Surgeon: Bruce Murillo MD;  Location: [facility] OR;  Service: Cardiothoracic;  Laterality: N/A;    THORACOSCOPY  Right 02/15/2021    Procedure: THORACOSCOPY, WEDGE RESECTION OF RIGHT UPPER LOBE WITH FROZEN,  , MEDIASTINAL LYMPH NODE DISSECTION;  Surgeon: Bruce Murillo MD;  Location: [facility] OR;  Service: Cardiothoracic;  Laterality: Right;    TONSILLECTOMY     ,   Family History   Problem Relation Age of Onset    Colon polyps Mother         Unknown age    Diabetes Mother     Colon cancer Neg Hx     Esophageal cancer Neg Hx     Liver cancer Neg Hx     Liver disease Neg Hx     Rectal cancer Neg Hx     Stomach cancer Neg Hx    ,   Social History     Tobacco Use    Smoking status: Every Day     Current packs/day: 0.50     Average packs/day: 0.5 packs/day for 56.1 years (28.0 ttl pk-yrs)     Types: Cigarettes     Start date: 1/1/1969     Passive exposure: Current    Smokeless tobacco: Never    Tobacco comments:     Pt states about 7-8 cigarettes per day 7/3   Vaping Use    Vaping status: Never Used   Substance Use Topics    Alcohol use: Not Currently     Alcohol/week: 3.0 standard drinks of alcohol     Types: 3 Shots of liquor per week     Comment: sober as of 06/01/2022    Drug use: Yes     Types: Marijuana     Comment: daily   , (Not in a hospital admission)   and Allergies:  Patient has no known allergies.    Current Outpatient Medications:     albuterol sulfate HFA (ProAir HFA) 108 (90 Base) MCG/ACT inhaler, Inhale 2 puffs Every 4 (Four) Hours As Needed for Wheezing., Disp: 18 g, Rfl: 3    aspirin 81 MG EC tablet, Take 1 tablet by mouth Daily., Disp: , Rfl:     atorvastatin (LIPITOR) 10 MG tablet, Take 1 tablet by mouth Daily., Disp: , Rfl:     Fluticasone-Umeclidin-Vilant (TRELEGY) 100-62.5-25 MCG/ACT inhaler, Inhale 1 puff Daily., Disp: , Rfl:     HYDROcodone-acetaminophen (NORCO) 5-325 MG per tablet, Take 1 tablet by mouth Every 8 (Eight) Hours As Needed for Moderate Pain or Severe Pain (Pain) for up to 6 doses., Disp: 6 tablet, Rfl: 0    vitamin D (ERGOCALCIFEROL) 1.25 MG (01131 UT) capsule capsule, Take 1 capsule by  "mouth 1 (One) Time Per Week., Disp: , Rfl:       Review of Systems    Review of Systems - General ROS: negative  ENT ROS: positive for - head and neck cancer   Respiratory ROS: no cough, shortness of breath, or wheezing  Cardiovascular ROS: no chest pain or dyspnea on exertion  Gastrointestinal ROS: no abdominal pain, change in bowel habits, or black or bloody stools  Genito-Urinary ROS: no dysuria, trouble voiding, or hematuria  Dermatological ROS: negative   Breast ROS: negative for breast lumps  Hematological and Lymphatic ROS: negative  Musculoskeletal ROS: negative   Neurological ROS: no TIA or stroke symptoms    Psychological ROS: negative  Endocrine ROS: negative    Objective     Vital Signs   /82   Ht 175.3 cm (69\")   Wt 83.5 kg (184 lb)   BMI 27.17 kg/m²      Physical Exam:  General appearance - alert, well appearing, and in no distress  Mental status - alert, oriented to person, place, and time  Eyes - pupils equal and reactive, extraocular eye movements intact  Abdomen - soft, nontender, nondistended, no masses or organomegaly  Physical Exam       Results Review:     The following data was reviewed by: Amie Donnelly MD on 02/05/2025:  Progress Notes by Sandra Marie CCC-SLP (02/05/2025 11:00)   Progress Notes by Jerome Johnson MD (01/30/2025 14:30)     Assessment & Plan       Diagnoses and all orders for this visit:    1. Oropharyngeal dysphagia (Primary)  -     Case Request; Standing  -     ceFAZolin (ANCEF) 2,000 mg in sodium chloride 0.9 % 100 mL IVPB  -     Case Request  -     Ambulatory Referral to Nutrition Services    2. Nicotine dependence, cigarettes, uncomplicated    3. Overweight with body mass index (BMI) of 27 to 27.9 in adult    Other orders  -     Follow Anesthesia Guidelines / Protocol; Future  -     Follow Anesthesia Guidelines / Protocol; Standing  -     Verify / Perform Chlorhexidine Skin Prep; Standing  -     Provide NPO Instructions to Patient; Future  -     " Chlorhexidine Skin Prep; Future  -     Notify physician (specify); Standing  -     Instructions on coughing, deep breathing, and incentive spirometry.; Standing  -     Oxygen Therapy-; Standing  -     Place Sequential Compression Device; Standing  -     Maintain Sequential Compression Device; Standing       Assessment & Plan  1. Throat cancer with oropharyngeal dysphagia.   He is scheduled to start radiation therapy next week. Due to potential difficulties in swallowing and maintaining adequate nutrition during treatment, a percutaneous endoscopic gastrostomy (PEG) tube is recommended. The procedure involves inserting a tube through the mouth into the stomach, with a small incision made where the light shines through. It is an outpatient procedure performed in the endoscopy suite, and he will be able to go home the same day. A home health nurse will provide instructions on tube care and usage. He has been advised to quit smoking, as it can negatively impact his treatment and overall health. An appointment with a nutritionist will be arranged to discuss tube feeding recommendations. The PEG tube insertion procedure is scheduled for 02/19/2024, Wednesday morning.     This is a chronic problem. I have reviewed the above notes. I have ordered the prework and dietician referral. He is at increased risk of perioperative complications 2/2 his elevated BMI, cancer diagnosis, and nicotine dependence.       Amie Donnelly MD  02/05/25  14:35 CST      Patient or patient representative verbalized consent for the use of Ambient Listening during the visit with  Amie Donnelly MD for chart documentation. 2/5/2025  14:37 CST

## 2025-02-05 NOTE — H&P (VIEW-ONLY)
Office New Patient History and Physical:     Referring Provider: Jerome Johnson MD    Chief Complaint   Patient presents with    Peg tube placement       Subjective .     History of present illness:    History of Present Illness  The patient presents for evaluation of a feeding tube.    He has been diagnosed with throat cancer and is scheduled to start radiation therapy next week. He was informed that the insertion of a feeding tube is optional. Currently, he is able to consume food without significant difficulty, although he occasionally experiences a sensation of food getting stuck in his throat. This issue is typically resolved by drinking water. He expresses reluctance towards the idea of a feeding tube. He also reports experiencing a persistent burning sensation in his throat, which is present both at bedtime and upon waking in the morning.    Supplemental Information  His surgical history includes gallbladder removal and hernia repair in the right groin.    SOCIAL HISTORY  The patient admits to smoking about half a pack to a pack a day.    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    History of adenomatous polyp of colon     History of colon polyps     History of lung cancer     Right lung    Hyperlipidemia    ,   Past Surgical History:   Procedure Laterality Date    BRONCHOSCOPY N/A 02/15/2021    Procedure: BRONCHOSCOPY;  Surgeon: Bruce Murillo MD;  Location: [facility];  Service: Cardiothoracic;  Laterality: N/A;    CATARACT EXTRACTION      CHOLECYSTECTOMY      COLONOSCOPY  11/20/2012    One 1cm tubular adenomatous polyp in the sigmoid colon; One 5mm hyperplastic polyp in the rectum; Diverticulosis; Repeat 4 years     COLONOSCOPY N/A 03/30/2017     Two 4-6mm tubular adenomatous polyps at the hepatic flexure; One 5mm tubular adenomatous polyp in the transverse colon; One 11mm tubular adenomatous polyp in the sigmoid colon; One 6mm tubular adenomatous polyp in the rectum; Diverticulosis in the left colon; Repeat 3 years     COLONOSCOPY  12/17/2009    One less than 5mm tubular adenomatous polyp in the cecum; One 5mm hyperplastic polyp in the transverse; One less than 3mm hyperplastic polyp in the rectum; Diverticulosis; Repeat 3 years     COLONOSCOPY N/A 07/02/2020    Two 5-7mm tubular adenomatous polyps at the hepatic flexure; One 6mm tubular adenomatous polyp in the transverse colon; Diverticulosis in the left colon; The entire examined colon is normal on direct and retroflexion views; Repeat 5 years    COLONOSCOPY N/A 06/29/2022    Diverticulosis in the left colon; One 6mm tubular adenomatous polyp in the descending colon; One 4mm tubular adenomatous polyp in the descending colon; Congested mucosa in the entire examined colon-biopsied; Repeat 5 years    ENDOSCOPY N/A 03/27/2019    Medium-sized HH; Normal stomach; Normal examined duodenum-biopsied    ENDOSCOPY N/A 06/09/2022    Small HH; Non-erosive gastritis-biopsied; Normal examined duodenum-biopsied    ERCP  07/30/2024    Dr. Buzz Dangelo-Choledocholithiasis status post balloon sweep extraction wtih balloon assisted sphincteroplasty; Placement of biliary stent as well as prophylactic pancreatic stent; Repeat ERCP 3 months for biliary stent removal    FOOT SURGERY Left     ORIF    HERNIA REPAIR      INNER EAR SURGERY      LARYNGOSCOPY N/A 1/20/2025    Procedure: DIRECT LARYNGOSCOPY WITH EXCISION OF LESION;  Surgeon: Antelmo Dangelo MD;  Location: [facility] OR;  Service: ENT;  Laterality: N/A;    MEDIASTINOSCOPY N/A 02/15/2021    Procedure: CERVICAL MEDIASTINOSCOPY WITH LYMPH NODE DISECTION;  Surgeon: Bruce Murillo MD;  Location: [facility] OR;  Service: Cardiothoracic;  Laterality: N/A;    THORACOSCOPY  Right 02/15/2021    Procedure: THORACOSCOPY, WEDGE RESECTION OF RIGHT UPPER LOBE WITH FROZEN,  , MEDIASTINAL LYMPH NODE DISSECTION;  Surgeon: Bruce Murillo MD;  Location: [facility] OR;  Service: Cardiothoracic;  Laterality: Right;    TONSILLECTOMY     ,   Family History   Problem Relation Age of Onset    Colon polyps Mother         Unknown age    Diabetes Mother     Colon cancer Neg Hx     Esophageal cancer Neg Hx     Liver cancer Neg Hx     Liver disease Neg Hx     Rectal cancer Neg Hx     Stomach cancer Neg Hx    ,   Social History     Tobacco Use    Smoking status: Every Day     Current packs/day: 0.50     Average packs/day: 0.5 packs/day for 56.1 years (28.0 ttl pk-yrs)     Types: Cigarettes     Start date: 1/1/1969     Passive exposure: Current    Smokeless tobacco: Never    Tobacco comments:     Pt states about 7-8 cigarettes per day 7/3   Vaping Use    Vaping status: Never Used   Substance Use Topics    Alcohol use: Not Currently     Alcohol/week: 3.0 standard drinks of alcohol     Types: 3 Shots of liquor per week     Comment: sober as of 06/01/2022    Drug use: Yes     Types: Marijuana     Comment: daily   , (Not in a hospital admission)   and Allergies:  Patient has no known allergies.    Current Outpatient Medications:     albuterol sulfate HFA (ProAir HFA) 108 (90 Base) MCG/ACT inhaler, Inhale 2 puffs Every 4 (Four) Hours As Needed for Wheezing., Disp: 18 g, Rfl: 3    aspirin 81 MG EC tablet, Take 1 tablet by mouth Daily., Disp: , Rfl:     atorvastatin (LIPITOR) 10 MG tablet, Take 1 tablet by mouth Daily., Disp: , Rfl:     Fluticasone-Umeclidin-Vilant (TRELEGY) 100-62.5-25 MCG/ACT inhaler, Inhale 1 puff Daily., Disp: , Rfl:     HYDROcodone-acetaminophen (NORCO) 5-325 MG per tablet, Take 1 tablet by mouth Every 8 (Eight) Hours As Needed for Moderate Pain or Severe Pain (Pain) for up to 6 doses., Disp: 6 tablet, Rfl: 0    vitamin D (ERGOCALCIFEROL) 1.25 MG (63603 UT) capsule capsule, Take 1 capsule by  "mouth 1 (One) Time Per Week., Disp: , Rfl:       Review of Systems    Review of Systems - General ROS: negative  ENT ROS: positive for - head and neck cancer   Respiratory ROS: no cough, shortness of breath, or wheezing  Cardiovascular ROS: no chest pain or dyspnea on exertion  Gastrointestinal ROS: no abdominal pain, change in bowel habits, or black or bloody stools  Genito-Urinary ROS: no dysuria, trouble voiding, or hematuria  Dermatological ROS: negative   Breast ROS: negative for breast lumps  Hematological and Lymphatic ROS: negative  Musculoskeletal ROS: negative   Neurological ROS: no TIA or stroke symptoms    Psychological ROS: negative  Endocrine ROS: negative    Objective     Vital Signs   /82   Ht 175.3 cm (69\")   Wt 83.5 kg (184 lb)   BMI 27.17 kg/m²      Physical Exam:  General appearance - alert, well appearing, and in no distress  Mental status - alert, oriented to person, place, and time  Eyes - pupils equal and reactive, extraocular eye movements intact  Abdomen - soft, nontender, nondistended, no masses or organomegaly  Physical Exam       Results Review:     The following data was reviewed by: Amie Donnelly MD on 02/05/2025:  Progress Notes by Sandra Marie CCC-SLP (02/05/2025 11:00)   Progress Notes by Jerome Johnson MD (01/30/2025 14:30)     Assessment & Plan       Diagnoses and all orders for this visit:    1. Oropharyngeal dysphagia (Primary)  -     Case Request; Standing  -     ceFAZolin (ANCEF) 2,000 mg in sodium chloride 0.9 % 100 mL IVPB  -     Case Request  -     Ambulatory Referral to Nutrition Services    2. Nicotine dependence, cigarettes, uncomplicated    3. Overweight with body mass index (BMI) of 27 to 27.9 in adult    Other orders  -     Follow Anesthesia Guidelines / Protocol; Future  -     Follow Anesthesia Guidelines / Protocol; Standing  -     Verify / Perform Chlorhexidine Skin Prep; Standing  -     Provide NPO Instructions to Patient; Future  -     " Chlorhexidine Skin Prep; Future  -     Notify physician (specify); Standing  -     Instructions on coughing, deep breathing, and incentive spirometry.; Standing  -     Oxygen Therapy-; Standing  -     Place Sequential Compression Device; Standing  -     Maintain Sequential Compression Device; Standing       Assessment & Plan  1. Throat cancer with oropharyngeal dysphagia.   He is scheduled to start radiation therapy next week. Due to potential difficulties in swallowing and maintaining adequate nutrition during treatment, a percutaneous endoscopic gastrostomy (PEG) tube is recommended. The procedure involves inserting a tube through the mouth into the stomach, with a small incision made where the light shines through. It is an outpatient procedure performed in the endoscopy suite, and he will be able to go home the same day. A home health nurse will provide instructions on tube care and usage. He has been advised to quit smoking, as it can negatively impact his treatment and overall health. An appointment with a nutritionist will be arranged to discuss tube feeding recommendations. The PEG tube insertion procedure is scheduled for 02/19/2024, Wednesday morning.     This is a chronic problem. I have reviewed the above notes. I have ordered the prework and dietician referral. He is at increased risk of perioperative complications 2/2 his elevated BMI, cancer diagnosis, and nicotine dependence.       Amie Donnelly MD  02/05/25  14:35 CST      Patient or patient representative verbalized consent for the use of Ambient Listening during the visit with  Amie Donnelly MD for chart documentation. 2/5/2025  14:37 CST

## 2025-02-05 NOTE — PROGRESS NOTES
Speech/Language Pathology Initial Evaluation and Plan of Care  [address] [place] [address]    Patient: Se Ramirez               : 1950  Visit Date: 2025  Referring practitioner: PARISA Mathews  Date of Initial Visit: 2025      Visit Diagnoses:    ICD-10-CM ICD-9-CM   1. 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    History of adenomatous polyp of colon     History of colon polyps     History of lung cancer     Right lung    Hyperlipidemia      Past Surgical History:   Procedure Laterality Date    BRONCHOSCOPY N/A 02/15/2021    Procedure: BRONCHOSCOPY;  Surgeon: Bruce Murillo MD;  Location: [facility];  Service: Cardiothoracic;  Laterality: N/A;    CATARACT EXTRACTION      CHOLECYSTECTOMY      COLONOSCOPY  2012    One 1cm tubular adenomatous polyp in the sigmoid colon; One 5mm hyperplastic polyp in the rectum; Diverticulosis; Repeat 4 years     COLONOSCOPY N/A 2017    Two 4-6mm tubular adenomatous polyps at the hepatic flexure; One 5mm tubular adenomatous polyp in the transverse colon; One 11mm tubular adenomatous polyp in the sigmoid colon; One 6mm tubular adenomatous polyp in the rectum; Diverticulosis in the left colon; Repeat 3 years     COLONOSCOPY  2009    One less than 5mm tubular adenomatous polyp in the cecum; One 5mm hyperplastic polyp in the transverse; One less than 3mm hyperplastic polyp in the rectum; Diverticulosis; Repeat 3 years     COLONOSCOPY N/A 2020    Two 5-7mm tubular adenomatous polyps at the hepatic flexure; One 6mm tubular adenomatous polyp in the transverse colon; Diverticulosis in the left colon; The entire  examined colon is normal on direct and retroflexion views; Repeat 5 years    COLONOSCOPY N/A 06/29/2022    Diverticulosis in the left colon; One 6mm tubular adenomatous polyp in the descending colon; One 4mm tubular adenomatous polyp in the descending colon; Congested mucosa in the entire examined colon-biopsied; Repeat 5 years    ENDOSCOPY N/A 03/27/2019    Medium-sized HH; Normal stomach; Normal examined duodenum-biopsied    ENDOSCOPY N/A 06/09/2022    Small HH; Non-erosive gastritis-biopsied; Normal examined duodenum-biopsied    ERCP  07/30/2024    Dr. Buzz Dangelo-Choledocholithiasis status post balloon sweep extraction wtih balloon assisted sphincteroplasty; Placement of biliary stent as well as prophylactic pancreatic stent; Repeat ERCP 3 months for biliary stent removal    FOOT SURGERY Left     ORIF    HERNIA REPAIR      INNER EAR SURGERY      LARYNGOSCOPY N/A 1/20/2025    Procedure: DIRECT LARYNGOSCOPY WITH EXCISION OF LESION;  Surgeon: Antelmo Dangelo MD;  Location: [facility];  Service: ENT;  Laterality: N/A;    MEDIASTINOSCOPY N/A 02/15/2021    Procedure: CERVICAL MEDIASTINOSCOPY WITH LYMPH NODE DISECTION;  Surgeon: Bruce Murillo MD;  Location: [facility];  Service: Cardiothoracic;  Laterality: N/A;    THORACOSCOPY Right 02/15/2021    Procedure: THORACOSCOPY, WEDGE RESECTION OF RIGHT UPPER LOBE WITH FROZEN,  , MEDIASTINAL LYMPH NODE DISSECTION;  Surgeon: Bruce Murillo MD;  Location: [facility];  Service: Cardiothoracic;  Laterality: Right;    TONSILLECTOMY         SUBJECTIVE     Subjective:   Patient presents with the following symptoms: difficulty swallowing solids, difficulty swallowing liquids, difficulty swallowing pills, food getting stuck, weight loss, globus, and esophageal symptoms   Date of Onset: June/July 2024  History of present problems: Patient had an episode of coughing leading to sore throat. He had various testing and trailed different medications as prescribed without  resolution. 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 He has met with Dr. Johnson with plan for chemoradiation therapy. He has met with Dr. Donnelly with plan for prophylactic PEG tube placement. He will see RD this week for nutrition counseling. He is scheduled with Dr. Macias in oncology and for follow up with Dr. Dangelo.    The functional impact on the patient: Risk of aspiration pneumonia. Difficulty swallowing. Risk of decreased nutrition and hydration.    Prior level of function: WFL  Pertinent Medical History Related to this Problem:  Lung cancer, current smoker, GERD, COPD  Current Diet Level:   Solid: 7 -  Easy to Chew/Regular  Liquid: 0 - Thin  Non-oral Feeding: N/A - patient anticipating PEG placement in the future    OBJECTIVE     CLINICAL OBSERVATIONS  Respiratory/Swallow Coordination: Mildly impaired  Position During Evaluation: Upright  Anatomy/Physiology: Patient presents with anatomy and physiology that is WNL  Dentition: Patient is endentulous, Patient has dentures/partials, Dentures were worn for this evaluation, and Dentures are typically worn for meals  Oral Health: Oral cavity is clean and uses listerine and cleans dentures by soaking at night  Awareness/Control of Secretions: Patient swallows saliva  Method of Food Administration: Cup  Oral Phase symptoms: No overt oral phase s/s  Pharyngeal Symptoms: multiple swallows with consistencies of thin water and audible swallow      INSTRUMENTAL ASSESSMENT  Instrumental Exam Completed?: Yes, 1/16/2025  [place]  Severity Level of Dysphagia: mild dysphagia and moderate dysphagia  Consistencies Aspirated/Penetrated: Aspirated and Penetrated: thin (deep penetration and question aspiration)  Summary Statement: Difficulties were noted with  all consistencies , characterized by decreased bolus mastication,  decreased bolus preparation, weak lingual surface/BOT, slow epiglottic inversion which did allow deep penetration during the swallow with thin liquids with questionable aspiration without a cough noted. No other concerns for penetration or aspiration were noted with any other consistency. A chin tuck  maneuver decreased the amount of penetration, to transient penetration,  with thin liquids.  Bulbous tissue on the base underside of the epiglottis/pyriform sinus area was noted.  Residue after the swallow was noted on the base of tongue, vallecula, undercoating of the epiglottis, posterior pharyngeal wall, and  throughout the pharynx as well as on the lingual surface. Residue was mild to moderate. Regular consistency solids demonstrated the largest amount of vallecula residue of all consistencies presented. Patient was able to clear pharyngeal residue, although he did not spontaneously demonstrate sensation of the residue post swallow.      Esophageal screen was performed and displayed functional clearance. See radiology report for full details..     SLP Findings: Patient presents with mild to moderate oropharyngeal dysphagia.   Comments: Continued concerns for edema in the pharynx impacting bolus transit. Patient without report of recurrent pneumonia at this time. Therefore concerns for aspiration are considered lower, although deep penetration with questionable aspiration are noted. Patient is able to demonstrate a chin tuck, limiting sip size, and alternating with a thin liquid wash to clear the pharynx.      Recommendations: Diet Textures: thin liquid, regular consistency food. Soften food on the plate with cutting or masking. Add gravy or sauce to moisten.     Recommended Strategies:  Begin and end the meal with small sips of thin liquids to moisten and clear the oropharynx, Upright for PO, small bites and sips, may use straw, and alternate liquids and solids. Oral care before breakfast, after all meals and  PRN.     Other Recommended Evaluations: Referral to outpatient speech therapy for strengthening and education as indicated following ENT treatment re: possible epiglottic lesion.        Dysphagia therapy is recommended. Rationale: see above.     Marcy Aguayo, SLP 1/16/2025 11:26 CST  Please see SLP report of [facility]    IMPRESSION/RECOMMENDATIONS  Factors Affecting Performance: no difficulty participating in study  Learning Motivation: strong  Education/Learning Comments: Patient demonstrated understanding of evaluation results and plan of care.     PATIENT SELF ASSESSMENT    EAT 10  0= No problem 4= Severe problem  My swallowing has caused me to lose weight 1   My Swallowing problem interferes with my ability to go out for meals  0   Swallowing liquids takes extra effort 3   Swallowing solids takes extra effort 2   Swallowing pills takes extra effort 2   Swallowing is painful 1   The pleasure of eating is affected by my swallowing 3   When I swallow, food sticks in my throat 4   I cough when I eat 3   Swallowing is stressful 2                                                                                                  Total Score 21     0-9 Minimal  10-19 Mild  20-29 Moderate 30-40 Severe      Clinical Impression:   Mild/Moderate: oropharyngeal phase dysphagia  Impact on Function: risk for inadequate nutrition, inadequate hydration, aspiration, and pneumonia  Prognosis Comment: Patient is about to begin chemoradiation for laryngeal cancer. Prognosis pending.       Therapy Education/Self Care    Details: Evaluation results and POC   Given Information on effects of chemoradiation on swallowing  Need for stringent  oral care  Need for continued therapy during treatment   Progress: New   Education provided to:  Patient   Level of understanding Verbalized, Demonstrated, and Teach back level of understanding           Total Time of Visit:            60   mins    ASSESSMENT/PLAN     Goals                                             STG  Comments Status   Patient will improve oral skills to enhance safety and increase eating efficiency and bolus control as measured by improved bolus formation and improved posterior propulsion of the bolus.     Patient will improve laryngeal closure and increase base of the tongue strength and posterior pharyngeal wall excursion as measured by decreased overt signs and symptoms of aspiration and decreased penetration and aspiration on repeat instrumental swallow study, if applicable.      Patient will report improved EAT-10 score.     Patient will improve oral hygiene to enhance safety and decrease risk of aspiration pneumonia     LTG      Patient will safely consume full PO diet of regular consistency food and thin liquids without difficulty or complications such as aspiration or pneumonia. Will use ADOLFO as supplement to nutrition during chemoradiation therapy.        ASSESSMENT:   Patient is indicated for skilled care by a Speech/Language Pathologist.     Summary of Assessment: Patient presents with mild/moderate oropharyngeal dysphagia and is facing chemoradiation treatment for laryngeal cancer.     Recommendations for Diet/Nutrition: Drinks: 0 - Thin and Solids: 7 -  Easy to Chew/Regular  Swallowing Precautions: multiple swallows per bite or sip and strict oral care  Therapy Recommendations: dysphagia therapy to address swallowing deficits and maintain swallow function during chemoradiation.     PLAN:  Details of Plan of Care: Initiate therapy and home exercises.     Frequency: 1 time per week  Duration: 12 visits  Estimated Length of Session: 30 minutes    SPEECH/LANGUAGE PATHOLOGIST SIGNATURE: Sandra Marie CCC-SLP, [place] License #: [id number]  Electronically Signed on 2/5/2025        Initial Certification  Certification Period: 2/5/2025 through 5/5/2025  I certify that the therapy services are furnished while this patient is under my care.  The services outlined above are required by this patient,  and will be reviewed every 90 days.     PHYSICIAN: Edmundo Infante APRN (NPI: [id number])    Signature:____________________________________________DATE: _________     Please sign and return via fax to [phone].   Thank you so much for letting us work with Se. I appreciate your letting us work with your patients. If you have any questions or concerns, please don't hesitate to contact me.          Outpatient Therapy Services  [address] [address]  [phone]

## 2025-02-06 NOTE — PROGRESS NOTES
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   No scintigraphic evidence for hilar or mediastinal paris metastasis or distant metastatic disease.   Solitary kidney.      02/15/2021 - Wedge resection and lymph node excision per :  Lung, right upper lobe, wedge resection:  Invasive keratinizing squamous cell carcinoma.  Maximum tumor diameter is 3.2 cm.  The surgical margins are free of tumor (0.3 cm).  Please see CAP synoptic.  Level 7 lymph node, excision:   1 benign lymph node.  Level 4L lymph node, excision:  1 benign lymph node.  Level 4R lymph node, excision:  1 benign lymph node.  Level 2R lymph node, excision:  1 benign lymph node.     Synoptic Checklist   LUNG   8th Edition - Protocol posted: 2/26/2020LUNG: RESECTION - All Specimens       SPECIMEN   Procedure   Wedge resection   Specimen Laterality   Right   TUMOR   Tumor Site   Upper lobe of lung   Histologic Type   Invasive squamous cell carcinoma, keratinizing   Histologic Grade   G2: Moderately differentiated   Tumor Size       Total Tumor Size (size of entire tumor)   Greatest Dimension (Centimeters): 3.2 cm   Additional Dimension (Centimeters)   2.6 cm       " 1.3 cm   Tumor Focality   Single focus   Visceral Pleura Invasion   Not identified   Direct Invasion of Adjacent Structures   No adjacent structures present   Treatment Effect   No known presurgical therapy   Lymphovascular Invasion   Not identified   MARGINS   Margins   All margins are uninvolved by tumor   Margins Examined   Parenchymal   Distance of Invasive Carcinoma from Closest Margin (Centimeters)   0.3 cm   Closest Margin   Parenchymal   LYMPH NODES   Number of Lymph Nodes Involved   0   Number of Lymph Nodes Examined   4   Juan David Stations Examined   2R: Upper paratracheal       4R: Lower paratracheal       7: Subcarinal       4L: Lower paratracheal   PATHOLOGIC STAGE CLASSIFICATION (pTNM, AJCC 8th Edition)   Primary Tumor (pT)   pT2a   Regional Lymph Nodes (pN)   pN0   ADDITIONAL FINDINGS   Additional Findings   Emphysema   .      05/21/2021 - CT Chest with contrast:  Since the previous study the patient's undergone wedge resection for a primary neoplasm of the lung within the anterior segment of the right upper lobe. No evidence of localized recurrence. No new lung lesions are present.  Stable subcarinal right hilar lymph nodes. These are enlarged by CT criteria but demonstrate no significant change from previous study of 12/10/2020. No new mediastinal lymphadenopathy is present. There is evidence of remote granulomatous disease.  Stable subtle hypodense lesion within the left lobe of the liver. There is steatosis of the liver. No new lesions are identified within the liver in those portions which are imaged.  Heavy coronary calcifications are present      11/24/2021 - CT chest with contrast:  Treatment-related changes in the right apex are stable. There is a mild right hilar and mediastinal adenopathy which is also stable and nonspecific. There are some granulomatous calcifications identified in these areas in the right hilum and mediastinum, perhaps old granulomatous inflammation.  5 mm left lower lobe  pulmonary nodule slightly larger, previously measuring 4 mm.  Lung emphysema.  Advanced coronary atheromatous calcification.     12/07/2022 - CT Chest with contrast:  Multiple new small pulmonary nodules measuring up to 5 mm. These may be infectious or inflammatory in nature but short interval follow-up in 2-3 months is recommended to confirm stability or resolution, given history of lung cancer.  Stable posttreatment change in the RIGHT upper lobe.  No change in mild RIGHT hilar lymphadenopathy.  Moderate emphysema.     03/07/2023 - CT Chest with contrast:  Postoperative change of RIGHT upper lobe wedge resection without definite evidence of recurrent or metastatic disease.  Waxing and waning of sub-6 mm pulmonary nodules. Several pulmonary nodules present on the prior study have now resolved. However, multiple new 3 to 4 mm pulmonary nodules have developed. Favor an infectious or inflammatory process but recommend continued imaging surveillance.  Moderate emphysema.     06/14/2023 - CT Chest with contrast:  No lung mass is identified, however there are several new nodules within both lungs, none of the new nodules measures more than 6.1 mm. Some the pre-existing nodules have slightly increased in size. This may be related to an ongoing infectious or inflammatory etiology rather than small pulmonary metastases, consider repeat chest CT in 3 months. None of the pulmonary nodules is large enough to attempt percutaneous biopsy. No evidence of intrathoracic lymphadenopathy. There are advanced emphysematous changes as before. Postsurgical changes noted in the right upper lobe.     09/20/2023 - CT Chest with contrast:  Several of the small nodules in the posterior lower lobes have decreased in size or are resolved, compatible with resolved/resolving infectious/inflammatory process. Additional sub-6 mm pulmonary nodules are unchanged in size. No new suspicious pulmonary nodule.  Status post right upper lobe wedge  resection without evidence of recurrent disease or convincing evidence of metastatic disease.     03/25/2024 - CT Chest with contrast:  A tiny nodule, 4 mm in maximum dimension, in the right lower lobe, was not seen in the previous study. A follow-up examination in 6 months is recommended to ensure stability or resolution.  The remaining nodules are either stable or have resolved or decreased in size.  Nonspecific prominent right hilar lymph nodes. Etiology and clinical significance is not certain.  Chronic emphysematous lung changes.  The postsurgical/postprocedural changes in the right upper lobe.     06/26/2024 - CT Chest with contrast:  Postoperative change of right upper lobe wedge resection without definite evidence of recurrent or metastatic disease in the chest.  New indeterminate 4 mm right upper lobe and 3 mm right lower lobe pulmonary nodules. Recommend a follow-up chest CT in 6 months to confirm stability or resolution and exclude neoplastic potential.  Bilateral hilar lymphadenopathy, similar to multiple prior studies dating back to 5/21/2021. Given stability, suspect these are related to an indolent/chronic infectious or inflammatory process.  Intrahepatic and extrahepatic biliary ductal dilatation extending down to the ampulla where a 10 mm filling defect is present, likely representing choledocholithiasis. Recommend GI referral for ERCP.     12/30/2024 - CT Chest with contrast:  No evidence of disease recurrence or progression.  Interval resolution of previously described new pulmonary nodules on the prior exam 6/26/2024.  Similar borderline hilar lymph nodes, not optimally evaluated on this noncontrast exam.  Interval cholecystectomy with resolution of bile duct di     01/08/2025 - Appointment with Mallory Bryan APRN:  Stage 1 mild COPD by GOLD classification (Primary)  1/8/25 Reviewed PFT which although remains mildly obstructive he has had a drop in his FEV1 from 94 to 86% for predicted.   He also has had a drop in his diffusion capacity when corrected for alveolar volume from 54 to 46% predicted.  He was given Spiriva at his last visit however his primary care physician is keeping him with samples of Trelegy for which he finds benefit.  He is encouraged to continue this.  Continue as needed albuterol HFA.  Continue to monitor periodic flow-volume loop and diffusion capacity.  Multiple lung nodules  Several have resolved on recent CT imaging he does have a 3 mm left lower lobe nodule that is stable.  He has been getting routine imaging with his history of lung cancer.  Follow Up   Return in about 4 months (around 5/8/2025).     01/08/2025 - Pulmonary Function Tests:  Spirometry with Diffusion Capacity  Pre Drug % Predicted   FVC: 108%  FEV1: 86%  FEF 25-75%: 45%  FEV1/FVC: 60%  DLCO: 49%  D/VAsb: 46%     Interpretation Spirometry   Spirometry shows mild obstruction. There is reduced midflow suggesting small airway/airflow obstruction.   Review of FVL curve   Patient's effort is normal.   Diffusion Capacity  The patient's diffusion capacity is moderately reduced.  Diffusion capacity is moderately reduced when corrected for alveolar volume.   Overall comments: To June 2023 FEV1 has decreased from 94 to 86% predicted, FVC has decreased from 120 to 108% predicted.  Diffusion capacity when corrected for alveolar volume remains moderately decreased however has dropped from 54 to 46% predicted.     ----------------------------------------------------------------     History of Present Illness:  04/24/2024 - CT Soft tissue neck with contrast:  Mild asymmetrical nodular prominence of the left aryepiglottic fold for which ENT consultation and visualization is recommended. No pathologic lymphadenopathy.  Moderate to advanced emphysema with postoperative changes right lung apex.     08/07/2024 - Appointment with Edmundo Infante, APRN:  The patient has had problems with globus sensation, sore throat, and  hoarseness.   At the last office visit, Protonix was increased to twice daily before breakfast and dinner.    He states that he is still with intermittent hoarseness.    He has had a CT neck in the past that was correlated in office with direct visualization-this was normal.   Plan:  Return in about 5 months (around 1/7/2025) for Recheck.     12/27/2024 - Appointment with Edmundo Infante APRN:  Presents for evaluation of persistent hoarseness.   Plan:  Persistent hoarseness.  He continues to experience hoarseness, which he had hoped would improve following his recent cholecystectomy, but it has not.   He has reduced his smoking habit to approximately three-quarters of a pack per day.   He is currently on a regimen of Nexium, taking two tablets once daily in the morning before meals. He reports no current symptoms of heartburn.   He also reports difficulty swallowing, with occasional instances of food impaction. He has not undergone a swallow study.   A CT scan of his neck has not been performed.   He is scheduled for a CT scan of his lungs next week at [facility].   A CT scan of the neck will be ordered to be obtained concurrently with the scheduled CT chest. A swallow study will also be conducted.   The dosage of Nexium will be increased to one tablet twice daily, to be taken before breakfast and dinner. It is recommended to eliminate milk and dairy from diet.  Return in about 3 months (around 3/27/2025) for Recheck, CT.     12/30/2024 - CT soft tissue neck with contrast:  Abnormal appearance and contour of the epiglottis left of midline with CT concerning for a mucosal space lesion arising along its laryngeal surface, possibly ulcerated and measuring up to 1.5 cm in greatest axial diameter.   No suspicious adenopathy by size criteria.     01/02/2025 - Appointment with :  Direct Laryngoscopy with biopsy recommended.      01/02/2025 - Flexible Fiberoptic Laryngoscopy per :  FINDINGS:  Mucosal surfaces:    The mucosal surfaces demonstrated normal mucosa surfaces with moderately severe inflammation  Base of tongue:  The base of tongue was found to have no mass or lesion.  Epiglottis:  The epiglottis was found to have no mass or lesion.  The vallecula is clear but the laryngeal surface of the epiglottis does not have an obvious lesion discernible.  Aryepiglottic fold:  The AE folds were found to have no mass or lesion no moderate erythema is noted throughout.  False Vocal Fold:  The false cords were found to have no mass or lesion.  True Vocal Cord:  The true vocal cords were found to have no mass or lesion. Both true vocal cords adduct and abduct normally.  There is significant erythema in the posterior one third of both true vocal folds without mass identifiable.  Arytenoid:   The arytenoids were found to have Moderately severe interarytenoid edema with erythema.  Hypopharynx:  The hypopharynx was found to have no mass or lesion.    01/13/2025 - Labs:  CMP normal -creatinine 0.86; GFR 90.3.  Hgb 14.8 otherwise normal CBC     01/20/2025 - Laryngeal surface of epiglottis, biopsies per :  Invasive keratinizing squamous cell carcinoma.     01/27/2025 - Documentation per  clinic:  Will need pet scan and referral to oncology and radiation      01/30/2025 - Consult per Dr. 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 He clinically has no grossly identifiable adenopathy. Completion workup with a PET/CT is pending. We have discussed the indications and rationale of radiation therapy according to the NCCN Guidelines for supraglottic carcinomas.  I reviewed the options of surgical extirpation that would require laryngectomy versus laryngeal conservation/organ preservation approach with chemoradiation.   We discussed treatment options per the national guidelines could include surgical  resection, a definitive course of radiation therapy, with or without concurrent chemotherapy. Prior to treatment, we would recommend dental clearance, nutrition, speech and swallowing evaluations. Long-term salivary gland dysfunction is likely and will exacerbate future dental care problems.  Will order referrals to oncology dietician and speech pathologist for care and management during radiation course.  We also will refer the patient to surgery for prophylactic PEG tube placement for supportive enteral nutrition during treatment with its expected level of pharyngeal and esophagus toxicity In consideration of diagnostic data and evaluation of the patient,  I recommend a definitive course of chemoradiation therapy, anticipate a total tumor dose of approximately 5703-3088 cGy over 30-35 fractions. Will coordinate care with medical oncology for delivery of chemotherapy per their direction.    02/04/2025 - PET scan: 1.  Hypermetabolic soft tissue mass in the left larynx/epiglottis, in keeping with biopsy-proven primary squamous cell carcinoma.2.  No evidence of hypermetabolic metastatic disease in the neck, chest, abdomen, or pelvis. 3.  Postoperative change of right upper lobe wedge resection without evidence of recurrent or metastatic disease. 4.  Postoperative change of left nephrectomy without suspicious nodule or metabolic activity in the nephrectomy bed. 5.  3 mm nonobstructing right upper pole renal calculus.    02/12/2025 - Seen in chemotherapy consideration:       LABS    Lab Results - Last 18 Months   Lab Units 01/13/25  0850 08/29/24  0802   HEMOGLOBIN g/dL 14.8 16.0   HEMATOCRIT % 45.4 49.7   MCV fL 95.0 98.6*   WBC 10*3/mm3 8.01 7.8   RDW % 11.9* 12.2   MPV fL 11.3 11.4   PLATELETS 10*3/mm3 180 160   IMM GRAN % % 0.4  --    NEUTROS ABS 10*3/mm3 4.82 4.8   LYMPHS ABS 10*3/mm3 2.27 2.1   MONOS ABS 10*3/mm3 0.56 0.50   EOS ABS 10*3/mm3 0.23 0.30   BASOS ABS 10*3/mm3 0.10 0.10   IMMATURE GRANS (ABS) 10*3/mm3  0.03 0.0   NRBC /100 WBC 0.0  --        Lab Results - Last 18 Months   Lab Units 01/13/25  0850 12/30/24  0942 08/29/24  0802 07/09/24  1549 06/26/24  0851 04/24/24  0849   GLUCOSE mg/dL 92  --  80 100  --   --    SODIUM mmol/L 141  --  142 137  --   --    POTASSIUM mmol/L 4.7  --  4.5 4.3  --   --    TOTAL CO2 mmol/L  --   --  27 26  --   --    CO2 mmol/L 29.0  --   --   --   --   --    CHLORIDE mmol/L 104  --  103 98  --   --    ANION GAP mmol/L 8.0  --  12 13  --   --    CREATININE mg/dL 0.86 0.90 0.7 0.7 0.90 0.90   BUN mg/dL 17  --  16 18  --   --    BUN / CREAT RATIO  19.8  --   --   --   --   --    CALCIUM mg/dL 9.4  --  9.7 9.5  --   --    ALK PHOS U/L 82  --  93 433*  --   --    TOTAL PROTEIN g/dL 7.3  --  7.1 7.7  --   --    ALT (SGPT) U/L 16  --  13 105*  --   --    AST (SGOT) U/L 19  --  21 58*  --   --    BILIRUBIN mg/dL 0.4  --  0.8 0.9  --   --    ALBUMIN g/dL 4.4  --  4.4 4.2  --   --    GLOBULIN gm/dL 2.9  --   --   --   --   --        PAST MEDICAL HISTORY:  ALLERGIES:  No Known Allergies  CURRENT MEDICATIONS:  Outpatient Encounter Medications as of 2/12/2025   Medication Sig Dispense Refill    albuterol sulfate HFA (ProAir HFA) 108 (90 Base) MCG/ACT inhaler Inhale 2 puffs Every 4 (Four) Hours As Needed for Wheezing. 18 g 3    aspirin 81 MG EC tablet Take 1 tablet by mouth Daily.      atorvastatin (LIPITOR) 10 MG tablet Take 1 tablet by mouth Daily.      esomeprazole (nexIUM) 40 MG capsule Take 1 capsule by mouth Every Morning Before Breakfast.      Fluticasone-Umeclidin-Vilant (TRELEGY) 100-62.5-25 MCG/ACT inhaler Inhale 1 puff Daily.      HYDROcodone-acetaminophen (NORCO) 5-325 MG per tablet Take 1 tablet by mouth Every 8 (Eight) Hours As Needed for Moderate Pain or Severe Pain (Pain) for up to 6 doses. 6 tablet 0    vitamin D (ERGOCALCIFEROL) 1.25 MG (39239 UT) capsule capsule Take 1 capsule by mouth 1 (One) Time Per Week.       No facility-administered encounter medications on file as of  2/12/2025.     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    Tobacco user Z72.0    Right upper lobe pulmonary nodule R91.1    Malignant neoplasm of upper lobe of right lung C34.11    Overweight E66.3    Hilar adenopathy R59.0    Mediastinal adenopathy R59.0    Pre-procedure lab exam Z01.812    Pulmonary emphysema J43.9    Esophageal dysphagia R13.19    Multiple lung nodules R91.8    Personal history of nicotine dependence Z87.891    Elevated liver function tests R79.89    Dysphagia R13.10    Hoarseness R49.0    Current every day smoker F17.200    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     SURGERIES:  Past Surgical History:   Procedure Laterality Date    BRONCHOSCOPY N/A 02/15/2021    Procedure: BRONCHOSCOPY;  Surgeon: Bruce Murillo MD;  Location: [facility];  Service: Cardiothoracic;  Laterality: N/A;    CATARACT EXTRACTION      CHOLECYSTECTOMY      COLONOSCOPY  11/20/2012    One 1cm tubular adenomatous polyp in the sigmoid colon; One 5mm hyperplastic polyp in the rectum; Diverticulosis; Repeat 4 years     COLONOSCOPY N/A 03/30/2017    Two 4-6mm tubular adenomatous polyps at the hepatic flexure; One 5mm tubular adenomatous polyp in the transverse colon; One 11mm tubular adenomatous polyp in the sigmoid colon; One 6mm tubular adenomatous polyp in the rectum; Diverticulosis in the left colon; Repeat 3 years     COLONOSCOPY  12/17/2009    One less than 5mm tubular adenomatous polyp in the cecum; One 5mm hyperplastic polyp in the transverse; One less than 3mm hyperplastic polyp in the rectum; Diverticulosis;  Repeat 3 years     COLONOSCOPY N/A 07/02/2020    Two 5-7mm tubular adenomatous polyps at the hepatic flexure; One 6mm tubular adenomatous polyp in the transverse colon; Diverticulosis in the left colon; The entire examined colon is normal on direct and retroflexion views; Repeat 5 years    COLONOSCOPY N/A 06/29/2022    Diverticulosis in the left colon; One 6mm tubular adenomatous polyp in the descending colon; One 4mm tubular adenomatous polyp in the descending colon; Congested mucosa in the entire examined colon-biopsied; Repeat 5 years    ENDOSCOPY N/A 03/27/2019    Medium-sized HH; Normal stomach; Normal examined duodenum-biopsied    ENDOSCOPY N/A 06/09/2022    Small HH; Non-erosive gastritis-biopsied; Normal examined duodenum-biopsied    ERCP  07/30/2024    Dr. Buzz Dangelo-Choledocholithiasis status post balloon sweep extraction wtih balloon assisted sphincteroplasty; Placement of biliary stent as well as prophylactic pancreatic stent; Repeat ERCP 3 months for biliary stent removal    FOOT SURGERY Left     ORIF    HERNIA REPAIR      INNER EAR SURGERY      LARYNGOSCOPY N/A 1/20/2025    Procedure: DIRECT LARYNGOSCOPY WITH EXCISION OF LESION;  Surgeon: Antelmo Dangelo MD;  Location: [facility] OR;  Service: ENT;  Laterality: N/A;    MEDIASTINOSCOPY N/A 02/15/2021    Procedure: CERVICAL MEDIASTINOSCOPY WITH LYMPH NODE DISECTION;  Surgeon: Bruce Murillo MD;  Location: [facility] OR;  Service: Cardiothoracic;  Laterality: N/A;    THORACOSCOPY Right 02/15/2021    Procedure: THORACOSCOPY, WEDGE RESECTION OF RIGHT UPPER LOBE WITH FROZEN,  , MEDIASTINAL LYMPH NODE DISSECTION;  Surgeon: Bruce Murillo MD;  Location:  [facility] OR;  Service: Cardiothoracic;  Laterality: Right;    TONSILLECTOMY       HEALTH MAINTENANCE ITEMS:  Health Maintenance Due   Topic Date Due    LIPID PANEL  Never done    TDAP/TD VACCINES (1 - Tdap) Never done    ZOSTER VACCINE (1 of 2) Never done    Hepatitis B (1 of 3 - Risk 3-dose series) Never  done    INFLUENZA VACCINE  07/01/2024    RSV Vaccine - Adults (1 - 1-dose 75+ series) Never done    COVID-19 Vaccine (4 - 2024-25 season) 09/01/2024       <no information>  Last Completed Colonoscopy            COLORECTAL CANCER SCREENING (COLONOSCOPY - Every 5 Years) Next due on 6/29/2027 06/29/2022  Surgical Procedure: COLONOSCOPY    06/29/2022  COLONOSCOPY    07/02/2020  COLONOSCOPY    07/02/2020  Surgical Procedure: COLONOSCOPY    03/30/2017  Surgical Procedure: COLONOSCOPY    Only the first 5 history entries have been loaded, but more history exists.                  Immunization History   Administered Date(s) Administered    COVID-19 (MODERNA) 1st,2nd,3rd Dose Monovalent 03/24/2021, 04/21/2021, 11/10/2021     Last Completed Mammogram       This patient has no relevant Health Maintenance data.              FAMILY HISTORY:  Family History   Problem Relation Age of Onset    Colon polyps Mother         Unknown age    Diabetes Mother     Colon cancer Neg Hx     Esophageal cancer Neg Hx     Liver cancer Neg Hx     Liver disease Neg Hx     Rectal cancer Neg Hx     Stomach cancer Neg Hx      SOCIAL HISTORY:  Social History     Socioeconomic History    Marital status: Significant Other   Tobacco Use    Smoking status: Every Day     Current packs/day: 0.50     Average packs/day: 0.5 packs/day for 56.1 years (28.1 ttl pk-yrs)     Types: Cigarettes     Start date: 1/1/1969     Passive exposure: Current    Smokeless tobacco: Never    Tobacco comments:     Pt states about 7-8 cigarettes per day 7/3   Vaping Use    Vaping status: Never Used   Substance and Sexual Activity    Alcohol use: Not Currently     Alcohol/week: 3.0 standard drinks of alcohol     Types: 3 Shots of liquor per week     Comment: sober as of 06/01/2022    Drug use: Yes     Types: Marijuana     Comment: daily    Sexual activity: Yes     Partners: Female       REVIEW OF SYSTEMS:  Review of Systems   Constitutional:  Positive for fatigue (Chronic).  "Negative for unexpected weight loss.        Manages his ADLs including chores, errands, driving. He is up and about \"all the time\"   HENT:  Positive for dental problem, hearing loss, sore throat, trouble swallowing (\"Some foods I avoid cause they get hung\") and voice change (hoarseness for ~ 1 year).    Eyes: Negative.    Respiratory:  Positive for cough (Chronic.  Sometimes productive of yellow phlegm), shortness of breath (Baseline BERNSTEIN but no SOB with routine activities) and wheezing.         Current cigarette smoker-age 17 present.  1/2 pack/day   Cardiovascular: Negative.    Gastrointestinal:  Negative for diarrhea, nausea and vomiting.   Endocrine: Negative.    Genitourinary:  Positive for nocturia.   Musculoskeletal: Negative.    Skin: Negative.    Allergic/Immunologic: Negative.    Neurological: Negative.  Negative for weakness and headache.   Hematological: Negative.    Psychiatric/Behavioral: Negative.         /64   Pulse 86   Temp 98.3 °F (36.8 °C) (Temporal)   Resp 18   Ht 177.8 cm (70\")   Wt 84.5 kg (186 lb 3.2 oz)   SpO2 94%   BMI 26.72 kg/m²  Body surface area is 2.03 meters squared.  Pain Score    02/12/25 1348   PainSc:   5   PainLoc: Throat       Physical Exam:  Physical Exam  Vitals and nursing note reviewed.   Constitutional:       Comments: Pleasant, cooperative, heavyset, modestly kept elderly male.  Ambulatory.  ECOG 1   HENT:      Head: Normocephalic and atraumatic.      Mouth/Throat:      Mouth: Mucous membranes are moist.      Comments:  Head: Normocephalic.      Mouth/Throat:      Mouth: Mucous membranes are moist.      Dentition: Has dentures.      Tongue: No lesions. Tongue does not deviate from midline.      Pharynx: Oropharynx is clear. Uvula midline.      Comments: Voice is raspy.    Eyes:      General: No scleral icterus.     Extraocular Movements: Extraocular movements intact.      Pupils: Pupils are equal, round, and reactive to light.   Cardiovascular:      Rate and " Rhythm: Normal rate.   Pulmonary:      Effort: Pulmonary effort is normal.   Abdominal:      Palpations: Abdomen is soft.      Tenderness: There is no abdominal tenderness.   Musculoskeletal:         General: Normal range of motion.      Cervical back: Normal range of motion.   Lymphadenopathy:      Cervical: No cervical adenopathy.      Right cervical: No superficial, deep or posterior cervical adenopathy.     Left cervical: No superficial, deep or posterior cervical adenopathy.      Upper Body:      Right upper body: No supraclavicular or axillary adenopathy.      Left upper body: No supraclavicular or axillary adenopathy.   Skin:     General: Skin is warm.   Neurological:      General: No focal deficit present.      Mental Status: He is alert and oriented to person, place, and time.   Psychiatric:         Mood and Affect: Mood normal.         Behavior: Behavior normal.         Thought Content: Thought content normal.       ASSESSMENT:   1. Invasive squamous cell carcinoma, supraglottic larynx:   Original tumor stage: AJCC I (cT1, cN0 M0).   Original tumor burden: .    04/24/2024 - CT Soft tissue neck with contrast:  Mild asymmetrical nodular prominence of the left aryepiglottic fold for which ENT consultation and visualization is recommended. No pathologic lymphadenopathy.  Moderate to advanced emphysema with postoperative changes right lung apex.        12/30/2024 - CT soft tissue neck with contrast:  Abnormal appearance and contour of the epiglottis left of midline with CT concerning for a mucosal space lesion arising along its laryngeal surface, possibly ulcerated and measuring up to 1.5 cm in greatest axial diameter.   No suspicious adenopathy by size criteria.        01/02/2025 - Flexible Fiberoptic Laryngoscopy per :  FINDINGS:  Mucosal surfaces:   The mucosal surfaces demonstrated normal mucosa surfaces with moderately severe inflammation  Base of tongue:  The base of tongue was found to have no  mass or lesion.  Epiglottis:  The epiglottis was found to have no mass or lesion.  The vallecula is clear but the laryngeal surface of the epiglottis does not have an obvious lesion discernible.  Aryepiglottic fold:  The AE folds were found to have no mass or lesion no moderate erythema is noted throughout.  False Vocal Fold:  The false cords were found to have no mass or lesion.  True Vocal Cord:  The true vocal cords were found to have no mass or lesion. Both true vocal cords adduct and abduct normally.  There is significant erythema in the posterior one third of both true vocal folds without mass identifiable.  Arytenoid:   The arytenoids were found to have Moderately severe interarytenoid edema with erythema.  Hypopharynx:  The hypopharynx was found to have no mass or lesion.    01/13/2025 - Labs:  CMP normal -creatinine 0.86; GFR 90.3.  Hgb 14.8 otherwise normal CBC     01/20/2025 - Laryngeal surface of epiglottis, biopsies per :  Invasive keratinizing squamous cell carcinoma.     01/27/2025 - Documentation per  clinic:  Will need pet scan and referral to oncology and radiation      01/30/2025 - Consult per Dr. 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 He clinically has no grossly identifiable adenopathy. Completion workup with a PET/CT is pending. We have discussed the indications and rationale of radiation therapy according to the NCCN Guidelines for supraglottic carcinomas.  I reviewed the options of surgical extirpation that would require laryngectomy versus laryngeal conservation/organ preservation approach with chemoradiation.   We discussed treatment options per the national guidelines could include surgical resection, a definitive course of radiation therapy, with or without concurrent chemotherapy. Prior to treatment, we would recommend dental clearance, nutrition,  speech and swallowing evaluations. Long-term salivary gland dysfunction is likely and will exacerbate future dental care problems.  Will order referrals to oncology dietician and speech pathologist for care and management during radiation course.  We also will refer the patient to surgery for prophylactic PEG tube placement for supportive enteral nutrition during treatment with its expected level of pharyngeal and esophagus toxicity In consideration of diagnostic data and evaluation of the patient,  I recommend a definitive course of chemoradiation therapy, anticipate a total tumor dose of approximately 8846-8915 cGy over 30-35 fractions. Will coordinate care with medical oncology for delivery of chemotherapy per their direction.    02/04/2025 - PET scan: 1.  Hypermetabolic soft tissue mass in the left larynx/epiglottis, in keeping with biopsy-proven primary squamous cell carcinoma.2.  No evidence of hypermetabolic metastatic disease in the neck, chest, abdomen, or pelvis. 3.  Postoperative change of right upper lobe wedge resection without evidence of recurrent or metastatic disease. 4.  Postoperative change of left nephrectomy without suspicious nodule or metabolic activity in the nephrectomy bed. 5.  3 mm nonobstructing right upper pole renal calculus.      Complications of tumor: Hoarseness, dysphagia  Tumor status: Untreated.     2.  Longstanding tobacco smoker (28 pack years)  3.  COPD  4.  History of Right Lung Cancer:  Original tumor stage:  IB (pT2a,pN0M0)  Original tunor burden:  -12/29/2020 - PET Scan:  Solitary hypermetabolic right upper lobe pulmonary nodule which is concerning for primary lung malignancy.   No scintigraphic evidence for hilar or mediastinal paris metastasis or distant metastatic disease.   Solitary kidney.      Tumor status:  -02/15/2021 - Wedge resection and lymph node excision per :  Lung, right upper lobe, wedge resection:  Invasive keratinizing squamous cell  carcinoma.  Maximum tumor diameter is 3.2 cm.  The surgical margins are free of tumor (0.3 cm).  Please see CAP synoptic.  Level 7 lymph node, excision:   1 benign lymph node.  Level 4L lymph node, excision:  1 benign lymph node.  Level 4R lymph node, excision:   1 benign lymph node.  Level 2R lymph node, excision:  1 benign lymph node.                    -02/04/2025- PET scan: 1.  Hypermetabolic soft tissue mass in the left larynx/epiglottis, in keeping with biopsy-proven primary squamous cell carcinoma.2.  No evidence of hypermetabolic metastatic disease in the neck, chest, abdomen, or pelvis. 3.  Postoperative change of right upper lobe wedge resection without evidence of recurrent or metastatic disease    RECOMMENDATIONS:   1.   Re: Apprised of the available diagnostic information. Note the labs (normal CBC, normal CMP, clinical (laryngoscopic findings), pathology from biopsies of larynx (SCC confined to 1 subsite) and the radiographic (CT/PET scan) findings (above). Hypermetabolic soft tissue mass in the left larynx/epiglottis, in keeping with biopsy-proven primary squamous cell carcinoma.  No evidence of hypermetabolic metastatic disease in the neck, chest, abdomen, or pelvis.   2.  Review consult by [facility], 1/30/25 (above).  PET scan ordered,. Recommend a definitive course of chemoradiation therapy, anticipate a total tumor dose of approximately 8284-3404 cGy over 30-35 fractions.  3.  Review NCCN guidelines version 2.2025 cancer of the supraglottic larynx with T1-2, N0 disease amenable to larynx preserving (conservation) surgery/: Treatment of primary and neck--endoscopic resection+ neck dissection, or endoscopic or open partial laryngectomy+ neck dissection or definitive RT--post RT neck ggquulntwd-3-2 weeks clinical assessment as appropriate.  If response--PET scan negative-observation/follow-up: H&P (including complete H&N exam and mirror and fiberoptic exam).  Year 1, every 1-3 months; year 2,  every 2-6 months; years 3-5 every 4-8 months;> 5 years, every 12 months.  TSH every 6-12 months of neck irradiated.     4.  Anticipate radiation therapy as outlined above.  5.  PEG tube placement scheduled for 2/19/25 per Dr. Donnelly        6.  Return to the [place] office with CMP and CBC with differential in 12 weeks        7.  Importance of Smoking Cessation discussed with patient and informed patient additional information will be on today's [facility]'s Flyer - Plan to Be Tobacco Free handout provided to patient         PQRS:   QUALITY MEASURES:   MEDICAL DECISION MAKING: High Complexity   AMOUNT OF DATA: Extensive   RISK OF COMPLICATIONS: Moderate     I spent ~123 minutes caring for Se on this date of service. This time includes time spent by me in the following activities: preparing for the visit, reviewing tests, performing a medically appropriate examination and/or evaluation, counseling and educating the patient/family/caregiver, ordering medications, tests, or procedures and documenting information in the medical record

## 2025-02-07 ENCOUNTER — NUTRITION (OUTPATIENT)
Dept: BARIATRICS/WEIGHT MGMT | Facility: CLINIC | Age: 75
End: 2025-02-07
Payer: MEDICARE

## 2025-02-07 VITALS — HEIGHT: 70 IN | WEIGHT: 185.9 LBS | BODY MASS INDEX: 26.61 KG/M2

## 2025-02-07 DIAGNOSIS — J44.9 STAGE 1 MILD COPD BY GOLD CLASSIFICATION: Chronic | ICD-10-CM

## 2025-02-07 DIAGNOSIS — C32.1 SQUAMOUS CELL CARCINOMA OF EPIGLOTTIS: Primary | ICD-10-CM

## 2025-02-07 NOTE — PROGRESS NOTES
"Metabolic and Bariatric Surgery Adult Nutrition Assessment    Patient Name: Se Ramirez   YOB: 1950   MRN: [id number]     Assessment Date:  02/07/2025     Reason for Visit: Initial Nutrition Assessment     Treatment Pathway: Upcoming PEG tube placement (scheduled for 2/19/2025) with Dr Donnelly.   Patient with head/neck cancer. Has been seen by SLP with dx of mild to moderate oropharyngeal dysphagia.    Assessment    Anthropometrics   Wt Readings from Last 1 Encounters:   02/07/25 84.3 kg (185 lb 14.4 oz)     Ht Readings from Last 1 Encounters:   02/07/25 177.8 cm (70\")     BMI Readings from Last 1 Encounters:   02/07/25 26.67 kg/m²      UBW is 175-185 lbs over the last 10 years.   Tends to be closer to 175 lbs in the summer; tends to be closer to 185 lbs during the winter.   No significant wt changes recently.     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;  Service: Cardiothoracic;  Laterality: N/A;    CATARACT EXTRACTION      CHOLECYSTECTOMY      COLONOSCOPY  11/20/2012    One 1cm tubular adenomatous polyp in the sigmoid colon; One 5mm hyperplastic polyp in the rectum; Diverticulosis; Repeat 4 years     COLONOSCOPY N/A 03/30/2017    Two 4-6mm tubular adenomatous polyps at the hepatic flexure; One 5mm tubular adenomatous polyp in the transverse colon; One 11mm tubular adenomatous polyp in the sigmoid colon; One 6mm tubular adenomatous polyp in the rectum; Diverticulosis in the " left colon; Repeat 3 years     COLONOSCOPY  12/17/2009    One less than 5mm tubular adenomatous polyp in the cecum; One 5mm hyperplastic polyp in the transverse; One less than 3mm hyperplastic polyp in the rectum; Diverticulosis; Repeat 3 years     COLONOSCOPY N/A 07/02/2020    Two 5-7mm tubular adenomatous polyps at the hepatic flexure; One 6mm tubular adenomatous polyp in the transverse colon; Diverticulosis in the left colon; The entire examined colon is normal on direct and retroflexion views; Repeat 5 years    COLONOSCOPY N/A 06/29/2022    Diverticulosis in the left colon; One 6mm tubular adenomatous polyp in the descending colon; One 4mm tubular adenomatous polyp in the descending colon; Congested mucosa in the entire examined colon-biopsied; Repeat 5 years    ENDOSCOPY N/A 03/27/2019    Medium-sized HH; Normal stomach; Normal examined duodenum-biopsied    ENDOSCOPY N/A 06/09/2022    Small HH; Non-erosive gastritis-biopsied; Normal examined duodenum-biopsied    ERCP  07/30/2024    Dr. Buzz Dangelo-Choledocholithiasis status post balloon sweep extraction wtih balloon assisted sphincteroplasty; Placement of biliary stent as well as prophylactic pancreatic stent; Repeat ERCP 3 months for biliary stent removal    FOOT SURGERY Left     ORIF    HERNIA REPAIR      INNER EAR SURGERY      LARYNGOSCOPY N/A 1/20/2025    Procedure: DIRECT LARYNGOSCOPY WITH EXCISION OF LESION;  Surgeon: Antelmo Dangelo MD;  Location: [facility];  Service: ENT;  Laterality: N/A;    MEDIASTINOSCOPY N/A 02/15/2021    Procedure: CERVICAL MEDIASTINOSCOPY WITH LYMPH NODE DISECTION;  Surgeon: Bruce Murillo MD;  Location: [facility] OR;  Service: Cardiothoracic;  Laterality: N/A;    THORACOSCOPY Right 02/15/2021    Procedure: THORACOSCOPY, WEDGE RESECTION OF RIGHT UPPER LOBE WITH FROZEN,  , MEDIASTINAL LYMPH NODE DISSECTION;  Surgeon: Bruce Murillo MD;  Location:  [facility] OR;  Service: Cardiothoracic;  Laterality: Right;    TONSILLECTOMY         Current Outpatient Medications   Medication Sig Dispense Refill    aspirin 81 MG EC tablet Take 1 tablet by mouth Daily.      atorvastatin (LIPITOR) 10 MG tablet Take 1 tablet by mouth Daily.      Fluticasone-Umeclidin-Vilant (TRELEGY) 100-62.5-25 MCG/ACT inhaler Inhale 1 puff Daily.      vitamin D (ERGOCALCIFEROL) 1.25 MG (70270 UT) capsule capsule Take 1 capsule by mouth 1 (One) Time Per Week.      albuterol sulfate HFA (ProAir HFA) 108 (90 Base) MCG/ACT inhaler Inhale 2 puffs Every 4 (Four) Hours As Needed for Wheezing. (Patient not taking: Reported on 2/7/2025) 18 g 3    HYDROcodone-acetaminophen (NORCO) 5-325 MG per tablet Take 1 tablet by mouth Every 8 (Eight) Hours As Needed for Moderate Pain or Severe Pain (Pain) for up to 6 doses. (Patient not taking: Reported on 2/7/2025) 6 tablet 0     No current facility-administered medications for this visit.      No Known Allergies       Pertinent Social/Behavior/Environmental History:   States he had weight loss when he had lung cancer. Also describes he did have some lung cancer a few years ago and lost wt but did regain wt after.    He's an early riser. Starts day around 5 am. 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   Lives with spouse; they share cooking responsibilities.     Nutrition Recall:  Eating ~ 3 meals daily.   Remains eating about 75% of normal. Difficulty with hard/crunchy or spicy foods.  Does take small bites. He describes that food is often getting stuck and hard to swallow; does require liquid wash to rinse down food.  He is temperature sensitive; cold water hurts. He states he's able to swish it in his mouth to attain a warmer temperature and he tolerates that better.   (M1) Cereal (original Cheerios) with banana, blueberries, strawberries OR scrambled eggs with sausage sometimes with toast. OR french toast about once a week.   (M2) Pimento cheese sandwich OR burger. Occasionally pairs it with potato chips.   (M3) last night was corn  dogs. OR sweet potatoes with mac and cheese   Snacking - donuts, pound cake, usually something sweet at least daily.   Calculating Protein- not at this time.   Drinking sugary/carbonated beverages- none  Fluid Intake- 2 c black coffee in the am; throughout the day drinks a glass of water at each meal + 2 more totaling about 5 glasses daily.     Nutrition Focused Physical Exam:  Temporal region- normal  Mild pectoralis/deltoids loss near clavicle bone. Shoulders rounded, acromion process not prominent.   Mild loss of interosseous muscles.  Significant loss of orbital fat pad- orbital region sunken and dark.   Cheek bones visible however Pt states this has always been his normal- no loss of buccal fat fat noted.   Ribs palpable, however appropriate fat layer present at this time.    Estimated Nutrient Needs:  25 to 30 kcal/kg/day = 2107.5 to 2529 kcal/day  1.0 to 1.5 gm/kg/day Protein = 84.3 to 126 gm PRO/day.   Fluid 1 ml/kcal/day.    Nutrition Diagnosis  Predicted suboptimal energy and protein intake related to increased energy requirements with upcoming cancer treatment in the setting of known COPD as evidenced by Pt currently describes difficulty swallowing, difficulty likely to increase with radiation treatment to the H/N region, as well as his present diet contains suboptimal protein, likely to worsen over time.    Nutrition Intervention  Nutrition education for head/neck cancer. Nutrition coaching and intensive behavioral therapy for behavior change provided.  Strategies used included Comprehensive education and Problem Solving  We discussed he should continue to have SLP services during radiation treatment and encouraged him to continue to follow up, communicate his difficulties with them, and to follow their instruction for stretching and exercises to maintain as much PO intake as possible.   Encouraged nutrient dense foods, including incorporating more protein and vegetables into his meals regularly for  nutrient density.   We discussed potential risks and changes with taste preferences and abilities to swallow as he undergoes treatment and nutritional strategies for maintaining his current usual body weight.     Recommended Diet Changes-  Continue current PO intake with supplemental EN   Protein goal: no less than 84 grams daily. Increase vegetable intake daily., Choose more nutrient dense foods.and Increase fluid intake to 64 ounces per day.   Enteral Nutrition recommendations-   Begin 1 carton (250 ml) of Nutren 2.0 daily (30 ml water flush before and after each administration) with current PO diet.  Administer 1 carton of Nutren 2.0 daily for each meal when intake is < 50% of normal.   Administer an additional carton of Nutren 2.0 daily if weight loss begins during treatment, for a total of up to 5 cartons of Nutren 2.0 daily (30 ml water flush before and after each feeding).    Recommendations may be adjusted depending on treatment progress.     Goals  1. While continuing PO intake, consider adding additional protein and vegetables at every meal. And maintain fluid intake of at least 64 ounces daily to maintain adequate hydration.  2. Begin enteral nutrition once PEG tube is placed and   3. Maintain weight and current nutrition status throughout H/N cancer treatment.     Monitoring/Evaluation Plan  Continue collaboration of care with physician and treatment team.     Time Spent 30 minutes    Electronically signed by  Jo Ann Ceballos RDN, LD  02/07/2025 11:03 CST.

## 2025-02-11 ENCOUNTER — HOSPITAL ENCOUNTER (OUTPATIENT)
Dept: RADIATION ONCOLOGY | Facility: HOSPITAL | Age: 75
Setting detail: RADIATION/ONCOLOGY SERIES
End: 2025-02-11
Payer: MEDICARE

## 2025-02-12 ENCOUNTER — LAB (OUTPATIENT)
Dept: LAB | Facility: HOSPITAL | Age: 75
End: 2025-02-12
Payer: MEDICARE

## 2025-02-12 ENCOUNTER — CONSULT (OUTPATIENT)
Dept: ONCOLOGY | Facility: CLINIC | Age: 75
End: 2025-02-12
Payer: MEDICARE

## 2025-02-12 ENCOUNTER — HOSPITAL ENCOUNTER (OUTPATIENT)
Facility: HOSPITAL | Age: 75
Setting detail: THERAPIES SERIES
Discharge: HOME | End: 2025-02-12
Payer: MEDICARE

## 2025-02-12 VITALS
RESPIRATION RATE: 18 BRPM | BODY MASS INDEX: 26.66 KG/M2 | OXYGEN SATURATION: 94 % | WEIGHT: 186.2 LBS | HEIGHT: 70 IN | HEART RATE: 86 BPM | DIASTOLIC BLOOD PRESSURE: 64 MMHG | TEMPERATURE: 98.3 F | SYSTOLIC BLOOD PRESSURE: 118 MMHG

## 2025-02-12 DIAGNOSIS — R13.10 DYSPHAGIA, UNSPECIFIED TYPE: Primary | ICD-10-CM

## 2025-02-12 DIAGNOSIS — C34.11 MALIGNANT NEOPLASM OF UPPER LOBE OF RIGHT LUNG: Primary | ICD-10-CM

## 2025-02-12 DIAGNOSIS — R13.12 OROPHARYNGEAL DYSPHAGIA: Primary | ICD-10-CM

## 2025-02-12 DIAGNOSIS — R49.0 HOARSENESS: ICD-10-CM

## 2025-02-12 DIAGNOSIS — C32.1 SQUAMOUS CELL CARCINOMA OF EPIGLOTTIS: ICD-10-CM

## 2025-02-12 PROCEDURE — 92526 ORAL FUNCTION THERAPY: CPT | Performed by: SPEECH-LANGUAGE PATHOLOGIST

## 2025-02-12 RX ORDER — ESOMEPRAZOLE MAGNESIUM 40 MG/1
40 CAPSULE, DELAYED RELEASE ORAL
COMMUNITY

## 2025-02-12 NOTE — PROGRESS NOTES
Speech Language Pathology Treatment Note  [address] [place] [address]    Patient: Se Ramirez                                                                                     Visit Date: 2025  :     1950    Referring practitioner:    PARISA Mathews  Date of Initial Visit:          Type: THERAPY  Episode: HNC Dysphagia      Visit Diagnoses:    ICD-10-CM ICD-9-CM   1. Oropharyngeal dysphagia  R13.12 787.22     SUBJECTIVE     Patient seen for therapy today.     Pain: Pain rating not applicable to this diagnosis.    OBJECTIVE   GOALS  Goals                                            STG  Comments Status   Patient will improve oral skills to enhance safety and increase eating efficiency and bolus control as measured by improved bolus formation and improved posterior propulsion of the bolus.  introduced tongue press up, out, and side exercises. Patient able to demonstrate New   Patient will improve laryngeal closure and increase base of the tongue strength and posterior pharyngeal wall excursion as measured by decreased overt signs and symptoms of aspiration and decreased penetration and aspiration on repeat instrumental swallow study, if applicable.   Introduced effortful swallow and supraglottic swallow exercises. Patient demonstrated understanding and able to demonstrate New   Patient will report improved EAT-10 score.  Initial EAT 10 score 21 - Moderate. New   Patient will improve oral hygiene to enhance safety and decrease risk of aspiration pneumonia  Discussed oral hygiene and aspiration risk. Discussed oral care and radiation treatment effects.  Patient demonstrated understanding. New   LTG        Patient will safely consume full PO diet of regular consistency food and thin liquids without difficulty or complications such as aspiration or pneumonia. Will use ADOLFO as supplement to nutrition during chemoradiation  therapy.   Patient has difficulty swallowing all consistencies. He has had consult for PEG to maintain nutrition during his cancer treatment. He demonstrated understanding of exercises for swallow maintenance.  New       Therapy Education/Self Care    Details: POC   Given Home Exercise Program  Access Code: [email or web address]  URL: [email or web address]  Date: 02/12/2025  Prepared by: Sandra Marie    Exercises  - Tongue Resistance with Front of Tongue   - 1 x daily - 7 x weekly - 3 sets - 10 reps  - Tongue Resistance with Top of Tongue   - 1 x daily - 7 x weekly - 3 sets - 10 reps  - Tongue Resistance with Side of Tongue   - 1 x daily - 7 x weekly - 3 sets - 10 reps  - Effortful Swallow  - 2-3 x daily - 7 x weekly - 3 sets - 10 reps  - Supraglottic swallow  - 1 x daily - 7 x weekly - 3 sets - 10 reps    Patient Education  - Normal Swallowing & Aspiration  - Oral Care: How to Take Care of Your Mouth   Progress: New   Education provided to:  Patient   Level of understanding Verbalized, Demonstrated, and Teach back level of understanding           CPT Code:   24627 Swallow Treatment  Total Time of Visit:             45  mins         ASSESSMENT/PLAN     ASSESSMENT: Patient able to demonstrate understanding and use of exercises and education. Patient would benefit from PT evaluation for lymphedema.     PLAN: Continue therapy and home exercises. Patient to see Dr. Macias today for plan of cancer treatment.  Frequency: 1x/ Week  Duration: 12 weeks    Progress Note Due Date:3/5/25  Recertification Due Date:5/5/25    SIGNATURE: Sandra Marie, CCC-SLP, [place] License #: [id number]  Electronically Signed on 2/12/2025          [facility] [address]  [phone]

## 2025-02-17 ENCOUNTER — HOSPITAL ENCOUNTER (OUTPATIENT)
Facility: HOSPITAL | Age: 75
Setting detail: THERAPIES SERIES
Discharge: HOME | End: 2025-02-17
Payer: MEDICARE

## 2025-02-17 ENCOUNTER — HOSPITAL ENCOUNTER (OUTPATIENT)
Dept: RADIATION ONCOLOGY | Facility: HOSPITAL | Age: 75
Setting detail: RADIATION/ONCOLOGY SERIES
Discharge: HOME | End: 2025-02-17
Payer: MEDICARE

## 2025-02-17 DIAGNOSIS — R13.12 OROPHARYNGEAL DYSPHAGIA: Primary | ICD-10-CM

## 2025-02-17 PROCEDURE — 92526 ORAL FUNCTION THERAPY: CPT | Performed by: SPEECH-LANGUAGE PATHOLOGIST

## 2025-02-17 PROCEDURE — 77334 RADIATION TREATMENT AID(S): CPT | Performed by: RADIOLOGY

## 2025-02-17 NOTE — PROGRESS NOTES
Speech Language Pathology Treatment Note  [address] [place] [address]    Patient: Se Ramirez                                                                                     Visit Date: 2025  :     1950    Referring practitioner:    PARISA Mathews  Date of Initial Visit:          Type: THERAPY  Episode: HNC Dysphagia      Visit Diagnoses:    ICD-10-CM ICD-9-CM   1. Oropharyngeal dysphagia  R13.12 787.22     SUBJECTIVE     Patient seen for therapy today. He stated he was sick over the weekend and was not able to do his exercises much.     Pain: Pain rating not applicable to this diagnosis.    OBJECTIVE   GOALS  Goals                                            STG  Comments Status   Patient will improve oral skills to enhance safety and increase eating efficiency and bolus control as measured by improved bolus formation and improved posterior propulsion of the bolus. Reviewed tongue press up, out, and side exercises. Patient able to demonstrate 3 sets of 10 each direction.  Ongoing   Patient will improve laryngeal closure and increase base of the tongue strength and posterior pharyngeal wall excursion as measured by decreased overt signs and symptoms of aspiration and decreased penetration and aspiration on repeat instrumental swallow study, if applicable.  Reviewed effortful swallow and supraglottic swallow exercises. Patient demonstrated understanding and able to demonstrate. Ongoing   Patient will report improved EAT-10 score.  Initial EAT 10 score 21 - Moderate.Patient stated that he feels like his swallow is getting worse. PEG scheduled for Wednesday, pending weather.  Ongoing   Patient will improve oral hygiene to enhance safety and decrease risk of aspiration pneumonia  Discussed oral hygiene and aspiration risk. Discussed oral care and radiation treatment effects.  Patient demonstrated understanding.  Ongoing   LTG        Patient will safely consume full PO diet of regular consistency food and thin liquids without difficulty or complications such as aspiration or pneumonia. Will use ADOLFO as supplement to nutrition during chemoradiation therapy.   Patient has difficulty swallowing all consistencies. He feels his swallow is getting worse. He demonstrated understanding of exercises for swallow maintenance.  Ongoing       Therapy Education/Self Care    Details: POC   Given Home Exercise Program  Access Code: [email or web address]  URL: [email or web address]  Date: 02/12/2025  Prepared by: Sandra Marie    Exercises  - Tongue Resistance with Front of Tongue   - 1 x daily - 7 x weekly - 3 sets - 10 reps  - Tongue Resistance with Top of Tongue   - 1 x daily - 7 x weekly - 3 sets - 10 reps  - Tongue Resistance with Side of Tongue   - 1 x daily - 7 x weekly - 3 sets - 10 reps  - Effortful Swallow  - 2-3 x daily - 7 x weekly - 3 sets - 10 reps  - Supraglottic swallow  - 1 x daily - 7 x weekly - 3 sets - 10 reps    Patient Education  - Normal Swallowing & Aspiration  - Oral Care: How to Take Care of Your Mouth   Progress: Reinforced   Education provided to:  Patient   Level of understanding Verbalized, Demonstrated, and Teach back level of understanding           CPT Code:   13814 Swallow Treatment  Total Time of Visit:             45  mins         ASSESSMENT/PLAN     ASSESSMENT: Patient able to demonstrate understanding and use of exercises and education. PT evaluation for lymphedema scheduled.     PLAN: Continue therapy and home exercises.   Frequency: 1x/ Week  Duration: 12 weeks    Progress Note Due Date:3/5/25  Recertification Due Date:5/5/25    SIGNATURE: Sandra Marie, CCC-SLP, [place] License #: [id number]  Electronically Signed on 2/17/2025          [facility]  [place] [address]  [phone]

## 2025-02-18 ENCOUNTER — TELEPHONE (OUTPATIENT)
Dept: OTOLARYNGOLOGY | Facility: CLINIC | Age: 75
End: 2025-02-18

## 2025-02-18 ENCOUNTER — TELEPHONE (OUTPATIENT)
Dept: SURGERY | Facility: CLINIC | Age: 75
End: 2025-02-18
Payer: MEDICARE

## 2025-02-18 NOTE — TELEPHONE ENCOUNTER
Called Se to confirm his surgery date 02/19/2025 with an arrival time of 6:00AM.   Reminded him not to eat or drink after midnight.   Let him know to come through the main entrance of the hospital and check in at main registration.     Left message with patient.

## 2025-02-18 NOTE — TELEPHONE ENCOUNTER
Called and left message for patient to let him know we had to move his procedure to Monday 02/24/2025 with a 6:30AM arrival.     Left message for patient spouse.     Spoke with patients brother and he will have the patient call to discuss details.

## 2025-02-18 NOTE — TELEPHONE ENCOUNTER
The [facility] received a fax that requires your attention. The document has been indexed to the patient’s chart for your review.      Reason for sending: NEEDS PROVIDER REVIEW    Documents Description: PRESCRIPTION DETERMINATION    Name of Sender: ARIAN    Date Indexed: 02/18/25

## 2025-02-20 PROCEDURE — 77301 RADIOTHERAPY DOSE PLAN IMRT: CPT | Performed by: RADIOLOGY

## 2025-02-20 PROCEDURE — 77338 DESIGN MLC DEVICE FOR IMRT: CPT | Performed by: RADIOLOGY

## 2025-02-20 PROCEDURE — 77300 RADIATION THERAPY DOSE PLAN: CPT | Performed by: RADIOLOGY

## 2025-02-21 ENCOUNTER — TELEPHONE (OUTPATIENT)
Dept: SURGERY | Facility: CLINIC | Age: 75
End: 2025-02-21
Payer: MEDICARE

## 2025-02-21 DIAGNOSIS — C32.1 SQUAMOUS CELL CARCINOMA OF EPIGLOTTIS: Primary | ICD-10-CM

## 2025-02-21 NOTE — TELEPHONE ENCOUNTER
Called Se to confirm his surgery date 02/24/2025 with an arrival time of 6:30AM.   Reminded him not to eat or drink after midnight.   Let him know to come through the main entrance of the hospital and check in at main registration.     Confirmed with patient.

## 2025-02-24 ENCOUNTER — ANESTHESIA EVENT (OUTPATIENT)
Dept: GASTROENTEROLOGY | Facility: HOSPITAL | Age: 75
End: 2025-02-24
Payer: MEDICARE

## 2025-02-24 ENCOUNTER — HOSPITAL ENCOUNTER (OUTPATIENT)
Facility: HOSPITAL | Age: 75
Setting detail: HOSPITAL OUTPATIENT SURGERY
Discharge: HOME | End: 2025-02-24
Attending: STUDENT IN AN ORGANIZED HEALTH CARE EDUCATION/TRAINING PROGRAM | Admitting: STUDENT IN AN ORGANIZED HEALTH CARE EDUCATION/TRAINING PROGRAM
Payer: MEDICARE

## 2025-02-24 ENCOUNTER — ANESTHESIA (OUTPATIENT)
Dept: GASTROENTEROLOGY | Facility: HOSPITAL | Age: 75
End: 2025-02-24
Payer: MEDICARE

## 2025-02-24 VITALS
TEMPERATURE: 97.7 F | SYSTOLIC BLOOD PRESSURE: 135 MMHG | HEART RATE: 99 BPM | BODY MASS INDEX: 26.2 KG/M2 | HEIGHT: 70 IN | RESPIRATION RATE: 22 BRPM | OXYGEN SATURATION: 94 % | DIASTOLIC BLOOD PRESSURE: 74 MMHG | WEIGHT: 183 LBS

## 2025-02-24 DIAGNOSIS — R13.12 OROPHARYNGEAL DYSPHAGIA: ICD-10-CM

## 2025-02-24 PROCEDURE — 25810000003 SODIUM CHLORIDE 0.9 % SOLUTION

## 2025-02-24 PROCEDURE — 25010000002 LIDOCAINE PF 2% 2 % SOLUTION

## 2025-02-24 PROCEDURE — 25010000002 CEFAZOLIN PER 500 MG: Performed by: STUDENT IN AN ORGANIZED HEALTH CARE EDUCATION/TRAINING PROGRAM

## 2025-02-24 PROCEDURE — 25010000002 PROPOFOL 10 MG/ML EMULSION

## 2025-02-24 PROCEDURE — 43246 EGD PLACE GASTROSTOMY TUBE: CPT | Performed by: STUDENT IN AN ORGANIZED HEALTH CARE EDUCATION/TRAINING PROGRAM

## 2025-02-24 RX ORDER — ACETAMINOPHEN 325 MG/1
975 TABLET ORAL EVERY 8 HOURS
Start: 2025-02-24 | End: 2026-02-24

## 2025-02-24 RX ORDER — SODIUM CHLORIDE 9 MG/ML
500 INJECTION, SOLUTION INTRAVENOUS ONCE
Status: COMPLETED | OUTPATIENT
Start: 2025-02-24 | End: 2025-02-24

## 2025-02-24 RX ORDER — PROPOFOL 10 MG/ML
VIAL (ML) INTRAVENOUS AS NEEDED
Status: DISCONTINUED | OUTPATIENT
Start: 2025-02-24 | End: 2025-02-24 | Stop reason: SURG

## 2025-02-24 RX ORDER — OXYCODONE HYDROCHLORIDE 5 MG/1
5 TABLET ORAL EVERY 8 HOURS PRN
Qty: 10 TABLET | Refills: 0 | Status: SHIPPED | OUTPATIENT
Start: 2025-02-24 | End: 2026-02-24

## 2025-02-24 RX ORDER — ONDANSETRON 4 MG/1
4 TABLET, FILM COATED ORAL EVERY 8 HOURS PRN
Qty: 15 TABLET | Refills: 0 | Status: SHIPPED | OUTPATIENT
Start: 2025-02-24 | End: 2026-02-24

## 2025-02-24 RX ORDER — LIDOCAINE HYDROCHLORIDE 10 MG/ML
0.5 INJECTION, SOLUTION EPIDURAL; INFILTRATION; INTRACAUDAL; PERINEURAL ONCE AS NEEDED
Status: DISCONTINUED | OUTPATIENT
Start: 2025-02-24 | End: 2025-02-27 | Stop reason: HOSPADM

## 2025-02-24 RX ORDER — SODIUM CHLORIDE 0.9 % (FLUSH) 0.9 %
10 SYRINGE (ML) INJECTION AS NEEDED
Status: DISCONTINUED | OUTPATIENT
Start: 2025-02-24 | End: 2025-02-27 | Stop reason: HOSPADM

## 2025-02-24 RX ORDER — LIDOCAINE HYDROCHLORIDE 20 MG/ML
INJECTION, SOLUTION EPIDURAL; INFILTRATION; INTRACAUDAL; PERINEURAL AS NEEDED
Status: DISCONTINUED | OUTPATIENT
Start: 2025-02-24 | End: 2025-02-24 | Stop reason: SURG

## 2025-02-24 RX ADMIN — PROPOFOL 150 MG: 10 INJECTION, EMULSION INTRAVENOUS at 07:25

## 2025-02-24 RX ADMIN — LIDOCAINE HYDROCHLORIDE 100 MG: 20 INJECTION, SOLUTION EPIDURAL; INFILTRATION; INTRACAUDAL; PERINEURAL at 07:25

## 2025-02-24 RX ADMIN — CEFAZOLIN 2000 MG: 2 INJECTION, POWDER, FOR SOLUTION INTRAMUSCULAR; INTRAVENOUS at 07:00

## 2025-02-24 RX ADMIN — SODIUM CHLORIDE 500 ML: 9 INJECTION, SOLUTION INTRAVENOUS at 06:54

## 2025-02-24 NOTE — ANESTHESIA PREPROCEDURE EVALUATION
Anesthesia Evaluation     Patient summary reviewed and Nursing notes reviewed   no history of anesthetic complications:   NPO Solid Status: > 8 hours  NPO Liquid Status: > 2 hours           Airway   Mallampati: I  TM distance: >3 FB  Neck ROM: full  No difficulty expected  Dental      Pulmonary    (+) a smoker Current, lung cancer, COPD,  Cardiovascular   Exercise tolerance: good (4-7 METS)    (+) DVT (diagnosed 1 month ago, took eliquis but stopped for procedur), hyperlipidemia  (-) hypertension      Neuro/Psych  (-) seizures, TIA, CVA  GI/Hepatic/Renal/Endo    (+) GERD, hepatitis (s/p treatment, resolved) C, liver disease    Musculoskeletal     Abdominal    Substance History   (+) drug use     OB/GYN          Other      history of cancer                      Anesthesia Plan    ASA 3     MAC     intravenous induction     Anesthetic plan, risks, benefits, and alternatives have been provided, discussed and informed consent has been obtained with: patient.    Plan discussed with CRNA.        CODE STATUS:

## 2025-02-24 NOTE — ANESTHESIA POSTPROCEDURE EVALUATION
Patient: Se Ramirez    Procedure Summary       Date: 02/24/25 Room / Location: [facility] ENDOSCOPY 5 / [facility]    Anesthesia Start: 0723 Anesthesia Stop: 0740    Procedures:       ESOPHAGOGASTRODUODENOSCOPY WITH PERCUTANEOUS ENDOSCOPIC GASTROSTOMY TUBE INSERTION (Esophagus)      PERCUTANEOUS ENDOSCOPIC GASTROSTOMY TUBE INSERTION (Abdomen) Diagnosis:       Oropharyngeal dysphagia      (Oropharyngeal dysphagia [R13.12])    Surgeons: Amie Donnelly MD Provider: Zeeshan Rubio CRNA    Anesthesia Type: MAC ASA Status: 3            Anesthesia Type: MAC    Vitals  Vitals Value Taken Time   /65 02/24/25 0739   Temp     Pulse 84 02/24/25 0740   Resp     SpO2     Vitals shown include unfiled device data.        Post Anesthesia Care and Evaluation    Patient location during evaluation: PHASE II  Patient participation: complete - patient participated  Level of consciousness: awake  Pain management: adequate    Airway patency: patent  Anesthetic complications: No anesthetic complications  PONV Status: none  Cardiovascular status: acceptable  Respiratory status: acceptable  Hydration status: acceptable  No anesthesia care post op

## 2025-02-24 NOTE — OP NOTE
Percutaneous Gastrostomy Insertion Operative Report:     Preoperative diagnosis:   Oropharyngeal dysphagia.  Postoperative diagnosis:   Oropharyngeal dysphagia.  Procedure: Esophagogastroduodenoscopy with percutaneous endoscopic gastrostomy placement    Attending surgeon: Amie Donnelly MD   Anesthesia: MAC  Specimens: none  Blood loss: 5 mL   Drains: 20 Bhutanese pull PEG.  Indications: Se Ramirez is a 74 y.o. 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  He is scheduled today for PEG insertion for nutrition support. He will need tube feeds for > 90 days.     Description of procedure: Consent was obtained. The patient was transferred to the endo suite and MAC anesthesia was induced. A surgical timeout was performed. The flexible endoscope was inserted into the mouth through the bite block.    The esophagus was examined upon advancement of the scope. It was normal. There was no hiatal hernia. The stomach was normal.  The stomach was fully insufflated. A suitable location for placement of the PEG tube was identified by transillumination with the endoscopic light source and careful ballottement of the abdominal wall.  The skin was prepped with ChloraPrep and draped in sterile fashion. The skin was infiltrated with 1% lidocaine. The lidocaine needle was inserted into the stomach, aspirating the whole time and no air was aspirated until the stomach was entered. An incision was made. An 18 gauge needle with the introducer was passed into the stomach. A guidewire was passed.  It was grasped with the endoscopic snare.  The guidewire, snare and endoscope were withdrawn through the patient's mouth.  A 20 Bhutanese pull-style PEG tube was advanced over the guidewire.  It was seated at 2.5 cm from the bumper according to the markings on the tube. The PEG was examined with the endoscope inside the stomach; hemostasis was confirmed, it was in good position. The  retention flange and feeding tube adapter were attached. The PEG was adhered to the skin with tape and mesentery. An abdominal binder was placed.     The patient tolerated the procedure very well and was transferred to the recovery area in stable condition.    Amie Donnelly MD  02/24/25

## 2025-02-27 ENCOUNTER — DOCUMENTATION (OUTPATIENT)
Dept: RADIATION ONCOLOGY | Facility: HOSPITAL | Age: 75
End: 2025-02-27
Payer: MEDICARE

## 2025-02-27 PROCEDURE — 77386: CPT | Performed by: RADIOLOGY

## 2025-02-27 NOTE — PROGRESS NOTES
ANTON met with Mr. 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 ANTON introduced self and explained role and source of support. He is 74 years old and lives with his significant other. His support system includes his significant other and brother. Currently, he does not have transportation concerns. Mr. Ramirez states he has a limited income and is concerned about possible medical bills. ANTON informed him about the financial counselors. Mr. Ramirez states he is a lung cancer survivor but at that time he only needed surgery, radiation is new for him, and he is having some pain. He does not take any medication for anxiety/depression, and he does not see a counselor. He has positive coping strategies. SW encouraged him to call if assistance is needed in the future.

## 2025-02-28 LAB
RAD ONC ARIA COURSE ID: NORMAL
RAD ONC ARIA COURSE INTENT: NORMAL
RAD ONC ARIA COURSE LAST TREATMENT DATE: NORMAL
RAD ONC ARIA COURSE START DATE: NORMAL
RAD ONC ARIA COURSE TREATMENT ELAPSED DAYS: 1
RAD ONC ARIA FIRST TREATMENT DATE: NORMAL
RAD ONC ARIA PLAN FRACTIONS TREATED TO DATE: 2
RAD ONC ARIA PLAN ID: NORMAL
RAD ONC ARIA PLAN PRESCRIBED DOSE PER FRACTION: 2 GY
RAD ONC ARIA PLAN PRIMARY REFERENCE POINT: NORMAL
RAD ONC ARIA PLAN TOTAL FRACTIONS PRESCRIBED: 35
RAD ONC ARIA PLAN TOTAL PRESCRIBED DOSE: 7000 CGY
RAD ONC ARIA REFERENCE POINT DOSAGE GIVEN TO DATE: 4 GY
RAD ONC ARIA REFERENCE POINT ID: NORMAL
RAD ONC ARIA REFERENCE POINT SESSION DOSAGE GIVEN: 2 GY

## 2025-02-28 PROCEDURE — 77412 RADIATION TX DELIVERY LVL 3: CPT | Performed by: RADIOLOGY

## 2025-02-28 PROCEDURE — 77386: CPT | Performed by: RADIOLOGY

## 2025-03-03 ENCOUNTER — HOSPITAL ENCOUNTER (OUTPATIENT)
Dept: RADIATION ONCOLOGY | Facility: HOSPITAL | Age: 75
Setting detail: RADIATION/ONCOLOGY SERIES
End: 2025-03-03
Payer: MEDICARE

## 2025-03-03 LAB
RAD ONC ARIA COURSE ID: NORMAL
RAD ONC ARIA COURSE INTENT: NORMAL
RAD ONC ARIA COURSE LAST TREATMENT DATE: NORMAL
RAD ONC ARIA COURSE START DATE: NORMAL
RAD ONC ARIA COURSE TREATMENT ELAPSED DAYS: 4
RAD ONC ARIA FIRST TREATMENT DATE: NORMAL
RAD ONC ARIA PLAN FRACTIONS TREATED TO DATE: 3
RAD ONC ARIA PLAN ID: NORMAL
RAD ONC ARIA PLAN PRESCRIBED DOSE PER FRACTION: 2 GY
RAD ONC ARIA PLAN PRIMARY REFERENCE POINT: NORMAL
RAD ONC ARIA PLAN TOTAL FRACTIONS PRESCRIBED: 35
RAD ONC ARIA PLAN TOTAL PRESCRIBED DOSE: 7000 CGY
RAD ONC ARIA REFERENCE POINT DOSAGE GIVEN TO DATE: 6 GY
RAD ONC ARIA REFERENCE POINT ID: NORMAL
RAD ONC ARIA REFERENCE POINT SESSION DOSAGE GIVEN: 2 GY

## 2025-03-03 PROCEDURE — 77014 CHG CT GUIDANCE RADIATION THERAPY FLDS PLACEMENT: CPT | Performed by: RADIOLOGY

## 2025-03-03 PROCEDURE — 77386: CPT | Performed by: RADIOLOGY

## 2025-03-04 LAB
RAD ONC ARIA COURSE ID: NORMAL
RAD ONC ARIA COURSE INTENT: NORMAL
RAD ONC ARIA COURSE LAST TREATMENT DATE: NORMAL
RAD ONC ARIA COURSE START DATE: NORMAL
RAD ONC ARIA COURSE TREATMENT ELAPSED DAYS: 5
RAD ONC ARIA FIRST TREATMENT DATE: NORMAL
RAD ONC ARIA PLAN FRACTIONS TREATED TO DATE: 4
RAD ONC ARIA PLAN ID: NORMAL
RAD ONC ARIA PLAN PRESCRIBED DOSE PER FRACTION: 2 GY
RAD ONC ARIA PLAN PRIMARY REFERENCE POINT: NORMAL
RAD ONC ARIA PLAN TOTAL FRACTIONS PRESCRIBED: 35
RAD ONC ARIA PLAN TOTAL PRESCRIBED DOSE: 7000 CGY
RAD ONC ARIA REFERENCE POINT DOSAGE GIVEN TO DATE: 8 GY
RAD ONC ARIA REFERENCE POINT ID: NORMAL
RAD ONC ARIA REFERENCE POINT SESSION DOSAGE GIVEN: 2 GY

## 2025-03-04 PROCEDURE — 77386: CPT | Performed by: RADIOLOGY

## 2025-03-04 PROCEDURE — 77014 CHG CT GUIDANCE RADIATION THERAPY FLDS PLACEMENT: CPT | Performed by: RADIOLOGY

## 2025-03-05 ENCOUNTER — OFFICE VISIT (OUTPATIENT)
Dept: SURGERY | Facility: CLINIC | Age: 75
End: 2025-03-05
Payer: MEDICARE

## 2025-03-05 ENCOUNTER — HOSPITAL ENCOUNTER (OUTPATIENT)
Dept: PHYSICAL THERAPY | Facility: HOSPITAL | Age: 75
Setting detail: THERAPIES SERIES
End: 2025-03-05
Payer: MEDICARE

## 2025-03-05 ENCOUNTER — APPOINTMENT (OUTPATIENT)
Facility: HOSPITAL | Age: 75
End: 2025-03-05
Payer: MEDICARE

## 2025-03-05 ENCOUNTER — TREATMENT (OUTPATIENT)
Dept: SPEECH THERAPY | Facility: HOSPITAL | Age: 75
End: 2025-03-05
Payer: MEDICARE

## 2025-03-05 ENCOUNTER — HOSPITAL ENCOUNTER (OUTPATIENT)
Dept: RADIATION ONCOLOGY | Facility: HOSPITAL | Age: 75
Setting detail: RADIATION/ONCOLOGY SERIES
Discharge: HOME | End: 2025-03-05
Payer: MEDICARE

## 2025-03-05 VITALS
SYSTOLIC BLOOD PRESSURE: 122 MMHG | BODY MASS INDEX: 26.2 KG/M2 | HEIGHT: 70 IN | WEIGHT: 183 LBS | DIASTOLIC BLOOD PRESSURE: 84 MMHG

## 2025-03-05 DIAGNOSIS — Z93.1 S/P PERCUTANEOUS ENDOSCOPIC GASTROSTOMY (PEG) TUBE PLACEMENT: Primary | ICD-10-CM

## 2025-03-05 DIAGNOSIS — R13.12 OROPHARYNGEAL DYSPHAGIA: Primary | ICD-10-CM

## 2025-03-05 LAB
RAD ONC ARIA COURSE ID: NORMAL
RAD ONC ARIA COURSE INTENT: NORMAL
RAD ONC ARIA COURSE LAST TREATMENT DATE: NORMAL
RAD ONC ARIA COURSE START DATE: NORMAL
RAD ONC ARIA COURSE TREATMENT ELAPSED DAYS: 6
RAD ONC ARIA FIRST TREATMENT DATE: NORMAL
RAD ONC ARIA PLAN FRACTIONS TREATED TO DATE: 5
RAD ONC ARIA PLAN ID: NORMAL
RAD ONC ARIA PLAN PRESCRIBED DOSE PER FRACTION: 2 GY
RAD ONC ARIA PLAN PRIMARY REFERENCE POINT: NORMAL
RAD ONC ARIA PLAN TOTAL FRACTIONS PRESCRIBED: 35
RAD ONC ARIA PLAN TOTAL PRESCRIBED DOSE: 7000 CGY
RAD ONC ARIA REFERENCE POINT DOSAGE GIVEN TO DATE: 10 GY
RAD ONC ARIA REFERENCE POINT ID: NORMAL
RAD ONC ARIA REFERENCE POINT SESSION DOSAGE GIVEN: 2 GY

## 2025-03-05 PROCEDURE — 77014 CHG CT GUIDANCE RADIATION THERAPY FLDS PLACEMENT: CPT | Performed by: RADIOLOGY

## 2025-03-05 PROCEDURE — 77386: CPT | Performed by: RADIOLOGY

## 2025-03-05 PROCEDURE — 77336 RADIATION PHYSICS CONSULT: CPT | Performed by: RADIOLOGY

## 2025-03-05 PROCEDURE — 92610 EVALUATE SWALLOWING FUNCTION: CPT | Performed by: SPEECH-LANGUAGE PATHOLOGIST

## 2025-03-05 NOTE — PROGRESS NOTES
"Patient: Se Ramirez    YOB: 1950    Date: 03/05/2025    Primary Care Provider: Diego Lucio MD    Vital Signs:   Vitals:    03/05/25 1041   BP: 122/84   Weight: 83 kg (183 lb)   Height: 177.8 cm (70\")       The patient is tolerating a regular diet and has no complaints s/p PEG tube placement by Dr. Donnelly on 2/24/25. The patient denies fevers, chills, nausea, vomiting, and excessive pain. The patient has been using the PEG tube without any issues. He also is tolerating oral intake without any issue.      Results Review:   I reviewed the patient's new clinical results.        Assessment / Plan:    Diagnoses and all orders for this visit:    1. S/P percutaneous endoscopic gastrostomy (PEG) tube placement (Primary)    Mr. Ramirez is a 74 year old male who presents to the clinic 2 weeks s/p PEG tube placement. Patient is tolerating feeds well and is having no issues. At this time, the patient will return to clinic on an as needed basis. Patient is to call for an appointment if he has any new problems or concerns. He voices understanding and is agreeable to the plan.     Follow up:     Return if symptoms worsen or fail to improve.        Electronically signed by Cielo Adams PA-C  03/05/25  11:32 CST                  "

## 2025-03-05 NOTE — THERAPY EVALUATION
Outpatient Speech Language Pathology   Adult Swallow Initial Evaluation       Patient Name: Se Ramirez  : 1950  MRN: [id number]  Today's Date: 3/5/2025         Visit Date: 2025     SPEECH-LANGUAGE PATHOLOGY EVALUATION - SWALLOW  Subjective: The patient was seen on this date for a Clinical Swallow evaluation.  Patient was alert and cooperative.  Significant history: Patient had an episode of coughing leading to sore throat. He had various testing and trailed different medications as prescribed without resolution. 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 He has now began XRT. PEG in place.   Objective: Oral motor examination results: WFL. Mild vocal changes.   Textures given during assessment of swallow function included thin liquid.  Assessment: Difficulties were noted with thin liquid.  Observations: Audible swallows with thin liquid intake and delayed throat clearing noted. VFSS completed in 2018 with noted pharyngeal weakness and residue. Suspect this continues. Patient does have PEG tube but is not currently utilizing. Reports he has not been compliant with exercises prescribed by OP SLP Sandra Marie. We discussed importance of nutritional status/intake, oral care, exercise, and cessation of smoking.     Continue to follow 1 time per week throughout XRT. Patient encouraged to comply with home excercise program, to be completed 3x per day. Patient to continue least restrictive PO diet of whole soft foods and thin liquids. Encouraged use of PEG for supplementation and increased hydration as needed.      SLP Findings:  Patient presents with moderate oropharyngeal dysphagia, without esophageal component.   Recommendations: Diet Textures: soft to chew whole meats and thin liquids  Medications should be taken whole as tolerated.    Recommended Strategies: upright for PO, small bites and sips, alternate liquids and solids, and double swallow with all intake . Oral care 2x a day.  Other Recommended Evaluations: Repeat VFSS as needed and at least after 6 week XRT follow up to obtain new baseline fx.     Dysphagia therapy is recommended.      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    Tobacco user    Right upper lobe pulmonary nodule    Malignant neoplasm of upper lobe of right lung    Overweight    Hilar adenopathy    Mediastinal adenopathy    Pre-procedure lab exam    Pulmonary emphysema    Esophageal dysphagia    Multiple lung nodules    Personal history of nicotine dependence    Elevated liver function tests    Dysphagia    Hoarseness    Current every day smoker    Squamous cell carcinoma of epiglottis        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    History of adenomatous polyp of colon     History of colon polyps     History of lung cancer     Right lung    Hyperlipidemia         Past Surgical History:   Procedure Laterality Date    BRONCHOSCOPY N/A 02/15/2021    Procedure: BRONCHOSCOPY;  Surgeon: Bruce Murillo MD;  Location: [facility];  Service: Cardiothoracic;  Laterality: N/A;    CATARACT EXTRACTION      CHOLECYSTECTOMY      COLONOSCOPY  11/20/2012    One 1cm tubular adenomatous polyp in the sigmoid colon; One 5mm  hyperplastic polyp in the rectum; Diverticulosis; Repeat 4 years     COLONOSCOPY N/A 03/30/2017    Two 4-6mm tubular adenomatous polyps at the hepatic flexure; One 5mm tubular adenomatous polyp in the transverse colon; One 11mm tubular adenomatous polyp in the sigmoid colon; One 6mm tubular adenomatous polyp in the rectum; Diverticulosis in the left colon; Repeat 3 years     COLONOSCOPY  12/17/2009    One less than 5mm tubular adenomatous polyp in the cecum; One 5mm hyperplastic polyp in the transverse; One less than 3mm hyperplastic polyp in the rectum; Diverticulosis; Repeat 3 years     COLONOSCOPY N/A 07/02/2020    Two 5-7mm tubular adenomatous polyps at the hepatic flexure; One 6mm tubular adenomatous polyp in the transverse colon; Diverticulosis in the left colon; The entire examined colon is normal on direct and retroflexion views; Repeat 5 years    COLONOSCOPY N/A 06/29/2022    Diverticulosis in the left colon; One 6mm tubular adenomatous polyp in the descending colon; One 4mm tubular adenomatous polyp in the descending colon; Congested mucosa in the entire examined colon-biopsied; Repeat 5 years    ENDOSCOPY N/A 03/27/2019    Medium-sized HH; Normal stomach; Normal examined duodenum-biopsied    ENDOSCOPY N/A 06/09/2022    Small HH; Non-erosive gastritis-biopsied; Normal examined duodenum-biopsied    ENDOSCOPY N/A 2/24/2025    Procedure: ESOPHAGOGASTRODUODENOSCOPY WITH PERCUTANEOUS ENDOSCOPIC GASTROSTOMY TUBE INSERTION;  Surgeon: Amie Donnelly MD;  Location: [facility] ENDOSCOPY;  Service: General;  Laterality: N/A;  pre op:   post op:  pcp:Diego Lucio MD    ERCP  07/30/2024    Dr. Buzz Dangelo-Choledocholithiasis status post balloon sweep extraction wtih balloon assisted sphincteroplasty; Placement of biliary stent as well as prophylactic pancreatic stent; Repeat ERCP 3 months for biliary stent removal    FOOT SURGERY Left     ORIF    HERNIA REPAIR      INNER EAR SURGERY      LARYNGOSCOPY N/A  1/20/2025    Procedure: DIRECT LARYNGOSCOPY WITH EXCISION OF LESION;  Surgeon: Antelmo Dangelo MD;  Location: [facility] OR;  Service: ENT;  Laterality: N/A;    MEDIASTINOSCOPY N/A 02/15/2021    Procedure: CERVICAL MEDIASTINOSCOPY WITH LYMPH NODE DISECTION;  Surgeon: Bruce Murillo MD;  Location: [facility] OR;  Service: Cardiothoracic;  Laterality: N/A;    PEG TUBE INSERTION N/A 2/24/2025    Procedure: PERCUTANEOUS ENDOSCOPIC GASTROSTOMY TUBE INSERTION;  Surgeon: Amie Donnelly MD;  Location: [facility] ENDOSCOPY;  Service: General;  Laterality: N/A;  pre op:  post op:  pcp:Diego Lucio MD    THORACOSCOPY Right 02/15/2021    Procedure: THORACOSCOPY, WEDGE RESECTION OF RIGHT UPPER LOBE WITH FROZEN,  , MEDIASTINAL LYMPH NODE DISSECTION;  Surgeon: Bruce Murillo MD;  Location: [facility] OR;  Service: Cardiothoracic;  Laterality: Right;    TONSILLECTOMY           Visit Dx:     ICD-10-CM ICD-9-CM   1. Oropharyngeal dysphagia  R13.12 787.22            OP SLP Assessment/Plan - 03/05/25 1345          SLP Assessment    Functional Problems Swallowing  -MG    Impact on Function: Swallowing Risk of malnourishment;Risk of dehydration;Risk of aspiration;Risk of pneumonia;Impact on social aspects of eating  -MG    Clinical Impression: Swallowing Moderate:;oropharyngeal phase dysphagia  -MG    Clinical Impression Comments Instrumental swallow study completed on 1/16/2025 indicating pharyngeal weakness and residue. At risk for progression of dysphagia secondary to location of cancer and current XRT to the head and neck.  -MG    Please refer to paper survey for additional self-reported information Yes  -MG    Please refer to items scanned into chart for additional diagnostic informaiton and handouts as provided by clinician Yes  -MG    SLP Diagnosis Oropharyngeal dysphagia  -MG    Prognosis Fair (comment)  -MG    Patient/caregiver participated in establishment of treatment plan and goals Yes  -MG    Patient would benefit  from skilled therapy intervention Yes  -MG       SLP Plan    Frequency 1 time per week  -MG    Duration 6 months  -MG    Planned CPT's? SLP SWALLOW THERAPY: 47537;SLP CLINICAL SWALLOW EVAL: 84978  -MG    Expected Duration of Therapy Session (SLP Eval) 45  -MG    Plan Comments Continue to follow 1 time per week throughout XRT. Patient encouraged to comply with home excercise program, to be completed 3x per day. Patient to continue least restrictive PO diet of whole soft foods and thin liquids. Encouraged use of PEG for supplementation and increased hydration as needed.  -MG              User Key  (r) = Recorded By, (t) = Taken By, (c) = Cosigned By      Initials Name Provider Type    MG Ruth Sherwood MS CCC-SLP Speech and Language Pathologist                     SLP Adult Swallow Evaluation       Row Name 03/05/25 1300       Rehab Evaluation    Document Type evaluation  -MG    Subjective Information no complaints  -MG    Patient Observations alert;cooperative;agree to therapy  -MG    Patient Effort good  -MG    Symptoms Noted During/After Treatment none  -MG       General Information    Patient Profile Reviewed yes  -MG    Pertinent History Of Current Problem Patient had an episode of coughing leading to sore throat. He had various testing and trailed different medications as prescribed without resolution. 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 He has now began XRT. PEG in place.  -MG    Current Method of Nutrition regular textures;thin liquids;gastrostomy feedings  -MG    Precautions/Limitations, Vision WFL;for purposes of eval  -MG    Precautions/Limitations, Hearing WFL;for purposes of eval  -MG    Prior Level of Function-Communication WFL  -MG    Prior Level of Function-Swallowing no diet consistency restrictions  prior VFSS on 1/16/2025 with noted  weakness  -MG    Plans/Goals Discussed with patient;agreed upon  -MG    Barriers to Rehab medically complex  -MG    Patient's Goals for Discharge return to all previous roles/activities  -MG       Pain    Additional Documentation Pain Scale: FACES Pre/Post-Treatment (Group)  -MG       Pain Scale: FACES Pre/Post-Treatment    Pain: FACES Scale, Pretreatment 4-->hurts little more  -MG    Posttreatment Pain Rating 4-->hurts little more  -MG       Oral Motor Structure and Function    Dentition Assessment upper dentures/partial in place;lower dentures/partial in place  -MG    Secretion Management WNL/WFL  -MG    Mucosal Quality dry  -MG       Oral Musculature and Cranial Nerve Assessment    Oral Motor General Assessment vocal impairment  -MG    Vocal Impairment, Detail. Cranial Nerve X (Vagus) CN10: Motor;vocal quality abnormality (see comments)  -MG       General Eating/Swallowing Observations    Respiratory Support Currently in Use room air  -MG    Eating/Swallowing Skills self-fed  -MG    Positioning During Eating upright in chair  -MG    Utensils Used cup  -MG    Consistencies Trialed thin liquids  -MG       Clinical Swallow Eval    Oral Prep Phase WFL  -MG    Oral Transit WFL  -MG    Oral Residue WFL  -MG    Pharyngeal Phase suspected pharyngeal impairment  -MG    Esophageal Phase unremarkable  -MG    Clinical Swallow Evaluation Summary See note  -MG       Pharyngeal Phase Concerns    Pharyngeal Phase Concerns throat clear  -MG    Throat Clear thin  -MG       SLP Evaluation Clinical Impression    SLP Swallowing Diagnosis moderate;oral dysphagia;pharyngeal dysphagia  -MG    Functional Impact risk of aspiration/pneumonia  -MG    Rehab Potential/Prognosis, Swallowing adequate, monitor progress closely  -MG    Swallow Criteria for Skilled Therapeutic Interventions Met demonstrates skilled criteria  -MG       Recommendations    Therapy Frequency (Swallow) 1 day per week  -MG    Predicted Duration Therapy Intervention  (Days) until discharge  -MG    SLP Diet Recommendation soft to chew textures;whole;thin liquids  -MG    Recommended Diagnostics VFSS (MBS)  as needed and after XRT complete  -MG    Recommended Precautions and Strategies upright posture during/after eating;small bites of food and sips of liquid;multiple swallows per bite of food;multiple swallows per sip of liquid;alternate between small bites of food and sips of liquid;general aspiration precautions  -MG    Oral Care Recommendations Oral Care BID/PRN;Toothbrush  -MG    SLP Rec. for Method of Medication Administration as tolerated  -MG    Monitor for Signs of Aspiration yes;notify SLP if any concerns  -MG    Anticipated Discharge Disposition (SLP) home with OP services  -MG              User Key  (r) = Recorded By, (t) = Taken By, (c) = Cosigned By      Initials Name Provider Type    Ruth Pearson MS CCC-SLP Speech and Language Pathologist                                   OP SLP Education       Row Name 03/05/25 1348       Education    Barriers to Learning No barriers identified  -MG    Education Provided Described results of evaluation;Patient expressed understanding of evaluation;Patient requires further education on strategies, risks  -MG    Assessed Learning readiness;Learning preferences;Learning motivation;Learning needs  -MG    Learning Motivation Strong  -MG    Learning Method Written materials  -MG    Teaching Response Verbalized understanding  -MG    Education Comments Current symptoms reviewed with recommendations given. Discussed importance of continued home excercise program to be completed 3 times per day. Plan of care packet provided.  -MG              User Key  (r) = Recorded By, (t) = Taken By, (c) = Cosigned By      Initials Name Effective Dates    Ruth Pearson MS CCC-SLP 07/11/23 -                    SLP OP Goals       Row Name 03/05/25 1354 03/05/25 1300       Goal Type Needed    Goal Type Needed -- Other Adult Goals  -MG        Subjective Comments    Subjective Comments -- Patient was seen today for swallowing evaluation.  -MG       Subjective Pain    Able to rate subjective pain? -- yes  -MG    Pre-Treatment Pain Level -- 4  -MG    Post-Treatment Pain Level -- 4  -MG    Subjective Pain Comment -- Reports irritation more than pain.  -MG       Other Goals    Other Adult Goal- 1 --  Patient will increase swallowing frequency during XRT in order to maintain current swallow function once XRT is completed.  -MG    Status: Other Adult Goal- 1 -- New  -MG    Comments: Other Adult Goal- 1 -- See note  -MG    Other Adult Goal- 2 --  Patient will tolerate a regular diet with thin liquids throughout and following treatment without complications such as aspiration pneumonia or overt s/s of aspiration.  -MG    Status: Other Adult Goal- 2 -- New  -MG    Comments: Other Adult Goal- 2 -- See note  -MG    Other Adult Goal- 3 --  Patient will complete oral care daily in order to promote healthy oral mucosa throughout XRT.  -MG    Status: Other Adult Goal- 3 -- New  -MG    Comments: Other Adult Goal- 3 -- See note  -MG    Other Adult Goal- 4 --  Patient will maintain adequate hydration by drinking needed amount of water by mouth.  -MG    Status: Other Adult Goal- 4 -- New  -MG    Comments: Other Adult Goal- 4 -- See note  -MG    Other Adult Goal- 5 --  Patient will maintain adequate nutrition by mouth by implementing six meals a day into their healthy habits.  -MG    Status: Other Adult Goal- 5 -- New  -MG    Comments: Other Adult Goal- 5 -- See note  -MG    Other Adult Goal- 6 --  Patient will complete swallowing exercises three times a day in order to maintain current swallow function and range of motion of oral and pharyngeal structures.  -MG    Status: Other Adult Goal- 6 -- New  -MG    Comments: Other Adult Goal- 6 -- See note  -MG    Other Adult Goal- 7 --  Patient will continue to be assessed/monitored for increasing dysphagia and other effects of XRT  on the swallow up until 3 months post XRT.  -MG    Status: Other Adult Goal- 7 -- New  -MG    Comments: Other Adult Goal- 7 -- See note  -MG       SLP Time Calculation    SLP Goal Re-Cert Due Date 06/05/25  -MG 06/05/25  -MG              User Key  (r) = Recorded By, (t) = Taken By, (c) = Cosigned By      Initials Name Provider Type    Ruth Pearson MS CCC-SLP Speech and Language Pathologist                             Time Calculation:   SLP Start Time: 1300  SLP Stop Time: 1354  SLP Time Calculation (min): 54 min  Untimed Charges  SLP Eval/Re-eval : ST Eval Oral Pharyng Swallow - 81910  [id number] Eval Oral Pharyng Swallow Minutes: 54  Total Minutes  Untimed Charges Total Minutes: 54   Total Minutes: 54                 Ruth Sherwood MS CCC-SLP  3/5/2025

## 2025-03-06 ENCOUNTER — HOSPITAL ENCOUNTER (OUTPATIENT)
Dept: RADIATION ONCOLOGY | Facility: HOSPITAL | Age: 75
Discharge: HOME | End: 2025-03-06

## 2025-03-06 LAB
RAD ONC ARIA COURSE ID: NORMAL
RAD ONC ARIA COURSE INTENT: NORMAL
RAD ONC ARIA COURSE LAST TREATMENT DATE: NORMAL
RAD ONC ARIA COURSE START DATE: NORMAL
RAD ONC ARIA COURSE TREATMENT ELAPSED DAYS: 7
RAD ONC ARIA FIRST TREATMENT DATE: NORMAL
RAD ONC ARIA PLAN FRACTIONS TREATED TO DATE: 6
RAD ONC ARIA PLAN ID: NORMAL
RAD ONC ARIA PLAN PRESCRIBED DOSE PER FRACTION: 2 GY
RAD ONC ARIA PLAN PRIMARY REFERENCE POINT: NORMAL
RAD ONC ARIA PLAN TOTAL FRACTIONS PRESCRIBED: 35
RAD ONC ARIA PLAN TOTAL PRESCRIBED DOSE: 7000 CGY
RAD ONC ARIA REFERENCE POINT DOSAGE GIVEN TO DATE: 12 GY
RAD ONC ARIA REFERENCE POINT ID: NORMAL
RAD ONC ARIA REFERENCE POINT SESSION DOSAGE GIVEN: 2 GY

## 2025-03-06 PROCEDURE — 77014 CHG CT GUIDANCE RADIATION THERAPY FLDS PLACEMENT: CPT | Performed by: RADIOLOGY

## 2025-03-06 PROCEDURE — 77427 RADIATION TX MANAGEMENT X5: CPT | Performed by: RADIOLOGY

## 2025-03-06 PROCEDURE — 77386: CPT | Performed by: RADIOLOGY

## 2025-03-07 ENCOUNTER — CLINICAL SUPPORT (OUTPATIENT)
Dept: SURGERY | Facility: CLINIC | Age: 75
End: 2025-03-07
Payer: MEDICARE

## 2025-03-07 ENCOUNTER — HOSPITAL ENCOUNTER (OUTPATIENT)
Dept: RADIATION ONCOLOGY | Facility: HOSPITAL | Age: 75
Discharge: HOME | End: 2025-03-07

## 2025-03-07 ENCOUNTER — HOSPITAL ENCOUNTER (OUTPATIENT)
Dept: PHYSICAL THERAPY | Facility: HOSPITAL | Age: 75
Setting detail: THERAPIES SERIES
Discharge: HOME | End: 2025-03-07
Payer: MEDICARE

## 2025-03-07 DIAGNOSIS — C32.1 SQUAMOUS CELL CARCINOMA OF EPIGLOTTIS: ICD-10-CM

## 2025-03-07 DIAGNOSIS — Z91.89 AT RISK FOR LYMPHEDEMA: Primary | ICD-10-CM

## 2025-03-07 LAB
RAD ONC ARIA COURSE ID: NORMAL
RAD ONC ARIA COURSE INTENT: NORMAL
RAD ONC ARIA COURSE LAST TREATMENT DATE: NORMAL
RAD ONC ARIA COURSE START DATE: NORMAL
RAD ONC ARIA COURSE TREATMENT ELAPSED DAYS: 8
RAD ONC ARIA FIRST TREATMENT DATE: NORMAL
RAD ONC ARIA PLAN FRACTIONS TREATED TO DATE: 7
RAD ONC ARIA PLAN ID: NORMAL
RAD ONC ARIA PLAN PRESCRIBED DOSE PER FRACTION: 2 GY
RAD ONC ARIA PLAN PRIMARY REFERENCE POINT: NORMAL
RAD ONC ARIA PLAN TOTAL FRACTIONS PRESCRIBED: 35
RAD ONC ARIA PLAN TOTAL PRESCRIBED DOSE: 7000 CGY
RAD ONC ARIA REFERENCE POINT DOSAGE GIVEN TO DATE: 14 GY
RAD ONC ARIA REFERENCE POINT ID: NORMAL
RAD ONC ARIA REFERENCE POINT SESSION DOSAGE GIVEN: 2 GY

## 2025-03-07 PROCEDURE — 77014 CHG CT GUIDANCE RADIATION THERAPY FLDS PLACEMENT: CPT | Performed by: RADIOLOGY

## 2025-03-07 PROCEDURE — 77386: CPT | Performed by: RADIOLOGY

## 2025-03-07 NOTE — PROGRESS NOTES
Pt here today for PEG tube check. Pt states he noticed some yellow drainage coming out around the PEG tube site. Removed split gauze dressing. Scant amount of yellow drainage noted to which appears to be from feeding supplement. Cleansed area with saline. Reapplied 4x4 split gauze. Instructed patient to continue feedings and flushing with sterile water to prevent tube from clogging. May apply a zinc based cream if redness appears on surrounding skin. If patients develops fever over 101.5, nausea/vomiting, increased abdominal pain then report to ER for evaluation. Pt understood.

## 2025-03-07 NOTE — THERAPY EVALUATION
Physical Therapy Initial Evaluation and Plan of Care  [address] [place] [address]    Patient: Se Ramirez             : 1950  Today's Date: 3/7/2025  Referring practitioner: PARISA Mathews  Date of Initial Visit: 3/7/2025  Patient seen for 1 sessions    Visit Diagnoses:    ICD-10-CM ICD-9-CM   1. 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     Past Medical History:   Diagnosis Date   • Bronchitis    • Cancer    • COPD (chronic obstructive pulmonary disease)    • Diverticulosis    • Family history of colonic polyps    • GERD (gastroesophageal reflux disease)    • Hearing loss     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   • History of adenomatous polyp of colon    • History of colon polyps    • History of lung cancer     Right lung   • Hyperlipidemia      Past Surgical History:   Procedure Laterality Date   • BRONCHOSCOPY N/A 02/15/2021    Procedure: BRONCHOSCOPY;  Surgeon: Bruce Murillo MD;  Location: [facility];  Service: Cardiothoracic;  Laterality: N/A;   • CATARACT EXTRACTION     • CHOLECYSTECTOMY     • COLONOSCOPY  2012    One 1cm tubular adenomatous polyp in the sigmoid colon; One 5mm hyperplastic polyp in the rectum; Diverticulosis; Repeat 4 years    • COLONOSCOPY N/A 2017    Two 4-6mm tubular adenomatous polyps at the hepatic flexure; One 5mm tubular adenomatous polyp in the transverse colon; One 11mm tubular adenomatous polyp in the sigmoid colon; One 6mm tubular adenomatous polyp in the rectum; Diverticulosis in the left colon; Repeat 3 years    • COLONOSCOPY  2009    One less than 5mm tubular adenomatous polyp in the cecum; One 5mm hyperplastic polyp in the transverse; One less than 3mm hyperplastic polyp in the rectum; Diverticulosis; Repeat 3 years    • COLONOSCOPY N/A 2020    Two 5-7mm tubular adenomatous polyps at the hepatic flexure;  One 6mm tubular adenomatous polyp in the transverse colon; Diverticulosis in the left colon; The entire examined colon is normal on direct and retroflexion views; Repeat 5 years   • COLONOSCOPY N/A 06/29/2022    Diverticulosis in the left colon; One 6mm tubular adenomatous polyp in the descending colon; One 4mm tubular adenomatous polyp in the descending colon; Congested mucosa in the entire examined colon-biopsied; Repeat 5 years   • ENDOSCOPY N/A 03/27/2019    Medium-sized HH; Normal stomach; Normal examined duodenum-biopsied   • ENDOSCOPY N/A 06/09/2022    Small HH; Non-erosive gastritis-biopsied; Normal examined duodenum-biopsied   • ENDOSCOPY N/A 2/24/2025    Procedure: ESOPHAGOGASTRODUODENOSCOPY WITH PERCUTANEOUS ENDOSCOPIC GASTROSTOMY TUBE INSERTION;  Surgeon: Amie Donnelly MD;  Location: [facility] ENDOSCOPY;  Service: General;  Laterality: N/A;  pre op:   post op:  pcp:Diego Lucio MD   • ERCP  07/30/2024    Dr. Buzz Dangelo-Choledocholithiasis status post balloon sweep extraction wtih balloon assisted sphincteroplasty; Placement of biliary stent as well as prophylactic pancreatic stent; Repeat ERCP 3 months for biliary stent removal   • FOOT SURGERY Left     ORIF   • HERNIA REPAIR     • INNER EAR SURGERY     • LARYNGOSCOPY N/A 1/20/2025    Procedure: DIRECT LARYNGOSCOPY WITH EXCISION OF LESION;  Surgeon: Antelmo Dangelo MD;  Location: [facility] OR;  Service: ENT;  Laterality: N/A;   • MEDIASTINOSCOPY N/A 02/15/2021    Procedure: CERVICAL MEDIASTINOSCOPY WITH LYMPH NODE DISECTION;  Surgeon: Bruce Murillo MD;  Location: [facility] OR;  Service: Cardiothoracic;  Laterality: N/A;   • PEG TUBE INSERTION N/A 2/24/2025    Procedure: PERCUTANEOUS ENDOSCOPIC GASTROSTOMY TUBE INSERTION;  Surgeon: Amie Donnelly MD;  Location: [facility] ENDOSCOPY;  Service: General;  Laterality: N/A;  pre op:  post op:  pcp:Diego Lucio MD   • THORACOSCOPY Right 02/15/2021    Procedure: THORACOSCOPY, WEDGE RESECTION  OF RIGHT UPPER LOBE WITH FROZEN,  , MEDIASTINAL LYMPH NODE DISSECTION;  Surgeon: Bruce Murillo MD;  Location: [facility] OR;  Service: Cardiothoracic;  Laterality: Right;   • TONSILLECTOMY         SUBJECTIVE     Subjective Evaluation    Pain  Current pain ratin       The patient living arrangement includes {Living Arrangement:59859}. Their home accessibility includes {Rehab home environment / accessibility:00380}. Their home assistive devices and adaptive equipment includes {Devices; equipment ot:28770}.  Outcome Measure:   ***       OBJECTIVE     Objective    Lymphedema       Row Name 25 1500             Subjective Pain    Able to rate subjective pain? yes  -HR      Pre-Treatment Pain Level 4  -HR         Lymphedema Assessment    Lymphedema Classification Face:;Neck:;at risk/stage 0  -HR      Radiation Therapy Received yes  -HR      Radiation Treatments #/Timeframe 7 of 35  -HR         Lymphedema Measurements    Measurement Type(s) Circumferential  -HR      Circumferential Areas Head  -HR         Head Circumferential (cm)    Measurement Location 1 upper lip  -HR      Head 1 54.1 cm  -HR      Measurement Location 2 10 cm inferior to earlobe attachment  -HR      Head 2 39.5 cm  -HR      Head Circumferential Total 93.6 cm  -HR                User Key  (r) = Recorded By, (t) = Taken By, (c) = Cosigned By      Initials Name Provider Type    HR Ellie Garcia, PT, DPT, CLT-KYLE Physical Therapist                  Therapy Education/Self Care [id number]   Education offered today ***   Medduye Code    Ongoing HEP   ***   Timed Minutes        Total Timed Treatment:     ***   mins  Total Time of Visit:            ***   mins    ASSESSMENT/PLAN     Goals                                          Progress Note due by ***                                                      Recert due by ***   STG by: *** Comments Date Status   Patient will have a good basic understanding of lymphedema and its suggested risk  reduction practices      Patient will be independent with remedial HEP for lymphedema      Patient will be independent with self MLD for lymphedema            LTG by: ***      Patient will have appropriate compression garments for lymphedema maintenance      Patient will have no sign or symptoms of infection      Patient will be independent with comprehensive maintenance program for lymphedema                          Assessment/Plan  Anticipated CPT codes: {CPT PT/OT:62300}    SIGNATURE: Ellie Garcia, PT, DPT, RAMIRO JIMENEZ License #: ***  Electronically Signed on 3/7/2025    Initial Certification  Certification Period: 3/7/2025 through 6/4/2025  I certify that the therapy services are furnished while this patient is under my care.  The services outlined above are required by this patient, and will be reviewed every 90 days.     PHYSICIAN: Edmundo Infante APRN (NPI: [id number])    Signature:_________________________________________DATE: ________      Please sign and return via fax to [phone].   Thank you so much for letting us work with Se. I appreciate your letting us work with your patients. If you have any questions or concerns, please don't hesitate to contact me.          [address] [address]  [phone]

## 2025-03-10 ENCOUNTER — HOSPITAL ENCOUNTER (OUTPATIENT)
Dept: RADIATION ONCOLOGY | Facility: HOSPITAL | Age: 75
Discharge: HOME | End: 2025-03-10
Payer: MEDICARE

## 2025-03-10 LAB
RAD ONC ARIA COURSE ID: NORMAL
RAD ONC ARIA COURSE INTENT: NORMAL
RAD ONC ARIA COURSE LAST TREATMENT DATE: NORMAL
RAD ONC ARIA COURSE START DATE: NORMAL
RAD ONC ARIA COURSE TREATMENT ELAPSED DAYS: 11
RAD ONC ARIA FIRST TREATMENT DATE: NORMAL
RAD ONC ARIA PLAN FRACTIONS TREATED TO DATE: 8
RAD ONC ARIA PLAN ID: NORMAL
RAD ONC ARIA PLAN PRESCRIBED DOSE PER FRACTION: 2 GY
RAD ONC ARIA PLAN PRIMARY REFERENCE POINT: NORMAL
RAD ONC ARIA PLAN TOTAL FRACTIONS PRESCRIBED: 35
RAD ONC ARIA PLAN TOTAL PRESCRIBED DOSE: 7000 CGY
RAD ONC ARIA REFERENCE POINT DOSAGE GIVEN TO DATE: 16 GY
RAD ONC ARIA REFERENCE POINT ID: NORMAL
RAD ONC ARIA REFERENCE POINT SESSION DOSAGE GIVEN: 2 GY

## 2025-03-10 PROCEDURE — 77014 CHG CT GUIDANCE RADIATION THERAPY FLDS PLACEMENT: CPT | Performed by: RADIOLOGY

## 2025-03-10 PROCEDURE — 77386: CPT | Performed by: RADIOLOGY

## 2025-03-11 ENCOUNTER — HOSPITAL ENCOUNTER (OUTPATIENT)
Dept: RADIATION ONCOLOGY | Facility: HOSPITAL | Age: 75
Discharge: HOME | End: 2025-03-11

## 2025-03-11 LAB
RAD ONC ARIA COURSE ID: NORMAL
RAD ONC ARIA COURSE INTENT: NORMAL
RAD ONC ARIA COURSE LAST TREATMENT DATE: NORMAL
RAD ONC ARIA COURSE START DATE: NORMAL
RAD ONC ARIA COURSE TREATMENT ELAPSED DAYS: 12
RAD ONC ARIA FIRST TREATMENT DATE: NORMAL
RAD ONC ARIA PLAN FRACTIONS TREATED TO DATE: 9
RAD ONC ARIA PLAN ID: NORMAL
RAD ONC ARIA PLAN PRESCRIBED DOSE PER FRACTION: 2 GY
RAD ONC ARIA PLAN PRIMARY REFERENCE POINT: NORMAL
RAD ONC ARIA PLAN TOTAL FRACTIONS PRESCRIBED: 35
RAD ONC ARIA PLAN TOTAL PRESCRIBED DOSE: 7000 CGY
RAD ONC ARIA REFERENCE POINT DOSAGE GIVEN TO DATE: 18 GY
RAD ONC ARIA REFERENCE POINT ID: NORMAL
RAD ONC ARIA REFERENCE POINT SESSION DOSAGE GIVEN: 2 GY

## 2025-03-11 PROCEDURE — 77014 CHG CT GUIDANCE RADIATION THERAPY FLDS PLACEMENT: CPT | Performed by: RADIOLOGY

## 2025-03-11 PROCEDURE — 77386: CPT | Performed by: RADIOLOGY

## 2025-03-12 ENCOUNTER — TREATMENT (OUTPATIENT)
Dept: SPEECH THERAPY | Facility: HOSPITAL | Age: 75
End: 2025-03-12
Payer: MEDICARE

## 2025-03-12 DIAGNOSIS — R13.12 OROPHARYNGEAL DYSPHAGIA: Primary | ICD-10-CM

## 2025-03-12 LAB
RAD ONC ARIA COURSE ID: NORMAL
RAD ONC ARIA COURSE INTENT: NORMAL
RAD ONC ARIA COURSE LAST TREATMENT DATE: NORMAL
RAD ONC ARIA COURSE START DATE: NORMAL
RAD ONC ARIA COURSE TREATMENT ELAPSED DAYS: 13
RAD ONC ARIA FIRST TREATMENT DATE: NORMAL
RAD ONC ARIA PLAN FRACTIONS TREATED TO DATE: 10
RAD ONC ARIA PLAN ID: NORMAL
RAD ONC ARIA PLAN PRESCRIBED DOSE PER FRACTION: 2 GY
RAD ONC ARIA PLAN PRIMARY REFERENCE POINT: NORMAL
RAD ONC ARIA PLAN TOTAL FRACTIONS PRESCRIBED: 35
RAD ONC ARIA PLAN TOTAL PRESCRIBED DOSE: 7000 CGY
RAD ONC ARIA REFERENCE POINT DOSAGE GIVEN TO DATE: 20 GY
RAD ONC ARIA REFERENCE POINT ID: NORMAL
RAD ONC ARIA REFERENCE POINT SESSION DOSAGE GIVEN: 2 GY

## 2025-03-12 PROCEDURE — 77386: CPT | Performed by: RADIOLOGY

## 2025-03-12 PROCEDURE — 92526 ORAL FUNCTION THERAPY: CPT | Performed by: SPEECH-LANGUAGE PATHOLOGIST

## 2025-03-12 PROCEDURE — 77014 CHG CT GUIDANCE RADIATION THERAPY FLDS PLACEMENT: CPT | Performed by: RADIOLOGY

## 2025-03-12 PROCEDURE — 77336 RADIATION PHYSICS CONSULT: CPT | Performed by: RADIOLOGY

## 2025-03-12 NOTE — THERAPY TREATMENT NOTE
Outpatient Speech Language Pathology   Adult Swallow Treatment Note       Patient Name: Se Ramirez  : 1950  MRN: [id number]  Today's Date: 3/12/2025         Visit Date: 2025     Mr. Ramirez was seen today for swallowing therapy. He reports he is about to stop eating by mouth due to pain. He reports pain is a 6-7/10. He has eaten some this past week and continues liquids PO. He has not been completing home exercise program. We discussed importance of at least some of the program being completed especially if he wishes to not eat PO and use if tube. I encouraged adequate intake by PEG if he transitions to this method.     Continue to follow 1x per week. Encouraged compliance with home excercise program. Patient OK to continue PO as tolerated; although, he does not wish to at this time due to pain. Discussed options that were runny purees and encouraged continued liquid intake PO. Encouraged increased use of salt/soda rinse.     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 Oropharyngeal dysphagia  R13.12 787.22        OP SLP Assessment/Plan - 25  7983          SLP Plan    Plan Comments Continue to follow 1x per week. Encouraged compliance with home excercise program. Patient OK to continue PO as tolerated; although, he does not wish to at this time due to pain. Discussed options that were runny purees and encouraged continued liquid intake PO. Encouraged increased use of salt/soda rinse.  -MG              User Key  (r) = Recorded By, (t) = Taken By, (c) = Cosigned By      Initials Name Provider Type    MG Ruth Sherwood MS CCC-SLP Speech and Language Pathologist                                       SLP OP Goals       Row Name 03/12/25 3305          Goal Type Needed    Goal Type Needed Other Adult Goals  -MG        Subjective Comments    Subjective Comments Patient seen today for swallowing therapy.  -MG        Subjective Pain    Able to rate subjective pain? yes  -MG     Pre-Treatment Pain Level 6  -MG     Post-Treatment Pain Level 6  -MG     Subjective Pain Comment Worsens with swallowing.  -MG        Other Goals    Other Adult Goal- 1  Patient will increase swallowing frequency during XRT in order to maintain current swallow function once XRT is completed.  -MG     Status: Other Adult Goal- 1 Progressing as expected  -MG     Comments: Other Adult Goal- 1 See note  -MG     Other Adult Goal- 2  Patient will tolerate a regular diet with thin liquids throughout and following treatment without complications such as aspiration pneumonia or overt s/s of aspiration.  -MG     Status: Other Adult Goal- 2 Progressing as expected  -MG     Comments: Other Adult Goal- 2 See note  -MG     Other Adult Goal- 3  Patient will complete oral care daily in order to promote healthy oral mucosa throughout XRT.  -MG     Status: Other Adult Goal- 3 Progressing as expected  -MG     Comments: Other Adult Goal- 3 See note  -MG     Other Adult Goal- 4  Patient will maintain adequate hydration by drinking needed amount of water by mouth.  -MG     Status: Other Adult Goal- 4  Progressing as expected  -MG     Comments: Other Adult Goal- 4 See note  -MG     Other Adult Goal- 5  Patient will maintain adequate nutrition by mouth by implementing six meals a day into their healthy habits.  -MG     Status: Other Adult Goal- 5 Progressing as expected  -MG     Comments: Other Adult Goal- 5 See note  -MG     Other Adult Goal- 6  Patient will complete swallowing exercises three times a day in order to maintain current swallow function and range of motion of oral and pharyngeal structures.  -MG     Status: Other Adult Goal- 6 Progressing as expected  -MG     Comments: Other Adult Goal- 6 See note  -MG     Other Adult Goal- 7  Patient will continue to be assessed/monitored for increasing dysphagia and other effects of XRT on the swallow up until 3 months post XRT.  -MG     Status: Other Adult Goal- 7 Progressing as expected  -MG     Comments: Other Adult Goal- 7 See note  -MG        SLP Time Calculation    SLP Goal Re-Cert Due Date 06/05/25  -MG               User Key  (r) = Recorded By, (t) = Taken By, (c) = Cosigned By      Initials Name Provider Type    Ruth Pearson MS CCC-SLP Speech and Language Pathologist                   OP SLP Education       Row Name 03/12/25 1403       Education    Barriers to Learning No barriers identified  -MG    Education Provided Patient requires further education on strategies, risks  -MG    Assessed Learning readiness;Learning preferences;Learning motivation;Learning needs  -MG    Learning Motivation Strong  -MG    Learning Method Explanation  -MG    Teaching Response Verbalized understanding  -MG    Education Comments Current symptoms reviewed with recommendations given. Discussed importance of continued home excercise program to be completed 3 times per day.  -MG              User Key  (r) = Recorded By, (t) = Taken By, (c) = Cosigned By      Initials Name Effective Dates    Ruth Pearson MS CCC-SLP 07/11/23 -                         Time  Calculation:   SLP Start Time: 1333  SLP Stop Time: 1403  SLP Time Calculation (min): 30 min  Untimed Charges  86642-GR Treatment Swallow Minutes: 30  Total Minutes  Untimed Charges Total Minutes: 30   Total Minutes: 30                 Ruth Sherwood MS CCC-SLP  3/12/2025

## 2025-03-13 ENCOUNTER — HOSPITAL ENCOUNTER (OUTPATIENT)
Dept: RADIATION ONCOLOGY | Facility: HOSPITAL | Age: 75
Discharge: HOME | End: 2025-03-13

## 2025-03-13 LAB
RAD ONC ARIA COURSE ID: NORMAL
RAD ONC ARIA COURSE INTENT: NORMAL
RAD ONC ARIA COURSE LAST TREATMENT DATE: NORMAL
RAD ONC ARIA COURSE START DATE: NORMAL
RAD ONC ARIA COURSE TREATMENT ELAPSED DAYS: 14
RAD ONC ARIA FIRST TREATMENT DATE: NORMAL
RAD ONC ARIA PLAN FRACTIONS TREATED TO DATE: 11
RAD ONC ARIA PLAN ID: NORMAL
RAD ONC ARIA PLAN PRESCRIBED DOSE PER FRACTION: 2 GY
RAD ONC ARIA PLAN PRIMARY REFERENCE POINT: NORMAL
RAD ONC ARIA PLAN TOTAL FRACTIONS PRESCRIBED: 35
RAD ONC ARIA PLAN TOTAL PRESCRIBED DOSE: 7000 CGY
RAD ONC ARIA REFERENCE POINT DOSAGE GIVEN TO DATE: 22 GY
RAD ONC ARIA REFERENCE POINT ID: NORMAL
RAD ONC ARIA REFERENCE POINT SESSION DOSAGE GIVEN: 2 GY

## 2025-03-13 PROCEDURE — 77427 RADIATION TX MANAGEMENT X5: CPT | Performed by: RADIOLOGY

## 2025-03-13 PROCEDURE — 77386: CPT | Performed by: RADIOLOGY

## 2025-03-13 PROCEDURE — 77014 CHG CT GUIDANCE RADIATION THERAPY FLDS PLACEMENT: CPT | Performed by: RADIOLOGY

## 2025-03-14 ENCOUNTER — HOSPITAL ENCOUNTER (OUTPATIENT)
Dept: RADIATION ONCOLOGY | Facility: HOSPITAL | Age: 75
Discharge: HOME | End: 2025-03-14

## 2025-03-14 LAB
RAD ONC ARIA COURSE ID: NORMAL
RAD ONC ARIA COURSE INTENT: NORMAL
RAD ONC ARIA COURSE LAST TREATMENT DATE: NORMAL
RAD ONC ARIA COURSE START DATE: NORMAL
RAD ONC ARIA COURSE TREATMENT ELAPSED DAYS: 15
RAD ONC ARIA FIRST TREATMENT DATE: NORMAL
RAD ONC ARIA PLAN FRACTIONS TREATED TO DATE: 12
RAD ONC ARIA PLAN ID: NORMAL
RAD ONC ARIA PLAN PRESCRIBED DOSE PER FRACTION: 2 GY
RAD ONC ARIA PLAN PRIMARY REFERENCE POINT: NORMAL
RAD ONC ARIA PLAN TOTAL FRACTIONS PRESCRIBED: 35
RAD ONC ARIA PLAN TOTAL PRESCRIBED DOSE: 7000 CGY
RAD ONC ARIA REFERENCE POINT DOSAGE GIVEN TO DATE: 24 GY
RAD ONC ARIA REFERENCE POINT ID: NORMAL
RAD ONC ARIA REFERENCE POINT SESSION DOSAGE GIVEN: 2 GY

## 2025-03-14 PROCEDURE — 77014 CHG CT GUIDANCE RADIATION THERAPY FLDS PLACEMENT: CPT | Performed by: RADIOLOGY

## 2025-03-14 PROCEDURE — 77386: CPT | Performed by: RADIOLOGY

## 2025-03-17 ENCOUNTER — HOSPITAL ENCOUNTER (OUTPATIENT)
Dept: RADIATION ONCOLOGY | Facility: HOSPITAL | Age: 75
Discharge: HOME | End: 2025-03-17
Payer: MEDICARE

## 2025-03-17 LAB
RAD ONC ARIA COURSE ID: NORMAL
RAD ONC ARIA COURSE INTENT: NORMAL
RAD ONC ARIA COURSE LAST TREATMENT DATE: NORMAL
RAD ONC ARIA COURSE START DATE: NORMAL
RAD ONC ARIA COURSE TREATMENT ELAPSED DAYS: 18
RAD ONC ARIA FIRST TREATMENT DATE: NORMAL
RAD ONC ARIA PLAN FRACTIONS TREATED TO DATE: 13
RAD ONC ARIA PLAN ID: NORMAL
RAD ONC ARIA PLAN PRESCRIBED DOSE PER FRACTION: 2 GY
RAD ONC ARIA PLAN PRIMARY REFERENCE POINT: NORMAL
RAD ONC ARIA PLAN TOTAL FRACTIONS PRESCRIBED: 35
RAD ONC ARIA PLAN TOTAL PRESCRIBED DOSE: 7000 CGY
RAD ONC ARIA REFERENCE POINT DOSAGE GIVEN TO DATE: 26 GY
RAD ONC ARIA REFERENCE POINT ID: NORMAL
RAD ONC ARIA REFERENCE POINT SESSION DOSAGE GIVEN: 2 GY

## 2025-03-17 PROCEDURE — 77386: CPT | Performed by: RADIOLOGY

## 2025-03-17 PROCEDURE — 77014 CHG CT GUIDANCE RADIATION THERAPY FLDS PLACEMENT: CPT | Performed by: RADIOLOGY

## 2025-03-18 ENCOUNTER — HOSPITAL ENCOUNTER (OUTPATIENT)
Dept: RADIATION ONCOLOGY | Facility: HOSPITAL | Age: 75
Discharge: HOME | End: 2025-03-18

## 2025-03-18 LAB
RAD ONC ARIA COURSE ID: NORMAL
RAD ONC ARIA COURSE INTENT: NORMAL
RAD ONC ARIA COURSE LAST TREATMENT DATE: NORMAL
RAD ONC ARIA COURSE START DATE: NORMAL
RAD ONC ARIA COURSE TREATMENT ELAPSED DAYS: 19
RAD ONC ARIA FIRST TREATMENT DATE: NORMAL
RAD ONC ARIA PLAN FRACTIONS TREATED TO DATE: 14
RAD ONC ARIA PLAN ID: NORMAL
RAD ONC ARIA PLAN PRESCRIBED DOSE PER FRACTION: 2 GY
RAD ONC ARIA PLAN PRIMARY REFERENCE POINT: NORMAL
RAD ONC ARIA PLAN TOTAL FRACTIONS PRESCRIBED: 35
RAD ONC ARIA PLAN TOTAL PRESCRIBED DOSE: 7000 CGY
RAD ONC ARIA REFERENCE POINT DOSAGE GIVEN TO DATE: 28 GY
RAD ONC ARIA REFERENCE POINT ID: NORMAL
RAD ONC ARIA REFERENCE POINT SESSION DOSAGE GIVEN: 2 GY

## 2025-03-18 PROCEDURE — 77386: CPT | Performed by: RADIOLOGY

## 2025-03-18 PROCEDURE — 77014 CHG CT GUIDANCE RADIATION THERAPY FLDS PLACEMENT: CPT | Performed by: RADIOLOGY

## 2025-03-19 ENCOUNTER — TREATMENT (OUTPATIENT)
Dept: SPEECH THERAPY | Facility: HOSPITAL | Age: 75
End: 2025-03-19
Payer: MEDICARE

## 2025-03-19 DIAGNOSIS — Z72.0 TOBACCO USER: ICD-10-CM

## 2025-03-19 DIAGNOSIS — B37.0 ORAL PHARYNGEAL CANDIDIASIS: Primary | ICD-10-CM

## 2025-03-19 DIAGNOSIS — R13.12 OROPHARYNGEAL DYSPHAGIA: Primary | ICD-10-CM

## 2025-03-19 DIAGNOSIS — C32.1 SQUAMOUS CELL CARCINOMA OF EPIGLOTTIS: ICD-10-CM

## 2025-03-19 LAB
RAD ONC ARIA COURSE ID: NORMAL
RAD ONC ARIA COURSE INTENT: NORMAL
RAD ONC ARIA COURSE LAST TREATMENT DATE: NORMAL
RAD ONC ARIA COURSE START DATE: NORMAL
RAD ONC ARIA COURSE TREATMENT ELAPSED DAYS: 20
RAD ONC ARIA FIRST TREATMENT DATE: NORMAL
RAD ONC ARIA PLAN FRACTIONS TREATED TO DATE: 15
RAD ONC ARIA PLAN ID: NORMAL
RAD ONC ARIA PLAN PRESCRIBED DOSE PER FRACTION: 2 GY
RAD ONC ARIA PLAN PRIMARY REFERENCE POINT: NORMAL
RAD ONC ARIA PLAN TOTAL FRACTIONS PRESCRIBED: 35
RAD ONC ARIA PLAN TOTAL PRESCRIBED DOSE: 7000 CGY
RAD ONC ARIA REFERENCE POINT DOSAGE GIVEN TO DATE: 30 GY
RAD ONC ARIA REFERENCE POINT ID: NORMAL
RAD ONC ARIA REFERENCE POINT SESSION DOSAGE GIVEN: 2 GY

## 2025-03-19 PROCEDURE — 77336 RADIATION PHYSICS CONSULT: CPT | Performed by: RADIOLOGY

## 2025-03-19 PROCEDURE — 92526 ORAL FUNCTION THERAPY: CPT | Performed by: SPEECH-LANGUAGE PATHOLOGIST

## 2025-03-19 PROCEDURE — 77386: CPT | Performed by: RADIOLOGY

## 2025-03-19 PROCEDURE — 77014 CHG CT GUIDANCE RADIATION THERAPY FLDS PLACEMENT: CPT | Performed by: RADIOLOGY

## 2025-03-19 RX ORDER — FLUCONAZOLE 100 MG/1
100 TABLET ORAL DAILY
Qty: 11 TABLET | Refills: 0 | Status: SHIPPED | OUTPATIENT
Start: 2025-03-19

## 2025-03-19 RX ORDER — OXYCODONE HCL 5 MG/5 ML
5 SOLUTION, ORAL ORAL EVERY 6 HOURS PRN
Qty: 473 ML | Refills: 0 | Status: SHIPPED | OUTPATIENT
Start: 2025-03-19

## 2025-03-19 NOTE — THERAPY TREATMENT NOTE
Outpatient Speech Language Pathology   Adult Swallow Treatment Note       Patient Name: Se Ramirez  : 1950  MRN: [id number]  Today's Date: 3/19/2025         Visit Date: 2025     Swallow treatment completed. Patient reports increased pain today. 10/10. Reports pain in his back, throat, and stomach. He has lost a significant amount of weight in the last week, down 18lbs. He reports he has not eaten anything or drank anything due to pain. It is difficult to say if he is truly having difficulty swallowing or if it is solely related to pain. We discussed allowance of PO if he feels comfortable once pain is better managed. We discussed overt s/s of aspiration to watch for and when to d/c intake. He did take a small sip of water but he reports he does this only for his dry mouth. He then coughed and spit it out. He is only tolerating 2 cartons of feedings a day. He is not completing exercises daily.     Continue to follow 1x per week. Encouraged compliance with home excercise program. Encouraged importance of establishing adequate intake via Gtube as he does not feel comfortable eating PO due to pain. OK for soft items, puree, and thin liquid intake as tolerated. Educated on signs/symptoms of aspiration and when to d/c PO.     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 Oropharyngeal dysphagia  R13.12 787.22        OP SLP Assessment/Plan - 03/19/25 1441          SLP Plan    Plan Comments Continue to follow 1x per week. Encouraged compliance with home excercise program. Encouraged importance of establishing adequate intake via Gtube as he does not feel comfortable eating PO due to pain. OK for soft items, puree, and thin liquid intake as tolerated. Educated on signs/symptoms of aspiration and when to d/c PO.  -MG              User Key  (r) = Recorded By, (t) = Taken By, (c) = Cosigned By      Initials Name Provider Type    MG Ruth Sherwood MS CCC-SLP Speech and Language Pathologist                                       SLP OP Goals       Row Name 03/19/25 1306          Goal Type Needed    Goal Type Needed Other Adult Goals  -MG        Subjective Comments    Subjective Comments Patient was seen for swallowing therapy.  -MG        Subjective Pain    Able to rate subjective pain? yes  -MG     Pre-Treatment Pain Level 10  -MG     Post-Treatment Pain Level 10  -MG        Other Goals    Other Adult Goal- 1  Patient will increase swallowing frequency during XRT in order to maintain current swallow function once XRT is completed.  -MG     Status: Other Adult Goal- 1 Progressing as expected  -MG     Comments: Other Adult Goal- 1 See note  -MG     Other Adult Goal- 2  Patient will tolerate a regular diet with thin liquids throughout and following treatment without complications such as aspiration pneumonia or overt s/s of aspiration.  -MG     Status: Other Adult Goal- 2 Progressing as expected  -MG     Comments: Other Adult Goal- 2 See note  -MG     Other Adult Goal- 3  Patient will complete oral care daily in order to promote healthy  oral mucosa throughout XRT.  -MG     Status: Other Adult Goal- 3 Progressing as expected  -MG     Comments: Other Adult Goal- 3 See note  -MG     Other Adult Goal- 4  Patient will maintain adequate hydration by drinking needed amount of water by mouth.  -MG     Status: Other Adult Goal- 4 Progressing as expected  -MG     Comments: Other Adult Goal- 4 See note  -MG     Other Adult Goal- 5  Patient will maintain adequate nutrition by mouth by implementing six meals a day into their healthy habits.  -MG     Status: Other Adult Goal- 5 Progressing as expected  -MG     Comments: Other Adult Goal- 5 See note  -MG     Other Adult Goal- 6  Patient will complete swallowing exercises three times a day in order to maintain current swallow function and range of motion of oral and pharyngeal structures.  -MG     Status: Other Adult Goal- 6 Progressing as expected  -MG     Comments: Other Adult Goal- 6 See note  -MG     Other Adult Goal- 7  Patient will continue to be assessed/monitored for increasing dysphagia and other effects of XRT on the swallow up until 3 months post XRT.  -MG     Status: Other Adult Goal- 7 Progressing as expected  -MG     Comments: Other Adult Goal- 7 See note  -MG        SLP Time Calculation    SLP Goal Re-Cert Due Date 06/05/25  -MG               User Key  (r) = Recorded By, (t) = Taken By, (c) = Cosigned By      Initials Name Provider Type    MG Ruth Sherwood MS CCC-SLP Speech and Language Pathologist                   OP SLP Education       Row Name 03/19/25 1444       Education    Barriers to Learning No barriers identified  -MG    Education Provided Patient requires further education on strategies, risks  -MG    Assessed Learning readiness;Learning preferences;Learning motivation;Learning needs  -MG    Learning Motivation Strong  -MG    Learning Method Explanation  -MG    Teaching Response Verbalized understanding  -MG    Education Comments Current symptoms reviewed with recommendations given.  Discussed importance of continued home excercise program to be completed 3 times per day.  -MG              User Key  (r) = Recorded By, (t) = Taken By, (c) = Cosigned By      Initials Name Effective Dates    Ruth Pearson MS CCC-SLP 07/11/23 -                         Time Calculation:   SLP Start Time: 1306  SLP Stop Time: 1334  SLP Time Calculation (min): 28 min  Untimed Charges  [id number]-XE Treatment Swallow Minutes: 28  Total Minutes  Untimed Charges Total Minutes: 28   Total Minutes: 28                 Ruth Sherwood MS CCC-SLP  3/19/2025

## 2025-03-20 ENCOUNTER — HOSPITAL ENCOUNTER (OUTPATIENT)
Dept: RADIATION ONCOLOGY | Facility: HOSPITAL | Age: 75
Setting detail: RADIATION/ONCOLOGY SERIES
Discharge: HOME | End: 2025-03-20
Payer: MEDICARE

## 2025-03-20 LAB
RAD ONC ARIA COURSE ID: NORMAL
RAD ONC ARIA COURSE INTENT: NORMAL
RAD ONC ARIA COURSE LAST TREATMENT DATE: NORMAL
RAD ONC ARIA COURSE START DATE: NORMAL
RAD ONC ARIA COURSE TREATMENT ELAPSED DAYS: 21
RAD ONC ARIA FIRST TREATMENT DATE: NORMAL
RAD ONC ARIA PLAN FRACTIONS TREATED TO DATE: 16
RAD ONC ARIA PLAN ID: NORMAL
RAD ONC ARIA PLAN PRESCRIBED DOSE PER FRACTION: 2 GY
RAD ONC ARIA PLAN PRIMARY REFERENCE POINT: NORMAL
RAD ONC ARIA PLAN TOTAL FRACTIONS PRESCRIBED: 35
RAD ONC ARIA PLAN TOTAL PRESCRIBED DOSE: 7000 CGY
RAD ONC ARIA REFERENCE POINT DOSAGE GIVEN TO DATE: 32 GY
RAD ONC ARIA REFERENCE POINT ID: NORMAL
RAD ONC ARIA REFERENCE POINT SESSION DOSAGE GIVEN: 2 GY

## 2025-03-20 PROCEDURE — 77386: CPT | Performed by: RADIOLOGY

## 2025-03-20 PROCEDURE — 77427 RADIATION TX MANAGEMENT X5: CPT | Performed by: RADIOLOGY

## 2025-03-20 PROCEDURE — 77014 CHG CT GUIDANCE RADIATION THERAPY FLDS PLACEMENT: CPT | Performed by: RADIOLOGY

## 2025-03-21 ENCOUNTER — HOSPITAL ENCOUNTER (OUTPATIENT)
Dept: RADIATION ONCOLOGY | Facility: HOSPITAL | Age: 75
Setting detail: RADIATION/ONCOLOGY SERIES
Discharge: HOME | End: 2025-03-21
Payer: MEDICARE

## 2025-03-21 LAB
RAD ONC ARIA COURSE ID: NORMAL
RAD ONC ARIA COURSE INTENT: NORMAL
RAD ONC ARIA COURSE LAST TREATMENT DATE: NORMAL
RAD ONC ARIA COURSE START DATE: NORMAL
RAD ONC ARIA COURSE TREATMENT ELAPSED DAYS: 22
RAD ONC ARIA FIRST TREATMENT DATE: NORMAL
RAD ONC ARIA PLAN FRACTIONS TREATED TO DATE: 17
RAD ONC ARIA PLAN ID: NORMAL
RAD ONC ARIA PLAN PRESCRIBED DOSE PER FRACTION: 2 GY
RAD ONC ARIA PLAN PRIMARY REFERENCE POINT: NORMAL
RAD ONC ARIA PLAN TOTAL FRACTIONS PRESCRIBED: 35
RAD ONC ARIA PLAN TOTAL PRESCRIBED DOSE: 7000 CGY
RAD ONC ARIA REFERENCE POINT DOSAGE GIVEN TO DATE: 34 GY
RAD ONC ARIA REFERENCE POINT ID: NORMAL
RAD ONC ARIA REFERENCE POINT SESSION DOSAGE GIVEN: 2 GY

## 2025-03-21 PROCEDURE — 77014 CHG CT GUIDANCE RADIATION THERAPY FLDS PLACEMENT: CPT | Performed by: RADIOLOGY

## 2025-03-21 PROCEDURE — 77386: CPT | Performed by: RADIOLOGY

## 2025-03-24 ENCOUNTER — HOSPITAL ENCOUNTER (OUTPATIENT)
Dept: RADIATION ONCOLOGY | Facility: HOSPITAL | Age: 75
Setting detail: RADIATION/ONCOLOGY SERIES
Discharge: HOME | End: 2025-03-24
Payer: MEDICARE

## 2025-03-24 LAB
RAD ONC ARIA COURSE ID: NORMAL
RAD ONC ARIA COURSE INTENT: NORMAL
RAD ONC ARIA COURSE LAST TREATMENT DATE: NORMAL
RAD ONC ARIA COURSE START DATE: NORMAL
RAD ONC ARIA COURSE TREATMENT ELAPSED DAYS: 25
RAD ONC ARIA FIRST TREATMENT DATE: NORMAL
RAD ONC ARIA PLAN FRACTIONS TREATED TO DATE: 18
RAD ONC ARIA PLAN ID: NORMAL
RAD ONC ARIA PLAN PRESCRIBED DOSE PER FRACTION: 2 GY
RAD ONC ARIA PLAN PRIMARY REFERENCE POINT: NORMAL
RAD ONC ARIA PLAN TOTAL FRACTIONS PRESCRIBED: 35
RAD ONC ARIA PLAN TOTAL PRESCRIBED DOSE: 7000 CGY
RAD ONC ARIA REFERENCE POINT DOSAGE GIVEN TO DATE: 36 GY
RAD ONC ARIA REFERENCE POINT ID: NORMAL
RAD ONC ARIA REFERENCE POINT SESSION DOSAGE GIVEN: 2 GY

## 2025-03-24 PROCEDURE — 77386: CPT | Performed by: RADIOLOGY

## 2025-03-24 PROCEDURE — 77014 CHG CT GUIDANCE RADIATION THERAPY FLDS PLACEMENT: CPT | Performed by: RADIOLOGY

## 2025-03-25 ENCOUNTER — HOSPITAL ENCOUNTER (OUTPATIENT)
Dept: RADIATION ONCOLOGY | Facility: HOSPITAL | Age: 75
Setting detail: RADIATION/ONCOLOGY SERIES
Discharge: HOME | End: 2025-03-25
Payer: MEDICARE

## 2025-03-25 LAB
RAD ONC ARIA COURSE ID: NORMAL
RAD ONC ARIA COURSE INTENT: NORMAL
RAD ONC ARIA COURSE LAST TREATMENT DATE: NORMAL
RAD ONC ARIA COURSE START DATE: NORMAL
RAD ONC ARIA COURSE TREATMENT ELAPSED DAYS: 26
RAD ONC ARIA FIRST TREATMENT DATE: NORMAL
RAD ONC ARIA PLAN FRACTIONS TREATED TO DATE: 19
RAD ONC ARIA PLAN ID: NORMAL
RAD ONC ARIA PLAN PRESCRIBED DOSE PER FRACTION: 2 GY
RAD ONC ARIA PLAN PRIMARY REFERENCE POINT: NORMAL
RAD ONC ARIA PLAN TOTAL FRACTIONS PRESCRIBED: 35
RAD ONC ARIA PLAN TOTAL PRESCRIBED DOSE: 7000 CGY
RAD ONC ARIA REFERENCE POINT DOSAGE GIVEN TO DATE: 38 GY
RAD ONC ARIA REFERENCE POINT ID: NORMAL
RAD ONC ARIA REFERENCE POINT SESSION DOSAGE GIVEN: 2 GY

## 2025-03-25 PROCEDURE — 77014 CHG CT GUIDANCE RADIATION THERAPY FLDS PLACEMENT: CPT | Performed by: RADIOLOGY

## 2025-03-25 PROCEDURE — 77386: CPT | Performed by: RADIOLOGY

## 2025-03-26 ENCOUNTER — HOSPITAL ENCOUNTER (INPATIENT)
Facility: HOSPITAL | Age: 75
LOS: 3 days | Discharge: HOME | End: 2025-03-29
Attending: FAMILY MEDICINE | Admitting: INTERNAL MEDICINE
Payer: MEDICARE

## 2025-03-26 ENCOUNTER — APPOINTMENT (OUTPATIENT)
Dept: GENERAL RADIOLOGY | Facility: HOSPITAL | Age: 75
End: 2025-03-26
Payer: MEDICARE

## 2025-03-26 ENCOUNTER — TREATMENT (OUTPATIENT)
Dept: SPEECH THERAPY | Facility: HOSPITAL | Age: 75
End: 2025-03-26
Payer: MEDICARE

## 2025-03-26 ENCOUNTER — APPOINTMENT (OUTPATIENT)
Dept: RADIATION ONCOLOGY | Facility: HOSPITAL | Age: 75
End: 2025-03-26
Payer: MEDICARE

## 2025-03-26 ENCOUNTER — APPOINTMENT (OUTPATIENT)
Dept: CT IMAGING | Facility: HOSPITAL | Age: 75
End: 2025-03-26
Payer: MEDICARE

## 2025-03-26 DIAGNOSIS — R13.12 OROPHARYNGEAL DYSPHAGIA: ICD-10-CM

## 2025-03-26 DIAGNOSIS — N17.9 ACUTE RENAL FAILURE, UNSPECIFIED ACUTE RENAL FAILURE TYPE: ICD-10-CM

## 2025-03-26 DIAGNOSIS — R79.89 ELEVATED TROPONIN: ICD-10-CM

## 2025-03-26 DIAGNOSIS — R13.12 OROPHARYNGEAL DYSPHAGIA: Primary | ICD-10-CM

## 2025-03-26 DIAGNOSIS — R06.02 SHORTNESS OF BREATH: Primary | ICD-10-CM

## 2025-03-26 DIAGNOSIS — E87.0 HYPERNATREMIA: ICD-10-CM

## 2025-03-26 LAB
ALBUMIN SERPL-MCNC: 3.9 G/DL (ref 3.5–5.2)
ALBUMIN/GLOB SERPL: 0.8 G/DL
ALP SERPL-CCNC: 93 U/L (ref 39–117)
ALT SERPL W P-5'-P-CCNC: 58 U/L (ref 1–41)
ANION GAP SERPL CALCULATED.3IONS-SCNC: 19 MMOL/L (ref 5–15)
ANISOCYTOSIS BLD QL: ABNORMAL
AST SERPL-CCNC: 53 U/L (ref 1–40)
BASOPHILS # BLD MANUAL: 0 10*3/MM3 (ref 0–0.2)
BASOPHILS NFR BLD MANUAL: 0 % (ref 0–1.5)
BILIRUB SERPL-MCNC: 0.6 MG/DL (ref 0–1.2)
BUN SERPL-MCNC: 165 MG/DL (ref 8–23)
BUN/CREAT SERPL: 41.4 (ref 7–25)
CALCIUM SPEC-SCNC: 10.5 MG/DL (ref 8.6–10.5)
CHLORIDE SERPL-SCNC: 117 MMOL/L (ref 98–107)
CLUMPED PLATELETS: PRESENT
CO2 SERPL-SCNC: 24 MMOL/L (ref 22–29)
CREAT SERPL-MCNC: 3.99 MG/DL (ref 0.76–1.27)
DEPRECATED RDW RBC AUTO: 46.6 FL (ref 37–54)
EGFRCR SERPLBLD CKD-EPI 2021: 14.9 ML/MIN/1.73
EOSINOPHIL # BLD MANUAL: 0.19 10*3/MM3 (ref 0–0.4)
EOSINOPHIL NFR BLD MANUAL: 1 % (ref 0.3–6.2)
ERYTHROCYTE [DISTWIDTH] IN BLOOD BY AUTOMATED COUNT: 12.9 % (ref 12.3–15.4)
GEN 5 1HR TROPONIN T REFLEX: 261 NG/L
GLOBULIN UR ELPH-MCNC: 4.8 GM/DL
GLUCOSE SERPL-MCNC: 141 MG/DL (ref 65–99)
HCT VFR BLD AUTO: 55.8 % (ref 37.5–51)
HGB BLD-MCNC: 17.4 G/DL (ref 13–17.7)
HOLD SPECIMEN: NORMAL
LYMPHOCYTES # BLD MANUAL: 1 10*3/MM3 (ref 0.7–3.1)
LYMPHOCYTES NFR BLD MANUAL: 2.1 % (ref 5–12)
MCH RBC QN AUTO: 30.7 PG (ref 26.6–33)
MCHC RBC AUTO-ENTMCNC: 31.2 G/DL (ref 31.5–35.7)
MCV RBC AUTO: 98.6 FL (ref 79–97)
MONOCYTES # BLD: 0.41 10*3/MM3 (ref 0.1–0.9)
NEUTROPHILS # BLD AUTO: 17.73 10*3/MM3 (ref 1.7–7)
NEUTROPHILS NFR BLD MANUAL: 91.8 % (ref 42.7–76)
NT-PROBNP SERPL-MCNC: 660.7 PG/ML (ref 0–1800)
PLATELET # BLD AUTO: 295 10*3/MM3 (ref 140–450)
PMV BLD AUTO: 11.9 FL (ref 6–12)
POTASSIUM SERPL-SCNC: 5.2 MMOL/L (ref 3.5–5.2)
PROT SERPL-MCNC: 8.7 G/DL (ref 6–8.5)
RAD ONC ARIA COURSE ID: NORMAL
RAD ONC ARIA COURSE INTENT: NORMAL
RAD ONC ARIA COURSE LAST TREATMENT DATE: NORMAL
RAD ONC ARIA COURSE START DATE: NORMAL
RAD ONC ARIA COURSE TREATMENT ELAPSED DAYS: 27
RAD ONC ARIA FIRST TREATMENT DATE: NORMAL
RAD ONC ARIA PLAN FRACTIONS TREATED TO DATE: 20
RAD ONC ARIA PLAN ID: NORMAL
RAD ONC ARIA PLAN PRESCRIBED DOSE PER FRACTION: 2 GY
RAD ONC ARIA PLAN PRIMARY REFERENCE POINT: NORMAL
RAD ONC ARIA PLAN TOTAL FRACTIONS PRESCRIBED: 35
RAD ONC ARIA PLAN TOTAL PRESCRIBED DOSE: 7000 CGY
RAD ONC ARIA REFERENCE POINT DOSAGE GIVEN TO DATE: 40 GY
RAD ONC ARIA REFERENCE POINT ID: NORMAL
RAD ONC ARIA REFERENCE POINT SESSION DOSAGE GIVEN: 2 GY
RBC # BLD AUTO: 5.66 10*6/MM3 (ref 4.14–5.8)
SODIUM SERPL-SCNC: 160 MMOL/L (ref 136–145)
TROPONIN T % DELTA: -1
TROPONIN T NUMERIC DELTA: -2 NG/L
TROPONIN T SERPL HS-MCNC: 263 NG/L
VARIANT LYMPHS NFR BLD MANUAL: 5.2 % (ref 19.6–45.3)
WBC MORPH BLD: NORMAL
WBC NRBC COR # BLD AUTO: 19.31 10*3/MM3 (ref 3.4–10.8)
WHOLE BLOOD HOLD COAG: NORMAL
WHOLE BLOOD HOLD SPECIMEN: NORMAL

## 2025-03-26 PROCEDURE — 25010000003 DEXTROSE 5 % SOLUTION: Performed by: INTERNAL MEDICINE

## 2025-03-26 PROCEDURE — 71046 X-RAY EXAM CHEST 2 VIEWS: CPT

## 2025-03-26 PROCEDURE — 25010000002 HEPARIN (PORCINE) PER 1000 UNITS: Performed by: INTERNAL MEDICINE

## 2025-03-26 PROCEDURE — 93005 ELECTROCARDIOGRAM TRACING: CPT | Performed by: FAMILY MEDICINE

## 2025-03-26 PROCEDURE — 83880 ASSAY OF NATRIURETIC PEPTIDE: CPT | Performed by: FAMILY MEDICINE

## 2025-03-26 PROCEDURE — 77014 CHG CT GUIDANCE RADIATION THERAPY FLDS PLACEMENT: CPT | Performed by: RADIOLOGY

## 2025-03-26 PROCEDURE — 77386: CPT | Performed by: RADIOLOGY

## 2025-03-26 PROCEDURE — 25810000003 LACTATED RINGERS SOLUTION: Performed by: FAMILY MEDICINE

## 2025-03-26 PROCEDURE — 92526 ORAL FUNCTION THERAPY: CPT | Performed by: SPEECH-LANGUAGE PATHOLOGIST

## 2025-03-26 PROCEDURE — 93010 ELECTROCARDIOGRAM REPORT: CPT | Performed by: INTERNAL MEDICINE

## 2025-03-26 PROCEDURE — 84484 ASSAY OF TROPONIN QUANT: CPT | Performed by: FAMILY MEDICINE

## 2025-03-26 PROCEDURE — 99285 EMERGENCY DEPT VISIT HI MDM: CPT

## 2025-03-26 PROCEDURE — 85025 COMPLETE CBC W/AUTO DIFF WBC: CPT | Performed by: FAMILY MEDICINE

## 2025-03-26 PROCEDURE — 36415 COLL VENOUS BLD VENIPUNCTURE: CPT

## 2025-03-26 PROCEDURE — 80053 COMPREHEN METABOLIC PANEL: CPT | Performed by: FAMILY MEDICINE

## 2025-03-26 PROCEDURE — 77336 RADIATION PHYSICS CONSULT: CPT | Performed by: RADIOLOGY

## 2025-03-26 PROCEDURE — 85007 BL SMEAR W/DIFF WBC COUNT: CPT | Performed by: FAMILY MEDICINE

## 2025-03-26 RX ORDER — SODIUM CHLORIDE 0.9 % (FLUSH) 0.9 %
10 SYRINGE (ML) INJECTION EVERY 12 HOURS SCHEDULED
Status: DISCONTINUED | OUTPATIENT
Start: 2025-03-26 | End: 2025-03-29 | Stop reason: HOSPADM

## 2025-03-26 RX ORDER — SODIUM CHLORIDE 9 MG/ML
40 INJECTION, SOLUTION INTRAVENOUS AS NEEDED
Status: DISCONTINUED | OUTPATIENT
Start: 2025-03-26 | End: 2025-03-29 | Stop reason: HOSPADM

## 2025-03-26 RX ORDER — SODIUM CHLORIDE 0.9 % (FLUSH) 0.9 %
10 SYRINGE (ML) INJECTION AS NEEDED
Status: DISCONTINUED | OUTPATIENT
Start: 2025-03-26 | End: 2025-03-29 | Stop reason: HOSPADM

## 2025-03-26 RX ORDER — BISACODYL 5 MG/1
5 TABLET, DELAYED RELEASE ORAL DAILY PRN
Status: DISCONTINUED | OUTPATIENT
Start: 2025-03-26 | End: 2025-03-29 | Stop reason: HOSPADM

## 2025-03-26 RX ORDER — ACETAMINOPHEN 325 MG/1
975 TABLET ORAL EVERY 8 HOURS SCHEDULED
Status: DISCONTINUED | OUTPATIENT
Start: 2025-03-26 | End: 2025-03-29 | Stop reason: HOSPADM

## 2025-03-26 RX ORDER — ALBUTEROL SULFATE 0.83 MG/ML
2.5 SOLUTION RESPIRATORY (INHALATION) EVERY 4 HOURS PRN
Status: DISCONTINUED | OUTPATIENT
Start: 2025-03-26 | End: 2025-03-29 | Stop reason: HOSPADM

## 2025-03-26 RX ORDER — HYDROCODONE BITARTRATE AND ACETAMINOPHEN 5; 325 MG/1; MG/1
1 TABLET ORAL EVERY 8 HOURS PRN
Refills: 0 | Status: DISCONTINUED | OUTPATIENT
Start: 2025-03-26 | End: 2025-03-27

## 2025-03-26 RX ORDER — ASPIRIN 81 MG/1
81 TABLET ORAL DAILY
Status: DISCONTINUED | OUTPATIENT
Start: 2025-03-27 | End: 2025-03-28

## 2025-03-26 RX ORDER — DEXTROSE MONOHYDRATE 50 MG/ML
125 INJECTION, SOLUTION INTRAVENOUS CONTINUOUS
Status: DISCONTINUED | OUTPATIENT
Start: 2025-03-26 | End: 2025-03-29

## 2025-03-26 RX ORDER — ONDANSETRON 2 MG/ML
4 INJECTION INTRAMUSCULAR; INTRAVENOUS EVERY 6 HOURS PRN
Status: DISCONTINUED | OUTPATIENT
Start: 2025-03-26 | End: 2025-03-29 | Stop reason: HOSPADM

## 2025-03-26 RX ORDER — POLYETHYLENE GLYCOL 3350 17 G/17G
17 POWDER, FOR SOLUTION ORAL DAILY PRN
Status: DISCONTINUED | OUTPATIENT
Start: 2025-03-26 | End: 2025-03-29 | Stop reason: HOSPADM

## 2025-03-26 RX ORDER — HYDROCODONE BITARTRATE AND ACETAMINOPHEN 5; 325 MG/1; MG/1
1 TABLET ORAL EVERY 8 HOURS PRN
Status: DISCONTINUED | OUTPATIENT
Start: 2025-03-26 | End: 2025-03-26

## 2025-03-26 RX ORDER — BISACODYL 10 MG
10 SUPPOSITORY, RECTAL RECTAL DAILY PRN
Status: DISCONTINUED | OUTPATIENT
Start: 2025-03-26 | End: 2025-03-29 | Stop reason: HOSPADM

## 2025-03-26 RX ORDER — PANTOPRAZOLE SODIUM 40 MG/1
40 TABLET, DELAYED RELEASE ORAL
Status: DISCONTINUED | OUTPATIENT
Start: 2025-03-27 | End: 2025-03-28

## 2025-03-26 RX ORDER — HEPARIN SODIUM 5000 [USP'U]/ML
5000 INJECTION, SOLUTION INTRAVENOUS; SUBCUTANEOUS EVERY 12 HOURS SCHEDULED
Status: DISCONTINUED | OUTPATIENT
Start: 2025-03-26 | End: 2025-03-29 | Stop reason: HOSPADM

## 2025-03-26 RX ORDER — BUDESONIDE AND FORMOTEROL FUMARATE DIHYDRATE 160; 4.5 UG/1; UG/1
2 AEROSOL RESPIRATORY (INHALATION)
Status: DISCONTINUED | OUTPATIENT
Start: 2025-03-26 | End: 2025-03-26

## 2025-03-26 RX ORDER — CHOLECALCIFEROL (VITAMIN D3) 25 MCG
5000 TABLET ORAL DAILY
Status: DISCONTINUED | OUTPATIENT
Start: 2025-03-27 | End: 2025-03-29 | Stop reason: HOSPADM

## 2025-03-26 RX ORDER — ATORVASTATIN CALCIUM 10 MG/1
10 TABLET, FILM COATED ORAL DAILY
Status: DISCONTINUED | OUTPATIENT
Start: 2025-03-27 | End: 2025-03-27

## 2025-03-26 RX ORDER — OXYCODONE HCL 5 MG/5 ML
5 SOLUTION, ORAL ORAL EVERY 6 HOURS PRN
Status: DISCONTINUED | OUTPATIENT
Start: 2025-03-26 | End: 2025-03-29 | Stop reason: HOSPADM

## 2025-03-26 RX ORDER — ALBUTEROL SULFATE 90 UG/1
2 INHALANT RESPIRATORY (INHALATION) EVERY 4 HOURS PRN
Status: DISCONTINUED | OUTPATIENT
Start: 2025-03-26 | End: 2025-03-26 | Stop reason: CLARIF

## 2025-03-26 RX ORDER — AMOXICILLIN 250 MG
2 CAPSULE ORAL 2 TIMES DAILY PRN
Status: DISCONTINUED | OUTPATIENT
Start: 2025-03-26 | End: 2025-03-29 | Stop reason: HOSPADM

## 2025-03-26 RX ADMIN — DEXTROSE MONOHYDRATE 125 ML/HR: 50 INJECTION, SOLUTION INTRAVENOUS at 23:47

## 2025-03-26 RX ADMIN — ACETAMINOPHEN 975 MG: 325 TABLET, FILM COATED ORAL at 23:46

## 2025-03-26 RX ADMIN — Medication 10 ML: at 23:47

## 2025-03-26 RX ADMIN — HEPARIN SODIUM 5000 UNITS: 5000 INJECTION, SOLUTION INTRAVENOUS; SUBCUTANEOUS at 23:46

## 2025-03-26 RX ADMIN — SODIUM CHLORIDE, SODIUM LACTATE, POTASSIUM CHLORIDE, CALCIUM CHLORIDE 1000 ML: 20; 30; 600; 310 INJECTION, SOLUTION INTRAVENOUS at 14:57

## 2025-03-26 RX ADMIN — OXYCODONE HYDROCHLORIDE 5 MG: 5 SOLUTION ORAL at 20:35

## 2025-03-26 NOTE — ED PROVIDER NOTES
Subjective   History of Present Illness  74-year-old male with throat cancer.  States that he has had difficulty eating and drinking.  He does have a feeding tube.  But he just however feels like is not getting enough nutrition.  Feels dehydrated.  States that he is also been feeling short of breath.  But does nothing new and other ordinary.  No chest pain.  Patient denies any fevers or chills.  Patient denies any other symptoms at this time.      Review of Systems   All other systems reviewed and are negative.      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    History of adenomatous polyp of colon     History of colon polyps     History of lung cancer     Right lung    Hyperlipidemia        No Known Allergies    Past Surgical History:   Procedure Laterality Date    BRONCHOSCOPY N/A 02/15/2021    Procedure: BRONCHOSCOPY;  Surgeon: Bruce Murillo MD;  Location: [facility];  Service: Cardiothoracic;  Laterality: N/A;    CATARACT EXTRACTION      CHOLECYSTECTOMY      COLONOSCOPY  11/20/2012    One 1cm tubular adenomatous polyp in the sigmoid colon; One 5mm hyperplastic polyp in the rectum; Diverticulosis; Repeat 4 years     COLONOSCOPY N/A 03/30/2017    Two 4-6mm tubular adenomatous polyps at the hepatic flexure; One 5mm tubular adenomatous polyp in the transverse colon; One 11mm tubular adenomatous polyp in the sigmoid colon; One 6mm tubular adenomatous polyp in the rectum; Diverticulosis in the left colon; Repeat 3 years     COLONOSCOPY  12/17/2009    One less than 5mm tubular adenomatous polyp in the cecum; One 5mm hyperplastic polyp in the transverse; One less than 3mm hyperplastic polyp in the rectum; Diverticulosis; Repeat 3 years     COLONOSCOPY N/A 07/02/2020    Two 5-7mm tubular  adenomatous polyps at the hepatic flexure; One 6mm tubular adenomatous polyp in the transverse colon; Diverticulosis in the left colon; The entire examined colon is normal on direct and retroflexion views; Repeat 5 years    COLONOSCOPY N/A 06/29/2022    Diverticulosis in the left colon; One 6mm tubular adenomatous polyp in the descending colon; One 4mm tubular adenomatous polyp in the descending colon; Congested mucosa in the entire examined colon-biopsied; Repeat 5 years    ENDOSCOPY N/A 03/27/2019    Medium-sized HH; Normal stomach; Normal examined duodenum-biopsied    ENDOSCOPY N/A 06/09/2022    Small HH; Non-erosive gastritis-biopsied; Normal examined duodenum-biopsied    ENDOSCOPY N/A 2/24/2025    Procedure: ESOPHAGOGASTRODUODENOSCOPY WITH PERCUTANEOUS ENDOSCOPIC GASTROSTOMY TUBE INSERTION;  Surgeon: Amie Donnelly MD;  Location: [facility] ENDOSCOPY;  Service: General;  Laterality: N/A;  pre op:   post op:  pcp:Diego Lucio MD    ERCP  07/30/2024    Dr. Buzz Dangelo-Choledocholithiasis status post balloon sweep extraction wtih balloon assisted sphincteroplasty; Placement of biliary stent as well as prophylactic pancreatic stent; Repeat ERCP 3 months for biliary stent removal    FOOT SURGERY Left     ORIF    HERNIA REPAIR      INNER EAR SURGERY      LARYNGOSCOPY N/A 1/20/2025    Procedure: DIRECT LARYNGOSCOPY WITH EXCISION OF LESION;  Surgeon: Antelmo Dangelo MD;  Location: [facility] OR;  Service: ENT;  Laterality: N/A;    MEDIASTINOSCOPY N/A 02/15/2021    Procedure: CERVICAL MEDIASTINOSCOPY WITH LYMPH NODE DISECTION;  Surgeon: Bruce Murillo MD;  Location: [facility] OR;  Service: Cardiothoracic;  Laterality: N/A;    PEG TUBE INSERTION N/A 2/24/2025    Procedure: PERCUTANEOUS ENDOSCOPIC GASTROSTOMY TUBE INSERTION;  Surgeon: Amie Donnelly MD;  Location: [facility] ENDOSCOPY;  Service: General;  Laterality: N/A;  pre op:  post op:  pcp:Diego Lucio MD    THORACOSCOPY Right 02/15/2021    Procedure:  THORACOSCOPY, WEDGE RESECTION OF RIGHT UPPER LOBE WITH FROZEN,  , MEDIASTINAL LYMPH NODE DISSECTION;  Surgeon: Bruce Murillo MD;  Location: [facility] OR;  Service: Cardiothoracic;  Laterality: Right;    TONSILLECTOMY         Family History   Problem Relation Age of Onset    Colon polyps Mother         Unknown age    Diabetes Mother     Colon cancer Neg Hx     Esophageal cancer Neg Hx     Liver cancer Neg Hx     Liver disease Neg Hx     Rectal cancer Neg Hx     Stomach cancer Neg Hx        Social History     Socioeconomic History    Marital status: Significant Other   Tobacco Use    Smoking status: Every Day     Current packs/day: 0.50     Average packs/day: 0.5 packs/day for 56.2 years (28.1 ttl pk-yrs)     Types: Cigarettes     Start date: 1/1/1969     Passive exposure: Current    Smokeless tobacco: Never    Tobacco comments:     Pt states about 7-8 cigarettes per day 7/3   Vaping Use    Vaping status: Never Used   Substance and Sexual Activity    Alcohol use: Not Currently     Alcohol/week: 3.0 standard drinks of alcohol     Types: 3 Shots of liquor per week     Comment: sober as of 06/01/2022    Drug use: Yes     Types: Marijuana     Comment: daily    Sexual activity: Defer           Objective   Physical Exam  Vitals and nursing note reviewed.   Constitutional:       Appearance: He is well-developed.   HENT:      Head: Normocephalic and atraumatic.      Mouth/Throat:      Mouth: Mucous membranes are moist.   Eyes:      Extraocular Movements: Extraocular movements intact.      Pupils: Pupils are equal, round, and reactive to light.   Cardiovascular:      Rate and Rhythm: Normal rate and regular rhythm.      Heart sounds: Normal heart sounds.   Pulmonary:      Effort: Pulmonary effort is normal.      Breath sounds: Normal breath sounds.   Abdominal:      General: Bowel sounds are normal.      Palpations: Abdomen is soft.      Tenderness: There is no abdominal tenderness.   Skin:     General: Skin is warm and dry.    Neurological:      General: No focal deficit present.      Mental Status: He is alert and oriented to person, place, and time.   Psychiatric:         Mood and Affect: Mood normal.         Behavior: Behavior normal.         Procedures           ED Course  ED Course as of 03/26/25 1725   Wed Mar 26, 2025   1622 EKG rate 110  Sinus tachycardia  No STEMI [RP]   1622 EKG rate 119  Sinus tachycardia  No STEMI [RP]      ED Course User Index  [RP] Christiano Berman MD                                                     Lab Results (last 24 hours)       Procedure Component Value Units Date/Time    CBC & Differential [[id number]]  (Abnormal) Collected: 03/26/25 1432    Specimen: Blood from Arm, Right Updated: 03/26/25 1516    Narrative:      The following orders were created for panel order CBC & Differential.  Procedure                               Abnormality         Status                     ---------                               -----------         ------                     CBC Auto Differential[[id number]]        Abnormal            Final result                 Please view results for these tests on the individual orders.    BNP [[id number]]  (Normal) Collected: 03/26/25 1432    Specimen: Blood from Arm, Right Updated: 03/26/25 1505     proBNP 660.7 pg/mL     Narrative:      This assay is used as an aid in the diagnosis of individuals suspected of having heart failure. It can be used as an aid in the diagnosis of acute decompensated heart failure (ADHF) in patients presenting with signs and symptoms of ADHF to the emergency department (ED). In addition, NT-proBNP of <300 pg/mL indicates ADHF is not likely.    Age Range Result Interpretation  NT-proBNP Concentration (pg/mL:      <50             Positive            >450                   Gray                 300-450                    Negative             <300    50-75           Positive            >900                  Gray                300-900                  Negative             <300      >75             Positive            >1800                  Gray                300-1800                  Negative            <300    High Sensitivity Troponin T [[id number]]  (Abnormal) Collected: 03/26/25 1432    Specimen: Blood from Arm, Right Updated: 03/26/25 1511     HS Troponin T 263 ng/L     Narrative:      High Sensitive Troponin T Reference Range:  <14.0 ng/L- Negative Female for AMI  <22.0 ng/L- Negative Male for AMI  >=14 - Abnormal Female indicating possible myocardial injury.  >=22 - Abnormal Male indicating possible myocardial injury.   Clinicians would have to utilize clinical acumen, EKG, Troponin, and serial changes to determine if it is an Acute Myocardial Infarction or myocardial injury due to an underlying chronic condition.         CBC Auto Differential [[id number]]  (Abnormal) Collected: 03/26/25 1432    Specimen: Blood from Arm, Right Updated: 03/26/25 1516     WBC 19.31 10*3/mm3      RBC 5.66 10*6/mm3      Hemoglobin 17.4 g/dL      Hematocrit 55.8 %      MCV 98.6 fL      MCH 30.7 pg      MCHC 31.2 g/dL      RDW 12.9 %      RDW-SD 46.6 fl      MPV 11.9 fL      Platelets 295 10*3/mm3     Narrative:      The previously reported component NRBC is no longer being reported. Previous result was 0.0 /100 WBC (Reference Range: 0.0-0.2 /100 WBC) on 3/26/2025 at 1450 CDT.    Manual Differential [[id number]]  (Abnormal) Collected: 03/26/25 1432    Specimen: Blood from Arm, Right Updated: 03/26/25 1516     Neutrophil % 91.8 %      Lymphocyte % 5.2 %      Monocyte % 2.1 %      Eosinophil % 1.0 %      Basophil % 0.0 %      Neutrophils Absolute 17.73 10*3/mm3      Lymphocytes Absolute 1.00 10*3/mm3      Monocytes Absolute 0.41 10*3/mm3      Eosinophils Absolute 0.19 10*3/mm3      Basophils Absolute 0.00 10*3/mm3      Anisocytosis Slight/1+     WBC Morphology Normal     Clumped Platelets Present    Comprehensive Metabolic Panel [[id number]]  (Abnormal) Collected: 03/26/25 1537    Specimen:  Blood from Arm, Right Updated: 03/26/25 1652     Glucose 141 mg/dL       mg/dL      Creatinine 3.99 mg/dL      Sodium 160 mmol/L      Potassium 5.2 mmol/L      Chloride 117 mmol/L      CO2 24.0 mmol/L      Calcium 10.5 mg/dL      Total Protein 8.7 g/dL      Albumin 3.9 g/dL      ALT (SGPT) 58 U/L      AST (SGOT) 53 U/L      Alkaline Phosphatase 93 U/L      Total Bilirubin 0.6 mg/dL      Globulin 4.8 gm/dL      A/G Ratio 0.8 g/dL      BUN/Creatinine Ratio 41.4     Anion Gap 19.0 mmol/L      eGFR 14.9 mL/min/1.73     Narrative:      GFR Categories in Chronic Kidney Disease (CKD)      GFR Category          GFR (mL/min/1.73)    Interpretation  G1                     90 or greater         Normal or high (1)  G2                      60-89                Mild decrease (1)  G3a                   45-59                Mild to moderate decrease  G3b                   30-44                Moderate to severe decrease  G4                    15-29                Severe decrease  G5                    14 or less           Kidney failure          (1)In the absence of evidence of kidney disease, neither GFR category G1 or G2 fulfill the criteria for CKD.    eGFR calculation 2021 CKD-EPI creatinine equation, which does not include race as a factor    High Sensitivity Troponin T 1Hr [[id number]]  (Abnormal) Collected: 03/26/25 1537    Specimen: Blood Updated: 03/26/25 1627     HS Troponin T 261 ng/L      Troponin T Numeric Delta -2 ng/L      Troponin T % Delta -1    Narrative:      High Sensitive Troponin T Reference Range:  <14.0 ng/L- Negative Female for AMI  <22.0 ng/L- Negative Male for AMI  >=14 - Abnormal Female indicating possible myocardial injury.  >=22 - Abnormal Male indicating possible myocardial injury.   Clinicians would have to utilize clinical acumen, EKG, Troponin, and serial changes to determine if it is an Acute Myocardial Infarction or myocardial injury due to an underlying chronic condition.               XR  Chest 2 View   Final Result       No acute findings.       This report was signed and finalized on 3/26/2025 3:32 PM by Dr. Esteban Almanzar MD.            Medications   sodium chloride 0.9 % flush 10 mL (has no administration in time range)   lactated ringers bolus 1,000 mL (1,000 mL Intravenous New Bag 3/26/25 0026)       Medical Decision Making  74-year-old male with shortness of breath.  Found to be hyponatremic.  Had acute renal failure new onset.  Case was discussed with the hospitalist on-call.  He has accepted the patient under his services.    Problems Addressed:  Acute renal failure, unspecified acute renal failure type: complicated acute illness or injury  Elevated troponin: complicated acute illness or injury  Hypernatremia: complicated acute illness or injury  Shortness of breath: complicated acute illness or injury    Amount and/or Complexity of Data Reviewed  External Data Reviewed: labs and notes.  Labs: ordered. Decision-making details documented in ED Course.  Radiology: ordered. Decision-making details documented in ED Course.  ECG/medicine tests: ordered. Decision-making details documented in ED Course.    Risk  Prescription drug management.  Decision regarding hospitalization.        Final diagnoses:   Shortness of breath   Acute renal failure, unspecified acute renal failure type   Hypernatremia   Elevated troponin       ED Disposition  ED Disposition       ED Disposition   Decision to Admit    Condition   --    Comment   Level of Care: Remote Telemetry [26]   Diagnosis: Acute renal failure [637816]   Admitting Physician: DINA THOMPSON [[id number]]   Attending Physician: DINA THOMPSON [[id number]]   Certification: I Certify That Inpatient Hospital Services Are Medically Necessary For Greater Than 2 Midnights                 No follow-up provider specified.       Medication List      No changes were made to your prescriptions during this visit.            Christiano Berman MD  03/26/25 7570

## 2025-03-26 NOTE — THERAPY TREATMENT NOTE
Outpatient Speech Language Pathology   Adult Swallow Treatment Note       Patient Name: Se Ramirez  : 1950  MRN: [id number]  Today's Date: 3/26/2025         Visit Date: 2025    Mr. Ramirez was seen today for swallowing therapy during his course of treatment for head and neck cancer. He was completed  treatments. He reports pain is 10/10. He is down another 15 lbs since last week. He is visually in distress and ill. He is disoriented during interactions and conversation. He attempted sips of water with coughing and yelling, it is difficult to discern if pain is only factor or if he is having difficulty swallowing. He is not compliant with home exercise program or doing his tube feedings as he is supposed to. He is unable to really say why he is not doing his tube feedings.     MD has decided patient is being sent to the ED for concern for malnutrition and dehydration. He is also placing patient on a 1 week hold from XRT.     I recommend patient focus on toleration of tube feedings at goal amount and water only PO at this time until further assessment can be completed on the safety of his swallow.     SLP will continue to follow.     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 Oropharyngeal dysphagia  R13.12 787.22        OP SLP Assessment/Plan - 03/26/25 1431          SLP Plan    Plan Comments Continue to follow 1x per week. MD is sending patient to the ED due to concern for malnutrition and dehydration. Placing patient on hold from XRT for 1 week.  -MG              User Key  (r) = Recorded By, (t) = Taken By, (c) = Cosigned By      Initials Name Provider Type    MG Ruth Sherwood MS CCC-SLP Speech and Language Pathologist                                       SLP OP Goals       Row Name 03/26/25 1400          Goal Type Needed    Goal Type Needed Other Adult Goals  -MG        Subjective Comments    Subjective Comments Patient was seen for swallowing therapy.  -MG        Subjective Pain    Able to rate subjective pain? yes  -MG     Pre-Treatment Pain Level 10  -MG     Post-Treatment Pain Level 10  -MG        Other Goals    Other Adult Goal- 1  Patient will increase swallowing frequency during XRT in order to maintain current swallow function once XRT is completed.  -MG     Status: Other Adult Goal- 1 Progressing as expected  -MG     Comments: Other Adult Goal- 1 See note  -MG     Other Adult Goal- 2  Patient will tolerate a regular diet with thin liquids throughout and following treatment without complications such as aspiration pneumonia or overt s/s of aspiration.  -MG     Status: Other Adult Goal- 2 Progressing as expected  -MG     Comments: Other Adult Goal- 2 See note  -MG     Other Adult Goal- 3  Patient will complete oral care daily in order to promote healthy oral mucosa throughout XRT.  -MG     Status: Other Adult Goal- 3 Progressing as expected  -MG     Comments: Other Adult Goal- 3 See note  -MG     Other Adult Goal- 4  Patient will maintain adequate hydration by drinking needed amount of water by mouth.   -MG     Status: Other Adult Goal- 4 Progressing as expected  -MG     Comments: Other Adult Goal- 4 See note  -MG     Other Adult Goal- 5  Patient will maintain adequate nutrition by mouth by implementing six meals a day into their healthy habits.  -MG     Status: Other Adult Goal- 5 Progressing as expected  -MG     Comments: Other Adult Goal- 5 See note  -MG     Other Adult Goal- 6  Patient will complete swallowing exercises three times a day in order to maintain current swallow function and range of motion of oral and pharyngeal structures.  -MG     Status: Other Adult Goal- 6 Progressing as expected  -MG     Comments: Other Adult Goal- 6 See note  -MG     Other Adult Goal- 7  Patient will continue to be assessed/monitored for increasing dysphagia and other effects of XRT on the swallow up until 3 months post XRT.  -MG     Status: Other Adult Goal- 7 Progressing as expected  -MG     Comments: Other Adult Goal- 7 See note  -MG        SLP Time Calculation    SLP Goal Re-Cert Due Date 06/05/25  -MG               User Key  (r) = Recorded By, (t) = Taken By, (c) = Cosigned By      Initials Name Provider Type    Ruth Pearson MS CCC-SLP Speech and Language Pathologist                   OP SLP Education       Row Name 03/26/25 1433       Education    Barriers to Learning No barriers identified  -MG    Education Provided Patient requires further education on strategies, risks  -MG    Assessed Learning readiness;Learning preferences;Learning motivation;Learning needs  -MG    Learning Motivation Strong  -MG    Learning Method Explanation  -MG    Teaching Response Verbalized understanding  -MG    Education Comments Current symptoms reviewed with recommendations given. Discussed importance of continued home excercise program to be completed 3 times per day.  -MG              User Key  (r) = Recorded By, (t) = Taken By, (c) = Cosigned By      Initials Name Effective Dates    Ruth Pearson MS CCC-SLP 07/11/23 -                          Time Calculation:   SLP Start Time: 1400  SLP Stop Time: 1440  SLP Time Calculation (min): 40 min  Untimed Charges  07040-QE Treatment Swallow Minutes: 40  Total Minutes  Untimed Charges Total Minutes: 40   Total Minutes: 40                 Ruth Sherwood MS CCC-SLP  3/26/2025

## 2025-03-26 NOTE — H&P
[facility] Medicine Services  HISTORY AND PHYSICAL    Date of Admission: 3/26/2025  Primary Care Physician: Diego Lucio MD    Subjective   Primary Historian: Spouse at bedside    Chief Complaint: Poor oral intake at home    History of Present Illness  Mr. Millan is a 74-year-old male with past medical history of throat cancer, COPD, diverticulosis, GERD, difficulty hearing and hyperlipidemia, who presented to the emergency room because of reduced intake at home [because of patient's throat cancer he has a PEG for feeding, therefore I do not understand why he has reduced intake].  Patient is still getting radiation treatments for his throat cancer, last treatment was today and he still has 15 more treatments left.  In the emergency room, he was found to have severe dehydration with consequent acute kidney injury and hyponatremia and also elevated troponin but no chest complaint.  He was given boluses of fluid in the emergency room.      Review of Systems   Otherwise complete ROS reviewed and negative except as mentioned in the HPI.    Past Medical History:   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    History of adenomatous polyp of colon     History of colon polyps     History of lung cancer     Right lung    Hyperlipidemia      Past Surgical History:  Past Surgical History:   Procedure Laterality Date    BRONCHOSCOPY N/A 02/15/2021    Procedure: BRONCHOSCOPY;  Surgeon: Bruce Murillo MD;  Location: [facility];  Service: Cardiothoracic;  Laterality: N/A;    CATARACT EXTRACTION      CHOLECYSTECTOMY      COLONOSCOPY  11/20/2012    One 1cm tubular adenomatous polyp in the sigmoid colon; One 5mm hyperplastic polyp in the rectum; Diverticulosis;  Repeat 4 years     COLONOSCOPY N/A 03/30/2017    Two 4-6mm tubular adenomatous polyps at the hepatic flexure; One 5mm tubular adenomatous polyp in the transverse colon; One 11mm tubular adenomatous polyp in the sigmoid colon; One 6mm tubular adenomatous polyp in the rectum; Diverticulosis in the left colon; Repeat 3 years     COLONOSCOPY  12/17/2009    One less than 5mm tubular adenomatous polyp in the cecum; One 5mm hyperplastic polyp in the transverse; One less than 3mm hyperplastic polyp in the rectum; Diverticulosis; Repeat 3 years     COLONOSCOPY N/A 07/02/2020    Two 5-7mm tubular adenomatous polyps at the hepatic flexure; One 6mm tubular adenomatous polyp in the transverse colon; Diverticulosis in the left colon; The entire examined colon is normal on direct and retroflexion views; Repeat 5 years    COLONOSCOPY N/A 06/29/2022    Diverticulosis in the left colon; One 6mm tubular adenomatous polyp in the descending colon; One 4mm tubular adenomatous polyp in the descending colon; Congested mucosa in the entire examined colon-biopsied; Repeat 5 years    ENDOSCOPY N/A 03/27/2019    Medium-sized HH; Normal stomach; Normal examined duodenum-biopsied    ENDOSCOPY N/A 06/09/2022    Small HH; Non-erosive gastritis-biopsied; Normal examined duodenum-biopsied    ENDOSCOPY N/A 2/24/2025    Procedure: ESOPHAGOGASTRODUODENOSCOPY WITH PERCUTANEOUS ENDOSCOPIC GASTROSTOMY TUBE INSERTION;  Surgeon: Amie Donnelly MD;  Location: [facility] ENDOSCOPY;  Service: General;  Laterality: N/A;  pre op:   post op:  pcp:Diego Lucio MD    ERCP  07/30/2024    Dr. Buzz Dangelo-Choledocholithiasis status post balloon sweep extraction wtih balloon assisted sphincteroplasty; Placement of biliary stent as well as prophylactic pancreatic stent; Repeat ERCP 3 months for biliary stent removal    FOOT SURGERY Left     ORIF    HERNIA REPAIR      INNER EAR SURGERY      LARYNGOSCOPY N/A 1/20/2025    Procedure: DIRECT LARYNGOSCOPY WITH EXCISION  OF LESION;  Surgeon: Antelmo Dangelo MD;  Location: [facility] OR;  Service: ENT;  Laterality: N/A;    MEDIASTINOSCOPY N/A 02/15/2021    Procedure: CERVICAL MEDIASTINOSCOPY WITH LYMPH NODE DISECTION;  Surgeon: Bruce Murillo MD;  Location:  [facility] OR;  Service: Cardiothoracic;  Laterality: N/A;    PEG TUBE INSERTION N/A 2/24/2025    Procedure: PERCUTANEOUS ENDOSCOPIC GASTROSTOMY TUBE INSERTION;  Surgeon: Amie Donnelly MD;  Location: [facility] ENDOSCOPY;  Service: General;  Laterality: N/A;  pre op:  post op:  pcp:Diego Lucio MD    THORACOSCOPY Right 02/15/2021    Procedure: THORACOSCOPY, WEDGE RESECTION OF RIGHT UPPER LOBE WITH FROZEN,  , MEDIASTINAL LYMPH NODE DISSECTION;  Surgeon: Bruce Murillo MD;  Location: [facility] OR;  Service: Cardiothoracic;  Laterality: Right;    TONSILLECTOMY       Social History:  reports that he has been smoking cigarettes. He started smoking about 56 years ago. He has a 28.1 pack-year smoking history. He has been exposed to tobacco smoke. He has never used smokeless tobacco. He reports that he does not currently use alcohol after a past usage of about 3.0 standard drinks of alcohol per week. He reports current drug use. Drug: Marijuana.    Family History: family history includes Colon polyps in his mother; Diabetes in his mother.       Allergies:  No Known Allergies    Medications:  Prior to Admission medications    Medication Sig Start Date End Date Taking? Authorizing Provider   acetaminophen (Tylenol) 325 MG tablet Take 3 tablets by mouth Every 8 (Eight) Hours. Take every 8 hours for 3 days then take prn as needed. 2/24/25 2/24/26  Amie Donnelly MD   albuterol sulfate HFA (ProAir HFA) 108 (90 Base) MCG/ACT inhaler Inhale 2 puffs Every 4 (Four) Hours As Needed for Wheezing. 1/20/21   Mallory Bryan APRN   aspirin 81 MG EC tablet Take 1 tablet by mouth Daily.    Provider, MD Krysten   atorvastatin (LIPITOR) 10 MG tablet Take 1 tablet by mouth Daily.  "3/19/19   Krysten Flood MD   esomeprazole (nexIUM) 40 MG capsule Take 1 capsule by mouth Every Morning Before Breakfast.    Krysten Flood MD   fluconazole (DIFLUCAN) 100 MG tablet Take 1 tablet by mouth Daily. Take 2 tablets day# 1, then 1 tab QD until complete. 3/19/25   Jerome Johnson MD   Fluticasone-Umeclidin-Vilant (TRELEGY) 100-62.5-25 MCG/ACT inhaler Inhale 1 puff Daily.    Krysten Flood MD   HYDROcodone-acetaminophen (NORCO) 5-325 MG per tablet Take 1 tablet by mouth Every 8 (Eight) Hours As Needed for Moderate Pain or Severe Pain (Pain) for up to 6 doses. 1/20/25   Antelmo Dangelo MD   ondansetron (Zofran) 4 MG tablet Take 1 tablet by mouth Every 8 (Eight) Hours As Needed for Nausea or Vomiting. 2/24/25 2/24/26  Amie Donnelly MD   oxyCODONE (ROXICODONE) 5 MG/5ML solution Take 5 mL by mouth Every 6 (Six) Hours As Needed for Moderate Pain. 3/19/25   Jerome Johnson MD   vitamin D (ERGOCALCIFEROL) 1.25 MG (25555 UT) capsule capsule Take 1 capsule by mouth 1 (One) Time Per Week. 3/9/24   Krysten Flood MD     I have utilized all available immediate resources to obtain, update, or review the patient's current medications (including all prescriptions, over-the-counter products, herbals, cannabis/cannabidiol products, and vitamin/mineral/dietary (nutritional) supplements).    Objective     Vital Signs: /81   Pulse 97   Temp 97.6 °F (36.4 °C) (Oral)   Resp 22   Ht 177.8 cm (70\")   Wt 83 kg (182 lb 15.7 oz)   SpO2 96%   BMI 26.26 kg/m²   Physical Exam   GEN: Awake, alert, interactive but uncomfortable  HEENT: Atraumatic, PERRLA, EOMI, Anicteric, Trachea midline  Lungs: CTAB, no wheezing/rales/rhonchi  Heart: RRR, +S1/s2, no rub  ABD: soft, nt/nd, +BS, no guarding/rebound, PEG tube in situ  Extremities: atraumatic, no cyanosis, no edema  Skin: no rashes or lesions but skin turgor is significantly reduced  Neuro: AAOx3, no focal deficits  Psych: normal mood & " affect       Results Reviewed:  Lab Results (last 24 hours)       Procedure Component Value Units Date/Time    Comprehensive Metabolic Panel [[id number]]  (Abnormal) Collected: 03/26/25 1537    Specimen: Blood from Arm, Right Updated: 03/26/25 1652     Glucose 141 mg/dL       mg/dL      Creatinine 3.99 mg/dL      Sodium 160 mmol/L      Potassium 5.2 mmol/L      Chloride 117 mmol/L      CO2 24.0 mmol/L      Calcium 10.5 mg/dL      Total Protein 8.7 g/dL      Albumin 3.9 g/dL      ALT (SGPT) 58 U/L      AST (SGOT) 53 U/L      Alkaline Phosphatase 93 U/L      Total Bilirubin 0.6 mg/dL      Globulin 4.8 gm/dL      A/G Ratio 0.8 g/dL      BUN/Creatinine Ratio 41.4     Anion Gap 19.0 mmol/L      eGFR 14.9 mL/min/1.73     Narrative:      GFR Categories in Chronic Kidney Disease (CKD)      GFR Category          GFR (mL/min/1.73)    Interpretation  G1                     90 or greater         Normal or high (1)  G2                      60-89                Mild decrease (1)  G3a                   45-59                Mild to moderate decrease  G3b                   30-44                Moderate to severe decrease  G4                    15-29                Severe decrease  G5                    14 or less           Kidney failure          (1)In the absence of evidence of kidney disease, neither GFR category G1 or G2 fulfill the criteria for CKD.    eGFR calculation 2021 CKD-EPI creatinine equation, which does not include race as a factor    High Sensitivity Troponin T 1Hr [[id number]]  (Abnormal) Collected: 03/26/25 1537    Specimen: Blood Updated: 03/26/25 1627     HS Troponin T 261 ng/L      Troponin T Numeric Delta -2 ng/L      Troponin T % Delta -1    Narrative:      High Sensitive Troponin T Reference Range:  <14.0 ng/L- Negative Female for AMI  <22.0 ng/L- Negative Male for AMI  >=14 - Abnormal Female indicating possible myocardial injury.  >=22 - Abnormal Male indicating possible myocardial injury.   Clinicians  would have to utilize clinical acumen, EKG, Troponin, and serial changes to determine if it is an Acute Myocardial Infarction or myocardial injury due to an underlying chronic condition.         CBC & Differential [[id number]]  (Abnormal) Collected: 03/26/25 1432    Specimen: Blood from Arm, Right Updated: 03/26/25 1516    Narrative:      The following orders were created for panel order CBC & Differential.  Procedure                               Abnormality         Status                     ---------                               -----------         ------                     CBC Auto Differential[[id number]]        Abnormal            Final result                 Please view results for these tests on the individual orders.    Manual Differential [[id number]]  (Abnormal) Collected: 03/26/25 1432    Specimen: Blood from Arm, Right Updated: 03/26/25 1516     Neutrophil % 91.8 %      Lymphocyte % 5.2 %      Monocyte % 2.1 %      Eosinophil % 1.0 %      Basophil % 0.0 %      Neutrophils Absolute 17.73 10*3/mm3      Lymphocytes Absolute 1.00 10*3/mm3      Monocytes Absolute 0.41 10*3/mm3      Eosinophils Absolute 0.19 10*3/mm3      Basophils Absolute 0.00 10*3/mm3      Anisocytosis Slight/1+     WBC Morphology Normal     Clumped Platelets Present    CBC Auto Differential [[id number]]  (Abnormal) Collected: 03/26/25 1432    Specimen: Blood from Arm, Right Updated: 03/26/25 1516     WBC 19.31 10*3/mm3      RBC 5.66 10*6/mm3      Hemoglobin 17.4 g/dL      Hematocrit 55.8 %      MCV 98.6 fL      MCH 30.7 pg      MCHC 31.2 g/dL      RDW 12.9 %      RDW-SD 46.6 fl      MPV 11.9 fL      Platelets 295 10*3/mm3     Narrative:      The previously reported component NRBC is no longer being reported. Previous result was 0.0 /100 WBC (Reference Range: 0.0-0.2 /100 WBC) on 3/26/2025 at 1450 CDT.    High Sensitivity Troponin T [[id number]]  (Abnormal) Collected: 03/26/25 1432    Specimen: Blood from Arm, Right Updated: 03/26/25 1511      HS Troponin T 263 ng/L     Narrative:      High Sensitive Troponin T Reference Range:  <14.0 ng/L- Negative Female for AMI  <22.0 ng/L- Negative Male for AMI  >=14 - Abnormal Female indicating possible myocardial injury.  >=22 - Abnormal Male indicating possible myocardial injury.   Clinicians would have to utilize clinical acumen, EKG, Troponin, and serial changes to determine if it is an Acute Myocardial Infarction or myocardial injury due to an underlying chronic condition.         BNP [[id number]]  (Normal) Collected: 03/26/25 1432    Specimen: Blood from Arm, Right Updated: 03/26/25 1505     proBNP 660.7 pg/mL     Narrative:      This assay is used as an aid in the diagnosis of individuals suspected of having heart failure. It can be used as an aid in the diagnosis of acute decompensated heart failure (ADHF) in patients presenting with signs and symptoms of ADHF to the emergency department (ED). In addition, NT-proBNP of <300 pg/mL indicates ADHF is not likely.    Age Range Result Interpretation  NT-proBNP Concentration (pg/mL:      <50             Positive            >450                   Gray                 300-450                    Negative             <300    50-75           Positive            >900                  Gray                300-900                  Negative            <300      >75             Positive            >1800                  Gray                300-1800                  Negative            <300    [place] Draw [[id number]] Collected: 03/26/25 1432    Specimen: Blood from Arm, Right Updated: 03/26/25 1446    Narrative:      The following orders were created for panel order [place] Draw.  Procedure                               Abnormality         Status                     ---------                               -----------         ------                     Green Top (Gel)[[id number]]                                  Final result               Lavender Top[[id number]]                                      Final result               Red Top[[id number]]                                          Final result               Hinkle Top[[id number]]                                         Final result               Light Blue Top[[id number]]                                   Final result                 Please view results for these tests on the individual orders.    Green Top (Gel) [[id number]] Collected: 03/26/25 1432    Specimen: Blood from Arm, Right Updated: 03/26/25 1446     Extra Tube Hold for add-ons.     Comment: Auto resulted.       Lavender Top [[id number]] Collected: 03/26/25 1432    Specimen: Blood from Arm, Right Updated: 03/26/25 1446     Extra Tube hold for add-on     Comment: Auto resulted       Red Top [[id number]] Collected: 03/26/25 1432    Specimen: Blood from Arm, Right Updated: 03/26/25 1446     Extra Tube Hold for add-ons.     Comment: Auto resulted.       Gray Top [[id number]] Collected: 03/26/25 1432    Specimen: Blood from Arm, Right Updated: 03/26/25 1446     Extra Tube Hold for add-ons.     Comment: Auto resulted.       Light Blue Top [[id number]] Collected: 03/26/25 1432    Specimen: Blood from Arm, Right Updated: 03/26/25 1446     Extra Tube Hold for add-ons.     Comment: Auto resulted             Imaging Results (Last 24 Hours)       Procedure Component Value Units Date/Time    XR Chest 2 View [[id number]] Collected: 03/26/25 1530     Updated: 03/26/25 1535    Narrative:      EXAM/TECHNIQUE: XR CHEST 2 VW-     INDICATION: Shortness of air     COMPARISON: 1/13/2025     FINDINGS:     Cardiac silhouette is within normal limits.     No pleural effusion or pneumothorax. No consolidation. Small calcified  granuloma in the right midlung zone. Chronic interstitial coarsening.  Postoperative change of right upper lobe wedge resection.     Mild degenerative change in the thoracic spine. No acute osseous  finding.       Impression:         No acute findings.     This report was signed and finalized  on 3/26/2025 3:32 PM by Dr. Esteban Almanzar MD.             I have personally reviewed and interpreted the radiology studies and ECG obtained at time of admission.     Assessment / Plan   Assessment:   Active Hospital Problems    Diagnosis     **Acute renal failure        Treatment Plan  The patient will be admitted to my service here at [facility].     -Severe acute kidney injury [versus acute on chronic kidney disease]  Review of the chart showed that patient had normal renal function in March of this year, GFR is current acute kidney injury is probably fall reduce fluid intake.  We will start patient on dextrose water at 125 mL/h and follow renal function in the morning, if no improvement will consult nephrology.    -Severe hypernatremia  This is probably secondary to severe dehydration, patient will be started on free water and also free water flushes via the PEG tube.    -Elevated troponin  This is probably secondary to acute kidney injury, patient has no chest symptoms.  We will follow-up troponin in the morning, if it trends up we will consult cardiologist.    -Throat cancer [with PEG tube for feeding/water flushes]  We will consult nutritionist for input, patient does not remember the name of his home feeds.  In the interim, we will start him on jevity at 50 mL/h until reviewed by nutritionist, which to 15 mL of water flushes every 4 hours.    Other chronic medical conditions-  History of COPD  Diverticulosis  GERD  Difficulty hearing  Hyperlipidemia      Prophylaxis-heparin      Medical Decision Making  Number and Complexity of problems: Multiple  Differential Diagnosis: Severe dehydration, acute kidney injury, hyponatremia    Conditions and Status        Condition is unchanged.     MDM Data  External documents reviewed: No  Cardiac tracing (EKG, telemetry) interpretation: Yes  Radiology interpretation: No  Labs reviewed: Yes  Any tests that were considered but not ordered: No     Decision  rules/scores evaluated (example [id number], Wells, etc): No     Discussed with: Patient and significant other at bedside     Care Planning  Shared decision making: Yes with patient and significant other  Code status and discussions: Full code, with patient    Disposition  Social Determinants of Health that impact treatment or disposition: No  Estimated length of stay is 1 to 2 days.     I confirmed that the patient's advanced care plan is present, code status is documented, and a surrogate decision maker is listed in the patient's medical record.     The patient's surrogate decision maker is significant other/girlfriend.     The patient was seen and examined by me on 3/26/2025 at 5:40 PM.    Electronically signed by Tyrone Willis MD, 03/26/25, 17:57 CDT.

## 2025-03-27 ENCOUNTER — RESULTS FOLLOW-UP (OUTPATIENT)
Dept: ONCOLOGY | Facility: CLINIC | Age: 75
End: 2025-03-27
Payer: MEDICARE

## 2025-03-27 DIAGNOSIS — C32.1 SQUAMOUS CELL CARCINOMA OF EPIGLOTTIS: ICD-10-CM

## 2025-03-27 DIAGNOSIS — C34.11 MALIGNANT NEOPLASM OF UPPER LOBE OF RIGHT LUNG: ICD-10-CM

## 2025-03-27 LAB
ALBUMIN SERPL-MCNC: 3.8 G/DL (ref 3.5–5.2)
ALBUMIN/GLOB SERPL: 0.8 G/DL
ALP SERPL-CCNC: 89 U/L (ref 39–117)
ALT SERPL W P-5'-P-CCNC: 63 U/L (ref 1–41)
ANION GAP SERPL CALCULATED.3IONS-SCNC: 14 MMOL/L (ref 5–15)
AST SERPL-CCNC: 58 U/L (ref 1–40)
BILIRUB SERPL-MCNC: 0.6 MG/DL (ref 0–1.2)
BUN SERPL-MCNC: 148 MG/DL (ref 8–23)
BUN/CREAT SERPL: 53.8 (ref 7–25)
CALCIUM SPEC-SCNC: 10.4 MG/DL (ref 8.6–10.5)
CHLORIDE SERPL-SCNC: 120 MMOL/L (ref 98–107)
CO2 SERPL-SCNC: 28 MMOL/L (ref 22–29)
CREAT SERPL-MCNC: 2.75 MG/DL (ref 0.76–1.27)
EGFRCR SERPLBLD CKD-EPI 2021: 23.3 ML/MIN/1.73
GLOBULIN UR ELPH-MCNC: 4.8 GM/DL
GLUCOSE BLDC GLUCOMTR-MCNC: 148 MG/DL (ref 70–130)
GLUCOSE BLDC GLUCOMTR-MCNC: 188 MG/DL (ref 70–130)
GLUCOSE BLDC GLUCOMTR-MCNC: 205 MG/DL (ref 70–130)
GLUCOSE SERPL-MCNC: 156 MG/DL (ref 65–99)
MAGNESIUM SERPL-MCNC: 3 MG/DL (ref 1.6–2.4)
POTASSIUM SERPL-SCNC: 4.8 MMOL/L (ref 3.5–5.2)
PROT SERPL-MCNC: 8.6 G/DL (ref 6–8.5)
SODIUM SERPL-SCNC: 162 MMOL/L (ref 136–145)
TROPONIN T SERPL HS-MCNC: 159 NG/L

## 2025-03-27 PROCEDURE — 94799 UNLISTED PULMONARY SVC/PX: CPT

## 2025-03-27 PROCEDURE — 80053 COMPREHEN METABOLIC PANEL: CPT

## 2025-03-27 PROCEDURE — 94664 DEMO&/EVAL PT USE INHALER: CPT

## 2025-03-27 PROCEDURE — 25010000002 HEPARIN (PORCINE) PER 1000 UNITS: Performed by: INTERNAL MEDICINE

## 2025-03-27 PROCEDURE — 25010000003 DEXTROSE 5 % SOLUTION: Performed by: INTERNAL MEDICINE

## 2025-03-27 PROCEDURE — 82948 REAGENT STRIP/BLOOD GLUCOSE: CPT

## 2025-03-27 PROCEDURE — 84484 ASSAY OF TROPONIN QUANT: CPT | Performed by: INTERNAL MEDICINE

## 2025-03-27 PROCEDURE — 83735 ASSAY OF MAGNESIUM: CPT | Performed by: INTERNAL MEDICINE

## 2025-03-27 RX ORDER — ACETAMINOPHEN 325 MG/1
975 TABLET ORAL EVERY 8 HOURS PRN
COMMUNITY

## 2025-03-27 RX ORDER — FLUCONAZOLE 100 MG/1
TABLET ORAL TAKE AS DIRECTED
COMMUNITY
End: 2025-03-29 | Stop reason: HOSPADM

## 2025-03-27 RX ORDER — MULTIVIT WITH MINERALS/LUTEIN
250 TABLET ORAL DAILY
COMMUNITY

## 2025-03-27 RX ADMIN — Medication 5000 UNITS: at 09:25

## 2025-03-27 RX ADMIN — DEXTROSE MONOHYDRATE 125 ML/HR: 50 INJECTION, SOLUTION INTRAVENOUS at 23:30

## 2025-03-27 RX ADMIN — DEXTROSE MONOHYDRATE 125 ML/HR: 50 INJECTION, SOLUTION INTRAVENOUS at 16:04

## 2025-03-27 RX ADMIN — ACETAMINOPHEN 975 MG: 325 TABLET, FILM COATED ORAL at 21:36

## 2025-03-27 RX ADMIN — IPRATROPIUM BROMIDE 0.5 MG: 0.5 SOLUTION RESPIRATORY (INHALATION) at 20:24

## 2025-03-27 RX ADMIN — DEXTROSE MONOHYDRATE 125 ML/HR: 50 INJECTION, SOLUTION INTRAVENOUS at 06:56

## 2025-03-27 RX ADMIN — HEPARIN SODIUM 5000 UNITS: 5000 INJECTION, SOLUTION INTRAVENOUS; SUBCUTANEOUS at 20:41

## 2025-03-27 RX ADMIN — IPRATROPIUM BROMIDE 0.5 MG: 0.5 SOLUTION RESPIRATORY (INHALATION) at 11:08

## 2025-03-27 RX ADMIN — HEPARIN SODIUM 5000 UNITS: 5000 INJECTION, SOLUTION INTRAVENOUS; SUBCUTANEOUS at 09:25

## 2025-03-27 RX ADMIN — ACETAMINOPHEN 975 MG: 325 TABLET, FILM COATED ORAL at 15:07

## 2025-03-27 RX ADMIN — OXYCODONE HYDROCHLORIDE 5 MG: 5 SOLUTION ORAL at 20:45

## 2025-03-27 RX ADMIN — ATORVASTATIN CALCIUM 10 MG: 10 TABLET, FILM COATED ORAL at 09:25

## 2025-03-27 RX ADMIN — IPRATROPIUM BROMIDE 0.5 MG: 0.5 SOLUTION RESPIRATORY (INHALATION) at 07:05

## 2025-03-27 RX ADMIN — ACETAMINOPHEN 975 MG: 325 TABLET, FILM COATED ORAL at 06:51

## 2025-03-27 RX ADMIN — IPRATROPIUM BROMIDE 0.5 MG: 0.5 SOLUTION RESPIRATORY (INHALATION) at 14:52

## 2025-03-27 NOTE — TELEPHONE ENCOUNTER
Verbal Release Authorization denied. Unable to contact anyone else. Will continue to reach out to the patient.

## 2025-03-27 NOTE — PLAN OF CARE
Goal Outcome Evaluation:  Plan of Care Reviewed With: patient        Progress: improving  Outcome Evaluation: Pt A&O x4. VSS. Room air. Bolus feeds initiated at 125 mL. See orders. Pt tolerated first two bolus feeds and flushes without complaint. Safety maintained.

## 2025-03-27 NOTE — TELEPHONE ENCOUNTER
Left a message for the patient to return our call. Dr Macias wishes for him to return to the ER ASAP. Will continue to contact.

## 2025-03-27 NOTE — PAYOR COMM NOTE
"ADMIT 3/26/2025  REF: [id number]    [facility]  [place],  [phone] OR FAX  [phone]        Rob Ramirez (74 y.o. Male)       Date of Birth   1950    Social Security Number       Address   [address] [address]    Home Phone   [phone]    MRN   [id number]       Druze   Faith    Marital Status   Significant Other                            Admission Date   3/26/2025    Admission Type   Emergency    Admitting Provider   Tyrone Willis MD    Attending Provider   Tyrone Willis MD    Department, Room/Bed   [facility] 3C, 364/1       Discharge Date       Discharge Disposition       Discharge Destination                                 Attending Provider: Tyrone Willis MD    Allergies: No Known Allergies    Isolation: None   Infection: COVID (History) (02/15/21)   Code Status: CPR    Ht: 177.8 cm (70\")   Wt: 68.9 kg (151 lb 12.8 oz)    Admission Cmt: None   Principal Problem: Acute renal failure [N17.9]                   Active Insurance as of 3/26/2025       Primary Coverage       Payor Plan Insurance Group Employer/Plan Group    ANTHEM MEDICARE REPLACEMENT ANTHEM MEDICARE ADVANTAGE HMO [id number]       Payor Plan Address Payor Plan Phone Number Payor Plan Fax Number Effective Dates    [address] [phone]  1/1/2025 - None Entered    [facility] [id number]         Subscriber Name Subscriber Birth Date Member ID       ROB RAMIREZ 1950 [id number]                     Emergency Contacts        (Rel.) Home Phone Work Phone Mobile Phone    RAVINDRAJOHN (Significant Other) -- -- [phone]    OTTO CISNEROS (Brother) -- -- [phone]          /26/2025 Event Details User   14:24 Patient arrival  Narcisa Balbuena RN   14:24:36 Trigger for Triage Start  Narcisa Balbuena RN   14:24:36 Triage Started  Narcisa Balbuena RN   14:24:36 Chief Complaints Updated Shortness of Breath Narcisa Balbuena RN     14:25 " HPI HPI  Stated Reason for Visit: Patient brought over by urgent care for hx of cancer and 18 pound weight loss within the past week. Patient reports SOB x 3 weeks as well.  History Obtained From: patient  Duration (Weeks): 3 Narcisa Balbuena RN     14:27 Vital Signs Vital Signs  Temp: 97.6 °F (36.4 °C)  Temp src: Oral  Heart Rate: 124 Abnormal   Heart Rate Source: Monitor  Resp: 22  Resp Rate Source: Visual  BP: 83/66 Abnormal   BP Location: Left arm  BP Method: Automatic  Patient Position: Sitting  Oxygen Therapy  SpO2: 96 %  Pulse Oximetry Type: Intermittent  Device (Oxygen Therapy): room air  Vitals Timer  Restart Vitals Timer: Yes Nora Miranda, PCT     14:28 Pain Pain  (0-10) Pain Rating: Rest: 7  Pain Location: other (see comments) (generalized) Narcisa Balbuena RN     14:57 Medication New Bag lactated ringers bolus 1,000 mL - Dose: 1,000 mL ; Rate: 2,000 mL/hr ; Route: Intravenous ; Line: Peripheral IV 03/26/25 1434 Anterior;Right Forearm ; Scheduled Time: 1502 Deyanira Hernandez RN     16:15 Cardiac ECG  Lead Monitored: Lead II  Rhythm: sinus tachycardia Kameron Dutta RN   16:16 Vital Signs Vital Signs  Heart Rate: 102 (Device Time: 16:16:00)  BP: 119/77 (Device Time: 16:16:00)  Noninvasive MAP (mmHg): 90 (Device Time: 16:16:00) Kameron Dutta RN   16:22 Free Text EKG rate 110  Sinus tachycardia  No STEMI Christiano Berman MD   16:22 Free Text EKG rate 119  Sinus tachycardia  No STEMI Christiano Berman MD   16:27:22 Lab Notifications Critical value(s)  Critical value #1: Trop Abnormal  (261)  Read back and verified: Patient name; Patient ID; Result(s)  Provider(s) notified: Primary Kameron Da Silva RN   16:27:39 High Sensitivity Troponin T 1Hr Resulted Abnormal Result  Collected: 3/26/2025 15:37  Last updated: 3/26/2025 16:27  Status: Final result  HS Troponin T: 261 ng/L High Critical  [Ref Range: <22]  Troponin T Numeric Delta: -2 ng/L  Troponin T % Delta: -1 [Ref Range: Abnormal if >/= 20%]  Albert Virgen Rachel A, LPN   Licensed Nurse  Nursing     Plan of Care     Signed     Date of Service: 03/27/25 0659  Creation Time: 03/27/25 0810     Signed         Goal Outcome Evaluation:  Plan of Care Reviewed With: patient  Progress: no change  Outcome Evaluation: New admit from ED. VSS. PRN pain meds as ordered, with good results. IVF as ordered. Up with assist x1, safety maintained. Voiding per urinal. NPO, RD to manage TF.                   History & Physical        Tyrone Willis MD at 03/26/25 6657              [facility]  HISTORY AND PHYSICAL    Date of Admission: 3/26/2025  Primary Care Physician: Diego Lucio MD    Subjective   Primary Historian: Spouse at bedside    Chief Complaint: Poor oral intake at home    History of Present Illness  Mr. Millan is a 74-year-old male with past medical history of throat cancer, COPD, diverticulosis, GERD, difficulty hearing and hyperlipidemia, who presented to the emergency room because of reduced intake at home [because of patient's throat cancer he has a PEG for feeding, therefore I do not understand why he has reduced intake].  Patient is still getting radiation treatments for his throat cancer, last treatment was today and he still has 15 more treatments left.  In the emergency room, he was found to have severe dehydration with consequent acute kidney injury and hyponatremia and also elevated troponin but no chest complaint.  He was given boluses of fluid in the emergency room.      Review of Systems   Otherwise complete ROS reviewed and negative except as mentioned in the HPI.    Past Medical History:   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     History of adenomatous polyp of colon     History of colon polyps     History of lung cancer     Right lung    Hyperlipidemia      Past Surgical History:  Past Surgical History:   Procedure Laterality Date    BRONCHOSCOPY N/A 02/15/2021    Procedure: BRONCHOSCOPY;  Surgeon: Bruce Murillo MD;  Location: [facility];  Service: Cardiothoracic;  Laterality: N/A;    CATARACT EXTRACTION      CHOLECYSTECTOMY      COLONOSCOPY  11/20/2012    One 1cm tubular adenomatous polyp in the sigmoid colon; One 5mm hyperplastic polyp in the rectum; Diverticulosis; Repeat 4 years     COLONOSCOPY N/A 03/30/2017    Two 4-6mm tubular adenomatous polyps at the hepatic flexure; One 5mm tubular adenomatous polyp in the transverse colon; One 11mm tubular adenomatous polyp in the sigmoid colon; One 6mm tubular adenomatous polyp in the rectum; Diverticulosis in the left colon; Repeat 3 years     COLONOSCOPY  12/17/2009    One less than 5mm tubular adenomatous polyp in the cecum; One 5mm hyperplastic polyp in the transverse; One less than 3mm hyperplastic polyp in the rectum; Diverticulosis; Repeat 3 years     COLONOSCOPY N/A 07/02/2020    Two 5-7mm tubular adenomatous polyps at the hepatic flexure; One 6mm tubular adenomatous polyp in the transverse colon; Diverticulosis in the left colon; The entire examined colon is normal on direct and retroflexion views; Repeat 5 years    COLONOSCOPY N/A 06/29/2022    Diverticulosis in the left colon; One 6mm tubular adenomatous polyp in the descending colon; One 4mm tubular adenomatous polyp in the descending colon; Congested mucosa in the entire examined colon-biopsied; Repeat 5 years    ENDOSCOPY N/A 03/27/2019    Medium-sized HH; Normal stomach; Normal examined duodenum-biopsied    ENDOSCOPY N/A 06/09/2022    Small HH; Non-erosive gastritis-biopsied; Normal examined duodenum-biopsied    ENDOSCOPY N/A 2/24/2025    Procedure: ESOPHAGOGASTRODUODENOSCOPY WITH PERCUTANEOUS ENDOSCOPIC GASTROSTOMY TUBE  INSERTION;  Surgeon: Amie Donnelly MD;  Location: [facility] ENDOSCOPY;  Service: General;  Laterality: N/A;  pre op:   post op:  pcp:Diego Lucio MD    ERCP  07/30/2024    Dr. Buzz Dangelo-Choledocholithiasis status post balloon sweep extraction wtih balloon assisted sphincteroplasty; Placement of biliary stent as well as prophylactic pancreatic stent; Repeat ERCP 3 months for biliary stent removal    FOOT SURGERY Left     ORIF    HERNIA REPAIR      INNER EAR SURGERY      LARYNGOSCOPY N/A 1/20/2025    Procedure: DIRECT LARYNGOSCOPY WITH EXCISION OF LESION;  Surgeon: Antelmo Dangelo MD;  Location: [facility] OR;  Service: ENT;  Laterality: N/A;    MEDIASTINOSCOPY N/A 02/15/2021    Procedure: CERVICAL MEDIASTINOSCOPY WITH LYMPH NODE DISECTION;  Surgeon: Bruce Murillo MD;  Location: [facility] OR;  Service: Cardiothoracic;  Laterality: N/A;    PEG TUBE INSERTION N/A 2/24/2025    Procedure: PERCUTANEOUS ENDOSCOPIC GASTROSTOMY TUBE INSERTION;  Surgeon: Amie Donnelly MD;  Location: [facility] ENDOSCOPY;  Service: General;  Laterality: N/A;  pre op:  post op:  pcp:Diego Lucio MD    THORACOSCOPY Right 02/15/2021    Procedure: THORACOSCOPY, WEDGE RESECTION OF RIGHT UPPER LOBE WITH FROZEN,  , MEDIASTINAL LYMPH NODE DISSECTION;  Surgeon: Bruce Murillo MD;  Location: [facility] OR;  Service: Cardiothoracic;  Laterality: Right;    TONSILLECTOMY       Social History:  reports that he has been smoking cigarettes. He started smoking about 56 years ago. He has a 28.1 pack-year smoking history. He has been exposed to tobacco smoke. He has never used smokeless tobacco. He reports that he does not currently use alcohol after a past usage of about 3.0 standard drinks of alcohol per week. He reports current drug use. Drug: Marijuana.    Family History: family history includes Colon polyps in his mother; Diabetes in his mother.       Allergies:  No Known Allergies    Medications:  Prior to Admission medications     Medication Sig Start Date End Date Taking? Authorizing Provider   acetaminophen (Tylenol) 325 MG tablet Take 3 tablets by mouth Every 8 (Eight) Hours. Take every 8 hours for 3 days then take prn as needed. 2/24/25 2/24/26  Amie Donnelly MD   albuterol sulfate HFA (ProAir HFA) 108 (90 Base) MCG/ACT inhaler Inhale 2 puffs Every 4 (Four) Hours As Needed for Wheezing. 1/20/21   Mallory Bryan APRN   aspirin 81 MG EC tablet Take 1 tablet by mouth Daily.    Krysten Flood MD   atorvastatin (LIPITOR) 10 MG tablet Take 1 tablet by mouth Daily. 3/19/19   Krysten Flood MD   esomeprazole (nexIUM) 40 MG capsule Take 1 capsule by mouth Every Morning Before Breakfast.    ProviderKrysten MD   fluconazole (DIFLUCAN) 100 MG tablet Take 1 tablet by mouth Daily. Take 2 tablets day# 1, then 1 tab QD until complete. 3/19/25   Jerome Johnson MD   Fluticasone-Umeclidin-Vilant (TRELEGY) 100-62.5-25 MCG/ACT inhaler Inhale 1 puff Daily.    Krysten Flood MD   HYDROcodone-acetaminophen (NORCO) 5-325 MG per tablet Take 1 tablet by mouth Every 8 (Eight) Hours As Needed for Moderate Pain or Severe Pain (Pain) for up to 6 doses. 1/20/25   Antelmo Dangelo MD   ondansetron (Zofran) 4 MG tablet Take 1 tablet by mouth Every 8 (Eight) Hours As Needed for Nausea or Vomiting. 2/24/25 2/24/26  Amie Donnelly MD   oxyCODONE (ROXICODONE) 5 MG/5ML solution Take 5 mL by mouth Every 6 (Six) Hours As Needed for Moderate Pain. 3/19/25   Jerome Johnson MD   vitamin D (ERGOCALCIFEROL) 1.25 MG (67514 UT) capsule capsule Take 1 capsule by mouth 1 (One) Time Per Week. 3/9/24   Krysten Flood MD     I have utilized all available immediate resources to obtain, update, or review the patient's current medications (including all prescriptions, over-the-counter products, herbals, cannabis/cannabidiol products, and vitamin/mineral/dietary (nutritional) supplements).    Objective     Vital Signs: /81   " Pulse 97   Temp 97.6 °F (36.4 °C) (Oral)   Resp 22   Ht 177.8 cm (70\")   Wt 83 kg (182 lb 15.7 oz)   SpO2 96%   BMI 26.26 kg/m²   Physical Exam   GEN: Awake, alert, interactive but uncomfortable  HEENT: Atraumatic, PERRLA, EOMI, Anicteric, Trachea midline  Lungs: CTAB, no wheezing/rales/rhonchi  Heart: RRR, +S1/s2, no rub  ABD: soft, nt/nd, +BS, no guarding/rebound, PEG tube in situ  Extremities: atraumatic, no cyanosis, no edema  Skin: no rashes or lesions but skin turgor is significantly reduced  Neuro: AAOx3, no focal deficits  Psych: normal mood & affect       Results Reviewed:  Lab Results (last 24 hours)       Procedure Component Value Units Date/Time    Comprehensive Metabolic Panel [[id number]]  (Abnormal) Collected: 03/26/25 1537    Specimen: Blood from Arm, Right Updated: 03/26/25 1652     Glucose 141 mg/dL       mg/dL      Creatinine 3.99 mg/dL      Sodium 160 mmol/L      Potassium 5.2 mmol/L      Chloride 117 mmol/L      CO2 24.0 mmol/L      Calcium 10.5 mg/dL      Total Protein 8.7 g/dL      Albumin 3.9 g/dL      ALT (SGPT) 58 U/L      AST (SGOT) 53 U/L      Alkaline Phosphatase 93 U/L      Total Bilirubin 0.6 mg/dL      Globulin 4.8 gm/dL      A/G Ratio 0.8 g/dL      BUN/Creatinine Ratio 41.4     Anion Gap 19.0 mmol/L      eGFR 14.9 mL/min/1.73     Narrative:      GFR Categories in Chronic Kidney Disease (CKD)      GFR Category          GFR (mL/min/1.73)    Interpretation  G1                     90 or greater         Normal or high (1)  G2                      60-89                Mild decrease (1)  G3a                   45-59                Mild to moderate decrease  G3b                   30-44                Moderate to severe decrease  G4                    15-29                Severe decrease  G5                    14 or less           Kidney failure          (1)In the absence of evidence of kidney disease, neither GFR category G1 or G2 fulfill the criteria for CKD.    eGFR " calculation 2021 CKD-EPI creatinine equation, which does not include race as a factor    High Sensitivity Troponin T 1Hr [[id number]]  (Abnormal) Collected: 03/26/25 1537    Specimen: Blood Updated: 03/26/25 1627     HS Troponin T 261 ng/L      Troponin T Numeric Delta -2 ng/L      Troponin T % Delta -1    Narrative:      High Sensitive Troponin T Reference Range:  <14.0 ng/L- Negative Female for AMI  <22.0 ng/L- Negative Male for AMI  >=14 - Abnormal Female indicating possible myocardial injury.  >=22 - Abnormal Male indicating possible myocardial injury.   Clinicians would have to utilize clinical acumen, EKG, Troponin, and serial changes to determine if it is an Acute Myocardial Infarction or myocardial injury due to an underlying chronic condition.         CBC & Differential [[id number]]  (Abnormal) Collected: 03/26/25 1432    Specimen: Blood from Arm, Right Updated: 03/26/25 1516    Narrative:      The following orders were created for panel order CBC & Differential.  Procedure                               Abnormality         Status                     ---------                               -----------         ------                     CBC Auto Differential[[id number]]        Abnormal            Final result                 Please view results for these tests on the individual orders.    Manual Differential [[id number]]  (Abnormal) Collected: 03/26/25 1432    Specimen: Blood from Arm, Right Updated: 03/26/25 1516     Neutrophil % 91.8 %      Lymphocyte % 5.2 %      Monocyte % 2.1 %      Eosinophil % 1.0 %      Basophil % 0.0 %      Neutrophils Absolute 17.73 10*3/mm3      Lymphocytes Absolute 1.00 10*3/mm3      Monocytes Absolute 0.41 10*3/mm3      Eosinophils Absolute 0.19 10*3/mm3      Basophils Absolute 0.00 10*3/mm3      Anisocytosis Slight/1+     WBC Morphology Normal     Clumped Platelets Present    CBC Auto Differential [[id number]]  (Abnormal) Collected: 03/26/25 1432    Specimen: Blood from Arm, Right  Updated: 03/26/25 1516     WBC 19.31 10*3/mm3      RBC 5.66 10*6/mm3      Hemoglobin 17.4 g/dL      Hematocrit 55.8 %      MCV 98.6 fL      MCH 30.7 pg      MCHC 31.2 g/dL      RDW 12.9 %      RDW-SD 46.6 fl      MPV 11.9 fL      Platelets 295 10*3/mm3     Narrative:      The previously reported component NRBC is no longer being reported. Previous result was 0.0 /100 WBC (Reference Range: 0.0-0.2 /100 WBC) on 3/26/2025 at 1450 CDT.    High Sensitivity Troponin T [[id number]]  (Abnormal) Collected: 03/26/25 1432    Specimen: Blood from Arm, Right Updated: 03/26/25 1511     HS Troponin T 263 ng/L     Narrative:      High Sensitive Troponin T Reference Range:  <14.0 ng/L- Negative Female for AMI  <22.0 ng/L- Negative Male for AMI  >=14 - Abnormal Female indicating possible myocardial injury.  >=22 - Abnormal Male indicating possible myocardial injury.   Clinicians would have to utilize clinical acumen, EKG, Troponin, and serial changes to determine if it is an Acute Myocardial Infarction or myocardial injury due to an underlying chronic condition.         BNP [[id number]]  (Normal) Collected: 03/26/25 1432    Specimen: Blood from Arm, Right Updated: 03/26/25 1505     proBNP 660.7 pg/mL     Narrative:      This assay is used as an aid in the diagnosis of individuals suspected of having heart failure. It can be used as an aid in the diagnosis of acute decompensated heart failure (ADHF) in patients presenting with signs and symptoms of ADHF to the emergency department (ED). In addition, NT-proBNP of <300 pg/mL indicates ADHF is not likely.    Age Range Result Interpretation  NT-proBNP Concentration (pg/mL:      <50             Positive            >450                   Gray                 300-450                    Negative             <300    50-75           Positive            >900                  Gray                300-900                  Negative            <300      >75             Positive            >1800                   Hinkle                300-1800                  Negative            <300    [place] Draw [[id number]] Collected: 03/26/25 1432    Specimen: Blood from Arm, Right Updated: 03/26/25 1446    Narrative:      The following orders were created for panel order [place] Draw.  Procedure                               Abnormality         Status                     ---------                               -----------         ------                     Green Top (Gel)[[id number]]                                  Final result               Lavender Top[[id number]]                                     Final result               Red Top[[id number]]                                          Final result               Hinkle Top[[id number]]                                         Final result               Light Blue Top[[id number]]                                   Final result                 Please view results for these tests on the individual orders.    Green Top (Gel) [[id number]] Collected: 03/26/25 1432    Specimen: Blood from Arm, Right Updated: 03/26/25 1446     Extra Tube Hold for add-ons.     Comment: Auto resulted.       Lavender Top [[id number]] Collected: 03/26/25 1432    Specimen: Blood from Arm, Right Updated: 03/26/25 1446     Extra Tube hold for add-on     Comment: Auto resulted       Red Top [[id number]] Collected: 03/26/25 1432    Specimen: Blood from Arm, Right Updated: 03/26/25 1446     Extra Tube Hold for add-ons.     Comment: Auto resulted.       Gray Top [[id number]] Collected: 03/26/25 1432    Specimen: Blood from Arm, Right Updated: 03/26/25 1446     Extra Tube Hold for add-ons.     Comment: Auto resulted.       Light Blue Top [[id number]] Collected: 03/26/25 1432    Specimen: Blood from Arm, Right Updated: 03/26/25 1446     Extra Tube Hold for add-ons.     Comment: Auto resulted             Imaging Results (Last 24 Hours)       Procedure Component Value Units Date/Time    XR Chest 2 View [[id number]] Collected:  03/26/25 1530     Updated: 03/26/25 1535    Narrative:      EXAM/TECHNIQUE: XR CHEST 2 VW-     INDICATION: Shortness of air     COMPARISON: 1/13/2025     FINDINGS:     Cardiac silhouette is within normal limits.     No pleural effusion or pneumothorax. No consolidation. Small calcified  granuloma in the right midlung zone. Chronic interstitial coarsening.  Postoperative change of right upper lobe wedge resection.     Mild degenerative change in the thoracic spine. No acute osseous  finding.       Impression:         No acute findings.     This report was signed and finalized on 3/26/2025 3:32 PM by Dr. Esteban Almanzar MD.             I have personally reviewed and interpreted the radiology studies and ECG obtained at time of admission.     Assessment / Plan   Assessment:   Active Hospital Problems    Diagnosis     **Acute renal failure        Treatment Plan  The patient will be admitted to my service here at [facility].     -Severe acute kidney injury [versus acute on chronic kidney disease]  Review of the chart showed that patient had normal renal function in March of this year, GFR is current acute kidney injury is probably fall reduce fluid intake.  We will start patient on dextrose water at 125 mL/h and follow renal function in the morning, if no improvement will consult nephrology.    -Severe hypernatremia  This is probably secondary to severe dehydration, patient will be started on free water and also free water flushes via the PEG tube.    -Elevated troponin  This is probably secondary to acute kidney injury, patient has no chest symptoms.  We will follow-up troponin in the morning, if it trends up we will consult cardiologist.    -Throat cancer [with PEG tube for feeding/water flushes]  We will consult nutritionist for input, patient does not remember the name of his home feeds.  In the interim, we will start him on jevity at 50 mL/h until reviewed by nutritionist, which to 15 mL of water  flushes every 4 hours.    Other chronic medical conditions-  History of COPD  Diverticulosis  GERD  Difficulty hearing  Hyperlipidemia      Prophylaxis-heparin      Medical Decision Making  Number and Complexity of problems: Multiple  Differential Diagnosis: Severe dehydration, acute kidney injury, hyponatremia    Conditions and Status        Condition is unchanged.     [facility] Data  External documents reviewed: No  Cardiac tracing (EKG, telemetry) interpretation: Yes  Radiology interpretation: No  Labs reviewed: Yes  Any tests that were considered but not ordered: No     Decision rules/scores evaluated (example [id number], Wells, etc): No     Discussed with: Patient and significant other at bedside     Care Planning  Shared decision making: Yes with patient and significant other  Code status and discussions: Full code, with patient    Disposition  Social Determinants of Health that impact treatment or disposition: No  Estimated length of stay is 1 to 2 days.     I confirmed that the patient's advanced care plan is present, code status is documented, and a surrogate decision maker is listed in the patient's medical record.     The patient's surrogate decision maker is significant other/girlfriend.     The patient was seen and examined by me on 3/26/2025 at 5:40 PM.    Electronically signed by Tyrone Willis MD, 03/26/25, 17:57 CDT.               Electronically signed by Tyrone Willis MD at 03/26/25 1821          Emergency Department Notes        Mehnaz Webb RN at 03/26/25 1939          Please See IPASS.     Electronically signed by Mehnaz Webb RN at 03/26/25 1939       Christiano Berman MD at 03/26/25 1446          Subjective   History of Present Illness  74-year-old male with throat cancer.  States that he has had difficulty eating and drinking.  He does have a feeding tube.  But he just however feels like is not getting enough nutrition.  Feels dehydrated.  States that he is also been feeling  short of breath.  But does nothing new and other ordinary.  No chest pain.  Patient denies any fevers or chills.  Patient denies any other symptoms at this time.      Review of Systems   All other systems reviewed and are negative.      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    History of adenomatous polyp of colon     History of colon polyps     History of lung cancer     Right lung    Hyperlipidemia        No Known Allergies    Past Surgical History:   Procedure Laterality Date    BRONCHOSCOPY N/A 02/15/2021    Procedure: BRONCHOSCOPY;  Surgeon: Bruce Murillo MD;  Location: [facility];  Service: Cardiothoracic;  Laterality: N/A;    CATARACT EXTRACTION      CHOLECYSTECTOMY      COLONOSCOPY  11/20/2012    One 1cm tubular adenomatous polyp in the sigmoid colon; One 5mm hyperplastic polyp in the rectum; Diverticulosis; Repeat 4 years     COLONOSCOPY N/A 03/30/2017    Two 4-6mm tubular adenomatous polyps at the hepatic flexure; One 5mm tubular adenomatous polyp in the transverse colon; One 11mm tubular adenomatous polyp in the sigmoid colon; One 6mm tubular adenomatous polyp in the rectum; Diverticulosis in the left colon; Repeat 3 years     COLONOSCOPY  12/17/2009    One less than 5mm tubular adenomatous polyp in the cecum; One 5mm hyperplastic polyp in the transverse; One less than 3mm hyperplastic polyp in the rectum; Diverticulosis; Repeat 3 years     COLONOSCOPY N/A 07/02/2020    Two 5-7mm tubular adenomatous polyps at the hepatic flexure; One 6mm tubular adenomatous polyp in the transverse colon; Diverticulosis in the left colon; The entire examined colon is normal on direct and retroflexion views; Repeat 5 years    COLONOSCOPY N/A 06/29/2022    Diverticulosis in the left  colon; One 6mm tubular adenomatous polyp in the descending colon; One 4mm tubular adenomatous polyp in the descending colon; Congested mucosa in the entire examined colon-biopsied; Repeat 5 years    ENDOSCOPY N/A 03/27/2019    Medium-sized HH; Normal stomach; Normal examined duodenum-biopsied    ENDOSCOPY N/A 06/09/2022    Small HH; Non-erosive gastritis-biopsied; Normal examined duodenum-biopsied    ENDOSCOPY N/A 2/24/2025    Procedure: ESOPHAGOGASTRODUODENOSCOPY WITH PERCUTANEOUS ENDOSCOPIC GASTROSTOMY TUBE INSERTION;  Surgeon: Amie Donnelly MD;  Location: [facility] ENDOSCOPY;  Service: General;  Laterality: N/A;  pre op:   post op:  pcp:Diego Lucio MD    ERCP  07/30/2024    Dr. Buzz Dangelo-Choledocholithiasis status post balloon sweep extraction wtih balloon assisted sphincteroplasty; Placement of biliary stent as well as prophylactic pancreatic stent; Repeat ERCP 3 months for biliary stent removal    FOOT SURGERY Left     ORIF    HERNIA REPAIR      INNER EAR SURGERY      LARYNGOSCOPY N/A 1/20/2025    Procedure: DIRECT LARYNGOSCOPY WITH EXCISION OF LESION;  Surgeon: Antelmo Dangelo MD;  Location: [facility] OR;  Service: ENT;  Laterality: N/A;    MEDIASTINOSCOPY N/A 02/15/2021    Procedure: CERVICAL MEDIASTINOSCOPY WITH LYMPH NODE DISECTION;  Surgeon: Bruce Murillo MD;  Location: [facility] OR;  Service: Cardiothoracic;  Laterality: N/A;    PEG TUBE INSERTION N/A 2/24/2025    Procedure: PERCUTANEOUS ENDOSCOPIC GASTROSTOMY TUBE INSERTION;  Surgeon: Amie Donnelly MD;  Location: [facility] ENDOSCOPY;  Service: General;  Laterality: N/A;  pre op:  post op:  pcp:Diego Lucio MD    THORACOSCOPY Right 02/15/2021    Procedure: THORACOSCOPY, WEDGE RESECTION OF RIGHT UPPER LOBE WITH FROZEN,  , MEDIASTINAL LYMPH NODE DISSECTION;  Surgeon: Bruce Murillo MD;  Location: [facility] OR;  Service: Cardiothoracic;  Laterality: Right;    TONSILLECTOMY         Family History   Problem Relation Age of Onset     Colon polyps Mother         Unknown age    Diabetes Mother     Colon cancer Neg Hx     Esophageal cancer Neg Hx     Liver cancer Neg Hx     Liver disease Neg Hx     Rectal cancer Neg Hx     Stomach cancer Neg Hx        Social History     Socioeconomic History    Marital status: Significant Other   Tobacco Use    Smoking status: Every Day     Current packs/day: 0.50     Average packs/day: 0.5 packs/day for 56.2 years (28.1 ttl pk-yrs)     Types: Cigarettes     Start date: 1/1/1969     Passive exposure: Current    Smokeless tobacco: Never    Tobacco comments:     Pt states about 7-8 cigarettes per day 7/3   Vaping Use    Vaping status: Never Used   Substance and Sexual Activity    Alcohol use: Not Currently     Alcohol/week: 3.0 standard drinks of alcohol     Types: 3 Shots of liquor per week     Comment: sober as of 06/01/2022    Drug use: Yes     Types: Marijuana     Comment: daily    Sexual activity: Defer           Objective   Physical Exam  Vitals and nursing note reviewed.   Constitutional:       Appearance: He is well-developed.   HENT:      Head: Normocephalic and atraumatic.      Mouth/Throat:      Mouth: Mucous membranes are moist.   Eyes:      Extraocular Movements: Extraocular movements intact.      Pupils: Pupils are equal, round, and reactive to light.   Cardiovascular:      Rate and Rhythm: Normal rate and regular rhythm.      Heart sounds: Normal heart sounds.   Pulmonary:      Effort: Pulmonary effort is normal.      Breath sounds: Normal breath sounds.   Abdominal:      General: Bowel sounds are normal.      Palpations: Abdomen is soft.      Tenderness: There is no abdominal tenderness.   Skin:     General: Skin is warm and dry.   Neurological:      General: No focal deficit present.      Mental Status: He is alert and oriented to person, place, and time.   Psychiatric:         Mood and Affect: Mood normal.         Behavior: Behavior normal.         Procedures          ED Course  ED Course as of  03/26/25 1725   Wed Mar 26, 2025   1622 EKG rate 110  Sinus tachycardia  No STEMI [RP]   1622 EKG rate 119  Sinus tachycardia  No STEMI [RP]      ED Course User Index  [RP] Christiano Berman MD                                                     Lab Results (last 24 hours)       Procedure Component Value Units Date/Time    CBC & Differential [[id number]]  (Abnormal) Collected: 03/26/25 1432    Specimen: Blood from Arm, Right Updated: 03/26/25 1516    Narrative:      The following orders were created for panel order CBC & Differential.  Procedure                               Abnormality         Status                     ---------                               -----------         ------                     CBC Auto Differential[[id number]]        Abnormal            Final result                 Please view results for these tests on the individual orders.    BNP [[id number]]  (Normal) Collected: 03/26/25 1432    Specimen: Blood from Arm, Right Updated: 03/26/25 1505     proBNP 660.7 pg/mL     Narrative:      This assay is used as an aid in the diagnosis of individuals suspected of having heart failure. It can be used as an aid in the diagnosis of acute decompensated heart failure (ADHF) in patients presenting with signs and symptoms of ADHF to the emergency department (ED). In addition, NT-proBNP of <300 pg/mL indicates ADHF is not likely.    Age Range Result Interpretation  NT-proBNP Concentration (pg/mL:      <50             Positive            >450                   Gray                 300-450                    Negative             <300    50-75           Positive            >900                  Gray                300-900                  Negative            <300      >75             Positive            >1800                  Gray                300-1800                  Negative            <300    High Sensitivity Troponin T [[id number]]  (Abnormal) Collected: 03/26/25 1432    Specimen: Blood from Arm, Right  Updated: 03/26/25 1511     HS Troponin T 263 ng/L     Narrative:      High Sensitive Troponin T Reference Range:  <14.0 ng/L- Negative Female for AMI  <22.0 ng/L- Negative Male for AMI  >=14 - Abnormal Female indicating possible myocardial injury.  >=22 - Abnormal Male indicating possible myocardial injury.   Clinicians would have to utilize clinical acumen, EKG, Troponin, and serial changes to determine if it is an Acute Myocardial Infarction or myocardial injury due to an underlying chronic condition.         CBC Auto Differential [[id number]]  (Abnormal) Collected: 03/26/25 1432    Specimen: Blood from Arm, Right Updated: 03/26/25 1516     WBC 19.31 10*3/mm3      RBC 5.66 10*6/mm3      Hemoglobin 17.4 g/dL      Hematocrit 55.8 %      MCV 98.6 fL      MCH 30.7 pg      MCHC 31.2 g/dL      RDW 12.9 %      RDW-SD 46.6 fl      MPV 11.9 fL      Platelets 295 10*3/mm3     Narrative:      The previously reported component NRBC is no longer being reported. Previous result was 0.0 /100 WBC (Reference Range: 0.0-0.2 /100 WBC) on 3/26/2025 at 1450 CDT.    Manual Differential [[id number]]  (Abnormal) Collected: 03/26/25 1432    Specimen: Blood from Arm, Right Updated: 03/26/25 1516     Neutrophil % 91.8 %      Lymphocyte % 5.2 %      Monocyte % 2.1 %      Eosinophil % 1.0 %      Basophil % 0.0 %      Neutrophils Absolute 17.73 10*3/mm3      Lymphocytes Absolute 1.00 10*3/mm3      Monocytes Absolute 0.41 10*3/mm3      Eosinophils Absolute 0.19 10*3/mm3      Basophils Absolute 0.00 10*3/mm3      Anisocytosis Slight/1+     WBC Morphology Normal     Clumped Platelets Present    Comprehensive Metabolic Panel [[id number]]  (Abnormal) Collected: 03/26/25 1537    Specimen: Blood from Arm, Right Updated: 03/26/25 1652     Glucose 141 mg/dL       mg/dL      Creatinine 3.99 mg/dL      Sodium 160 mmol/L      Potassium 5.2 mmol/L      Chloride 117 mmol/L      CO2 24.0 mmol/L      Calcium 10.5 mg/dL      Total Protein 8.7 g/dL       Albumin 3.9 g/dL      ALT (SGPT) 58 U/L      AST (SGOT) 53 U/L      Alkaline Phosphatase 93 U/L      Total Bilirubin 0.6 mg/dL      Globulin 4.8 gm/dL      A/G Ratio 0.8 g/dL      BUN/Creatinine Ratio 41.4     Anion Gap 19.0 mmol/L      eGFR 14.9 mL/min/1.73     Narrative:      GFR Categories in Chronic Kidney Disease (CKD)      GFR Category          GFR (mL/min/1.73)    Interpretation  G1                     90 or greater         Normal or high (1)  G2                      60-89                Mild decrease (1)  G3a                   45-59                Mild to moderate decrease  G3b                   30-44                Moderate to severe decrease  G4                    15-29                Severe decrease  G5                    14 or less           Kidney failure          (1)In the absence of evidence of kidney disease, neither GFR category G1 or G2 fulfill the criteria for CKD.    eGFR calculation 2021 CKD-EPI creatinine equation, which does not include race as a factor    High Sensitivity Troponin T 1Hr [[id number]]  (Abnormal) Collected: 03/26/25 1537    Specimen: Blood Updated: 03/26/25 1627     HS Troponin T 261 ng/L      Troponin T Numeric Delta -2 ng/L      Troponin T % Delta -1    Narrative:      High Sensitive Troponin T Reference Range:  <14.0 ng/L- Negative Female for AMI  <22.0 ng/L- Negative Male for AMI  >=14 - Abnormal Female indicating possible myocardial injury.  >=22 - Abnormal Male indicating possible myocardial injury.   Clinicians would have to utilize clinical acumen, EKG, Troponin, and serial changes to determine if it is an Acute Myocardial Infarction or myocardial injury due to an underlying chronic condition.               XR Chest 2 View   Final Result       No acute findings.       This report was signed and finalized on 3/26/2025 3:32 PM by Dr. Esteban Almanzar MD.            Medications   sodium chloride 0.9 % flush 10 mL (has no administration in time range)   lactated ringers  bolus 1,000 mL (1,000 mL Intravenous New Bag 3/26/25 1406)       Medical Decision Making  74-year-old male with shortness of breath.  Found to be hyponatremic.  Had acute renal failure new onset.  Case was discussed with the hospitalist on-call.  He has accepted the patient under his services.    Problems Addressed:  Acute renal failure, unspecified acute renal failure type: complicated acute illness or injury  Elevated troponin: complicated acute illness or injury  Hypernatremia: complicated acute illness or injury  Shortness of breath: complicated acute illness or injury    Amount and/or Complexity of Data Reviewed  External Data Reviewed: labs and notes.  Labs: ordered. Decision-making details documented in ED Course.  Radiology: ordered. Decision-making details documented in ED Course.  ECG/medicine tests: ordered. Decision-making details documented in ED Course.    Risk  Prescription drug management.  Decision regarding hospitalization.        Final diagnoses:   Shortness of breath   Acute renal failure, unspecified acute renal failure type   Hypernatremia   Elevated troponin       ED Disposition  ED Disposition       ED Disposition   Decision to Admit    Condition   --    Comment   Level of Care: Remote Telemetry [26]   Diagnosis: Acute renal failure [987577]   Admitting Physician: DINA THOMPSON [[id number]]   Attending Physician: DINA THOMPSON [[id number]]   Certification: I Certify That Inpatient Hospital Services Are Medically Necessary For Greater Than 2 Midnights                 No follow-up provider specified.       Medication List      No changes were made to your prescriptions during this visit.            Christiano Berman MD  03/26/25 1725      Electronically signed by Christiano Berman MD at 03/26/25 1725       Vital Signs (last 2 days)       Date/Time Temp Temp src Pulse Resp BP Patient Position SpO2    03/27/25 0813 97.6 (36.4) Axillary 66 16 95/61 Lying 91    03/27/25 0713 -- -- 66 -- -- -- 96     03/27/25 0705 -- -- 65 15 -- -- 95    03/27/25 0459 97.5 (36.4) Axillary 69 18 109/66 Lying 94    03/26/25 2343 97.4 (36.3) Oral 95 16 102/55 Lying 91    03/26/25 2007 97.6 (36.4) Oral 107 18 111/56 Lying 91    03/26/25 1631 -- -- 97 -- 104/81 -- --    03/26/25 1616 -- -- 102 -- 119/77 -- --    03/26/25 1601 -- -- 103 -- 108/89 -- --    03/26/25 1427 97.6 (36.4) Oral 124 22 83/66 Sitting 96          Oxygen Therapy (last 2 days)       Date/Time SpO2 Device (Oxygen Therapy) Flow (L/min) (Oxygen Therapy) Oxygen Concentration (%) ETCO2 (mmHg)    03/27/25 0813 91 room air -- -- --    03/27/25 0713 96 -- -- -- --    03/27/25 0705 95 room air -- -- --    03/27/25 0459 94 room air -- -- --    03/26/25 2343 91 room air -- -- --    03/26/25 2010 -- room air -- -- --    03/26/25 2007 91 room air -- -- --    03/26/25 1427 96 room air -- -- --          Intake & Output (last 2 days)         03/25 0701 03/26 0700 03/26 0701  03/27 0700 03/27 0701  03/28 0700    P.O.   0    Total Intake(mL/kg)   0 (0)    Urine (mL/kg/hr)  400     Total Output  400     Net  -400 0                 Facility-Administered Medications as of 3/27/2025   Medication Dose Route Frequency Provider Last Rate Last Admin    acetaminophen (TYLENOL) tablet 975 mg  975 mg Oral Q8H Tyrone Willis MD   975 mg at 03/27/25 0651    albuterol (PROVENTIL) nebulizer solution 0.083% 2.5 mg/3mL  2.5 mg Nebulization Q4H PRN Tyrone Willis MD        aspirin EC tablet 81 mg  81 mg Oral Daily Tyrone Willis MD        atorvastatin (LIPITOR) tablet 10 mg  10 mg Oral Daily Tyrone Willis MD   10 mg at 03/27/25 0925    sennosides-docusate (PERICOLACE) 8.6-50 MG per tablet 2 tablet  2 tablet Oral BID PRN Tyrone Willis MD        And    polyethylene glycol (MIRALAX) packet 17 g  17 g Oral Daily PRN Tyrone Willis MD        And    bisacodyl (DULCOLAX) EC tablet 5 mg  5 mg Oral Daily PRN Tyrone Willis MD        And    bisacodyl (DULCOLAX) suppository  10 mg  10 mg Rectal Daily PRN Tyrone Willis MD        Calcium Replacement - Follow Nurse / BPA Driven Protocol   Not Applicable PRN Tyrone Willis MD        cholecalciferol (VITAMIN D3) tablet 5,000 Units  5,000 Units Oral Daily Tyrone Willis MD   5,000 Units at 03/27/25 0925    dextrose (D5W) 5 % infusion  125 mL/hr Intravenous Continuous Tyrone Willis  mL/hr at 03/27/25 0656 125 mL/hr at 03/27/25 0656    heparin (porcine) 5000 UNIT/ML injection 5,000 Units  5,000 Units Subcutaneous Q12H Tyrone Willis MD   5,000 Units at 03/27/25 0925    HYDROcodone-acetaminophen (NORCO) 5-325 MG per tablet 1 tablet  1 tablet Oral Q8H PRN Tyrone Willis MD        ipratropium (ATROVENT) nebulizer solution 0.5 mg  0.5 mg Nebulization 4x Daily - RT Tyrone Willis MD   0.5 mg at 03/27/25 0705    [COMPLETED] lactated ringers bolus 1,000 mL  1,000 mL Intravenous Once Christiano Berman MD   Stopped at 03/26/25 1835    Magnesium Low Dose Replacement - Follow Nurse / BPA Driven Protocol   Not Applicable Tyrone Holman MD        ondansetron (ZOFRAN) injection 4 mg  4 mg Intravenous Q6H PRN Tyrone Willis MD        oxyCODONE (ROXICODONE) 5 MG/5ML solution 5 mg  5 mg Oral Q6H PRN Tyrone Willis MD   5 mg at 03/26/25 2035    pantoprazole (PROTONIX) EC tablet 40 mg  40 mg Oral Q AM Tyrone Willis MD        Phosphorus Replacement - Follow Nurse / BPA Driven Protocol   Not Applicable PRN Tyrone Willis MD        Potassium Replacement - Follow Nurse / BPA Driven Protocol   Not Applicable Tyrone Holman MD        sodium chloride 0.9 % flush 10 mL  10 mL Intravenous PRN Christiano Berman MD        sodium chloride 0.9 % flush 10 mL  10 mL Intravenous Q12H Tyrone Willis MD   10 mL at 03/26/25 2347    sodium chloride 0.9 % flush 10 mL  10 mL Intravenous PRN Tyrone Willis MD        sodium chloride 0.9 % infusion 40 mL  40 mL Intravenous PRN Tyrone Willis MD      "    Orders (last 48 hrs)        Start     Ordered    03/27/25 1200  POC Glucose Finger Q6H  Every 6 Hours      Comments: Complete no more than 45 minutes prior to patient eating      03/27/25 0800    03/27/25 0900  atorvastatin (LIPITOR) tablet 10 mg  Daily         03/26/25 1935 03/27/25 0900  aspirin EC tablet 81 mg  Daily         03/26/25 1935 03/27/25 0900  cholecalciferol (VITAMIN D3) tablet 5,000 Units  Daily         03/26/25 1935 03/27/25 0800  Oral Care  2 Times Daily       03/26/25 1935 03/27/25 0600  pantoprazole (PROTONIX) EC tablet 40 mg  Every Early Morning         03/26/25 1935 03/27/25 0600  High Sensitivity Troponin T  Morning Draw         03/26/25 1935 03/27/25 0600  Basic Metabolic Panel  Morning Draw,   Status:  Canceled         03/26/25 1935 03/27/25 0600  Magnesium  Morning Draw         03/26/25 1935 03/26/25 2130  budesonide-formoterol (SYMBICORT) 160-4.5 MCG/ACT inhaler 2 puff  2 Times Daily - RT,   Status:  Discontinued        Placed in \"And\" Linked Group    03/26/25 1935 03/26/25 2100  sodium chloride 0.9 % flush 10 mL  Every 12 Hours Scheduled         03/26/25 1935 03/26/25 2100  heparin (porcine) 5000 UNIT/ML injection 5,000 Units  Every 12 Hours Scheduled         03/26/25 1935 03/26/25 2030  ipratropium (ATROVENT) nebulizer solution 0.5 mg  4 Times Daily - RT        Placed in \"And\" Linked Group    03/26/25 1935 03/26/25 2009  HYDROcodone-acetaminophen (NORCO) 5-325 MG per tablet 1 tablet  Every 8 Hours PRN         03/26/25 2009 03/26/25 2000  acetaminophen (TYLENOL) tablet 975 mg  Every 8 Hours Scheduled         03/26/25 1935 03/26/25 2000  Vital Signs  Every 4 Hours       03/26/25 1935 03/26/25 1951  dextrose (D5W) 5 % infusion  Continuous         03/26/25 1935 03/26/25 1942  albuterol (PROVENTIL) nebulizer solution 0.083% 2.5 mg/3mL  Every 4 Hours PRN         03/26/25 1943    03/26/25 1936  Inpatient Nutrition Consult  Once        Provider: " " (Not yet assigned)    03/26/25 1935 03/26/25 1936  Intake & Output  Every Shift       03/26/25 1935 03/26/25 1936  Weigh Patient  Once         03/26/25 1935 03/26/25 1936  Insert Peripheral IV  Once         03/26/25 1935 03/26/25 1936  Saline Lock & Maintain IV Access  Continuous         03/26/25 1935 03/26/25 1936  Activity - Ad Jessica  Until Discontinued         03/26/25 1935 03/26/25 1935  albuterol sulfate HFA (PROVENTIL HFA;VENTOLIN HFA;PROAIR HFA) inhaler 2 puff  Every 4 Hours PRN,   Status:  Discontinued         03/26/25 1935 03/26/25 1935  HYDROcodone-acetaminophen (NORCO) 5-325 MG per tablet 1 tablet  Every 8 Hours PRN,   Status:  Discontinued         03/26/25 1935 03/26/25 1935  oxyCODONE (ROXICODONE) 5 MG/5ML solution 5 mg  Every 6 Hours PRN         03/26/25 1935 03/26/25 1935  sodium chloride 0.9 % flush 10 mL  As Needed         03/26/25 1935 03/26/25 1935  sodium chloride 0.9 % infusion 40 mL  As Needed         03/26/25 1935 03/26/25 1935  Potassium Replacement - Follow Nurse / BPA Driven Protocol  As Needed         03/26/25 1935 03/26/25 1935  Magnesium Low Dose Replacement - Follow Nurse / BPA Driven Protocol  As Needed         03/26/25 1935 03/26/25 1935  Phosphorus Replacement - Follow Nurse / BPA Driven Protocol  As Needed         03/26/25 1935 03/26/25 1935  Calcium Replacement - Follow Nurse / BPA Driven Protocol  As Needed         03/26/25 1935 03/26/25 1935  sennosides-docusate (PERICOLACE) 8.6-50 MG per tablet 2 tablet  2 Times Daily PRN        Placed in \"And\" Linked Group    03/26/25 1935 03/26/25 1935  polyethylene glycol (MIRALAX) packet 17 g  Daily PRN        Placed in \"And\" Linked Group    03/26/25 1935 03/26/25 1935  bisacodyl (DULCOLAX) EC tablet 5 mg  Daily PRN        Placed in \"And\" Linked Group    03/26/25 1935 03/26/25 1935  bisacodyl (DULCOLAX) suppository 10 mg  Daily PRN        Placed in \"And\" Linked Group    03/26/25 1935    " 03/26/25 1935  ondansetron (ZOFRAN) injection 4 mg  Every 6 Hours PRN         03/26/25 1935    03/26/25 1823  Tube Feeding: Formula: RD to Initiate & Manage  Diet Effective Now        Comments: Jevity at 50ml /hr  Water flush at 250ml every 4 hours    03/26/25 1823    03/26/25 1756  Code Status and Medical Interventions: CPR (Attempt to Resuscitate); Full Support  Continuous         03/26/25 1757    03/26/25 1723  Inpatient Admission  Once         03/26/25 1723    03/26/25 1637  CT Angiogram Chest  1 Time Imaging,   Status:  Canceled         03/26/25 1637    03/26/25 1532  High Sensitivity Troponin T 1Hr  PROCEDURE ONCE         03/26/25 1511    03/26/25 1521  Comprehensive Metabolic Panel  Once         03/26/25 1425    03/26/25 1502  lactated ringers bolus 1,000 mL  Once         03/26/25 1446    03/26/25 1443  Manual Differential  Once         03/26/25 1442    03/26/25 1425  NPO Diet NPO Type: Strict NPO  Diet Effective Now         03/26/25 1425    03/26/25 1425  Undress & Gown  Once         03/26/25 1425    03/26/25 1425  Cardiac Monitoring  Continuous        Comments: Follow Standing Orders As Outlined in Process Instructions (Open Order Report to View Full Instructions)    03/26/25 1425    03/26/25 1425  Continuous Pulse Oximetry  Continuous         03/26/25 1425    03/26/25 1425  Vital Signs  Per Hospital Policy/Protocol         03/26/25 1425    03/26/25 1425  ECG 12 Lead Dyspnea  Now Then Every 1 Hour       03/26/25 1425    03/26/25 1425  XR Chest 2 View  1 Time Imaging         03/26/25 1425    03/26/25 1425  Insert Peripheral IV  Once         03/26/25 1425    03/26/25 1425  [place] Draw  Once         03/26/25 1425    03/26/25 1425  CBC & Differential  Once         03/26/25 1425    03/26/25 1425  BNP  Once         03/26/25 1425    03/26/25 1425  High Sensitivity Troponin T  Once         03/26/25 1425    03/26/25 1425  Green Top (Gel)  PROCEDURE ONCE         03/26/25 1425    03/26/25 1425  Lavender Top   PROCEDURE ONCE         03/26/25 1425    03/26/25 1425  Red Top  PROCEDURE ONCE         03/26/25 1425    03/26/25 1425  Gray Top  PROCEDURE ONCE         03/26/25 1425    03/26/25 1425  Light Blue Top  PROCEDURE ONCE         03/26/25 1425    03/26/25 1425  CBC Auto Differential  PROCEDURE ONCE         03/26/25 1425    03/26/25 1424  sodium chloride 0.9 % flush 10 mL  As Needed         03/26/25 1425    Unscheduled  Oxygen Therapy- Nasal Cannula; Titrate 1-6 LPM Per SpO2; 90 - 95%  Continuous PRN       03/26/25 1425    --  Cholecalciferol (VITAMIN D3 EXTRA STRENGTH PO)  Daily         03/27/25 0927    --  vitamin C (ASCORBIC ACID) 250 MG tablet  Daily         03/27/25 0927    --  fluconazole (DIFLUCAN) 100 MG tablet  Take As Directed         03/27/25 0929    --  acetaminophen (TYLENOL) 325 MG tablet  Every 8 Hours PRN         03/27/25 1025

## 2025-03-27 NOTE — TELEPHONE ENCOUNTER
Left message for the patient. Labs are still showing significant dehydration and kidney injury. Instructed patient to go to ER ASAP. Left our main office number for call back.

## 2025-03-27 NOTE — TELEPHONE ENCOUNTER
----- Message from Kelton Macias sent at 3/27/2025  7:15 AM CDT -----  Regarding: ER  Gross electrolyte abnormalities (sodium 160) and acute kidney injury.  Please send to the ER ASAP  ----- Message -----  From: Lab, Background User  Sent: 3/27/2025   6:38 AM CDT  To: Kelton Macias MD

## 2025-03-27 NOTE — CONSULTS
Adult Nutrition  Assessment/PES/Intervention Note  TF Assessment  Malnutrition Severity Assessment    Patient Name:  Se Ramirez  YOB: 1950  MRN: [id number]  Admit Date:  3/26/2025    Assessment Date:  3/27/2025    Comments:  Initial nutrition assessment. Nutrition consult for TF assessment. Pt was sent from XRT treatment to the ED with concern for decreased intake and dehydration. He had his 20th XRT tx completed yesterday out of 30 planned treatments. Pt has dx of throat CA. He had a PEG tube placed by Dr. Donnelly on 2/19. He was seen by [facility] RD on 2/7 with orders for TF moving forward based on po intake and recommendations for increase in TF if pt has negative side effects as XRT progresses. He has also been receiving weekly dysphagia treatment in the head/neck oncology clinic. He initially was able to tolerate a po diet. PO intake has quickly halted over the last 1-2 weeks per SLP notes. The pt described over the last 2 weeks and today upon RD visit, severe sore throat, preventing him from swallowing. It is noted that he has been non-compliant with dysphagia exercises and recommended TF regimen. He has lost ~32# in the last month (17%). He reports to this RD today that he has been doing the TF boluses as prescribed but he is inconsistent in how he describes that he is taking the TF, at times saying he consumes it po and other times referring to the PEG tube. At SLP treatment 3/19, he reported he was only able to tolerate 2 cartons/day and no po intake. Recommendations made by RD 2/7, were for up to 4-5 cartons daily. Pt was coughing and spitting in the trash can during RD visit. PCA to provide pt with a box of tissues per his request. He is also upset, saying that he was kicked out of the head/neck XRT program. Per notes, it appears he has just been placed on hold for at least a week. Told pt that I believe the treatment is just on hold. He can't tell this RD the name of the TF product  he uses or how much TF/water flushes he is supposed to be giving himself daily at home. No family or sig other in the room during visit. He has muscle and fat wasting per NFPE. He has not been started on any TF yet.     Recommendation/TF orders:  Bolus Nutren 2.0 (or equivalent formula for home use), 250mL (1carton), 4x/day. Flush PEG with 75mL of water before and after each TF bolus. Bolus an additional 240mL of water via PEG for hydration, 3x/day. **Recommend initial bolus of TF be 125mL to assess pt's volume tolerance and advance to goal volume as tolerated.**     Will follow to establish TF bolus tolerance and make adjustments/changes as necessary.    Malnutrition Severity Assessment      Patient meets criteria for : Severe Malnutrition  Malnutrition Type (Last 8 Hours)       Malnutrition Severity Assessment       Row Name 03/27/25 1116       Malnutrition Severity Assessment    Malnutrition Type Acute Disease or Injury - Related Malnutrition      Row Name 03/27/25 1116       Insufficient Energy Intake     Insufficient Energy Intake Findings Severe    Insufficient Energy Intake  < or equal to 50% of est. energy requirement for > or equal to 5d)  Not able to tolerate po diet for at least the last 1-2 weeks and not adhering to TF bolus recommendations prior to hospital admission.      Row Name 03/27/25 1116       Unintentional Weight Loss     Unintentional Weight Loss Findings Severe    Unintentional Weight Loss  Weight loss greater than 5% in one month  He has lost ~32# in the month (17%).      Row Name 03/27/25 1116       Muscle Loss    Loss of Muscle Mass Findings Moderate    Sabianist Region Moderate - slight depression    Clavicle Bone Region Moderate - some protrusion in females, visible in males    Acromion Bone Region Moderate - acromion may slightly protrude    Scapular Bone Region Moderate - mild depression, bones may show slightly    Dorsal Hand Region Moderate - slight depression    Patellar Region  Moderate - patella more prominent, less muscle definition around patella    Anterior Thigh Region Moderate - mild depression on inner thigh    Posterior Calf Region Moderate - some roundness, slight firmness      Row Name 03/27/25 1116       Fat Loss    Subcutaneous Fat Loss Findings Moderate    Orbital Region  Severe - pronounced hollowness/depression, dark circles, loose saggy skin    Upper Arm Region Moderate - some fat tissue, not ample    Thoracic & Lumbar Region Moderate - ribs visible with mild depressions, iliac crest somewhat prominent      Row Name 03/27/25 1116       Criteria Met (Must meet criteria for severity in at least 2 of these categories: M Wasting, Fat Loss, Fluid, Secondary Signs, Wt. Status, Intake)    Patient meets criteria for  Severe Malnutrition                           Reason for Assessment       Row Name 03/27/25 1047          Reason for Assessment    Reason For Assessment physician consult;TF/PN     Diagnosis cancer diagnosis/related complications;pulmonary disease;fluid status;metabolic state;cardiac disease;renal disease     Identified At Risk by Screening Criteria tube feeding or parenteral nutrition                    Nutrition/Diet History       Row Name 03/27/25 1047          Nutrition/Diet History          Enteral Nutrition Regimen Recs per [facility] 2/7: Nutren 2.0, 1 carton daily, additional 1 carton after each meal if he consumes <50% of meal, an additional carton if he starts losing weight durign treatment. So a total of potentially up to 5 cartons daily.     Typical Activity Patterns moderate intensity     Functional Status ability to purchase food;ability to prepare meals;access to food;ambulatory     Factors Affecting Nutritional Intake appetite;difficulty/impaired swallowing;emotional/behavioral;cognitive status/motor function;enteral/parenteral device(s);pain                    Labs/Tests/Procedures/Meds       Row Name 03/27/25 1101          Labs/Procedures/Meds    Lab  Results Reviewed reviewed, pertinent     Lab Results Comments Mg++, BUN/Cr, Na+, Cl-, ALT/AST, GFR 23.3, WBC        Diagnostic Tests/Procedures    Diagnostic Test/Procedure Reviewed reviewed        Medications    Pertinent Medications Reviewed reviewed, pertinent     Pertinent Medications Comments Vit D, protonix, oxycodone                    Physical Findings       Row Name 03/27/25 1102          Physical Findings    Overall Physical Appearance thin, last BM reported by pt 4-5 days ago, [place], missing teeth/dentures, weak, dry/painful throat, muscle and fat wasting     Exam Agreement consented                    Estimated/Assessed Needs - Anthropometrics       Row Name 03/27/25 1103          Anthropometrics    Calculation Weight 68.9 kg (151 lb 14.4 oz)     Additional Documentation Usual Body Weight (UBW) (Group)        Usual Body Weight (UBW)    Usual Body Weight --  175-185# per pt report     Weight Change (Amount and Duration) He has lost ~32# in the month (17%), since XRT started        Estimated/Assessed Needs    Additional Documentation KCAL/KG (Group);Protein Requirements (Group);Estimated Calorie Needs (Group)        Estimated Calorie Needs    Estimated Calorie Require (kcal/day) 7950-2690     Estimated Calorie Need Method kcal/kg        KCAL/KG    KCAL/KG 30 Kcal/Kg (kcal);35 Kcal/Kg (kcal)     30 Kcal/Kg (kcal) 2067     35 Kcal/Kg (kcal) 2411.5        Protein Requirements    Weight Used For Protein Calculations 68.9 kg (151 lb 14.4 oz)     Est Protein Requirement Amount (gms/kg) 1.2 gm protein  1.2-1.5g/kg BW = 83-103g/day                    Nutrition Prescription Ordered       Row Name 03/27/25 1115          Nutrition Prescription PO    Current PO Diet NPO                    Evaluation of Received Nutrient/Fluid Intake       Row Name 03/27/25 1115          Nutrient/Fluid Evaluation    Number of Days Evaluated 1 day  last 24 hours     Additional Documentation Fluid Intake Evaluation (Group)        Fluid  Intake Evaluation    Other Fluid (mL) 0        PO Evaluation    % PO Intake NPO        EN Evaluation    EN Average Volume Delivered (mL/day) 0 mL/day                   Problem/Interventions:   Problem 1       Row Name 03/27/25 1117          Nutrition Diagnoses Problem 1    Problem 1 Malnutrition  Severe malnutrition in the context of acute illness/disease     Etiology (related to) Factors Affecting Nutrition;Medical Diagnosis     Nutrition related Increased nutrition needs;Alternate nutrition requirements  hypermetabolic, has PEG in place     Fluid Status Dehydration     Gastrointestinal Esophagitis     Metabolic/ICU Hypernatremia;Hypermagnesemia     Oncology Head/neck cancer     Pulmonary/Critical Care COPD     Renal YANICK     Oral Swallowing Difficulty;Dry Mouth;Mouth Pain     Signs/Symptoms (evidenced by) PO diet not tolerated;EN Intake Delivery;Unintended Weight Change;SLP/Swallow eval;Biochemical;Report/Observation     Percent (%) of EN goal --  TF consult     Unintended Weight Change Loss     Number of Pounds Lost He has lost ~32# in the month (17%).     Other Comment muscle and fat wasting, severe dehydration, non-compliant with therapy and nutrition support recommendations, white oral mucosa, dry/sore throat     Specific Labs Noted BUN;Creatinine;GFR;Mg++;Na+     Swallow eval status Pending  Has been receiving OP treatment                          Intervention Goal       Row Name 03/27/25 1122          Intervention Goal    General Improved nutrition related lab(s);Nutrition support treatment;Reduce/improve symptoms;Meet nutritional needs for age/condition;Disease management/therapy     PO --  per MD/SLP discretion     TF/PN Inititiate TF/PN;Establish TF tolerance;Tolerate TF at goal     Weight Maintain weight                    Nutrition Intervention       Row Name 03/27/25 1123          Nutrition Intervention    RD/Tech Action Follow Tx progress;Care plan reviewd;Recommend/ordered;Other (comment)  Will  consult SLP for continuity of care and SW to ensure TF is set up for home use     Recommended/Ordered EN                    Nutrition Prescription       Row Name 03/27/25 1123          Nutrition Prescription EN    Enteral Prescription Enteral begin/change;Enteral to supply     Enteral Route PEG     Product Nutren 2 ruben     TF Delivery Method Bolus     TF Bolus Goal Volume (mL) 250 mL     TF Bolus Starting Volume (mL) 125 mL     TF Bolus Frequency 4 times a day     Water flush (mL)  150 mL  75mL of water before and after each bolus, 240mL additional water flush TID     Water Flush Frequency Every feeding     New EN Prescription Ordered? Yes        EN to Supply    Kcal/Day 2000 Kcal/Day     Kcal/Kg 29 Kcal/Kg     Kcal/Kg Weight Method Actual weight     Protein (gm/day) 84 gm/day     Meet Estimated Kcal Need (%) 97 %  of kcal range     Meet Estimated Protein Need (%) 100 %  protein range     TF Free H2O (mL) 692 mL     Total Free H2O (mL/day) 2012 mL/day     Fiber Per Day (gm/day) 0 gm/day        Other Orders    Obtain Weight Daily     Obtain Weight Ordered? Yes                    Education/Evaluation       Row Name 03/27/25 1125          Education    Education Other (comment);Advised regarding habits/behavior;Education topics  RN to teach pt correct TF bolus administration. SW to confirm that TF is set up for home use.     Education Topics TF instruction     Advised Regarding Habits/Behavior Increased nutrient density        Monitor/Evaluation    Monitor Per protocol;I&O;Pertinent labs;TF delivery/tolerance;Weight;Skin status;Symptoms     Education Follow-up Reinforce PRN                     Electronically signed by:  Ana María Goel RDN, LD  03/27/25 11:27 CDT

## 2025-03-27 NOTE — PROGRESS NOTES
"    [facility] Medicine Services  INPATIENT PROGRESS NOTE    Patient Name: Se Ramirez  Date of Admission: 3/26/2025  Today's Date: 03/27/25  Length of Stay: 1  Primary Care Physician: Diego Lucio MD    Subjective   Chief Complaint: Poor food intake    Patient has no new complaint this morning, said he feels a lot better.      Review of Systems   All pertinent negatives and positives are as above. All other systems have been reviewed and are negative unless otherwise stated.     Objective    Temp:  [96.7 °F (35.9 °C)-97.6 °F (36.4 °C)] 96.7 °F (35.9 °C)  Heart Rate:  [] 92  Resp:  [14-18] 14  BP: ()/(55-81) 101/67  Physical Exam  GEN: Awake, alert, interactive but uncomfortable  HEENT: Atraumatic, PERRLA, EOMI, Anicteric, Trachea midline  Lungs: CTAB, no wheezing/rales/rhonchi  Heart: RRR, +S1/s2, no rub  ABD: soft, nt/nd, +BS, no guarding/rebound, PEG tube in situ  Extremities: atraumatic, no cyanosis, no edema  Skin: no rashes or lesions but skin turgor is significantly reduced  Neuro: AAOx3, no focal deficits  Psych: normal mood & affect      Results Review:  I have reviewed the labs, radiology results, and diagnostic studies.    Laboratory Data:   Results from last 7 days   Lab Units 03/26/25  1432   WBC 10*3/mm3 19.31*   HEMOGLOBIN g/dL 17.4   HEMATOCRIT % 55.8*   PLATELETS 10*3/mm3 295        Results from last 7 days   Lab Units 03/27/25  0508 03/26/25  1537   SODIUM mmol/L 162* 160*   POTASSIUM mmol/L 4.8 5.2   CHLORIDE mmol/L 120* 117*   CO2 mmol/L 28.0 24.0   BUN mg/dL 148* 165*   CREATININE mg/dL 2.75* 3.99*   CALCIUM mg/dL 10.4 10.5   BILIRUBIN mg/dL 0.6 0.6   ALK PHOS U/L 89 93   ALT (SGPT) U/L 63* 58*   AST (SGOT) U/L 58* 53*   GLUCOSE mg/dL 156* 141*       Culture Data:   No results found for: \"BLOODCX\", \"URINECX\", \"WOUNDCX\", \"MRSACX\", \"RESPCX\", \"STOOLCX\"    Radiology Data:   Imaging Results (Last 24 Hours)       ** No results found for the " last 24 hours. **            I have reviewed the patient's current medications.     Assessment/Plan   Assessment  Active Hospital Problems    Diagnosis     **Acute renal failure        Treatment Plan    -Severe acute kidney injury [versus acute on chronic kidney disease]  Review of the chart showed that patient had normal renal function in March of this year, Current acute kidney injury is probably secondary to reduce fluid intake.  We will continue patient on dextrose water at 125 mL/h and follow renal function in the morning, if no improvement will consult nephrology [improving].     -Severe hypernatremia  This is probably secondary to severe dehydration, patient will be continued on free water and also free water flushes via the PEG tube.     -Elevated troponin  This is probably secondary to acute kidney injury, patient has no chest symptoms.  We will follow-up troponin in the morning, if it trends up we will consult cardiologist [trended downwards].     -Throat cancer [with PEG tube for feeding/water flushes]  We will consult nutritionist for input, patient does not remember the name of his home feeds.  Consulted dietitian to help with nutrition    Other chronic medical conditions-  History of COPD  Diverticulosis  GERD  Difficulty hearing  Hyperlipidemia      Prophylaxis-heparin    Disposition-continue current management      Electronically signed by Tyrone Willis MD, 03/27/25, 16:22 CDT.

## 2025-03-27 NOTE — CASE MANAGEMENT/SOCIAL WORK
Discharge Planning Assessment  [facility]     Patient Name: Se Ramirez  MRN: [id number]  Today's Date: 3/27/2025    Admit Date: 3/26/2025        Discharge Needs Assessment       Row Name 03/27/25 1446       Living Environment    People in Home significant other    Current Living Arrangements home    Potentially Unsafe Housing Conditions none    Primary Care Provided by self    Provides Primary Care For no one    Family Caregiver if Needed significant other    Quality of Family Relationships helpful;involved;supportive    Able to Return to Prior Arrangements yes       Resource/Environmental Concerns    Resource/Environmental Concerns none       Transition Planning    Patient/Family Anticipates Transition to home with family    Patient/Family Anticipated Services at Transition none       Discharge Needs Assessment    Readmission Within the Last 30 Days no previous admission in last 30 days    Equipment Currently Used at Home nutrition supplies    Anticipated Changes Related to Illness none    Equipment Needed After Discharge none    Discharge Coordination/Progress Pt lives at home with significant other and will return home at d/c. He has TF at home through [facility]. Spoke with Ramón with [facility] [phone] and pt was on Nutren 2.0 bolus at home prior to admission. She says he still should have some at home (especially if noncompliant) and they can provide another shipment if needed. Pt has rx coverage for meds. Will follow.                   Discharge Plan    No documentation.                      Demographic Summary    No documentation.                  Functional Status    No documentation.                  Psychosocial    No documentation.                  Abuse/Neglect    No documentation.                  Legal    No documentation.                  Substance Abuse    No documentation.                  Patient Forms    No documentation.                     JOSE ALBERTO Lazo

## 2025-03-27 NOTE — PLAN OF CARE
Goal Outcome Evaluation:  Plan of Care Reviewed With: patient, caregiver (JOSE Hinojosa)                                         SLP received order for swallowing evaluation. Patient refused today due to pain and lethargy. He is well-known to me as I follow him through Dr. 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 He has made himself NPO over the last several weeks due to odynophagia. He has had a PEG tube since the start of his XRT but has not been compliant with tube feedings at goal amount. He is not compliant with home exercise program for dysphagia. He is still smoking. SLP will follow up to reassess swallow tomorrow if patient is more appropriate.     Ruth Sherwood MS CCC-SLP 3/27/2025 13:15 CDT

## 2025-03-27 NOTE — PLAN OF CARE
Goal Outcome Evaluation:  Plan of Care Reviewed With: patient        Progress: no change  Outcome Evaluation: New admit from ED. VSS. PRN pain meds as ordered, with good results. IVF as ordered. Up with assist x1, safety maintained. Voiding per urinal. NPO, RD to manage TF.

## 2025-03-28 LAB
ANION GAP SERPL CALCULATED.3IONS-SCNC: 12 MMOL/L (ref 5–15)
BUN SERPL-MCNC: 87 MG/DL (ref 8–23)
BUN/CREAT SERPL: 66.4 (ref 7–25)
CALCIUM SPEC-SCNC: 9.9 MG/DL (ref 8.6–10.5)
CHLORIDE SERPL-SCNC: 112 MMOL/L (ref 98–107)
CO2 SERPL-SCNC: 25 MMOL/L (ref 22–29)
CREAT SERPL-MCNC: 1.31 MG/DL (ref 0.76–1.27)
EGFRCR SERPLBLD CKD-EPI 2021: 56.8 ML/MIN/1.73
GLUCOSE BLDC GLUCOMTR-MCNC: 123 MG/DL (ref 70–130)
GLUCOSE BLDC GLUCOMTR-MCNC: 134 MG/DL (ref 70–130)
GLUCOSE BLDC GLUCOMTR-MCNC: 160 MG/DL (ref 70–130)
GLUCOSE SERPL-MCNC: 115 MG/DL (ref 65–99)
MAGNESIUM SERPL-MCNC: 2.3 MG/DL (ref 1.6–2.4)
PHOSPHATE SERPL-MCNC: 3.6 MG/DL (ref 2.5–4.5)
POTASSIUM SERPL-SCNC: 4.1 MMOL/L (ref 3.5–5.2)
SODIUM SERPL-SCNC: 149 MMOL/L (ref 136–145)

## 2025-03-28 PROCEDURE — 82948 REAGENT STRIP/BLOOD GLUCOSE: CPT

## 2025-03-28 PROCEDURE — 80048 BASIC METABOLIC PNL TOTAL CA: CPT | Performed by: INTERNAL MEDICINE

## 2025-03-28 PROCEDURE — 25010000002 HEPARIN (PORCINE) PER 1000 UNITS: Performed by: INTERNAL MEDICINE

## 2025-03-28 PROCEDURE — 83735 ASSAY OF MAGNESIUM: CPT | Performed by: INTERNAL MEDICINE

## 2025-03-28 PROCEDURE — 92610 EVALUATE SWALLOWING FUNCTION: CPT

## 2025-03-28 PROCEDURE — 25010000003 DEXTROSE 5 % SOLUTION: Performed by: INTERNAL MEDICINE

## 2025-03-28 PROCEDURE — 84100 ASSAY OF PHOSPHORUS: CPT | Performed by: INTERNAL MEDICINE

## 2025-03-28 PROCEDURE — 94664 DEMO&/EVAL PT USE INHALER: CPT

## 2025-03-28 PROCEDURE — 94799 UNLISTED PULMONARY SVC/PX: CPT

## 2025-03-28 RX ORDER — PANTOPRAZOLE SODIUM 40 MG/10ML
40 INJECTION, POWDER, LYOPHILIZED, FOR SOLUTION INTRAVENOUS
Status: DISCONTINUED | OUTPATIENT
Start: 2025-03-28 | End: 2025-03-29 | Stop reason: HOSPADM

## 2025-03-28 RX ORDER — ASPIRIN 81 MG/1
81 TABLET, CHEWABLE ORAL DAILY
Status: DISCONTINUED | OUTPATIENT
Start: 2025-03-28 | End: 2025-03-29 | Stop reason: HOSPADM

## 2025-03-28 RX ADMIN — PANTOPRAZOLE SODIUM 40 MG: 40 INJECTION, POWDER, FOR SOLUTION INTRAVENOUS at 06:15

## 2025-03-28 RX ADMIN — IPRATROPIUM BROMIDE 0.5 MG: 0.5 SOLUTION RESPIRATORY (INHALATION) at 18:09

## 2025-03-28 RX ADMIN — OXYCODONE HYDROCHLORIDE 5 MG: 5 SOLUTION ORAL at 08:40

## 2025-03-28 RX ADMIN — IPRATROPIUM BROMIDE 0.5 MG: 0.5 SOLUTION RESPIRATORY (INHALATION) at 09:48

## 2025-03-28 RX ADMIN — HEPARIN SODIUM 5000 UNITS: 5000 INJECTION, SOLUTION INTRAVENOUS; SUBCUTANEOUS at 21:33

## 2025-03-28 RX ADMIN — OXYCODONE HYDROCHLORIDE 5 MG: 5 SOLUTION ORAL at 02:22

## 2025-03-28 RX ADMIN — ACETAMINOPHEN 975 MG: 325 TABLET, FILM COATED ORAL at 21:33

## 2025-03-28 RX ADMIN — ACETAMINOPHEN 975 MG: 325 TABLET, FILM COATED ORAL at 14:14

## 2025-03-28 RX ADMIN — Medication 10 ML: at 08:41

## 2025-03-28 RX ADMIN — Medication 5000 UNITS: at 08:39

## 2025-03-28 RX ADMIN — DEXTROSE MONOHYDRATE 125 ML/HR: 50 INJECTION, SOLUTION INTRAVENOUS at 17:17

## 2025-03-28 RX ADMIN — DEXTROSE MONOHYDRATE 125 ML/HR: 50 INJECTION, SOLUTION INTRAVENOUS at 08:51

## 2025-03-28 RX ADMIN — ASPIRIN 81 MG: 81 TABLET, CHEWABLE ORAL at 12:04

## 2025-03-28 RX ADMIN — OXYCODONE HYDROCHLORIDE 5 MG: 5 SOLUTION ORAL at 23:56

## 2025-03-28 RX ADMIN — IPRATROPIUM BROMIDE 0.5 MG: 0.5 SOLUTION RESPIRATORY (INHALATION) at 13:51

## 2025-03-28 RX ADMIN — ACETAMINOPHEN 975 MG: 325 TABLET, FILM COATED ORAL at 05:41

## 2025-03-28 RX ADMIN — HEPARIN SODIUM 5000 UNITS: 5000 INJECTION, SOLUTION INTRAVENOUS; SUBCUTANEOUS at 08:40

## 2025-03-28 RX ADMIN — OXYCODONE HYDROCHLORIDE 5 MG: 5 SOLUTION ORAL at 18:10

## 2025-03-28 RX ADMIN — IPRATROPIUM BROMIDE 0.5 MG: 0.5 SOLUTION RESPIRATORY (INHALATION) at 06:21

## 2025-03-28 NOTE — PLAN OF CARE
Clinical bedside swallow evaluation complete. The pt was alert, cooperative, and oriented x4. He reports he has not been eating anything by mouth for weeks due to pain with intake. He reports he has several more radiation treatments left. SLP informed pt it would likely be a few weeks post radiation before odynophagia relief began. He did try 3x ice chip trial as well as 3x trial of thin water via side of cup. Multiple swallows noted with all trials as well as delayed coughing with wet vocal quality at times. SLP recommends pt remain NPO with feedings via PEG only. SLP is ok for pt to have occasional ice chip or sip of water in order to aid with comfort. SLP will continue to follow and treat.

## 2025-03-28 NOTE — CASE MANAGEMENT/SOCIAL WORK
Continued Stay Note  [facility]     Patient Name: Se Ramirez  MRN: [id number]  Today's Date: 3/28/2025    Admit Date: 3/26/2025    Plan: Home   Discharge Plan       Row Name 03/28/25 1406       Plan    Plan Home    Patient/Family in Agreement with Plan yes    Final Discharge Disposition Code 01 - home or self-care    Final Note Pt will d/c home at d/c. He already has the Nutren 2.0 at home from [facility].                   Discharge Codes    No documentation.                       JOSE ALBERTO Lazo

## 2025-03-28 NOTE — PLAN OF CARE
Admitted 3/26 for ARF (Cr/BUN/GFR 3.99/165/14.9), poor oral intake at home, hypernatremia (160)  Nutrition consult; nutren 2.0 (250 ml) 4x daily  Heparin 5000 units subq bid    Problem: Adult Inpatient Plan of Care  Goal: Plan of Care Review  3/28/2025 0405 by Douglas Murillo RN  Outcome: Progressing  3/28/2025 0317 by Douglas Murillo RN  Outcome: Progressing     Problem: Acute Kidney Injury/Impairment  Goal: Fluid and Electrolyte Balance  Outcome: Progressing     Problem: Fall Injury Risk  Goal: Absence of Fall and Fall-Related Injury  3/28/2025 0405 by Douglas Murillo RN  Outcome: Progressing  3/28/2025 0317 by Douglas Murillo RN  Outcome: Progressing  Intervention: Promote Injury-Free Environment  Recent Flowsheet Documentation  Taken 3/28/2025 0300 by Douglas Murillo RN  Safety Promotion/Fall Prevention: safety round/check completed  Taken 3/28/2025 0000 by Douglas Murillo RN  Safety Promotion/Fall Prevention: safety round/check completed  Taken 3/27/2025 2300 by Douglas Murillo RN  Safety Promotion/Fall Prevention: safety round/check completed  Taken 3/27/2025 2200 by Douglas Murillo RN  Safety Promotion/Fall Prevention: safety round/check completed  Taken 3/27/2025 2100 by Douglas Murillo RN  Safety Promotion/Fall Prevention: safety round/check completed  Taken 3/27/2025 2045 by Douglas Murillo RN  Safety Promotion/Fall Prevention: safety round/check completed     Problem: Malnutrition  Goal: Improved Nutritional Intake  Outcome: Progressing     Problem: Adult Inpatient Plan of Care  Goal: Absence of Hospital-Acquired Illness or Injury  Intervention: Identify and Manage Fall Risk  Recent Flowsheet Documentation  Taken 3/28/2025 0300 by Douglas Murillo RN  Safety Promotion/Fall Prevention: safety round/check completed  Taken 3/28/2025 0000 by Douglas Murillo RN  Safety Promotion/Fall Prevention: safety round/check completed  Taken 3/27/2025 2300 by Douglas Murillo RN  Safety Promotion/Fall Prevention: safety round/check completed  Taken 3/27/2025  2200 by Douglas Murillo RN  Safety Promotion/Fall Prevention: safety round/check completed  Taken 3/27/2025 2100 by Douglas Murillo RN  Safety Promotion/Fall Prevention: safety round/check completed  Taken 3/27/2025 2045 by Douglas Murillo RN  Safety Promotion/Fall Prevention: safety round/check completed  Intervention: Prevent Skin Injury  Recent Flowsheet Documentation  Taken 3/28/2025 0000 by Douglas Murillo RN  Body Position: position changed independently  Intervention: Prevent and Manage VTE (Venous Thromboembolism) Risk  Recent Flowsheet Documentation  Taken 3/27/2025 2045 by Douglas Murillo RN  VTE Prevention/Management: (heparin) other (see comments)  Goal: Optimal Comfort and Wellbeing  Intervention: Monitor Pain and Promote Comfort  Recent Flowsheet Documentation  Taken 3/28/2025 0222 by Douglas Murillo RN  Pain Management Interventions: pain medication given  Taken 3/27/2025 2136 by Douglas Murillo RN  Pain Management Interventions: pain medication given  Taken 3/27/2025 2045 by Douglas Murillo RN  Pain Management Interventions: pain medication given   Goal Outcome Evaluation:

## 2025-03-28 NOTE — PLAN OF CARE
Admitted 3/26 for ARF, poor oral intake at home, hypernatremia  Nutrition consult; nutren 2.0 (250 ml) 4x daily  Heparin 5000 units subq bid  Problem: Adult Inpatient Plan of Care  Goal: Plan of Care Review  Outcome: Progressing     Problem: Acute Kidney Injury/Impairment  Goal: Fluid and Electrolyte Balance  Outcome: Progressing     Problem: Fall Injury Risk  Goal: Absence of Fall and Fall-Related Injury  Outcome: Progressing  Intervention: Promote Injury-Free Environment  Recent Flowsheet Documentation  Taken 3/28/2025 0000 by Douglas Murillo RN  Safety Promotion/Fall Prevention: safety round/check completed  Taken 3/27/2025 2300 by Douglas Murillo RN  Safety Promotion/Fall Prevention: safety round/check completed  Taken 3/27/2025 2200 by Douglas Murillo RN  Safety Promotion/Fall Prevention: safety round/check completed  Taken 3/27/2025 2100 by Douglas Murillo RN  Safety Promotion/Fall Prevention: safety round/check completed  Taken 3/27/2025 2045 by Douglas Murillo RN  Safety Promotion/Fall Prevention: safety round/check completed     Problem: Adult Inpatient Plan of Care  Goal: Absence of Hospital-Acquired Illness or Injury  Intervention: Identify and Manage Fall Risk  Recent Flowsheet Documentation  Taken 3/28/2025 0000 by Douglas Murillo RN  Safety Promotion/Fall Prevention: safety round/check completed  Taken 3/27/2025 2300 by Douglas Murillo RN  Safety Promotion/Fall Prevention: safety round/check completed  Taken 3/27/2025 2200 by Douglas Murillo RN  Safety Promotion/Fall Prevention: safety round/check completed  Taken 3/27/2025 2100 by Douglas Murillo RN  Safety Promotion/Fall Prevention: safety round/check completed  Taken 3/27/2025 2045 by Douglas Murillo RN  Safety Promotion/Fall Prevention: safety round/check completed  Intervention: Prevent Skin Injury  Recent Flowsheet Documentation  Taken 3/28/2025 0000 by Douglas Murillo RN  Body Position: position changed independently  Intervention: Prevent and Manage VTE (Venous  Thromboembolism) Risk  Recent Flowsheet Documentation  Taken 3/27/2025 2045 by Douglas Murillo RN  VTE Prevention/Management: (heparin) other (see comments)  Goal: Optimal Comfort and Wellbeing  Intervention: Monitor Pain and Promote Comfort  Recent Flowsheet Documentation  Taken 3/28/2025 0222 by Douglas Murillo RN  Pain Management Interventions: pain medication given  Taken 3/27/2025 2136 by Douglas Murillo RN  Pain Management Interventions: pain medication given  Taken 3/27/2025 2045 by Douglas Murillo RN  Pain Management Interventions: pain medication given   Goal Outcome Evaluation:

## 2025-03-28 NOTE — PROGRESS NOTES
[facility] Medicine Services  INPATIENT PROGRESS NOTE    Patient Name: Se Ramirez  Date of Admission: 3/26/2025  Today's Date: 03/28/25  Length of Stay: 2  Primary Care Physician: Diego Lucio MD    Subjective   Chief Complaint: Poor food intake    Patient has no new complaint, but does not think he is ready to go home today.  We discussed discharge planning for tomorrow and patient is agreeable.      Review of Systems   All pertinent negatives and positives are as above. All other systems have been reviewed and are negative unless otherwise stated.     Objective    Temp:  [96.7 °F (35.9 °C)-98 °F (36.7 °C)] 97.8 °F (36.6 °C)  Heart Rate:  [64-99] 97  Resp:  [14-16] 16  BP: ()/(59-72) 102/70  Physical Exam  GEN: Awake, alert, interactive but uncomfortable  HEENT: Atraumatic, PERRLA, EOMI, Anicteric, Trachea midline  Lungs: CTAB, no wheezing/rales/rhonchi  Heart: RRR, +S1/s2, no rub  ABD: soft, nt/nd, +BS, no guarding/rebound, PEG tube in situ  Extremities: atraumatic, no cyanosis, no edema  Skin: no rashes or lesions but skin turgor is significantly reduced  Neuro: AAOx3, no focal deficits  Psych: normal mood & affect      Results Review:  I have reviewed the labs, radiology results, and diagnostic studies.    Laboratory Data:   Results from last 7 days   Lab Units 03/26/25  1432   WBC 10*3/mm3 19.31*   HEMOGLOBIN g/dL 17.4   HEMATOCRIT % 55.8*   PLATELETS 10*3/mm3 295        Results from last 7 days   Lab Units 03/28/25  0313 03/27/25  0508 03/26/25  1537   SODIUM mmol/L 149* 162* 160*   POTASSIUM mmol/L 4.1 4.8 5.2   CHLORIDE mmol/L 112* 120* 117*   CO2 mmol/L 25.0 28.0 24.0   BUN mg/dL 87* 148* 165*   CREATININE mg/dL 1.31* 2.75* 3.99*   CALCIUM mg/dL 9.9 10.4 10.5   BILIRUBIN mg/dL  --  0.6 0.6   ALK PHOS U/L  --  89 93   ALT (SGPT) U/L  --  63* 58*   AST (SGOT) U/L  --  58* 53*   GLUCOSE mg/dL 115* 156* 141*       Culture Data:   No results found for:  "\"BLOODCX\", \"URINECX\", \"WOUNDCX\", \"MRSACX\", \"RESPCX\", \"STOOLCX\"    Radiology Data:   Imaging Results (Last 24 Hours)       ** No results found for the last 24 hours. **            I have reviewed the patient's current medications.     Assessment/Plan   Assessment  Active Hospital Problems    Diagnosis     **Acute renal failure        Treatment Plan    -Severe acute kidney injury [versus acute on chronic kidney disease]  Review of the chart showed that patient had normal renal function in March of this year, Current acute kidney injury is probably secondary to reduce fluid intake.  We will continue patient on dextrose water and follow renal function in the morning [improved to almost normal].     -Severe hypernatremia  This is probably secondary to severe dehydration, patient will be continued on free water and also free water flushes via the PEG tube [improved to almost normal].     -Elevated troponin  This is probably secondary to acute kidney injury, patient has no chest symptoms.  We will follow-up troponin in the morning, if it trends up we will consult cardiologist [trended downwards].     -Throat cancer [with PEG tube for feeding/water flushes]  We will consult nutritionist for input, patient does not remember the name of his home feeds.  Consulted dietitian to help with nutrition/tube feeding as per dietitian's recommendation.  Speech therapist also evaluated patient and recommended to continue PEG feeding.    Other chronic medical conditions-  History of COPD  Diverticulosis  GERD  Difficulty hearing  Hyperlipidemia      Prophylaxis-heparin    Disposition-continue current management/discharge planning for tomorrow.      Electronically signed by Tyrone Willis MD, 03/28/25, 13:09 CDT.   "

## 2025-03-28 NOTE — THERAPY EVALUATION
Acute Care - Speech Language Pathology   Swallow Initial Evaluation [facility]     Patient Name: Se Ramirez  : 1950  MRN: [id number]  Today's Date: 3/28/2025               Admit Date: 3/26/2025  Clinical bedside swallow evaluation complete. The pt was alert, cooperative, and oriented x4. He reports he has not been eating anything by mouth for weeks due to pain with intake. He reports he has several more radiation treatments left. SLP informed pt it would likely be a few weeks post radiation before odynophagia relief began. He did try 3x ice chip trial as well as 3x trial of thin water via side of cup. Multiple swallows noted with all trials as well as delayed coughing with wet vocal quality at times. SLP recommends pt remain NPO with feedings via PEG only. SLP is ok for pt to have occasional ice chip or sip of water in order to aid with comfort. SLP will continue to follow and treat.    Hermelindo Rios CCC-SLP 3/28/2025 13:10 CDT    Visit Dx:     ICD-10-CM ICD-9-CM   1. Shortness of breath  R06.02 786.05   2. Acute renal failure, unspecified acute renal failure type  N17.9 584.9   3. Hypernatremia  E87.0 276.0   4. Elevated troponin  R79.89 790.6   5. 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    Tobacco user    Right upper lobe pulmonary nodule    Malignant neoplasm of upper lobe of right lung    Overweight    Hilar adenopathy    Mediastinal adenopathy    Pre-procedure lab exam    Pulmonary emphysema    Esophageal dysphagia    Multiple lung nodules    Personal history of nicotine dependence    Elevated liver function tests     Dysphagia    Hoarseness    Current every day smoker    Squamous cell carcinoma of epiglottis    Oral pharyngeal candidiasis    Acute renal failure     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    History of adenomatous polyp of colon     History of colon polyps     History of lung cancer     Right lung    Hyperlipidemia      Past Surgical History:   Procedure Laterality Date    BRONCHOSCOPY N/A 02/15/2021    Procedure: BRONCHOSCOPY;  Surgeon: Bruce Murillo MD;  Location: [facility] OR;  Service: Cardiothoracic;  Laterality: N/A;    CATARACT EXTRACTION      CHOLECYSTECTOMY      COLONOSCOPY  11/20/2012    One 1cm tubular adenomatous polyp in the sigmoid colon; One 5mm hyperplastic polyp in the rectum; Diverticulosis; Repeat 4 years     COLONOSCOPY N/A 03/30/2017    Two 4-6mm tubular adenomatous polyps at the hepatic flexure; One 5mm tubular adenomatous polyp in the transverse colon; One 11mm tubular adenomatous polyp in the sigmoid colon; One 6mm tubular adenomatous polyp in the rectum; Diverticulosis in the left colon; Repeat 3 years     COLONOSCOPY  12/17/2009    One less than 5mm tubular adenomatous polyp in the cecum; One 5mm hyperplastic polyp in the transverse; One less than 3mm hyperplastic polyp in the rectum; Diverticulosis; Repeat 3 years     COLONOSCOPY N/A 07/02/2020    Two 5-7mm tubular adenomatous polyps at the hepatic flexure; One 6mm tubular adenomatous polyp in the transverse colon; Diverticulosis in the left colon; The entire examined colon is normal on direct and retroflexion views; Repeat 5 years    COLONOSCOPY N/A 06/29/2022    Diverticulosis in the left colon; One 6mm tubular adenomatous polyp in the descending colon; One 4mm tubular adenomatous polyp in the  descending colon; Congested mucosa in the entire examined colon-biopsied; Repeat 5 years    ENDOSCOPY N/A 03/27/2019    Medium-sized HH; Normal stomach; Normal examined duodenum-biopsied    ENDOSCOPY N/A 06/09/2022    Small HH; Non-erosive gastritis-biopsied; Normal examined duodenum-biopsied    ENDOSCOPY N/A 2/24/2025    Procedure: ESOPHAGOGASTRODUODENOSCOPY WITH PERCUTANEOUS ENDOSCOPIC GASTROSTOMY TUBE INSERTION;  Surgeon: Amie Donnelly MD;  Location: [facility] ENDOSCOPY;  Service: General;  Laterality: N/A;  pre op:   post op:  pcp:Diego Lucio MD    ERCP  07/30/2024    Dr. Buzz Dangelo-Choledocholithiasis status post balloon sweep extraction wtih balloon assisted sphincteroplasty; Placement of biliary stent as well as prophylactic pancreatic stent; Repeat ERCP 3 months for biliary stent removal    FOOT SURGERY Left     ORIF    HERNIA REPAIR      INNER EAR SURGERY      LARYNGOSCOPY N/A 1/20/2025    Procedure: DIRECT LARYNGOSCOPY WITH EXCISION OF LESION;  Surgeon: Antelmo Dangelo MD;  Location: [facility] OR;  Service: ENT;  Laterality: N/A;    MEDIASTINOSCOPY N/A 02/15/2021    Procedure: CERVICAL MEDIASTINOSCOPY WITH LYMPH NODE DISECTION;  Surgeon: Bruce Murillo MD;  Location: [facility] OR;  Service: Cardiothoracic;  Laterality: N/A;    PEG TUBE INSERTION N/A 2/24/2025    Procedure: PERCUTANEOUS ENDOSCOPIC GASTROSTOMY TUBE INSERTION;  Surgeon: Amie Donnelly MD;  Location: [facility] ENDOSCOPY;  Service: General;  Laterality: N/A;  pre op:  post op:  pcp:Diego Lucio MD    THORACOSCOPY Right 02/15/2021    Procedure: THORACOSCOPY, WEDGE RESECTION OF RIGHT UPPER LOBE WITH FROZEN,  , MEDIASTINAL LYMPH NODE DISSECTION;  Surgeon: Bruce Murillo MD;  Location: [facility] OR;  Service: Cardiothoracic;  Laterality: Right;    TONSILLECTOMY         SLP Recommendation and Plan  SLP Swallowing Diagnosis: severe, oral dysphagia, suspected pharyngeal dysphagia, R/O pharyngeal dysphagia (03/28/25 0930)  SLP  Diet Recommendation: NPO (03/28/25 0930)     SLP Rec. for Method of Medication Administration: meds via alternate route (03/28/25 0930)     Monitor for Signs of Aspiration: yes, notify SLP if any concerns (03/28/25 0930)  Recommended Diagnostics: VFSS ([facility]), FEES (03/28/25 0930)  Swallow Criteria for Skilled Therapeutic Interventions Met: demonstrates skilled criteria (03/28/25 0930)  Anticipated Discharge Disposition (SLP): home (03/28/25 0930)  Rehab Potential/Prognosis, Swallowing: adequate, monitor progress closely (03/28/25 0930)  Therapy Frequency (Swallow): at least, 2 days per week (03/28/25 0930)  Predicted Duration Therapy Intervention (Days): until discharge (03/28/25 0930)  Oral Care Recommendations: Oral Care BID/PRN (03/28/25 0930)                                        Progress: no change      SWALLOW EVALUATION (Last 72 Hours)       SLP Adult Swallow Evaluation       Row Name 03/28/25 0930 03/27/25 9445                Rehab Evaluation    Document Type evaluation  -CS evaluation  -MG       Subjective Information complains of;pain  -CS --       Patient Observations alert;cooperative;agree to therapy  -CS --       Session Not Performed -- patient/family declined evaluation  -MG       Patient Effort adequate  -CS --       Comment -- Patient not feeling well; not appropriate for PO attempts at this time.  -MG          General Information    Patient Profile Reviewed yes  -CS --       Pertinent History Of Current Problem throat cancer, COPD, diverticulosis, GERD, difficulty hearing and hyperlipidemia,  -CS --       Current Method of Nutrition NPO;gastrostomy feedings  -CS --       Precautions/Limitations, Vision WFL;for purposes of eval  -CS --       Precautions/Limitations, Hearing WFL;for purposes of eval  -CS --       Prior Level of Function-Communication WFL  -CS --       Prior Level of Function-Swallowing no diet consistency restrictions  -CS --       Plans/Goals Discussed with patient  -CS --        Barriers to Rehab none identified  -CS --       Patient's Goals for Discharge return to all previous roles/activities  -CS --          Pain    Pretreatment Pain Rating 8/10  -CS --       Posttreatment Pain Rating 8/10  -CS --          Oral Motor Structure and Function    Dentition Assessment missing teeth  -CS --       Secretion Management dried secretions in oral cavity;problems swallowing secretions  -CS --       Mucosal Quality sticky  -CS --       Volitional Swallow unable to elicit  -CS --       Volitional Cough WFL  -CS --          Oral Musculature and Cranial Nerve Assessment    Oral Motor General Assessment generalized oral motor weakness  -CS --          General Eating/Swallowing Observations    Respiratory Support Currently in Use room air  -CS --       Eating/Swallowing Skills fed by SLP  -CS --       Positioning During Eating upright in bed  -CS --       Utensils Used spoon;cup  -CS --       Consistencies Trialed ice chips;thin liquids  -CS --          Clinical Swallow Eval    Oral Transit impaired  -CS --       Pharyngeal Phase suspected pharyngeal impairment  -CS --       Esophageal Phase unremarkable  -CS --       Clinical Swallow Evaluation Summary See note  -CS --          Oral Transit Concerns    Oral Transit Concerns increased oral transit time  -CS --       Increased Oral Transit Time thin  -CS --          Pharyngeal Phase Concerns    Pharyngeal Phase Concerns cough;multiple swallows;wet vocal quality  -CS --       Wet Vocal Quality thin  -CS --       Multiple Swallows thin  -CS --       Cough thin  -CS --       Throat Clear thin  -CS --          SLP Evaluation Clinical Impression    SLP Swallowing Diagnosis severe;oral dysphagia;suspected pharyngeal dysphagia;R/O pharyngeal dysphagia  -CS --       Functional Impact risk of aspiration/pneumonia;risk of malnutrition  -CS --       Rehab Potential/Prognosis, Swallowing adequate, monitor progress closely  -CS --       Swallow Criteria for Skilled  Therapeutic Interventions Met demonstrates skilled criteria  -CS --          Recommendations    Therapy Frequency (Swallow) at least;2 days per week  -CS --       Predicted Duration Therapy Intervention (Days) until discharge  -CS --       SLP Diet Recommendation NPO  -CS --       Recommended Diagnostics VFSS ([facility]);FEES  -CS --       Oral Care Recommendations Oral Care BID/PRN  -CS --       SLP Rec. for Method of Medication Administration meds via alternate route  -CS --       Monitor for Signs of Aspiration yes;notify SLP if any concerns  -CS --       Anticipated Discharge Disposition (SLP) home  -CS --          Swallow Goals (SLP)    Swallow LTGs Swallow Long Term Goal (free text)  -CS --       Swallow STGs diet tolerance goal selection (SLP)  -CS --       Diet Tolerance Goal Selection (SLP) Patient will tolerate trials of  -CS --          (LTG) Swallow    (LTG) Swallow Pt will begin PO diet with no overt s/s of aspiration.  -CS --       [place] (Swallow Long Term Goal) with minimal cues (75-90% accuracy)  -CS --       Time Frame (Swallow Long Term Goal) by discharge  -CS --       Barriers (Swallow Long Term Goal) medically complex  -CS --       Progress/Outcomes (Swallow Long Term Goal) new goal  -CS --          (STG) Patient will tolerate trials of    Consistencies Trialed (Tolerate trials) thin liquids;pureed textures;honey/ moderately thick liquids;nectar/ mildly thick liquids  -CS --       Desired Outcome (Tolerate trials) without signs/symptoms of aspiration  -CS --       [place] (Tolerate trials) with minimal cues (75-90% accuracy)  -CS --       Time Frame (Tolerate trials) by discharge  -CS --       Progress/Outcomes (Tolerate trials) new goal  -CS --                 User Key  (r) = Recorded By, (t) = Taken By, (c) = Cosigned By      Initials Name Effective Dates    CS Hermelindo Rios, [facility] 05/07/24 -     MG Ruth Sherwood, MS [facility]-SLP 07/11/23 -                     EDUCATION  The patient has  been educated in the following areas:   Dysphagia (Swallowing Impairment).        SLP GOALS       Row Name 03/28/25 0930             (LTG) Swallow    (LTG) Swallow Pt will begin PO diet with no overt s/s of aspiration.  -CS      [place] (Swallow Long Term Goal) with minimal cues (75-90% accuracy)  -CS      Time Frame (Swallow Long Term Goal) by discharge  -CS      Barriers (Swallow Long Term Goal) medically complex  -CS      Progress/Outcomes (Swallow Long Term Goal) new goal  -CS         (STG) Patient will tolerate trials of    Consistencies Trialed (Tolerate trials) thin liquids;pureed textures;honey/ moderately thick liquids;nectar/ mildly thick liquids  -CS      Desired Outcome (Tolerate trials) without signs/symptoms of aspiration  -CS      [place] (Tolerate trials) with minimal cues (75-90% accuracy)  -CS      Time Frame (Tolerate trials) by discharge  -CS      Progress/Outcomes (Tolerate trials) new goal  -CS                User Key  (r) = Recorded By, (t) = Taken By, (c) = Cosigned By      Initials Name Provider Type    Hermelindo Chacon CCC-SLP Speech and Language Pathologist                         Time Calculation:    Time Calculation- SLP       Row Name 03/28/25 1307             Time Calculation- SLP    SLP Start Time 0930  -CS      SLP Stop Time 1040  -CS      SLP Time Calculation (min) 70 min  -CS      SLP Received On 03/28/25  -CS      SLP Goal Re-Cert Due Date 04/07/25  -CS         Untimed Charges    [id number] Eval Oral Pharyng Swallow Minutes 70  -CS         Total Minutes    Untimed Charges Total Minutes 70  -CS       Total Minutes 70  -CS                User Key  (r) = Recorded By, (t) = Taken By, (c) = Cosigned By      Initials Name Provider Type    Hermelindo Chacon CCC-SLP Speech and Language Pathologist                    Therapy Charges for Today       Code Description Service Date Service Provider Modifiers Qty    [id number] HC ST EVAL ORAL PHARYNG SWALLOW 5 3/28/2025 Hermelindo Rios,  CCC-SLP GN 1                 KEO Oliva  3/28/2025

## 2025-03-28 NOTE — PLAN OF CARE
Goal Outcome Evaluation:  Plan of Care Reviewed With: patient           Outcome Evaluation: Patient resting comfortably. C/o throat pain/ PRN pain meds given with good relief. IVF, voiding, up with assitstance ambulating leslie. Tolerating tube feedings per order. Safety maintained

## 2025-03-29 ENCOUNTER — READMISSION MANAGEMENT (OUTPATIENT)
Dept: CALL CENTER | Facility: HOSPITAL | Age: 75
End: 2025-03-29
Payer: MEDICARE

## 2025-03-29 VITALS
RESPIRATION RATE: 16 BRPM | HEART RATE: 102 BPM | OXYGEN SATURATION: 96 % | HEIGHT: 70 IN | SYSTOLIC BLOOD PRESSURE: 99 MMHG | TEMPERATURE: 97.9 F | BODY MASS INDEX: 22.39 KG/M2 | WEIGHT: 156.4 LBS | DIASTOLIC BLOOD PRESSURE: 54 MMHG

## 2025-03-29 PROBLEM — E43 SEVERE PROTEIN-CALORIE MALNUTRITION: Status: ACTIVE | Noted: 2025-03-29

## 2025-03-29 PROBLEM — E87.0 HYPERNATREMIA: Status: ACTIVE | Noted: 2025-03-29

## 2025-03-29 LAB
ANION GAP SERPL CALCULATED.3IONS-SCNC: 10 MMOL/L (ref 5–15)
BUN SERPL-MCNC: 33 MG/DL (ref 8–23)
BUN/CREAT SERPL: 40.2 (ref 7–25)
CALCIUM SPEC-SCNC: 9.1 MG/DL (ref 8.6–10.5)
CHLORIDE SERPL-SCNC: 107 MMOL/L (ref 98–107)
CO2 SERPL-SCNC: 26 MMOL/L (ref 22–29)
CREAT SERPL-MCNC: 0.82 MG/DL (ref 0.76–1.27)
EGFRCR SERPLBLD CKD-EPI 2021: 91.6 ML/MIN/1.73
GLUCOSE SERPL-MCNC: 130 MG/DL (ref 65–99)
MAGNESIUM SERPL-MCNC: 1.9 MG/DL (ref 1.6–2.4)
PHOSPHATE SERPL-MCNC: 3.5 MG/DL (ref 2.5–4.5)
POTASSIUM SERPL-SCNC: 4.1 MMOL/L (ref 3.5–5.2)
QT INTERVAL: 298 MS
QT INTERVAL: 312 MS
QTC INTERVAL: 419 MS
QTC INTERVAL: 422 MS
SODIUM SERPL-SCNC: 143 MMOL/L (ref 136–145)

## 2025-03-29 PROCEDURE — 80048 BASIC METABOLIC PNL TOTAL CA: CPT | Performed by: INTERNAL MEDICINE

## 2025-03-29 PROCEDURE — 83735 ASSAY OF MAGNESIUM: CPT | Performed by: INTERNAL MEDICINE

## 2025-03-29 PROCEDURE — 94799 UNLISTED PULMONARY SVC/PX: CPT

## 2025-03-29 PROCEDURE — 84100 ASSAY OF PHOSPHORUS: CPT | Performed by: INTERNAL MEDICINE

## 2025-03-29 PROCEDURE — 25010000003 DEXTROSE 5 % SOLUTION: Performed by: INTERNAL MEDICINE

## 2025-03-29 PROCEDURE — 25010000002 HEPARIN (PORCINE) PER 1000 UNITS: Performed by: INTERNAL MEDICINE

## 2025-03-29 RX ADMIN — HEPARIN SODIUM 5000 UNITS: 5000 INJECTION, SOLUTION INTRAVENOUS; SUBCUTANEOUS at 09:09

## 2025-03-29 RX ADMIN — DEXTROSE MONOHYDRATE 125 ML/HR: 50 INJECTION, SOLUTION INTRAVENOUS at 01:39

## 2025-03-29 RX ADMIN — PANTOPRAZOLE SODIUM 40 MG: 40 INJECTION, POWDER, FOR SOLUTION INTRAVENOUS at 06:44

## 2025-03-29 RX ADMIN — Medication 5000 UNITS: at 09:09

## 2025-03-29 RX ADMIN — IPRATROPIUM BROMIDE 0.5 MG: 0.5 SOLUTION RESPIRATORY (INHALATION) at 06:31

## 2025-03-29 RX ADMIN — ACETAMINOPHEN 975 MG: 325 TABLET, FILM COATED ORAL at 06:43

## 2025-03-29 RX ADMIN — ASPIRIN 81 MG: 81 TABLET, CHEWABLE ORAL at 09:09

## 2025-03-29 RX ADMIN — IPRATROPIUM BROMIDE 0.5 MG: 0.5 SOLUTION RESPIRATORY (INHALATION) at 10:26

## 2025-03-29 RX ADMIN — OXYCODONE HYDROCHLORIDE 5 MG: 5 SOLUTION ORAL at 06:43

## 2025-03-29 NOTE — DISCHARGE SUMMARY
[facility] Medicine Services  DISCHARGE SUMMARY       Date of Admission: 3/26/2025  Date of Discharge:  3/29/2025  Primary Care Physician: Diego Lucio MD    Presenting Problem/History of Present Illness:  Poor intake, dehydration    Final Discharge Diagnoses:  Active Hospital Problems    Diagnosis     **Acute renal failure     Hypernatremia     Severe protein-calorie malnutrition     Squamous cell carcinoma of epiglottis     Pulmonary emphysema     Gastroesophageal reflux disease without esophagitis        Consults: none    Procedures Performed: none    Pertinent Test Results:       Imaging Results (All)       Procedure Component Value Units Date/Time    XR Chest 2 View [[id number]] Collected: 03/26/25 1530     Updated: 03/26/25 1535    Narrative:      EXAM/TECHNIQUE: XR CHEST 2 VW-     INDICATION: Shortness of air     COMPARISON: 1/13/2025     FINDINGS:     Cardiac silhouette is within normal limits.     No pleural effusion or pneumothorax. No consolidation. Small calcified  granuloma in the right midlung zone. Chronic interstitial coarsening.  Postoperative change of right upper lobe wedge resection.     Mild degenerative change in the thoracic spine. No acute osseous  finding.       Impression:         No acute findings.     This report was signed and finalized on 3/26/2025 3:32 PM by Dr. Esteban Almanzar MD.             LAB RESULTS:      Lab 03/26/25  1432   WBC 19.31*   HEMOGLOBIN 17.4   HEMATOCRIT 55.8*   PLATELETS 295   NEUTROS ABS 17.73*   EOS ABS 0.19   MCV 98.6*         Lab 03/29/25  0449 03/28/25  0313 03/27/25  0508 03/26/25  1537   SODIUM 143 149* 162* 160*   POTASSIUM 4.1 4.1 4.8 5.2   CHLORIDE 107 112* 120* 117*   CO2 26.0 25.0 28.0 24.0   ANION GAP 10.0 12.0 14.0 19.0*   BUN 33* 87* 148* 165*   CREATININE 0.82 1.31* 2.75* 3.99*   EGFR 91.6 56.8* 23.3* 14.9*   GLUCOSE 130* 115* 156* 141*   CALCIUM 9.1 9.9 10.4 10.5   MAGNESIUM 1.9 2.3 3.0*  --    PHOSPHORUS  3.5 3.6  --   --          Lab 03/27/25  0508 03/26/25  1537   TOTAL PROTEIN 8.6* 8.7*   ALBUMIN 3.8 3.9   GLOBULIN 4.8 4.8   ALT (SGPT) 63* 58*   AST (SGOT) 58* 53*   BILIRUBIN 0.6 0.6   ALK PHOS 89 93         Lab 03/27/25  0508 03/26/25  1537 03/26/25  1432   PROBNP  --   --  660.7   HSTROP T 159* 261* 263*                 Brief Urine Lab Results       None          Microbiology Results (last 10 days)       ** No results found for the last 240 hours. **            Hospital Course:   Patient is a 74-year-old male with a history of throat cancer, GERD, emphysema, difficulty hearing, hyperlipidemia.  He presented to the hospital on 3/26 due to reduced intake.  Patient is actively getting radiation treatment for his throat cancer and is unable to take p.o.  He has a PEG tube in place.  For some reason he was not giving himself his feeds or flushes and presented here and was found in the ER to be in acute renal failure with hypernatremia.  He was given some IV fluid boluses in the ER admitted to the hospital for ongoing care.  His sodium was 160.  Creatinine was 3.99 with a GFR of 14.9.  Patient also with a history of 1 kidney per report.  He was started on D5 fluids.  Tube feeds via PEG were restarted as there was no issues or concerns with GI tract.  He tolerated the tube feeds without issues.  Over the next 48+ hours his renal function and sodium normalized.  On day of discharge sodium is 143.  Creatinine is 0.82.  GFR 91.6.  He feels back to his normal self.  He is been ambulating without issue over the last day.  Feels ready to go home.  Expressed importance of giving himself his tube feeds and flushes as instructed and he stated he understands.  Resume radiation as an outpatient as scheduled.  Follow-up with PCP postdischarge.  Would recommend a repeat CBC and follow-up.  Of note patient had a white count of 19.3 on arrival back on 3/26.  A CBC was never repeated after admission.  He has not had any fevers and  "there have been no signs of infection since admission.  Leukocytosis was likely in the setting of volume contraction and dehydration.  This is my first day with him but he would like to go home and given his overall improvement since admission feel this to be fine again with close interval follow-up and CBC and repeat via PCP.  Seek medical evaluation if any return or worsening of symptoms and/or fevers arise.      Physical Exam on Discharge:  BP 99/54 (BP Location: Right arm, Patient Position: Lying) Comment: nurse notified  Pulse 73   Temp 97.9 °F (36.6 °C) (Oral)   Resp 16   Ht 177.8 cm (70\")   Wt 70.9 kg (156 lb 6.4 oz)   SpO2 93%   BMI 22.44 kg/m²   Physical Exam  GEN: Awake, alert, interactive, in NAD  HEENT:  PERRLA, EOMI, Anicteric  Lungs: no wheezing/rales/rhonchi  Heart: RRR, +S1/s2, no rub  ABD: soft, nt/nd, +BS, no guarding/rebound, +feeding tube w/o drainage  Extremities: no cyanosis, no edema  Skin: no rashes or petechiae  Neuro: AAOx3, no focal deficits  Psych: normal mood & affect      Condition on Discharge: stable/improved    Discharge Disposition:  Home or Self Care    Discharge Medications:     Discharge Medications        Continue These Medications        Instructions Start Date   acetaminophen 325 MG tablet  Commonly known as: TYLENOL   975 mg, Oral, Every 8 Hours PRN      aspirin 81 MG EC tablet   81 mg, Oral, Daily      esomeprazole 20 MG capsule  Commonly known as: nexIUM   40 mg, Oral, 2 Times Daily      Fluticasone-Umeclidin-Vilant 100-62.5-25 MCG/ACT inhaler  Commonly known as: TRELEGY   1 puff, Inhalation, Daily - RT      ondansetron 4 MG tablet  Commonly known as: Zofran   4 mg, Oral, Every 8 Hours PRN      oxyCODONE 5 MG/5ML solution  Commonly known as: ROXICODONE   5 mg, Oral, Every 6 Hours PRN      vitamin C 250 MG tablet  Commonly known as: ASCORBIC ACID   250 mg, Oral, Daily      VITAMIN D3 EXTRA STRENGTH PO   1 tablet, Oral, Daily             Stop These Medications  "     fluconazole 100 MG tablet  Commonly known as: DIFLUCAN            ASK your doctor about these medications        Instructions Start Date   atorvastatin 10 MG tablet  Commonly known as: LIPITOR   10 mg, Daily               Discharge Diet:   Dietary Orders (From admission, onward)       Start     Ordered    03/27/25 1110  Tube Feeding: Formula: Nutren 2.0 (TwoCal); Feeding Type: Bolus; By: Syringe; Type: Set Rate; Volume: 250 mL; Feedings Per Day: 4; Bolus Schedule: 08, 12, 16, 20; Total Daily Feeding Volume (mL/day): 1000; Water Flush: Other; Other: 75mL; Every: B...  (Tube Feeding + NPO)  Diet Effective Now        Comments: Bolus Nutren 2.0, 250mL (1carton), 4x/day. Flush PEG with 75mL of water before and after each TF bolus. Bolus an additional 240mL of water via PEG for hydration, 3x/day. **Recommend initial bolus of TF be 125mL to assess pt's volume tolerance and advance to goal volume as tolerated.**   Question Answer Comment   Formula Nutren 2.0 (TwoCal)    Feeding Type Bolus    By Syringe    Type Set Rate    Volume 250 mL    Feedings Per Day 4    Bolus Schedule 08, 12, 16, 20    Total Daily Feeding Volume (mL/day) 1000    Water Flush Other    Other 75mL    Every Before & After TF Bolus    Water Bolus Other    Every 8 hours        03/27/25 1112    03/27/25 1110  NPO Diet NPO Type: Tube Feeding  (Tube Feeding + NPO)  Diet Effective Now        Question:  NPO Type  Answer:  Tube Feeding    03/27/25 1112                      Activity at Discharge:   As prior    Follow-up Appointments:   Future Appointments   Date Time Provider Department Center   4/2/2025  1:00 PM PAD SPEECH ONCOLOGY  [facility] P[facility]   4/9/2025  1:00 PM PAD SPEECH ONCOLOGY  [facility] P[facility]   4/16/2025  1:00 PM PAD SPEECH ONCOLOGY  PAD [facility] PAD   4/25/2025  1:30 PM Ellie Garcia, PT, DPT, [facility]  [facility] P[facility]   4/29/2025 11:00 AM Ellie Garcia PT, DPT, [facility]  [facility] PAD   5/2/2025  3:45 PM Ellie Garcia PT, DPT,  [facility] PAD OPT PAD   5/8/2025  9:00 AM Mallory Bryan APRN [facility] PAD PAD   5/9/2025  2:45 PM Ellie Garcia, PT, DPT, [facility] PAD OPT PAD   5/14/2025  1:15 PM BH PAD [facility] PAD [facility] PAD   5/14/2025  1:45 PM Kelton Macias MD [facility] PAD PAD   5/16/2025  3:00 PM Narcisa Sands, PTA, [facility] PAD OPT PAD   5/23/2025  2:00 PM Narcisa Sands, PTA, [facility] PAD OPT PAD   5/30/2025  2:00 PM Ellie Garcia, PT, DPT, [facility] PAD OPT PAD       Test Results Pending at Discharge: none    Electronically signed by Esa Franks DO, 03/29/25, 09:07 CDT.    Time: 32 minutes.

## 2025-03-29 NOTE — PLAN OF CARE
Goal Outcome Evaluation:  Plan of Care Reviewed With: patient  Progress: no change       Pt medicated for c/o pain x1 this prn pain meds; sched Tylenol given per order. IVF infusing per orders. Up with asst. Bed check in place. Voiding per urinal. Bolus feed given x1 before bed time. VSS. Safety maintained.

## 2025-03-29 NOTE — OUTREACH NOTE
Prep Survey      Flowsheet Row Responses   [facility] patient discharged [facility]? [place]   Is LACE score < 7 ? No   Eligibility Readm Mgmt   Discharge diagnosis *Acute renal failure   Does the patient have one of the following disease processes/diagnoses(primary or secondary)? Other   Does the patient have Home health ordered? No   Is there a DME ordered? No   Prep survey completed? Yes            VENUS [facility] - Registered Nurse

## 2025-03-29 NOTE — PLAN OF CARE
Goal Outcome Evaluation:  Plan of Care Reviewed With: patient        Progress: improving  Outcome Evaluation: A&OX4, VSS. C/o throat pain, rinse provided with relief. PEG tube intact, bolus feed given as ordered, meds crushed and pushed through tube. Voiding, on room air, SBA. D/c home this shift. Educated on tibe maintanence, feeding and water supplements, written instructions per AVS.

## 2025-03-29 NOTE — DISCHARGE INSTR - DIET
Bolus Nutren 2.0, 250mL (1carton), 4x/day. Flush PEG with 75mL of water before and after each TF bolus. Bolus an additional 240mL of water via PEG for hydration, 3x/day.

## 2025-03-30 NOTE — PAYOR COMM NOTE
"[facility] 3-29-25    Rob Ramirez (74 y.o. Male)       Date of Birth   1950    Social Security Number       Address   [address] [place] [id number]    Home Phone   [phone]    MRN   [id number]       Gnosticism   Restorationist    Marital Status   Significant Other                            Admission Date   3/26/2025    Admission Type   Emergency    Admitting Provider   Esa Franks DO    Attending Provider       Department, Room/Bed   [facility] 3C, 364/1       Discharge Date   3/29/2025    Discharge Disposition   Home or Self Care    Discharge Destination                                 Attending Provider: (none)   Allergies: No Known Allergies    Isolation: None   Infection: COVID (History) (02/15/21)   Code Status: Prior    Ht: 177.8 cm (70\")   Wt: 70.9 kg (156 lb 6.4 oz)    Admission Cmt: None   Principal Problem: Acute renal failure [N17.9]                   Active Insurance as of 3/26/2025       Primary Coverage       Payor Plan Insurance Group Employer/Plan Group    ANTH MEDICARE REPLACEMENT ANTH MEDICARE ADVANTAGE HMO [id number]       Payor Plan Address Payor Plan Phone Number Payor Plan Fax Number Effective Dates    [address] [phone]  1/1/2025 - None Entered    [facility] [id number]         Subscriber Name Subscriber Birth Date Member ID       ROB RAMIREZ 1950 [id number]                     Emergency Contacts        (Rel.) Home Phone Work Phone Mobile Phone    JOHN WAITE (Significant Other) -- -- [phone]    OTTO CISNEROS (Brother) -- -- [phone]                 Physician Progress Notes (all)        Tyrone Willis MD at 03/28/25 1309              [facility] Medicine Services  INPATIENT PROGRESS NOTE    Patient Name: Rob Ramirez  Date of Admission: 3/26/2025  Today's Date: 03/28/25  Length of Stay: 2  Primary Care Physician: Diego Lucio MD    Subjective   Chief " "Complaint: Poor food intake    Patient has no new complaint, but does not think he is ready to go home today.  We discussed discharge planning for tomorrow and patient is agreeable.      Review of Systems   All pertinent negatives and positives are as above. All other systems have been reviewed and are negative unless otherwise stated.     Objective    Temp:  [96.7 °F (35.9 °C)-98 °F (36.7 °C)] 97.8 °F (36.6 °C)  Heart Rate:  [64-99] 97  Resp:  [14-16] 16  BP: ()/(59-72) 102/70  Physical Exam  GEN: Awake, alert, interactive but uncomfortable  HEENT: Atraumatic, PERRLA, EOMI, Anicteric, Trachea midline  Lungs: CTAB, no wheezing/rales/rhonchi  Heart: RRR, +S1/s2, no rub  ABD: soft, nt/nd, +BS, no guarding/rebound, PEG tube in situ  Extremities: atraumatic, no cyanosis, no edema  Skin: no rashes or lesions but skin turgor is significantly reduced  Neuro: AAOx3, no focal deficits  Psych: normal mood & affect      Results Review:  I have reviewed the labs, radiology results, and diagnostic studies.    Laboratory Data:   Results from last 7 days   Lab Units 03/26/25  1432   WBC 10*3/mm3 19.31*   HEMOGLOBIN g/dL 17.4   HEMATOCRIT % 55.8*   PLATELETS 10*3/mm3 295        Results from last 7 days   Lab Units 03/28/25  0313 03/27/25  0508 03/26/25  1537   SODIUM mmol/L 149* 162* 160*   POTASSIUM mmol/L 4.1 4.8 5.2   CHLORIDE mmol/L 112* 120* 117*   CO2 mmol/L 25.0 28.0 24.0   BUN mg/dL 87* 148* 165*   CREATININE mg/dL 1.31* 2.75* 3.99*   CALCIUM mg/dL 9.9 10.4 10.5   BILIRUBIN mg/dL  --  0.6 0.6   ALK PHOS U/L  --  89 93   ALT (SGPT) U/L  --  63* 58*   AST (SGOT) U/L  --  58* 53*   GLUCOSE mg/dL 115* 156* 141*       Culture Data:   No results found for: \"BLOODCX\", \"URINECX\", \"WOUNDCX\", \"MRSACX\", \"RESPCX\", \"STOOLCX\"    Radiology Data:   Imaging Results (Last 24 Hours)       ** No results found for the last 24 hours. **            I have reviewed the patient's current medications.     Assessment/Plan   Assessment  Active " Hospital Problems    Diagnosis     **Acute renal failure        Treatment Plan    -Severe acute kidney injury [versus acute on chronic kidney disease]  Review of the chart showed that patient had normal renal function in March of this year, Current acute kidney injury is probably secondary to reduce fluid intake.  We will continue patient on dextrose water and follow renal function in the morning [improved to almost normal].     -Severe hypernatremia  This is probably secondary to severe dehydration, patient will be continued on free water and also free water flushes via the PEG tube [improved to almost normal].     -Elevated troponin  This is probably secondary to acute kidney injury, patient has no chest symptoms.  We will follow-up troponin in the morning, if it trends up we will consult cardiologist [trended downwards].     -Throat cancer [with PEG tube for feeding/water flushes]  We will consult nutritionist for input, patient does not remember the name of his home feeds.  Consulted dietitian to help with nutrition/tube feeding as per dietitian's recommendation.  Speech therapist also evaluated patient and recommended to continue PEG feeding.    Other chronic medical conditions-  History of COPD  Diverticulosis  GERD  Difficulty hearing  Hyperlipidemia      Prophylaxis-heparin    Disposition-continue current management/discharge planning for tomorrow.      Electronically signed by Tyrone Willis MD, 03/28/25, 13:09 CDT.     Electronically signed by Tyrone Willis MD at 03/28/25 1312       Tyrone Willis MD at 03/27/25 1621              [facility] Medicine Services  INPATIENT PROGRESS NOTE    Patient Name: Se Ramirez  Date of Admission: 3/26/2025  Today's Date: 03/27/25  Length of Stay: 1  Primary Care Physician: Diego Lucio MD    Subjective   Chief Complaint: Poor food intake    Patient has no new complaint this morning, said he feels a lot  "better.      Review of Systems   All pertinent negatives and positives are as above. All other systems have been reviewed and are negative unless otherwise stated.     Objective    Temp:  [96.7 °F (35.9 °C)-97.6 °F (36.4 °C)] 96.7 °F (35.9 °C)  Heart Rate:  [] 92  Resp:  [14-18] 14  BP: ()/(55-81) 101/67  Physical Exam  GEN: Awake, alert, interactive but uncomfortable  HEENT: Atraumatic, PERRLA, EOMI, Anicteric, Trachea midline  Lungs: CTAB, no wheezing/rales/rhonchi  Heart: RRR, +S1/s2, no rub  ABD: soft, nt/nd, +BS, no guarding/rebound, PEG tube in situ  Extremities: atraumatic, no cyanosis, no edema  Skin: no rashes or lesions but skin turgor is significantly reduced  Neuro: AAOx3, no focal deficits  Psych: normal mood & affect      Results Review:  I have reviewed the labs, radiology results, and diagnostic studies.    Laboratory Data:   Results from last 7 days   Lab Units 03/26/25  1432   WBC 10*3/mm3 19.31*   HEMOGLOBIN g/dL 17.4   HEMATOCRIT % 55.8*   PLATELETS 10*3/mm3 295        Results from last 7 days   Lab Units 03/27/25  0508 03/26/25  1537   SODIUM mmol/L 162* 160*   POTASSIUM mmol/L 4.8 5.2   CHLORIDE mmol/L 120* 117*   CO2 mmol/L 28.0 24.0   BUN mg/dL 148* 165*   CREATININE mg/dL 2.75* 3.99*   CALCIUM mg/dL 10.4 10.5   BILIRUBIN mg/dL 0.6 0.6   ALK PHOS U/L 89 93   ALT (SGPT) U/L 63* 58*   AST (SGOT) U/L 58* 53*   GLUCOSE mg/dL 156* 141*       Culture Data:   No results found for: \"BLOODCX\", \"URINECX\", \"WOUNDCX\", \"MRSACX\", \"RESPCX\", \"STOOLCX\"    Radiology Data:   Imaging Results (Last 24 Hours)       ** No results found for the last 24 hours. **            I have reviewed the patient's current medications.     Assessment/Plan   Assessment  Active Hospital Problems    Diagnosis     **Acute renal failure        Treatment Plan    -Severe acute kidney injury [versus acute on chronic kidney disease]  Review of the chart showed that patient had normal renal function in March of this year, " Current acute kidney injury is probably secondary to reduce fluid intake.  We will continue patient on dextrose water at 125 mL/h and follow renal function in the morning, if no improvement will consult nephrology [improving].     -Severe hypernatremia  This is probably secondary to severe dehydration, patient will be continued on free water and also free water flushes via the PEG tube.     -Elevated troponin  This is probably secondary to acute kidney injury, patient has no chest symptoms.  We will follow-up troponin in the morning, if it trends up we will consult cardiologist [trended downwards].     -Throat cancer [with PEG tube for feeding/water flushes]  We will consult nutritionist for input, patient does not remember the name of his home feeds.  Consulted dietitian to help with nutrition    Other chronic medical conditions-  History of COPD  Diverticulosis  GERD  Difficulty hearing  Hyperlipidemia      Prophylaxis-heparin    Disposition-continue current management      Electronically signed by Tyrone Willis MD, 03/27/25, 16:22 CDT.     Electronically signed by Tyrone Willis MD at 03/27/25 1628       Consult Notes (all)    No notes of this type exist for this encounter.          Discharge Summary        Esa Franks DO at 03/29/25 0907                [facility] Medicine Services  DISCHARGE SUMMARY       Date of Admission: 3/26/2025  Date of Discharge:  3/29/2025  Primary Care Physician: Diego Lucio MD    Presenting Problem/History of Present Illness:  Poor intake, dehydration    Final Discharge Diagnoses:  Active Hospital Problems    Diagnosis     **Acute renal failure     Hypernatremia     Severe protein-calorie malnutrition     Squamous cell carcinoma of epiglottis     Pulmonary emphysema     Gastroesophageal reflux disease without esophagitis        Consults: none    Procedures Performed: none    Pertinent Test Results:       Imaging Results (All)        Procedure Component Value Units Date/Time    XR Chest 2 View [[id number]] Collected: 03/26/25 1530     Updated: 03/26/25 1535    Narrative:      EXAM/TECHNIQUE: XR CHEST 2 VW-     INDICATION: Shortness of air     COMPARISON: 1/13/2025     FINDINGS:     Cardiac silhouette is within normal limits.     No pleural effusion or pneumothorax. No consolidation. Small calcified  granuloma in the right midlung zone. Chronic interstitial coarsening.  Postoperative change of right upper lobe wedge resection.     Mild degenerative change in the thoracic spine. No acute osseous  finding.       Impression:         No acute findings.     This report was signed and finalized on 3/26/2025 3:32 PM by Dr. Esteban Almanzar MD.             LAB RESULTS:      Lab 03/26/25  1432   WBC 19.31*   HEMOGLOBIN 17.4   HEMATOCRIT 55.8*   PLATELETS 295   NEUTROS ABS 17.73*   EOS ABS 0.19   MCV 98.6*         Lab 03/29/25  0449 03/28/25  0313 03/27/25  0508 03/26/25  1537   SODIUM 143 149* 162* 160*   POTASSIUM 4.1 4.1 4.8 5.2   CHLORIDE 107 112* 120* 117*   CO2 26.0 25.0 28.0 24.0   ANION GAP 10.0 12.0 14.0 19.0*   BUN 33* 87* 148* 165*   CREATININE 0.82 1.31* 2.75* 3.99*   EGFR 91.6 56.8* 23.3* 14.9*   GLUCOSE 130* 115* 156* 141*   CALCIUM 9.1 9.9 10.4 10.5   MAGNESIUM 1.9 2.3 3.0*  --    PHOSPHORUS 3.5 3.6  --   --          Lab 03/27/25  0508 03/26/25  1537   TOTAL PROTEIN 8.6* 8.7*   ALBUMIN 3.8 3.9   GLOBULIN 4.8 4.8   ALT (SGPT) 63* 58*   AST (SGOT) 58* 53*   BILIRUBIN 0.6 0.6   ALK PHOS 89 93         Lab 03/27/25  0508 03/26/25  1537 03/26/25  1432   PROBNP  --   --  660.7   HSTROP T 159* 261* 263*                 Brief Urine Lab Results       None          Microbiology Results (last 10 days)       ** No results found for the last 240 hours. **            Hospital Course:   Patient is a 74-year-old male with a history of throat cancer, GERD, emphysema, difficulty hearing, hyperlipidemia.  He presented to the hospital on 3/26 due to reduced  "intake.  Patient is actively getting radiation treatment for his throat cancer and is unable to take p.o.  He has a PEG tube in place.  For some reason he was not giving himself his feeds or flushes and presented here and was found in the ER to be in acute renal failure with hypernatremia.  He was given some IV fluid boluses in the ER admitted to the hospital for ongoing care.  His sodium was 160.  Creatinine was 3.99 with a GFR of 14.9.  Patient also with a history of 1 kidney per report.  He was started on D5 fluids.  Tube feeds via PEG were restarted as there was no issues or concerns with GI tract.  He tolerated the tube feeds without issues.  Over the next 48+ hours his renal function and sodium normalized.  On day of discharge sodium is 143.  Creatinine is 0.82.  GFR 91.6.  He feels back to his normal self.  He is been ambulating without issue over the last day.  Feels ready to go home.  Expressed importance of giving himself his tube feeds and flushes as instructed and he stated he understands.  Resume radiation as an outpatient as scheduled.  Follow-up with PCP postdischarge.  Would recommend a repeat CBC and follow-up.  Of note patient had a white count of 19.3 on arrival back on 3/26.  A CBC was never repeated after admission.  He has not had any fevers and there have been no signs of infection since admission.  Leukocytosis was likely in the setting of volume contraction and dehydration.  This is my first day with him but he would like to go home and given his overall improvement since admission feel this to be fine again with close interval follow-up and CBC and repeat via PCP.  Seek medical evaluation if any return or worsening of symptoms and/or fevers arise.      Physical Exam on Discharge:  BP 99/54 (BP Location: Right arm, Patient Position: Lying) Comment: nurse notified  Pulse 73   Temp 97.9 °F (36.6 °C) (Oral)   Resp 16   Ht 177.8 cm (70\")   Wt 70.9 kg (156 lb 6.4 oz)   SpO2 93%   BMI 22.44 " kg/m²   Physical Exam  GEN: Awake, alert, interactive, in NAD  HEENT:  PERRLA, EOMI, Anicteric  Lungs: no wheezing/rales/rhonchi  Heart: RRR, +S1/s2, no rub  ABD: soft, nt/nd, +BS, no guarding/rebound, +feeding tube w/o drainage  Extremities: no cyanosis, no edema  Skin: no rashes or petechiae  Neuro: AAOx3, no focal deficits  Psych: normal mood & affect      Condition on Discharge: stable/improved    Discharge Disposition:  Home or Self Care    Discharge Medications:     Discharge Medications        Continue These Medications        Instructions Start Date   acetaminophen 325 MG tablet  Commonly known as: TYLENOL   975 mg, Oral, Every 8 Hours PRN      aspirin 81 MG EC tablet   81 mg, Oral, Daily      esomeprazole 20 MG capsule  Commonly known as: nexIUM   40 mg, Oral, 2 Times Daily      Fluticasone-Umeclidin-Vilant 100-62.5-25 MCG/ACT inhaler  Commonly known as: TRELEGY   1 puff, Inhalation, Daily - RT      ondansetron 4 MG tablet  Commonly known as: Zofran   4 mg, Oral, Every 8 Hours PRN      oxyCODONE 5 MG/5ML solution  Commonly known as: ROXICODONE   5 mg, Oral, Every 6 Hours PRN      vitamin C 250 MG tablet  Commonly known as: ASCORBIC ACID   250 mg, Oral, Daily      VITAMIN D3 EXTRA STRENGTH PO   1 tablet, Oral, Daily             Stop These Medications      fluconazole 100 MG tablet  Commonly known as: DIFLUCAN            ASK your doctor about these medications        Instructions Start Date   atorvastatin 10 MG tablet  Commonly known as: LIPITOR   10 mg, Daily               Discharge Diet:   Dietary Orders (From admission, onward)       Start     Ordered    03/27/25 1110  Tube Feeding: Formula: Nutren 2.0 (TwoCal); Feeding Type: Bolus; By: Syringe; Type: Set Rate; Volume: 250 mL; Feedings Per Day: 4; Bolus Schedule: 08, 12, 16, 20; Total Daily Feeding Volume (mL/day): 1000; Water Flush: Other; Other: 75mL; Every: B...  (Tube Feeding + NPO)  Diet Effective Now        Comments: Bolus Nutren 2.0, 250mL  (1carton), 4x/day. Flush PEG with 75mL of water before and after each TF bolus. Bolus an additional 240mL of water via PEG for hydration, 3x/day. **Recommend initial bolus of TF be 125mL to assess pt's volume tolerance and advance to goal volume as tolerated.**   Question Answer Comment   Formula Nutren 2.0 (TwoCal)    Feeding Type Bolus    By Syringe    Type Set Rate    Volume 250 mL    Feedings Per Day 4    Bolus Schedule 08, 12, 16, 20    Total Daily Feeding Volume (mL/day) 1000    Water Flush Other    Other 75mL    Every Before & After TF Bolus    Water Bolus Other    Every 8 hours        03/27/25 1112    03/27/25 1110  NPO Diet NPO Type: Tube Feeding  (Tube Feeding + NPO)  Diet Effective Now        Question:  NPO Type  Answer:  Tube Feeding    03/27/25 1112                      Activity at Discharge:   As prior    Follow-up Appointments:   Future Appointments   Date Time Provider Department Center   4/2/2025  1:00 PM [facility] SPEECH ONCOLOGY  [facility] P[facility]   4/9/2025  1:00 PM PAD SPEECH ONCOLOGY  [facility] PAD   4/16/2025  1:00 PM PAD SPEECH ONCOLOGY  [facility] PAD   4/25/2025  1:30 PM Ellie Garcia, PT, DPT, CLT-KYLE  [facility] PAD   4/29/2025 11:00 AM Ellie Garcia, PT, DPT, CLT-KYLE  [facility] PAD   5/2/2025  3:45 PM Ellie Garcia, PT, DPT, CLT-KYLE  PAD OPT PAD   5/8/2025  9:00 AM Mallory Bryan APRN [facility] PAD PAD   5/9/2025  2:45 PM Ellie Garcia, PT, DPT, CLT-KYLE  PAD OPT PAD   5/14/2025  1:15 PM  PAD [facility] LAB  PAD CCLAB PAD   5/14/2025  1:45 PM Kelton Macias MD [facility] PAD PAD   5/16/2025  3:00 PM Narcisa Sands P, PTA, CLT-KYLE  PAD OPT PAD   5/23/2025  2:00 PM Narcisa Sands, PTA, CLT-KYLE [facility] PAD OPT PAD   5/30/2025  2:00 PM Ellie Garcia, PT, DPT, CLT-KYLE [facility] PAD OPT PAD       Test Results Pending at Discharge: none    Electronically signed by Esa Franks DO, 03/29/25, 09:07 CDT.    Time: 32 minutes.           Electronically signed by  Esa Franks DO at 03/29/25 0944

## 2025-03-31 NOTE — THERAPY DISCHARGE NOTE
Acute Care - Speech Language Pathology Discharge Summary  [facility]       Patient Name: Se Ramirez  : 1950  MRN: [id number]    Today's Date: 3/31/2025                   Admit Date: 3/26/2025      SLP Recommendation and Plan  NPO, PEG    Visit Dx:    ICD-10-CM ICD-9-CM   1. Shortness of breath  R06.02 786.05   2. Acute renal failure, unspecified acute renal failure type  N17.9 584.9   3. Hypernatremia  E87.0 276.0   4. Elevated troponin  R79.89 790.6   5. Oropharyngeal dysphagia  R13.12 787.22                SLP GOALS       Row Name 25 0900             (LTG) Swallow    (LTG) Swallow Pt will begin PO diet with no overt s/s of aspiration.  -MB      [place] (Swallow Long Term Goal) with minimal cues (75-90% accuracy)  -MB      Time Frame (Swallow Long Term Goal) by discharge  -MB      Barriers (Swallow Long Term Goal) medically complex  -MB      Progress/Outcomes (Swallow Long Term Goal) goal not met  -MB         (STG) Patient will tolerate trials of    Consistencies Trialed (Tolerate trials) thin liquids;pureed textures;honey/ moderately thick liquids;nectar/ mildly thick liquids  -MB      Desired Outcome (Tolerate trials) without signs/symptoms of aspiration  -MB      [place] (Tolerate trials) with minimal cues (75-90% accuracy)  -MB      Time Frame (Tolerate trials) by discharge  -MB      Progress/Outcomes (Tolerate trials) goal not met  -MB                User Key  (r) = Recorded By, (t) = Taken By, (c) = Cosigned By      Initials Name Provider Type    Malgorzata Sanabria CCC-SLP Speech and Language Pathologist                    [email or web address]      SLP Discharge Summary  Anticipated Discharge Disposition (SLP): home  Reason for Discharge: discharge from this facility  Progress Toward Achieving Short/long Term Goals: goals not met within established timelines  Discharge Destination: home      Malgorzata Castanon CCC-SLP  3/31/2025

## 2025-04-01 ENCOUNTER — HOSPITAL ENCOUNTER (OUTPATIENT)
Dept: RADIATION ONCOLOGY | Facility: HOSPITAL | Age: 75
Setting detail: RADIATION/ONCOLOGY SERIES
End: 2025-04-01
Payer: MEDICARE

## 2025-04-02 ENCOUNTER — HOSPITAL ENCOUNTER (OUTPATIENT)
Dept: RADIATION ONCOLOGY | Facility: HOSPITAL | Age: 75
Setting detail: RADIATION/ONCOLOGY SERIES
Discharge: HOME | End: 2025-04-02
Payer: MEDICARE

## 2025-04-02 ENCOUNTER — TREATMENT (OUTPATIENT)
Dept: SPEECH THERAPY | Facility: HOSPITAL | Age: 75
End: 2025-04-02
Payer: MEDICARE

## 2025-04-02 DIAGNOSIS — B37.0 ORAL PHARYNGEAL CANDIDIASIS: ICD-10-CM

## 2025-04-02 DIAGNOSIS — R13.12 OROPHARYNGEAL DYSPHAGIA: Primary | ICD-10-CM

## 2025-04-02 DIAGNOSIS — C32.1 SQUAMOUS CELL CARCINOMA OF EPIGLOTTIS: ICD-10-CM

## 2025-04-02 DIAGNOSIS — Z72.0 TOBACCO USER: ICD-10-CM

## 2025-04-02 LAB
RAD ONC ARIA COURSE ID: NORMAL
RAD ONC ARIA COURSE INTENT: NORMAL
RAD ONC ARIA COURSE LAST TREATMENT DATE: NORMAL
RAD ONC ARIA COURSE START DATE: NORMAL
RAD ONC ARIA COURSE TREATMENT ELAPSED DAYS: 34
RAD ONC ARIA FIRST TREATMENT DATE: NORMAL
RAD ONC ARIA PLAN FRACTIONS TREATED TO DATE: 21
RAD ONC ARIA PLAN ID: NORMAL
RAD ONC ARIA PLAN PRESCRIBED DOSE PER FRACTION: 2 GY
RAD ONC ARIA PLAN PRIMARY REFERENCE POINT: NORMAL
RAD ONC ARIA PLAN TOTAL FRACTIONS PRESCRIBED: 35
RAD ONC ARIA PLAN TOTAL PRESCRIBED DOSE: 7000 CGY
RAD ONC ARIA REFERENCE POINT DOSAGE GIVEN TO DATE: 42 GY
RAD ONC ARIA REFERENCE POINT ID: NORMAL
RAD ONC ARIA REFERENCE POINT SESSION DOSAGE GIVEN: 2 GY

## 2025-04-02 PROCEDURE — 92526 ORAL FUNCTION THERAPY: CPT | Performed by: SPEECH-LANGUAGE PATHOLOGIST

## 2025-04-02 PROCEDURE — 77386: CPT | Performed by: RADIOLOGY

## 2025-04-02 PROCEDURE — 77014 CHG CT GUIDANCE RADIATION THERAPY FLDS PLACEMENT: CPT | Performed by: RADIOLOGY

## 2025-04-02 RX ORDER — OXYCODONE HCL 5 MG/5 ML
5 SOLUTION, ORAL ORAL EVERY 6 HOURS PRN
Qty: 473 ML | Refills: 0 | Status: SHIPPED | OUTPATIENT
Start: 2025-04-02

## 2025-04-02 NOTE — THERAPY PROGRESS REPORT/RE-CERT
Outpatient Speech Language Pathology   Adult Swallow Progress Note       Patient Name: Se Ramirez  : 1950  MRN: [id number]  Today's Date: 2025         Visit Date: 2025     Mr. Ramirez was seen today for follow up. He was discharged from the hospital on 3/29. He is much improved. He is bright and well-appearing this afternoon. He reports he still has pain 7/10. Weight gain noted of 10lbs.  He is now completing his tube feedings as he is supposed to and is feeling much better. He is doing water and some soda by mouth. He does have delayed throat clearing and mucus production with intake today. Difficult to fully rule out aspiration. Discussed allowance for thin water as tolerated right now and swallow study to be completed again after 6 week post XRT follow up.     Continue to follow 1x per week. Continue PEG as main source of nutrition. OK for thin water as tolerated for comfort.     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  Oropharyngeal dysphagia  R13.12 787.22        OP SLP Assessment/Plan - 04/02/25 1316          SLP Assessment    Clinical Impression Comments Instrumental swallow study completed on 1/16/2025 indicating pharyngeal weakness and residue. At risk for progression of dysphagia secondary to location of cancer and current XRT to the head and neck.  -MG       SLP Plan    Plan Comments Continue to follow 1x per week. Continue PEG as main source of nutrition. OK for thin water as tolerated for comfort.  -MG              User Key  (r) = Recorded By, (t) = Taken By, (c) = Cosigned By      Initials Name Provider Type    MG Ruth Sherwood MS CCC-SLP Speech and Language Pathologist                                       SLP OP Goals       Row Name 04/02/25 1300          Goal Type Needed    Goal Type Needed Other Adult Goals  -MG        Subjective Comments    Subjective Comments Patient was seen today for swallowing therapy.  -MG        Subjective Pain    Able to rate subjective pain? yes  -MG     Pre-Treatment Pain Level 7  -MG     Post-Treatment Pain Level 7  -MG        Other Goals    Other Adult Goal- 1  Patient will increase swallowing frequency during XRT in order to maintain current swallow function once XRT is completed.  -MG     Status: Other Adult Goal- 1 Progressing as expected  -MG     Comments: Other Adult Goal- 1 See note  -MG     Other Adult Goal- 2  Patient will tolerate a regular diet with thin liquids throughout and following treatment without complications such as aspiration pneumonia or overt s/s of aspiration.  -MG     Status: Other Adult Goal- 2 Progressing as expected  -MG     Comments: Other Adult Goal- 2 See note  -MG     Other Adult Goal- 3  Patient will complete oral care daily in order to promote healthy oral mucosa throughout XRT.  -MG     Status: Other Adult Goal- 3 Progressing as expected  -MG     Comments: Other Adult Goal- 3 See note  -MG     Other Adult Goal- 4  Patient will maintain adequate  hydration by drinking needed amount of water by mouth.  -MG     Status: Other Adult Goal- 4 Progressing as expected  -MG     Comments: Other Adult Goal- 4 See note  -MG     Other Adult Goal- 5  Patient will maintain adequate nutrition by mouth by implementing six meals a day into their healthy habits.  -MG     Status: Other Adult Goal- 5 Progressing as expected  -MG     Comments: Other Adult Goal- 5 See note  -MG     Other Adult Goal- 6  Patient will complete swallowing exercises three times a day in order to maintain current swallow function and range of motion of oral and pharyngeal structures.  -MG     Status: Other Adult Goal- 6 Progressing as expected  -MG     Comments: Other Adult Goal- 6 See note  -MG     Other Adult Goal- 7  Patient will continue to be assessed/monitored for increasing dysphagia and other effects of XRT on the swallow up until 3 months post XRT.  -MG     Status: Other Adult Goal- 7 Progressing as expected  -MG     Comments: Other Adult Goal- 7 See note  -MG        SLP Time Calculation    SLP Goal Re-Cert Due Date 06/05/25  -MG               User Key  (r) = Recorded By, (t) = Taken By, (c) = Cosigned By      Initials Name Provider Type    MG Ruth Sherwood MS CCC-SLP Speech and Language Pathologist                   OP SLP Education       Row Name 04/02/25 1317       Education    Barriers to Learning No barriers identified  -MG    Education Provided Patient requires further education on strategies, risks  -MG    Assessed Learning readiness;Learning preferences;Learning motivation;Learning needs  -MG    Learning Motivation Strong  -MG    Learning Method Explanation  -MG    Teaching Response Verbalized understanding  -MG    Education Comments Current symptoms reviewed with recommendations given. Discussed importance of continued home excercise program to be completed 3 times per day.  -MG              User Key  (r) = Recorded By, (t) = Taken By, (c) = Cosigned By      Initials Name  Effective Dates    MG Ruth Sherwood MS CCC-SLP 07/11/23 -                         Time Calculation:   SLP Start Time: 1300  SLP Stop Time: 1323  SLP Time Calculation (min): 23 min  Untimed Charges  26605-ZV Treatment Swallow Minutes: 23  Total Minutes  Untimed Charges Total Minutes: 23   Total Minutes: 23                 Ruth Sherwood MS CCC-SLP  4/2/2025

## 2025-04-03 ENCOUNTER — HOSPITAL ENCOUNTER (OUTPATIENT)
Dept: RADIATION ONCOLOGY | Facility: HOSPITAL | Age: 75
Discharge: HOME | End: 2025-04-03

## 2025-04-03 LAB
RAD ONC ARIA COURSE ID: NORMAL
RAD ONC ARIA COURSE INTENT: NORMAL
RAD ONC ARIA COURSE LAST TREATMENT DATE: NORMAL
RAD ONC ARIA COURSE START DATE: NORMAL
RAD ONC ARIA COURSE TREATMENT ELAPSED DAYS: 35
RAD ONC ARIA FIRST TREATMENT DATE: NORMAL
RAD ONC ARIA PLAN FRACTIONS TREATED TO DATE: 22
RAD ONC ARIA PLAN ID: NORMAL
RAD ONC ARIA PLAN PRESCRIBED DOSE PER FRACTION: 2 GY
RAD ONC ARIA PLAN PRIMARY REFERENCE POINT: NORMAL
RAD ONC ARIA PLAN TOTAL FRACTIONS PRESCRIBED: 35
RAD ONC ARIA PLAN TOTAL PRESCRIBED DOSE: 7000 CGY
RAD ONC ARIA REFERENCE POINT DOSAGE GIVEN TO DATE: 44 GY
RAD ONC ARIA REFERENCE POINT ID: NORMAL
RAD ONC ARIA REFERENCE POINT SESSION DOSAGE GIVEN: 2 GY

## 2025-04-03 PROCEDURE — 77014 CHG CT GUIDANCE RADIATION THERAPY FLDS PLACEMENT: CPT | Performed by: RADIOLOGY

## 2025-04-03 PROCEDURE — 77386: CPT | Performed by: RADIOLOGY

## 2025-04-04 ENCOUNTER — HOSPITAL ENCOUNTER (OUTPATIENT)
Dept: RADIATION ONCOLOGY | Facility: HOSPITAL | Age: 75
Discharge: HOME | End: 2025-04-04

## 2025-04-04 LAB
RAD ONC ARIA COURSE ID: NORMAL
RAD ONC ARIA COURSE INTENT: NORMAL
RAD ONC ARIA COURSE LAST TREATMENT DATE: NORMAL
RAD ONC ARIA COURSE START DATE: NORMAL
RAD ONC ARIA COURSE TREATMENT ELAPSED DAYS: 36
RAD ONC ARIA FIRST TREATMENT DATE: NORMAL
RAD ONC ARIA PLAN FRACTIONS TREATED TO DATE: 23
RAD ONC ARIA PLAN ID: NORMAL
RAD ONC ARIA PLAN PRESCRIBED DOSE PER FRACTION: 2 GY
RAD ONC ARIA PLAN PRIMARY REFERENCE POINT: NORMAL
RAD ONC ARIA PLAN TOTAL FRACTIONS PRESCRIBED: 35
RAD ONC ARIA PLAN TOTAL PRESCRIBED DOSE: 7000 CGY
RAD ONC ARIA REFERENCE POINT DOSAGE GIVEN TO DATE: 46 GY
RAD ONC ARIA REFERENCE POINT ID: NORMAL
RAD ONC ARIA REFERENCE POINT SESSION DOSAGE GIVEN: 2 GY

## 2025-04-04 PROCEDURE — 77386: CPT | Performed by: RADIOLOGY

## 2025-04-04 PROCEDURE — 77014 CHG CT GUIDANCE RADIATION THERAPY FLDS PLACEMENT: CPT | Performed by: RADIOLOGY

## 2025-04-07 ENCOUNTER — HOSPITAL ENCOUNTER (OUTPATIENT)
Dept: RADIATION ONCOLOGY | Facility: HOSPITAL | Age: 75
Discharge: HOME | End: 2025-04-07
Payer: MEDICARE

## 2025-04-07 LAB
RAD ONC ARIA COURSE ID: NORMAL
RAD ONC ARIA COURSE INTENT: NORMAL
RAD ONC ARIA COURSE LAST TREATMENT DATE: NORMAL
RAD ONC ARIA COURSE START DATE: NORMAL
RAD ONC ARIA COURSE TREATMENT ELAPSED DAYS: 39
RAD ONC ARIA FIRST TREATMENT DATE: NORMAL
RAD ONC ARIA PLAN FRACTIONS TREATED TO DATE: 24
RAD ONC ARIA PLAN ID: NORMAL
RAD ONC ARIA PLAN PRESCRIBED DOSE PER FRACTION: 2 GY
RAD ONC ARIA PLAN PRIMARY REFERENCE POINT: NORMAL
RAD ONC ARIA PLAN TOTAL FRACTIONS PRESCRIBED: 35
RAD ONC ARIA PLAN TOTAL PRESCRIBED DOSE: 7000 CGY
RAD ONC ARIA REFERENCE POINT DOSAGE GIVEN TO DATE: 48 GY
RAD ONC ARIA REFERENCE POINT ID: NORMAL
RAD ONC ARIA REFERENCE POINT SESSION DOSAGE GIVEN: 2 GY

## 2025-04-07 PROCEDURE — 77014 CHG CT GUIDANCE RADIATION THERAPY FLDS PLACEMENT: CPT | Performed by: RADIOLOGY

## 2025-04-07 PROCEDURE — 77386: CPT | Performed by: RADIOLOGY

## 2025-04-08 ENCOUNTER — HOSPITAL ENCOUNTER (OUTPATIENT)
Dept: RADIATION ONCOLOGY | Facility: HOSPITAL | Age: 75
Discharge: HOME | End: 2025-04-08

## 2025-04-08 LAB
RAD ONC ARIA COURSE ID: NORMAL
RAD ONC ARIA COURSE INTENT: NORMAL
RAD ONC ARIA COURSE LAST TREATMENT DATE: NORMAL
RAD ONC ARIA COURSE START DATE: NORMAL
RAD ONC ARIA COURSE TREATMENT ELAPSED DAYS: 40
RAD ONC ARIA FIRST TREATMENT DATE: NORMAL
RAD ONC ARIA PLAN FRACTIONS TREATED TO DATE: 25
RAD ONC ARIA PLAN ID: NORMAL
RAD ONC ARIA PLAN PRESCRIBED DOSE PER FRACTION: 2 GY
RAD ONC ARIA PLAN PRIMARY REFERENCE POINT: NORMAL
RAD ONC ARIA PLAN TOTAL FRACTIONS PRESCRIBED: 35
RAD ONC ARIA PLAN TOTAL PRESCRIBED DOSE: 7000 CGY
RAD ONC ARIA REFERENCE POINT DOSAGE GIVEN TO DATE: 50 GY
RAD ONC ARIA REFERENCE POINT ID: NORMAL
RAD ONC ARIA REFERENCE POINT SESSION DOSAGE GIVEN: 2 GY

## 2025-04-08 PROCEDURE — 77336 RADIATION PHYSICS CONSULT: CPT | Performed by: RADIOLOGY

## 2025-04-08 PROCEDURE — 77014 CHG CT GUIDANCE RADIATION THERAPY FLDS PLACEMENT: CPT | Performed by: RADIOLOGY

## 2025-04-08 PROCEDURE — 77386: CPT | Performed by: RADIOLOGY

## 2025-04-09 ENCOUNTER — TREATMENT (OUTPATIENT)
Dept: SPEECH THERAPY | Facility: HOSPITAL | Age: 75
End: 2025-04-09
Payer: MEDICARE

## 2025-04-09 DIAGNOSIS — B37.0 ORAL PHARYNGEAL CANDIDIASIS: ICD-10-CM

## 2025-04-09 DIAGNOSIS — R13.10 DYSPHAGIA, UNSPECIFIED TYPE: Primary | ICD-10-CM

## 2025-04-09 DIAGNOSIS — C32.1 SQUAMOUS CELL CARCINOMA OF EPIGLOTTIS: ICD-10-CM

## 2025-04-09 DIAGNOSIS — Z72.0 TOBACCO USER: ICD-10-CM

## 2025-04-09 LAB
RAD ONC ARIA COURSE ID: NORMAL
RAD ONC ARIA COURSE INTENT: NORMAL
RAD ONC ARIA COURSE LAST TREATMENT DATE: NORMAL
RAD ONC ARIA COURSE START DATE: NORMAL
RAD ONC ARIA COURSE TREATMENT ELAPSED DAYS: 41
RAD ONC ARIA FIRST TREATMENT DATE: NORMAL
RAD ONC ARIA PLAN FRACTIONS TREATED TO DATE: 26
RAD ONC ARIA PLAN ID: NORMAL
RAD ONC ARIA PLAN PRESCRIBED DOSE PER FRACTION: 2 GY
RAD ONC ARIA PLAN PRIMARY REFERENCE POINT: NORMAL
RAD ONC ARIA PLAN TOTAL FRACTIONS PRESCRIBED: 35
RAD ONC ARIA PLAN TOTAL PRESCRIBED DOSE: 7000 CGY
RAD ONC ARIA REFERENCE POINT DOSAGE GIVEN TO DATE: 52 GY
RAD ONC ARIA REFERENCE POINT ID: NORMAL
RAD ONC ARIA REFERENCE POINT SESSION DOSAGE GIVEN: 2 GY

## 2025-04-09 PROCEDURE — 92526 ORAL FUNCTION THERAPY: CPT

## 2025-04-09 PROCEDURE — 77427 RADIATION TX MANAGEMENT X5: CPT | Performed by: RADIOLOGY

## 2025-04-09 PROCEDURE — 77386: CPT | Performed by: RADIOLOGY

## 2025-04-09 PROCEDURE — 77014 CHG CT GUIDANCE RADIATION THERAPY FLDS PLACEMENT: CPT | Performed by: RADIOLOGY

## 2025-04-09 RX ORDER — OXYCODONE HCL 5 MG/5 ML
5 SOLUTION, ORAL ORAL EVERY 8 HOURS PRN
Qty: 473 ML | Refills: 0 | Status: SHIPPED | OUTPATIENT
Start: 2025-04-09

## 2025-04-09 NOTE — THERAPY TREATMENT NOTE
Outpatient Speech Language Pathology   Adult Swallow Treatment Note       Patient Name: Se Ramirez  : 1950  MRN: [id number]  Today's Date: 2025      Pt was seen today to address swallow function. Pt's weight was 160.2lbs. Pt states he has been drinking water and Coke without difficulty. Cautioned consumption of soft drinks as they could be irritating and drying. Pt states he has been trying to eat by mouth more often. He has had cereal and ice cream but most foods taste bad. He drank water during the session with no immediate coughing. Even without drinking his vocal quality would become wet at times, but he was able to clear it with a cough. Discussed with pt continuing to try increased PO intake but that consistencies such as soups and mashed potatoes may be easier than mixed consistencies. Pt states he has been using 4 to 5 cartons of tube feeding per day. He has not been completing his swallow exercises. SLP educated on the importance of completing the exercises to maintain swallow function and reviewed some of the exercises on the list. Pt and wife verbalized understanding. SLP will continue to follow during treatment.   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 Dysphagia, unspecified type  R13.10 787.20                              SLP OP Goals       Row Name 04/09/25 1240          Other Goals    Other Adult Goal- 1  Patient will increase swallowing frequency during XRT in order to maintain current swallow function once XRT is completed.  -MB     Status: Other Adult Goal- 1 Progressing as expected  -MB     Comments: Other Adult Goal- 1 See note  -MB     Other Adult Goal- 2  Patient will tolerate a regular diet with thin liquids throughout and following treatment without complications such as aspiration pneumonia or overt s/s of aspiration.  -MB     Status: Other Adult Goal- 2 Progressing as expected  -MB     Comments: Other Adult Goal- 2 See note  -MB     Other Adult Goal- 3  Patient will complete oral care daily in order to promote healthy oral mucosa throughout XRT.  -MB     Status: Other Adult Goal- 3 Progressing as expected  -MB     Comments: Other Adult Goal- 3 See note  -MB     Other Adult Goal- 4  Patient will maintain adequate hydration by drinking needed amount of water by mouth.  -MB     Status: Other Adult Goal- 4 Progressing as expected  -MB     Comments: Other Adult Goal- 4 See note  -MB     Other Adult Goal- 5  Patient will maintain adequate nutrition by mouth by implementing six meals a day into their healthy habits.  -MB     Status: Other Adult Goal- 5 Progressing as expected  -MB     Comments: Other Adult Goal- 5 See note  -MB     Other Adult Goal- 6  Patient will complete swallowing exercises three times a day in order to maintain current swallow function and range of motion of oral and pharyngeal structures.  -MB     Status: Other Adult Goal- 6 Progressing as expected  -MB     Comments: Other Adult  Goal- 6 See note  -MB     Other Adult Goal- 7  Patient will continue to be assessed/monitored for increasing dysphagia and other effects of XRT on the swallow up until 3 months post XRT.  -MB     Status: Other Adult Goal- 7 Progressing as expected  -MB     Comments: Other Adult Goal- 7 See note  -MB        SLP Time Calculation    SLP Goal Re-Cert Due Date 06/05/25  -MB               User Key  (r) = Recorded By, (t) = Taken By, (c) = Cosigned By      Initials Name Provider Type    Malgorzata Sanabria CCC-SLP Speech and Language Pathologist                   OP SLP Education       Row Name 04/09/25 1405       Education    Barriers to Learning No barriers identified  -MB    Education Provided Described results of evaluation;Patient expressed understanding of evaluation  -MB    Assessed Learning needs;Learning motivation;Learning preferences;Learning readiness  -MB    Learning Motivation Strong  -MB    Learning Method Explanation  -MB    Teaching Response Verbalized understanding  -MB              User Key  (r) = Recorded By, (t) = Taken By, (c) = Cosigned By      Initials Name Effective Dates    Malgorzata Sanabria CCC-SLP 02/03/23 -                         Time Calculation:   SLP Start Time: 1240  SLP Stop Time: 1329  SLP Time Calculation (min): 49 min  Untimed Charges  62127-JF Treatment Swallow Minutes: 49  Total Minutes  Untimed Charges Total Minutes: 49   Total Minutes: 49                 KEO Herrmann  4/9/2025

## 2025-04-10 ENCOUNTER — READMISSION MANAGEMENT (OUTPATIENT)
Dept: CALL CENTER | Facility: HOSPITAL | Age: 75
End: 2025-04-10
Payer: MEDICARE

## 2025-04-10 ENCOUNTER — HOSPITAL ENCOUNTER (OUTPATIENT)
Dept: RADIATION ONCOLOGY | Facility: HOSPITAL | Age: 75
Discharge: HOME | End: 2025-04-10

## 2025-04-10 LAB
RAD ONC ARIA COURSE ID: NORMAL
RAD ONC ARIA COURSE INTENT: NORMAL
RAD ONC ARIA COURSE LAST TREATMENT DATE: NORMAL
RAD ONC ARIA COURSE START DATE: NORMAL
RAD ONC ARIA COURSE TREATMENT ELAPSED DAYS: 42
RAD ONC ARIA FIRST TREATMENT DATE: NORMAL
RAD ONC ARIA PLAN FRACTIONS TREATED TO DATE: 27
RAD ONC ARIA PLAN ID: NORMAL
RAD ONC ARIA PLAN PRESCRIBED DOSE PER FRACTION: 2 GY
RAD ONC ARIA PLAN PRIMARY REFERENCE POINT: NORMAL
RAD ONC ARIA PLAN TOTAL FRACTIONS PRESCRIBED: 35
RAD ONC ARIA PLAN TOTAL PRESCRIBED DOSE: 7000 CGY
RAD ONC ARIA REFERENCE POINT DOSAGE GIVEN TO DATE: 54 GY
RAD ONC ARIA REFERENCE POINT ID: NORMAL
RAD ONC ARIA REFERENCE POINT SESSION DOSAGE GIVEN: 2 GY

## 2025-04-10 PROCEDURE — 77386: CPT | Performed by: RADIOLOGY

## 2025-04-10 PROCEDURE — 77014 CHG CT GUIDANCE RADIATION THERAPY FLDS PLACEMENT: CPT | Performed by: RADIOLOGY

## 2025-04-10 NOTE — OUTREACH NOTE
Medical Week 2 Survey      Flowsheet Row Responses   [facility] patient discharged from? [place]   Does the patient have one of the following disease processes/diagnoses(primary or secondary)? Other   Week 2 attempt successful? Yes   Call start time 1559   Discharge diagnosis *Acute renal failure   Call end time 1602   Meds reviewed with patient/caregiver? Yes   Is the patient having any side effects they believe may be caused by any medication additions or changes? No   Does the patient have all medications ordered at discharge? Yes   Is the patient taking all medications as directed (includes completed medication regime)? Yes   Does the patient have a primary care provider?  Yes   Does the patient have an appointment with their PCP within 7 days of discharge? Yes   Has the patient kept scheduled appointments due by today? Yes   Has home health visited the patient within 72 hours of discharge? N/A   Psychosocial issues? No   Did the patient receive a copy of their discharge instructions? Yes   Nursing interventions Reviewed instructions with patient   What is the patient's perception of their health status since discharge? Improving   Is the patient/caregiver able to teach back signs and symptoms related to disease process for when to call PCP? Yes   Is the patient/caregiver able to teach back signs and symptoms related to disease process for when to call 911? Yes   Is the patient/caregiver able to teach back the hierarchy of who to call/visit for symptoms/problems? PCP, Specialist, Home health nurse, Urgent Care, ED, 911 Yes   Additional teach back comments states has gained about 11 lbs, choosing foods most tolerated   Week 2 Call Completed? Yes   Call end time 1602            Laura CASTRO - Registered Nurse   eMERGENCY dEPARTMENT eNCOUnter      Independent historian used: none      CHIEF COMPLAINT    Chief Complaint   Patient presents with    Sore Throat       HPI    Jennifer Cassandra Ricks is a 8 year old female who presents complaining of throat pain since last evening no other symptoms.    ALLERGIES    ALLERGIES:   Allergen Reactions    Cat Dander Other (See Comments)     Hay fever       CURRENT MEDICATIONS    No current facility-administered medications for this encounter.     Current Outpatient Medications   Medication Sig Dispense Refill    cefdinir (OMNICEF) 250 MG/5ML suspension Take 4.2 mLs by mouth in the morning and 4.2 mLs in the evening. Do all this for 5 days. 42 mL 0    IBUPROFEN CHILDRENS PO  (Patient not taking: Reported on 1/10/2025)      nystatin (MYCOSTATIN) 759698 UNIT/GM cream Apply topically 2 times daily. (Patient not taking: Reported on 1/22/2025) 30 g 0    CETIRIZINE HCL CHILDRENS PO          PAST MEDICAL HISTORY    Past Medical History:   Diagnosis Date    Breech presentation (CMD) 2015       PROBLEM LIST  Patient Active Problem List   Diagnosis    Other proteinuria    Other constipation       SURGICAL HISTORY    History reviewed. No pertinent surgical history.    SOCIAL HISTORY    Social History     Tobacco Use    Smoking status: Never     Passive exposure: Never    Smokeless tobacco: Never   Vaping Use    Vaping status: never used       FAMILY HISTORY    Family History   Problem Relation Age of Onset    Hypertension Father     Diabetes Paternal Grandmother        REVIEW OF SYSTEMS    Negative not pertinent noncontributory for all remaining 13 systems other than as stated above    PHYSICAL EXAM    Vitals:    02/28/25 1845   BP: 111/69   BP Location: LUE - Left upper extremity   Patient Position: Sitting/High-Hackett's   Pulse: (!) 125   Resp: 22   Temp: 98.9 °F (37.2 °C)   TempSrc: Oral   SpO2: 99%   Weight: 30.1 kg (66 lb 5.7 oz)   Height: 4' 10\" (1.473 m)         Patient is alert orientated  to person, place and time    Head:  normal cephalic, atraumatic    Eyes:  PERRLA, EOMI    Mucous Membranes: moist, no lesions    Oral pharynx:  noninjected    Neck: supple, no adenopathy    Heart:  Regular rate and rhythm, S1 S2, No murmers rubs or clicks    Lungs: clear to auscultation. No wheezes, rales or rhonchi    Abdomen:  Soft, nontender. No rebound no guarding    Extremities: No clubbing, cyanosis or edema     Skin: Is without cutaneous manifestations of systemic disease    Neurological: grossly intact.  No motor or sensory deficients                 EKG         RADIOLOGY    No orders to display       LABS    Results for orders placed or performed during the hospital encounter of 02/28/25   Streptococcus group A PCR    Specimen: Throat; Swab   Result Value    STREPTOCOCCUS GROUP A PCR Detected (A)     Comment: This result was obtained by RT-PCR amplification followed by fluorescence detection. This test has been cleared by the U.S. Food and Drug Administration for detection of Strep A.         ED MEDICATIONS  ED Medication Orders (From admission, onward)      None            PROCEDURES        CONSULTS:      ED COURSE & MEDICAL DECISION MAKING           Will check a strep on the patient      RECHECKS:      FINAL IMPRESSION        The encounter diagnosis was Strep pharyngitis.        FOLLOW UP:  Franco Martinez MD  [address]  [place] [address] [phone]      As needed     Patient was instructed to return to the ED immediately if symptoms worsen or any new unusual symptoms arise. All questions and concerns were addressed.       TREATMENT:     Summary of your Discharge Medications        Take these Medications        Details   cefdinir 250 MG/5ML suspension  Commonly known as: OMNICEF   Take 4.2 mLs by mouth in the morning and 4.2 mLs in the evening. Do all this for 5 days.              Closure:  The patient understands that this is a provisional diagnosis. Provisional diagnosis can and do change.  The diagnosis that you are discharged with today is based on the symptoms with which you presented today. Disease processes can and do change with time.  If any new symptoms occur or worsen, you should seek immediate attention for re-evaluation.         Shane Mock MD  02/28/25 1929

## 2025-04-11 ENCOUNTER — HOSPITAL ENCOUNTER (OUTPATIENT)
Dept: RADIATION ONCOLOGY | Facility: HOSPITAL | Age: 75
Discharge: HOME | End: 2025-04-11

## 2025-04-11 LAB
RAD ONC ARIA COURSE ID: NORMAL
RAD ONC ARIA COURSE INTENT: NORMAL
RAD ONC ARIA COURSE LAST TREATMENT DATE: NORMAL
RAD ONC ARIA COURSE START DATE: NORMAL
RAD ONC ARIA COURSE TREATMENT ELAPSED DAYS: 43
RAD ONC ARIA FIRST TREATMENT DATE: NORMAL
RAD ONC ARIA PLAN FRACTIONS TREATED TO DATE: 28
RAD ONC ARIA PLAN ID: NORMAL
RAD ONC ARIA PLAN PRESCRIBED DOSE PER FRACTION: 2 GY
RAD ONC ARIA PLAN PRIMARY REFERENCE POINT: NORMAL
RAD ONC ARIA PLAN TOTAL FRACTIONS PRESCRIBED: 35
RAD ONC ARIA PLAN TOTAL PRESCRIBED DOSE: 7000 CGY
RAD ONC ARIA REFERENCE POINT DOSAGE GIVEN TO DATE: 56 GY
RAD ONC ARIA REFERENCE POINT ID: NORMAL
RAD ONC ARIA REFERENCE POINT SESSION DOSAGE GIVEN: 2 GY

## 2025-04-11 PROCEDURE — 77386: CPT | Performed by: RADIOLOGY

## 2025-04-11 PROCEDURE — 77014 CHG CT GUIDANCE RADIATION THERAPY FLDS PLACEMENT: CPT | Performed by: RADIOLOGY

## 2025-04-14 ENCOUNTER — HOSPITAL ENCOUNTER (OUTPATIENT)
Dept: RADIATION ONCOLOGY | Facility: HOSPITAL | Age: 75
Discharge: HOME | End: 2025-04-14
Payer: MEDICARE

## 2025-04-14 LAB
RAD ONC ARIA COURSE ID: NORMAL
RAD ONC ARIA COURSE INTENT: NORMAL
RAD ONC ARIA COURSE LAST TREATMENT DATE: NORMAL
RAD ONC ARIA COURSE START DATE: NORMAL
RAD ONC ARIA COURSE TREATMENT ELAPSED DAYS: 46
RAD ONC ARIA FIRST TREATMENT DATE: NORMAL
RAD ONC ARIA PLAN FRACTIONS TREATED TO DATE: 29
RAD ONC ARIA PLAN ID: NORMAL
RAD ONC ARIA PLAN PRESCRIBED DOSE PER FRACTION: 2 GY
RAD ONC ARIA PLAN PRIMARY REFERENCE POINT: NORMAL
RAD ONC ARIA PLAN TOTAL FRACTIONS PRESCRIBED: 35
RAD ONC ARIA PLAN TOTAL PRESCRIBED DOSE: 7000 CGY
RAD ONC ARIA REFERENCE POINT DOSAGE GIVEN TO DATE: 58 GY
RAD ONC ARIA REFERENCE POINT ID: NORMAL
RAD ONC ARIA REFERENCE POINT SESSION DOSAGE GIVEN: 2 GY

## 2025-04-14 PROCEDURE — 77014 CHG CT GUIDANCE RADIATION THERAPY FLDS PLACEMENT: CPT | Performed by: RADIOLOGY

## 2025-04-14 PROCEDURE — 77386: CPT | Performed by: RADIOLOGY

## 2025-04-15 ENCOUNTER — HOSPITAL ENCOUNTER (OUTPATIENT)
Dept: RADIATION ONCOLOGY | Facility: HOSPITAL | Age: 75
Discharge: HOME | End: 2025-04-15

## 2025-04-15 LAB
RAD ONC ARIA COURSE ID: NORMAL
RAD ONC ARIA COURSE INTENT: NORMAL
RAD ONC ARIA COURSE LAST TREATMENT DATE: NORMAL
RAD ONC ARIA COURSE START DATE: NORMAL
RAD ONC ARIA COURSE TREATMENT ELAPSED DAYS: 47
RAD ONC ARIA FIRST TREATMENT DATE: NORMAL
RAD ONC ARIA PLAN FRACTIONS TREATED TO DATE: 30
RAD ONC ARIA PLAN ID: NORMAL
RAD ONC ARIA PLAN PRESCRIBED DOSE PER FRACTION: 2 GY
RAD ONC ARIA PLAN PRIMARY REFERENCE POINT: NORMAL
RAD ONC ARIA PLAN TOTAL FRACTIONS PRESCRIBED: 35
RAD ONC ARIA PLAN TOTAL PRESCRIBED DOSE: 7000 CGY
RAD ONC ARIA REFERENCE POINT DOSAGE GIVEN TO DATE: 60 GY
RAD ONC ARIA REFERENCE POINT ID: NORMAL
RAD ONC ARIA REFERENCE POINT SESSION DOSAGE GIVEN: 2 GY

## 2025-04-15 PROCEDURE — 77336 RADIATION PHYSICS CONSULT: CPT | Performed by: RADIOLOGY

## 2025-04-15 PROCEDURE — 77014 CHG CT GUIDANCE RADIATION THERAPY FLDS PLACEMENT: CPT | Performed by: RADIOLOGY

## 2025-04-15 PROCEDURE — 77386: CPT | Performed by: RADIOLOGY

## 2025-04-16 ENCOUNTER — APPOINTMENT (OUTPATIENT)
Dept: SPEECH THERAPY | Facility: HOSPITAL | Age: 75
End: 2025-04-16
Payer: MEDICARE

## 2025-04-16 ENCOUNTER — HOSPITAL ENCOUNTER (OUTPATIENT)
Dept: RADIATION ONCOLOGY | Facility: HOSPITAL | Age: 75
Discharge: HOME | End: 2025-04-16

## 2025-04-16 LAB
RAD ONC ARIA COURSE ID: NORMAL
RAD ONC ARIA COURSE INTENT: NORMAL
RAD ONC ARIA COURSE LAST TREATMENT DATE: NORMAL
RAD ONC ARIA COURSE START DATE: NORMAL
RAD ONC ARIA COURSE TREATMENT ELAPSED DAYS: 48
RAD ONC ARIA FIRST TREATMENT DATE: NORMAL
RAD ONC ARIA PLAN FRACTIONS TREATED TO DATE: 31
RAD ONC ARIA PLAN ID: NORMAL
RAD ONC ARIA PLAN PRESCRIBED DOSE PER FRACTION: 2 GY
RAD ONC ARIA PLAN PRIMARY REFERENCE POINT: NORMAL
RAD ONC ARIA PLAN TOTAL FRACTIONS PRESCRIBED: 35
RAD ONC ARIA PLAN TOTAL PRESCRIBED DOSE: 7000 CGY
RAD ONC ARIA REFERENCE POINT DOSAGE GIVEN TO DATE: 62 GY
RAD ONC ARIA REFERENCE POINT ID: NORMAL
RAD ONC ARIA REFERENCE POINT SESSION DOSAGE GIVEN: 2 GY

## 2025-04-16 PROCEDURE — 77014 CHG CT GUIDANCE RADIATION THERAPY FLDS PLACEMENT: CPT | Performed by: RADIOLOGY

## 2025-04-16 PROCEDURE — 77386: CPT | Performed by: RADIOLOGY

## 2025-04-16 PROCEDURE — 77427 RADIATION TX MANAGEMENT X5: CPT | Performed by: RADIOLOGY

## 2025-04-17 ENCOUNTER — HOSPITAL ENCOUNTER (OUTPATIENT)
Dept: RADIATION ONCOLOGY | Facility: HOSPITAL | Age: 75
Discharge: HOME | End: 2025-04-17

## 2025-04-17 LAB
RAD ONC ARIA COURSE ID: NORMAL
RAD ONC ARIA COURSE INTENT: NORMAL
RAD ONC ARIA COURSE LAST TREATMENT DATE: NORMAL
RAD ONC ARIA COURSE START DATE: NORMAL
RAD ONC ARIA COURSE TREATMENT ELAPSED DAYS: 49
RAD ONC ARIA FIRST TREATMENT DATE: NORMAL
RAD ONC ARIA PLAN FRACTIONS TREATED TO DATE: 32
RAD ONC ARIA PLAN ID: NORMAL
RAD ONC ARIA PLAN PRESCRIBED DOSE PER FRACTION: 2 GY
RAD ONC ARIA PLAN PRIMARY REFERENCE POINT: NORMAL
RAD ONC ARIA PLAN TOTAL FRACTIONS PRESCRIBED: 35
RAD ONC ARIA PLAN TOTAL PRESCRIBED DOSE: 7000 CGY
RAD ONC ARIA REFERENCE POINT DOSAGE GIVEN TO DATE: 64 GY
RAD ONC ARIA REFERENCE POINT ID: NORMAL
RAD ONC ARIA REFERENCE POINT SESSION DOSAGE GIVEN: 2 GY

## 2025-04-17 PROCEDURE — 77386: CPT | Performed by: RADIOLOGY

## 2025-04-17 PROCEDURE — 77014 CHG CT GUIDANCE RADIATION THERAPY FLDS PLACEMENT: CPT | Performed by: RADIOLOGY

## 2025-04-18 ENCOUNTER — HOSPITAL ENCOUNTER (OUTPATIENT)
Dept: RADIATION ONCOLOGY | Facility: HOSPITAL | Age: 75
Discharge: HOME | End: 2025-04-18

## 2025-04-18 LAB
RAD ONC ARIA COURSE ID: NORMAL
RAD ONC ARIA COURSE INTENT: NORMAL
RAD ONC ARIA COURSE LAST TREATMENT DATE: NORMAL
RAD ONC ARIA COURSE START DATE: NORMAL
RAD ONC ARIA COURSE TREATMENT ELAPSED DAYS: 50
RAD ONC ARIA FIRST TREATMENT DATE: NORMAL
RAD ONC ARIA PLAN FRACTIONS TREATED TO DATE: 33
RAD ONC ARIA PLAN ID: NORMAL
RAD ONC ARIA PLAN PRESCRIBED DOSE PER FRACTION: 2 GY
RAD ONC ARIA PLAN PRIMARY REFERENCE POINT: NORMAL
RAD ONC ARIA PLAN TOTAL FRACTIONS PRESCRIBED: 35
RAD ONC ARIA PLAN TOTAL PRESCRIBED DOSE: 7000 CGY
RAD ONC ARIA REFERENCE POINT DOSAGE GIVEN TO DATE: 66 GY
RAD ONC ARIA REFERENCE POINT ID: NORMAL
RAD ONC ARIA REFERENCE POINT SESSION DOSAGE GIVEN: 2 GY

## 2025-04-18 PROCEDURE — 77014 CHG CT GUIDANCE RADIATION THERAPY FLDS PLACEMENT: CPT | Performed by: RADIOLOGY

## 2025-04-18 PROCEDURE — 77386: CPT | Performed by: RADIOLOGY

## 2025-04-21 ENCOUNTER — HOSPITAL ENCOUNTER (OUTPATIENT)
Dept: RADIATION ONCOLOGY | Facility: HOSPITAL | Age: 75
Discharge: HOME | End: 2025-04-21
Payer: MEDICARE

## 2025-04-21 LAB
RAD ONC ARIA COURSE ID: NORMAL
RAD ONC ARIA COURSE INTENT: NORMAL
RAD ONC ARIA COURSE LAST TREATMENT DATE: NORMAL
RAD ONC ARIA COURSE START DATE: NORMAL
RAD ONC ARIA COURSE TREATMENT ELAPSED DAYS: 53
RAD ONC ARIA FIRST TREATMENT DATE: NORMAL
RAD ONC ARIA PLAN FRACTIONS TREATED TO DATE: 34
RAD ONC ARIA PLAN ID: NORMAL
RAD ONC ARIA PLAN PRESCRIBED DOSE PER FRACTION: 2 GY
RAD ONC ARIA PLAN PRIMARY REFERENCE POINT: NORMAL
RAD ONC ARIA PLAN TOTAL FRACTIONS PRESCRIBED: 35
RAD ONC ARIA PLAN TOTAL PRESCRIBED DOSE: 7000 CGY
RAD ONC ARIA REFERENCE POINT DOSAGE GIVEN TO DATE: 68 GY
RAD ONC ARIA REFERENCE POINT ID: NORMAL
RAD ONC ARIA REFERENCE POINT SESSION DOSAGE GIVEN: 2 GY

## 2025-04-21 PROCEDURE — 77386: CPT | Performed by: RADIOLOGY

## 2025-04-21 PROCEDURE — 77014 CHG CT GUIDANCE RADIATION THERAPY FLDS PLACEMENT: CPT | Performed by: RADIOLOGY

## 2025-04-22 ENCOUNTER — READMISSION MANAGEMENT (OUTPATIENT)
Dept: CALL CENTER | Facility: HOSPITAL | Age: 75
End: 2025-04-22
Payer: MEDICARE

## 2025-04-22 ENCOUNTER — HOSPITAL ENCOUNTER (OUTPATIENT)
Dept: RADIATION ONCOLOGY | Facility: HOSPITAL | Age: 75
Discharge: HOME | End: 2025-04-22

## 2025-04-22 LAB
RAD ONC ARIA COURSE ID: NORMAL
RAD ONC ARIA COURSE INTENT: NORMAL
RAD ONC ARIA COURSE LAST TREATMENT DATE: NORMAL
RAD ONC ARIA COURSE START DATE: NORMAL
RAD ONC ARIA COURSE TREATMENT ELAPSED DAYS: 54
RAD ONC ARIA FIRST TREATMENT DATE: NORMAL
RAD ONC ARIA PLAN FRACTIONS TREATED TO DATE: 35
RAD ONC ARIA PLAN ID: NORMAL
RAD ONC ARIA PLAN PRESCRIBED DOSE PER FRACTION: 2 GY
RAD ONC ARIA PLAN PRIMARY REFERENCE POINT: NORMAL
RAD ONC ARIA PLAN TOTAL FRACTIONS PRESCRIBED: 35
RAD ONC ARIA PLAN TOTAL PRESCRIBED DOSE: 7000 CGY
RAD ONC ARIA REFERENCE POINT DOSAGE GIVEN TO DATE: 70 GY
RAD ONC ARIA REFERENCE POINT ID: NORMAL
RAD ONC ARIA REFERENCE POINT SESSION DOSAGE GIVEN: 2 GY

## 2025-04-22 PROCEDURE — 77336 RADIATION PHYSICS CONSULT: CPT | Performed by: RADIOLOGY

## 2025-04-22 PROCEDURE — 77386: CPT | Performed by: RADIOLOGY

## 2025-04-22 PROCEDURE — 77014 CHG CT GUIDANCE RADIATION THERAPY FLDS PLACEMENT: CPT | Performed by: RADIOLOGY

## 2025-04-22 NOTE — OUTREACH NOTE
Medical Week 3 Survey      Flowsheet Row Responses   [facility] patient discharged from? [place]   Does the patient have one of the following disease processes/diagnoses(primary or secondary)? Other   Week 3 attempt successful? Yes   Call start time 1521   Call end time 1523   Discharge diagnosis *Acute renal failure   Person spoke with today (if not patient) and relationship patient   Meds reviewed with patient/caregiver? Yes   Does the patient have all medications ordered at discharge? Yes   Is the patient taking all medications as directed (includes completed medication regime)? Yes   Comments regarding appointments Radiation treatment, last visit today   Does the patient have a primary care provider?  Yes   Does the patient have an appointment with their PCP within 7 days of discharge? Yes   Has the patient kept scheduled appointments due by today? Yes   Comments Patient is doing PT and speech   Psychosocial issues? No   Did the patient receive a copy of their discharge instructions? Yes   Nursing interventions Reviewed instructions with patient   What is the patient's perception of their health status since discharge? Improving   If the patient is a current smoker, are they able to teach back resources for cessation? Not a smoker   Week 3 Call Completed? Yes   Graduated Yes   Is the patient interested in additional calls from an ambulatory ? No   Would this patient benefit from a Referral to Amb Social Work? No   Wrap up additional comments Patient is doing well at this time.  Denies needs.   Call end time 1523            HARRY CHAPMAN - Registered Nurse

## 2025-04-24 DIAGNOSIS — B37.0 ORAL PHARYNGEAL CANDIDIASIS: ICD-10-CM

## 2025-04-24 DIAGNOSIS — Z72.0 TOBACCO USER: ICD-10-CM

## 2025-04-24 DIAGNOSIS — F17.200 CURRENT EVERY DAY SMOKER: ICD-10-CM

## 2025-04-24 DIAGNOSIS — C32.1 SQUAMOUS CELL CARCINOMA OF EPIGLOTTIS: Primary | ICD-10-CM

## 2025-04-25 ENCOUNTER — HOSPITAL ENCOUNTER (OUTPATIENT)
Dept: PHYSICAL THERAPY | Facility: HOSPITAL | Age: 75
Setting detail: THERAPIES SERIES
Discharge: HOME | End: 2025-04-25
Payer: MEDICARE

## 2025-04-25 DIAGNOSIS — Z91.89 AT RISK FOR LYMPHEDEMA: Primary | ICD-10-CM

## 2025-04-25 DIAGNOSIS — C32.1 SQUAMOUS CELL CARCINOMA OF EPIGLOTTIS: ICD-10-CM

## 2025-04-25 PROCEDURE — 97140 MANUAL THERAPY 1/> REGIONS: CPT | Performed by: PHYSICAL THERAPIST

## 2025-04-25 NOTE — THERAPY TREATMENT NOTE
Physical Therapy Treatment Note, 30 Day Progress Note, and 90 Day Recertification Note  [facility]  A department [place] [address] [place] [address]    Patient: Se Ramirez                                                 Visit Date: 2025  :     1950    Referring practitioner:    Jerome Johnson MD  Date of Initial Visit:          Type: THERAPY  Episode: Lymphedema  Number of visits this episode: 2    Visit Diagnoses:    ICD-10-CM ICD-9-CM   1. 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     SUBJECTIVE     Subjective:  He has finished with radiation.  His last treatment was Tuesday.  He has been eating breakfast and dinner every day.  Not using the feeding tube any longer.    PAIN: 4/10         OBJECTIVE     Objective    Lymphedema       Row Name 25 1300             Subjective Pain    Able to rate subjective pain? yes  -HR      Pre-Treatment Pain Level 4  -HR         Skin Changes/Observations    Location/Assessment Head/Neck  -HR      Head/Neck Color/Pigment hyperpigmented;red  -HR      Head/Neck Skin Details skin around the neck is darker, red. No open areas. Warm to touch.  -HR      Skin Observations Comment Just completed 35 radiation treatments 3 days ago.  -HR         Lymphedema Measurements    Measurement Type(s) Circumferential  -HR         Head Circumferential (cm)    Measurement Location 1 upper lip  -HR      Head 1 52.5 cm  -HR      Measurement Location 2 10 cm inferior to earlobe attachment  -HR      Head 2 38 cm  -HR      Head Circumferential Total 90.5 cm  -HR         Manual Lymphatic Drainage    Manual Lymphatic Drainage initial sequence;head/neck treatment;opened regional lymph nodes  -HR      Initial Sequence short neck;supraclavicular  -HR      Supraclavicular right;left  -HR      Opened Regional Lymph Nodes axillary  -HR      Axillary right;left  -HR      Head/Neck  Treatment full/complex MLD  -HR      Full/Complex MLD Partial treatment done on mat table with head quite elevated.  Rest of treatment performed with him sitting in a chair  -HR      Manual Therapy 40  -HR                User Key  (r) = Recorded By, (t) = Taken By, (c) = Cosigned By      Initials Name Provider Type    HR Ellie Garcia, PT, DPT, CLT-KYLE Physical Therapist                    4/25/25: 35 of 35 radiation txs  3/7/25: 7 of 35 radiation txs      Therapy Education/Self Care [id number]   Education offered today May try to elevate his head more in the bed.  Can work on skin stretch lymph drainage to neck   Medbride Code    Ongoing HEP   Try the skin stretch massage   Timed Minutes        Total Timed Treatment:     40   mins  Total Time of Visit:             40   mins         ASSESSMENT/PLAN     GOALS          Goals                                          Progress Note 5/24/25                                                      Recert due by 7/22/25   STG by:  Comments Date Status   Patient will have a good basic understanding of lymphedema and its suggested risk reduction practices   4/25/2025 Ongoing   Patient will be independent with remedial HEP for lymphedema   4/25/2025 Ongoing   Patient will be independent with self MLD for lymphedema   4/25/2025 Ongoing             LTG by:          Patient will have appropriate compression garments for lymphedema maintenance Mentioned to him that this may be something needed in the future 4/25/2025 Ongoing   Patient will have no sign or symptoms of infection Skin is red but intact 4/25/2025 Ongoing   Patient will be independent with comprehensive maintenance program for lymphedema   4/25/2025 Ongoing             Anticipated CPT codes: Therapeutic Exercise 07631, Manual Therapy 30884, Therapeutic Activity 79557, and Self Care/Home Management 21057      Assessment/Plan     ASSESSMENT:   This is his first treatment visit.  He has completed 35 radiation treatments.   His biggest complaint is feeling like he has saliva stuck in his throat.  This is worse in the mornings.  He would benefit from MLD treatments which were initiated today.  Need to be cautious with his skin since he just finished radiation.  His cervical range of motion is functional only slightly deep to the left.  Measurements to the upper lip and neck were down as compared to March 7, 2025 but photo shows that there is obvious swelling in the anterior neck.  He has also had weight loss which could explain the reduction.    PLAN:   Lymphedema treatment on a weekly basis over the next 3 months    SIGNATURE: Ellie Garcia, PT, DPT, HARRIET-JINA WRIGHT License #: [id number]  Electronically Signed on 4/25/2025        [address] [place] [address] [place] [address]  [phone]

## 2025-04-28 ENCOUNTER — TELEPHONE (OUTPATIENT)
Dept: OTOLARYNGOLOGY | Facility: CLINIC | Age: 75
End: 2025-04-28
Payer: MEDICARE

## 2025-04-28 NOTE — TELEPHONE ENCOUNTER
I spoke to patient, we had new referral for patient, he is existing patient with Dr Dangelo, I asked him why he wanted to switch surgeons in the middle of his treatment. He stated they did not see eye to eye. Advised that we like to keep patients with same physician during treatment. He agree, sivan appt with Dr Dangelo on 5-8-25 at 2:00. He VU

## 2025-04-29 ENCOUNTER — HOSPITAL ENCOUNTER (OUTPATIENT)
Dept: PHYSICAL THERAPY | Facility: HOSPITAL | Age: 75
Setting detail: THERAPIES SERIES
Discharge: HOME | End: 2025-04-29
Payer: MEDICARE

## 2025-04-29 DIAGNOSIS — Z91.89 AT RISK FOR LYMPHEDEMA: Primary | ICD-10-CM

## 2025-04-29 DIAGNOSIS — C32.1 SQUAMOUS CELL CARCINOMA OF EPIGLOTTIS: ICD-10-CM

## 2025-04-29 PROCEDURE — 97110 THERAPEUTIC EXERCISES: CPT | Performed by: PHYSICAL THERAPIST

## 2025-04-29 PROCEDURE — 97140 MANUAL THERAPY 1/> REGIONS: CPT | Performed by: PHYSICAL THERAPIST

## 2025-04-29 NOTE — THERAPY TREATMENT NOTE
"Physical Therapy Treatment Note  [facility] Outpatient Therapy Services  A department [address], [place] [address]    Patient: Se Ramirez                                                 Visit Date: 2025  :     1950    Referring practitioner:    Jerome Johnson MD  Date of Initial Visit:          Type: THERAPY  Episode: Lymphedema  Number of visits this episode: 3    Visit Diagnoses:    ICD-10-CM ICD-9-CM   1. 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  He has started using the \"juice\" through his feeding tube some as well now.  PAIN: 5/10         OBJECTIVE     Objective    Lymphedema       Row Name 25 1100             Subjective Pain    Able to rate subjective pain? yes  -HR      Pre-Treatment Pain Level 5  -HR         Manual Lymphatic Drainage    Manual Lymphatic Drainage initial sequence;head/neck treatment;opened regional lymph nodes  -HR      Initial Sequence short neck;supraclavicular  -HR      Supraclavicular right;left  -HR      Opened Regional Lymph Nodes axillary  -HR      Axillary right;left  -HR      Head/Neck Treatment full/complex MLD  -HR      Full/Complex MLD Partial treatment done on mat table with head quite elevated. Rest of treatment performed with him sitting in a chair  -HR      Manual Therapy 35  -HR                User Key  (r) = Recorded By, (t) = Taken By, (c) = Cosigned By      Initials Name Provider Type    HR Ellie Garcia, PT, DPT, CLT-KYLE Physical Therapist                  Therapeutic Exercises    33335 Units Comments   Scapular retraction     Deep breathing     Chin tucks     Cervical rotation     Cervical SB     Timed Minutes 10            Therapy Education/Self Care [id number]   Education offered today May try to elevate his head more in the bed.  Can work on skin stretch lymph drainage " to neck   Medbride Code    Ongoing HEP   Exercises given listed above.    Timed Minutes        Total Timed Treatment:     45   mins  Total Time of Visit:             45   mins         ASSESSMENT/PLAN     GOALS          Goals                                          Progress Note 5/24/25                                                      Recert due by 7/22/25   STG by:  Comments Date Status   Patient will have a good basic understanding of lymphedema and its suggested risk reduction practices   4/25/2025 Ongoing   Patient will be independent with remedial HEP for lymphedema Started HEP today.  Went through each 1 with him 4/29/2025 Ongoing   Patient will be independent with self MLD for lymphedema   4/25/2025 Ongoing             LTG by:          Patient will have appropriate compression garments for lymphedema maintenance Mentioned to him that this may be something needed in the future 4/25/2025 Ongoing   Patient will have no sign or symptoms of infection Skin is red but intact 4/25/2025 Ongoing   Patient will be independent with comprehensive maintenance program for lymphedema   4/25/2025 Ongoing             Anticipated CPT codes: Therapeutic Exercise 71943, Manual Therapy 99367, Therapeutic Activity 31648, and Self Care/Home Management 44808      Assessment/Plan     ASSESSMENT:   He has had some increased pain over the last few days as a result of the radiation he just completed a week ago.  He has having more trouble swallowing due to pain.  He has gone back to using the feeding tube as supplemental nutrition.  He is trying to eat things that will not irritate the throat.  We went through some posture and lymphatic stimulation exercises that he will work on at home.    PLAN:   Lymphedema treatment on a weekly basis over the next 3 months    SIGNATURE: Ellie Garcia, PT, DPT, JINA JIMENEZ License #: [id number]  Electronically Signed on 4/29/2025        [place] [address]  [phone]

## 2025-05-01 NOTE — TELEPHONE ENCOUNTER
Patient called asking for a script sent in for this I noticed he has been getting samples from his pcp but he called to ask if a script could be sent in to [facility] please. Thank you   Requested Prescriptions     Pending Prescriptions Disp Refills    Fluticasone-Umeclidin-Vilant (TRELEGY) 100-62.5-25 MCG/ACT inhaler       Sig: Inhale 1 puff Daily.      Last office visit with prescribing clinician: 1/8/2025   Last telemedicine visit with prescribing clinician: Visit date not found   Next office visit with prescribing clinician: 5/8/2025                         Would you like a call back once the refill request has been completed: [] Yes [] No    If the office needs to give you a call back, can they leave a voicemail: [] Yes [] No    Paola Aguirre MA  05/01/25, 10:30 CDT

## 2025-05-02 ENCOUNTER — APPOINTMENT (OUTPATIENT)
Dept: PHYSICAL THERAPY | Facility: HOSPITAL | Age: 75
End: 2025-05-02
Payer: MEDICARE

## 2025-05-07 DIAGNOSIS — C34.11 MALIGNANT NEOPLASM OF UPPER LOBE OF RIGHT LUNG: Primary | ICD-10-CM

## 2025-05-07 NOTE — PROGRESS NOTES
PARISA Lopez  [facility]   Pulmonary and Critical Care  [address] [place] [address]  Phone: [phone]  Fax: [phone]           Chief Complaint  Stage 1 mild COPD    Subjective        Se Edenilson Ramirez presents to [facility] PULMONARY & CRITICAL CARE MEDICINE     History of Present Illness  Mr. Ramirez is a pleasant 74-year-old male patient with known 2.5 cm right upper lobe anterior segment nodule noted on 12/20/2020. He was treated by Dr. 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 He has known emphysema/COPD, history of DVT, history of alcohol abuse and a current every day smoker.     Since seeing me in early part of January he had a CT of the neck as well as biopsy per Dr. Antelmo Dangelo showing keratinizing squamous cell carcinoma.  He is following now with Dr. Johnson in radiation oncology and Dr. Mcduffie in oncology.  He is receiving radiation treatments.  His PET scan in February did not show any hypermetabolic areas in the lung.  He also has continue to follow with Dr. Licea in GI and had a PEG tube placement in February secondary to his oropharyngeal dysphagia.  Plans are to remove the feeding tube 6 weeks post radiation.    He reports experiencing night sweats intermittently. He reports experiencing taste bud alterations and speech difficulties post-radiation. He is scheduled for lab work next week. He is making efforts to quit smoking, currently down to half a pack per day. He is considering hypnosis as a potential aid in smoking cessation.    He is currently using Trelegy, which he finds beneficial for his breathing. He experiences a mild cough in the late afternoon. He does not require refills of Trelegy at this time. He has an old rescue inhaler, which he uses sparingly, but he used it more frequently prior to starting Trelegy.        Objective   Vital  "Signs:   /82   Pulse 70   Ht 177.8 cm (70\")   Wt 71.2 kg (157 lb)   SpO2 99% Comment: RA  BMI 22.53 kg/m²     Physical Exam  Vitals reviewed.   Constitutional:       Appearance: Normal appearance.   Cardiovascular:      Rate and Rhythm: Normal rate and regular rhythm.   Pulmonary:      Effort: Pulmonary effort is normal.      Breath sounds: Normal breath sounds.   Abdominal:      Comments: Peg Tube   Neurological:      General: No focal deficit present.      Mental Status: He is alert and oriented to person, place, and time.   Psychiatric:         Mood and Affect: Mood normal.         Behavior: Behavior normal.             Result Review :  The following data was reviewed by: PARISA Lopez on 05/08/2025:    My interpretation of imaging: No new  My interpretation of labs: No new  NM PET/CT Skull Base to Mid Thigh (02/04/2025 12:22)   Tissue Pathology Exam (01/20/2025 12:09)   PFT Values          6/28/2023    09:30 1/8/2025    09:15   Pre Drug PFT Results    108   FEV1 94 86   FEF 25-75% 43 45   FEV1/FVC 59 60   Other Tests PFT Results   DLCO 57 49   D/VAsb 54 46     My interpretation of the PFT : No new    Results for orders placed in visit on 01/08/25    Spirometry with Diffusion Capacity    Narrative  Spirometry with Diffusion Capacity    Performed by: Alona Voss, RRT  Authorized by: Mallory Bryan APRN  Pre Drug % Predicted  FVC: 108%  FEV1: 86%  FEF 25-75%: 45%  FEV1/FVC: 60%  DLCO: 49%  D/VAsb: 46%    Interpretation  Spirometry  Spirometry shows mild obstruction. There is reduced midflow suggesting small airway/airflow obstruction.  Review of FVL curve  Patient's effort is normal.  Diffusion Capacity  The patient's diffusion capacity is moderately reduced.  Diffusion capacity is moderately reduced when corrected for alveolar volume.  Overall comments: To June 2023 FEV1 has decreased from 94 to 86% predicted, FVC has decreased from 120 to 108% predicted.  Diffusion " capacity when corrected for alveolar volume remains moderately decreased however has dropped from 54 to 46% predicted.      Results for orders placed in visit on 23    Pulmonary Function Test    Narrative  Pulmonary Function Test  Performed by: Alona Voss, KATHY  Authorized by: Mallory Bryan APRN    Pre Drug % Predicted  FVC: 120%  FEV1: 94%  FEF 25-75%: 43%  FEV1/FVC: 59%  DLCO: 57%  D/VAsb: 54%    Interpretation  Spirometry  Spirometry shows mild obstruction. There is reduced midflow suggesting small airway/airflow obstruction.  Review of FVL curve  Patient's effort is normal.  Diffusion Capacity  The patient's diffusion capacity is moderately reduced.  Diffusion capacity is moderately reduced when corrected for alveolar volume.      Results for orders placed in visit on 21    Full Pulmonary Function Test Without Bronchodilator    Narrative  Full Pulmonary Function Test Without Bronchodilator  Performed by: Alona Voss, KATHY  Authorized by: Diego Landry MD    Pre Drug % Predicted  FVC: 115%  FEV1: 91%  FEF 25-75%: 41%  FEV1/FVC: 62.03%  T%  RV: 118%  DLCO: 65%  D/VAsb: 62%    Interpretation  Spirometry  Spirometry shows mild obstruction. There is reduced midflow suggesting small airway/airflow obstruction.  Review of FVL curve  Patient's effort is normal.  Lung Volume Measurements  Measurements show normal results.  Diffusion Capacity  The patient's diffusion capacity is moderately reduced.  Diffusion capacity is moderately reduced when corrected for alveolar volume.          Assessment and Plan   Diagnoses and all orders for this visit:    1. Stage 1 mild COPD by GOLD classification (Primary)    2. Pulmonary emphysema, unspecified emphysema type    3. Multiple lung nodules    4. Malignant neoplasm of upper lobe of right lung    5. Hilar adenopathy    6. Mediastinal adenopathy    7. Personal history of nicotine dependence    8. Squamous cell carcinoma of  epiglottis    9. S/P percutaneous endoscopic gastrostomy (PEG) tube placement    Other orders  -     albuterol sulfate  (90 Base) MCG/ACT inhaler; Inhale 2 puffs Every 4 (Four) Hours As Needed for Wheezing or Shortness of Air.  Dispense: 18 g; Refill: 3      Jaank standpoint is doing well.  He will continue his Trelegy and a prescription is given for as needed albuterol.  I have reviewed his recent history since my last visit including notes from Dr. Mcduffie and Dr. Johnson as well as general surgery op note, PET scans and CT scans.  He has a follow-up today with Dr. Antelmo Dangelo he is encouraged to keep.  I have tentatively asked him to return in 3 months with a follow-up CT which would be 6 months from his PET scan.  We also discussed that should radiation oncology and/or oncology wish to take over all routine imaging I am okay with that but also okay to continue managing imaging of his chest as well.  Patient is agreeable to the plan.    Se Ramirez  reports that he has been smoking cigarettes. He started smoking about 56 years ago. He has a 28.2 pack-year smoking history. He has been exposed to tobacco smoke. He has never used smokeless tobacco. I have educated him on the risk of diseases from using tobacco products such as cancer, COPD, and heart disease.     I advised him to quit and he is willing to quit. We have discussed the following method/s for tobacco cessation:   Hypnosis .  Together we have set a quit date for  by August .  He will follow up with me in 3 months or sooner to check on his progress.    I spent 3.5 minutes counseling the patient.         Alpha 1: MM, normal   LDCT: getting routine imaging for history of lung cancer   Smoking Cessation: encouraged smoking cessation   Vaccinations:  Declines vaccinations        Follow Up   Return in about 3 months (around 8/8/2025) for CT.  Patient was given instructions and counseling regarding his condition or for health maintenance advice.  Please see specific information pulled into the AVS if appropriate.     PARISA Lopez  5/8/2025  09:49 CDT    Please note that portions of this note were completed with a voice recognition program.    Patient or patient representative verbalized consent for the use of Ambient Listening during the visit with  PARISA Lopez for chart documentation. 5/8/2025  09:50 CDT

## 2025-05-08 ENCOUNTER — OFFICE VISIT (OUTPATIENT)
Dept: PULMONOLOGY | Facility: CLINIC | Age: 75
End: 2025-05-08
Payer: MEDICARE

## 2025-05-08 ENCOUNTER — OFFICE VISIT (OUTPATIENT)
Dept: OTOLARYNGOLOGY | Facility: CLINIC | Age: 75
End: 2025-05-08
Payer: MEDICARE

## 2025-05-08 VITALS
HEART RATE: 70 BPM | SYSTOLIC BLOOD PRESSURE: 124 MMHG | BODY MASS INDEX: 22.48 KG/M2 | OXYGEN SATURATION: 99 % | DIASTOLIC BLOOD PRESSURE: 82 MMHG | HEIGHT: 70 IN | WEIGHT: 157 LBS

## 2025-05-08 VITALS
HEART RATE: 84 BPM | DIASTOLIC BLOOD PRESSURE: 80 MMHG | BODY MASS INDEX: 22.53 KG/M2 | SYSTOLIC BLOOD PRESSURE: 129 MMHG | WEIGHT: 157 LBS

## 2025-05-08 DIAGNOSIS — K21.9 LARYNGOPHARYNGEAL REFLUX (LPR): ICD-10-CM

## 2025-05-08 DIAGNOSIS — C10.9 SQUAMOUS CELL CARCINOMA OF OROPHARYNX: Primary | ICD-10-CM

## 2025-05-08 DIAGNOSIS — Z87.891 PERSONAL HISTORY OF NICOTINE DEPENDENCE: ICD-10-CM

## 2025-05-08 DIAGNOSIS — Z93.1 S/P PERCUTANEOUS ENDOSCOPIC GASTROSTOMY (PEG) TUBE PLACEMENT: ICD-10-CM

## 2025-05-08 DIAGNOSIS — J38.7 LESION OF EPIGLOTTIS: ICD-10-CM

## 2025-05-08 DIAGNOSIS — J44.9 STAGE 1 MILD COPD BY GOLD CLASSIFICATION: Primary | Chronic | ICD-10-CM

## 2025-05-08 DIAGNOSIS — C32.1 SQUAMOUS CELL CARCINOMA OF EPIGLOTTIS: ICD-10-CM

## 2025-05-08 DIAGNOSIS — R13.10 DYSPHAGIA, UNSPECIFIED TYPE: ICD-10-CM

## 2025-05-08 DIAGNOSIS — R91.8 MULTIPLE LUNG NODULES: Chronic | ICD-10-CM

## 2025-05-08 DIAGNOSIS — C34.11 MALIGNANT NEOPLASM OF UPPER LOBE OF RIGHT LUNG: ICD-10-CM

## 2025-05-08 DIAGNOSIS — R49.0 HOARSENESS: ICD-10-CM

## 2025-05-08 DIAGNOSIS — R59.0 MEDIASTINAL ADENOPATHY: ICD-10-CM

## 2025-05-08 DIAGNOSIS — J43.9 PULMONARY EMPHYSEMA, UNSPECIFIED EMPHYSEMA TYPE: Chronic | ICD-10-CM

## 2025-05-08 DIAGNOSIS — R59.0 HILAR ADENOPATHY: ICD-10-CM

## 2025-05-08 RX ORDER — ALBUTEROL SULFATE 90 UG/1
2 INHALANT RESPIRATORY (INHALATION) EVERY 4 HOURS PRN
Qty: 18 G | Refills: 3 | Status: SHIPPED | OUTPATIENT
Start: 2025-05-08

## 2025-05-08 NOTE — PROGRESS NOTES
OPERATIVE NOTE:  Se Ramirez    DATE OF PROCEDURE: 5/8/2025    PROCEDURE:   Flexible Fiberoptic Laryngoscopy    ANESTHESIA:  None    REASON FOR PROCEDURE:  Procedure was recommended for re-evaluation of a lesion previously documented-surveillance of SCC [id number] BOT S/P XRT 4/2025  Risks, benefits and alternatives were discussed.      DETAILS of OPERATION:  The patient was seated in the exam chair.  A flexible fiberoptic laryngoscopy was performed through the oral cavity.  The scope was introduced into the oral cavity and directed to the level of the glottis, examining the structures of the oropharynx, base of tongue, vallecula, supraglottic larynx, glottic larynx, and hypopharynx.      FINDINGS:  Mucosal surfaces:   The mucosal surfaces demonstrated normal mucosa surfaces with mild inflammation    Base of tongue:  The base of tongue was found to have no mass or lesion.    Epiglottis:  The epiglottis was found to have no mass or lesion.    Aryepiglottic fold:  The AE folds were found to have no mass or lesion.    False Vocal Fold:  The false cords were found to have no mass or lesion.    True Vocal Cord:  The true vocal cords were found to have no mass or lesion. Both true vocal cords adduct and abduct normally    Arytenoid:   The arytenoids were found to have mild inter-arytenoid edema.    Hypopharynx:  The hypopharynx was found to have no mass or lesion.    The patient tolerated procedure well.

## 2025-05-08 NOTE — PROGRESS NOTES
YOB: 1950  Location: [facility]  Location Address: [address] [place] [address], [facility] [address], [address] [place] [address]  Location Phone: [phone]    Chief Complaint   Patient presents with    Follow-up       History of Present Illness  Se Ramirez is a 74 y.o. male.  Se Ramirez is here for follow up of ENT complaints. The patient is s/p direct laryngoscopy with excision of epiglottic lesion on 25.  Pathology revealed squamous cell carcinoma. He completed radiation in 2025. He admits decreased taste.    Reviewed:    Reviewed:     Final Diagnosis   Laryngeal surface of epiglottis, biopsies:  Invasive keratinizing squamous cell carcinoma.     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    History of adenomatous polyp of colon     History of colon polyps     History of lung cancer     Right lung    Hyperlipidemia     Throat cancer        Past Surgical History:   Procedure Laterality Date    BRONCHOSCOPY N/A 02/15/2021    Procedure: BRONCHOSCOPY;  Surgeon: Bruce Murillo MD;  Location: [facility];  Service: Cardiothoracic;  Laterality: N/A;    CATARACT EXTRACTION      CHOLECYSTECTOMY      COLONOSCOPY  2012    One 1cm tubular adenomatous polyp in the sigmoid colon; One 5mm hyperplastic polyp in the rectum; Diverticulosis; Repeat 4 years     COLONOSCOPY N/A 2017    Two 4-6mm tubular adenomatous polyps at the hepatic flexure; One 5mm tubular adenomatous polyp in the transverse colon; One 11mm tubular adenomatous polyp in the sigmoid colon; One 6mm tubular adenomatous polyp in the rectum; Diverticulosis in the left colon; Repeat 3 years     COLONOSCOPY  2009    One less than 5mm tubular adenomatous polyp in the cecum; One 5mm hyperplastic polyp  in the transverse; One less than 3mm hyperplastic polyp in the rectum; Diverticulosis; Repeat 3 years     COLONOSCOPY N/A 07/02/2020    Two 5-7mm tubular adenomatous polyps at the hepatic flexure; One 6mm tubular adenomatous polyp in the transverse colon; Diverticulosis in the left colon; The entire examined colon is normal on direct and retroflexion views; Repeat 5 years    COLONOSCOPY N/A 06/29/2022    Diverticulosis in the left colon; One 6mm tubular adenomatous polyp in the descending colon; One 4mm tubular adenomatous polyp in the descending colon; Congested mucosa in the entire examined colon-biopsied; Repeat 5 years    ENDOSCOPY N/A 03/27/2019    Medium-sized HH; Normal stomach; Normal examined duodenum-biopsied    ENDOSCOPY N/A 06/09/2022    Small HH; Non-erosive gastritis-biopsied; Normal examined duodenum-biopsied    ENDOSCOPY N/A 2/24/2025    Procedure: ESOPHAGOGASTRODUODENOSCOPY WITH PERCUTANEOUS ENDOSCOPIC GASTROSTOMY TUBE INSERTION;  Surgeon: Amie Donnelly MD;  Location: [facility] ENDOSCOPY;  Service: General;  Laterality: N/A;  pre op:   post op:  pcp:Diego Lucio MD    ERCP  07/30/2024    Dr. Buzz Dangelo-Choledocholithiasis status post balloon sweep extraction wtih balloon assisted sphincteroplasty; Placement of biliary stent as well as prophylactic pancreatic stent; Repeat ERCP 3 months for biliary stent removal    FOOT SURGERY Left     ORIF    HERNIA REPAIR      INNER EAR SURGERY      LARYNGOSCOPY N/A 1/20/2025    Procedure: DIRECT LARYNGOSCOPY WITH EXCISION OF LESION;  Surgeon: Antelmo Dangelo MD;  Location: [facility] OR;  Service: ENT;  Laterality: N/A;    MEDIASTINOSCOPY N/A 02/15/2021    Procedure: CERVICAL MEDIASTINOSCOPY WITH LYMPH NODE DISECTION;  Surgeon: Bruce Murillo MD;  Location: [facility] OR;  Service: Cardiothoracic;  Laterality: N/A;    PEG TUBE INSERTION N/A 2/24/2025    Procedure: PERCUTANEOUS ENDOSCOPIC GASTROSTOMY TUBE INSERTION;  Surgeon: Amie Donnelly MD;   Location:  [facility] ENDOSCOPY;  Service: General;  Laterality: N/A;  pre op:  post op:  pcp:Diego Lucio MD    THORACOSCOPY Right 02/15/2021    Procedure: THORACOSCOPY, WEDGE RESECTION OF RIGHT UPPER LOBE WITH FROZEN,  , MEDIASTINAL LYMPH NODE DISSECTION;  Surgeon: Bruce Murillo MD;  Location:  [facility] OR;  Service: Cardiothoracic;  Laterality: Right;    TONSILLECTOMY         Outpatient Medications Marked as Taking for the 5/8/25 encounter (Office Visit) with Antelmo Dangelo MD   Medication Sig Dispense Refill    albuterol sulfate  (90 Base) MCG/ACT inhaler Inhale 2 puffs Every 4 (Four) Hours As Needed for Wheezing or Shortness of Air. 18 g 3    aspirin 81 MG EC tablet Take 1 tablet by mouth Daily.      atorvastatin (LIPITOR) 10 MG tablet Take 1 tablet by mouth Daily.      Cholecalciferol (VITAMIN D3 EXTRA STRENGTH PO) Take 1 tablet by mouth Daily.      esomeprazole (nexIUM) 20 MG capsule Take 2 capsules by mouth 2 (Two) Times a Day.      Fluticasone-Umeclidin-Vilant (TRELEGY) 100-62.5-25 MCG/ACT inhaler Inhale 1 puff Daily. 60 each 3    vitamin C (ASCORBIC ACID) 250 MG tablet Take 1 tablet by mouth Daily.         Patient has no known allergies.    Family History   Problem Relation Age of Onset    Colon polyps Mother         Unknown age    Diabetes Mother     Colon cancer Neg Hx     Esophageal cancer Neg Hx     Liver cancer Neg Hx     Liver disease Neg Hx     Rectal cancer Neg Hx     Stomach cancer Neg Hx        Social History     Socioeconomic History    Marital status: Significant Other   Tobacco Use    Smoking status: Every Day     Current packs/day: 0.50     Average packs/day: 0.5 packs/day for 56.3 years (28.2 ttl pk-yrs)     Types: Cigarettes     Start date: 1/1/1969     Passive exposure: Current    Smokeless tobacco: Never    Tobacco comments:     Pt states about 7-8 cigarettes per day 7/3   Vaping Use    Vaping status: Never Used   Substance and Sexual Activity    Alcohol use: Not Currently      Alcohol/week: 3.0 standard drinks of alcohol     Types: 3 Shots of liquor per week     Comment: sober as of 06/01/2022    Drug use: Yes     Types: Marijuana     Comment: daily    Sexual activity: Defer       Review of Systems   Constitutional: Negative.    HENT: Negative.         Vitals:    05/08/25 1350   BP: 129/80   Pulse: 84       Body mass index is 22.53 kg/m².    Objective     Physical Exam  Vitals reviewed.   Constitutional:       Appearance: Normal appearance.   HENT:      Head: Normocephalic.      Right Ear: External ear normal.      Left Ear: External ear normal.      Ears:      Comments: Right hearing aid noted, removed for exam     Nose: Nose normal.      Mouth/Throat:      Lips: Pink.      Mouth: Mucous membranes are moist.      Dentition: Has dentures.      Pharynx: Oropharynx is clear.      Comments: See endoscopy  Musculoskeletal:      Cervical back: Full passive range of motion without pain.   Lymphadenopathy:      Cervical: No cervical adenopathy.   Neurological:      Mental Status: He is alert.   Psychiatric:         Behavior: Behavior is cooperative.         Assessment & Plan   Diagnoses and all orders for this visit:    1. Squamous cell carcinoma of oropharynx (Primary)    2. Dysphagia, unspecified type    3. Laryngopharyngeal reflux (LPR)    4. Lesion of epiglottis    5. Hoarseness      * Surgery not found *  No orders of the defined types were placed in this encounter.    Be aware of the signs and symptoms of recurrent head and neck cancer including neck mass, persistent or recurrent sore throat, ear pain, hemoptysis, weight loss and hoarseness. If any of these symptoms recur and or persist, call for an evaluation.      D/W patient increasing caloric intake and discussed cruciferous vegetables and protein shakes etc to maintain optimal nutrition.  The necessity of abstaining from tobacco products was also discussed particularly with respect to not smoking     Dr. Dangelo examined and  discussed care with patient and agrees with treatment plan.       Return in about 2 months (around 7/8/2025).       Patient Instructions   Be aware of the signs and symptoms of recurrent head and neck cancer including neck mass, persistent or recurrent sore throat, ear pain, hemoptysis, weight loss and hoarseness. If any of these symptoms recur and or persist, call for an evaluation.      D/W patient increasing caloric intake and discussed cruciferous vegetables and protein shakes etc to maintain optimal nutrition.  The necessity of abstaining from tobacco products was also discussed particularly with respect to not smoking

## 2025-05-09 ENCOUNTER — HOSPITAL ENCOUNTER (OUTPATIENT)
Dept: PHYSICAL THERAPY | Facility: HOSPITAL | Age: 75
Setting detail: THERAPIES SERIES
Discharge: HOME | End: 2025-05-09
Payer: MEDICARE

## 2025-05-09 DIAGNOSIS — C32.1 SQUAMOUS CELL CARCINOMA OF EPIGLOTTIS: ICD-10-CM

## 2025-05-09 DIAGNOSIS — Z91.89 AT RISK FOR LYMPHEDEMA: Primary | ICD-10-CM

## 2025-05-10 NOTE — PROGRESS NOTES
"[facility] HEMATOLOGY & ONCOLOGY  [address]  [facility] [address] [phone]    Patient Name: Se Ramirez  Encounter Date: 05/14/2025  YOB: 1950  Patient Number: [id number]    REASON FOR VISIT:   Se \"Jaime\" James is a 74 yoM who returns in follow-up of AJCC stage I (cT1, cN0 M0) invasive squamous cell carcinoma, supraglottic larynx.  He has received definitive RT beginning 3/6/25- 4/22/25 (26 fx).  He is here alone    Interval history: Admitted [facility], 3/26/25-3/29/25 for hypernatremia, ARF, severe protein-calorie malnutrition.  Resolved with IVFs and PEG feeding    I have reviewed the HPI and verified with the patient the accuracy of it. No changes to interval history since the information was documented. Kelton Macias MD 05/14/25      Diagnostic abnormalities:  Invasive squamous cell carcinoma, supraglottic larynx   - History includes nicotine dependence- current everyday smoker (28 pack years), COPD, and resected right upper lobe squamous cell carcinoma, who has been referred to our office for chemotherapy considerations for a recently diagnosed supraglottic head/neck carcinoma     04/24/2024 - CT Soft tissue neck with contrast:  Mild asymmetrical nodular prominence of the left aryepiglottic fold for which ENT consultation and visualization is recommended. No pathologic lymphadenopathy.  Moderate to advanced emphysema with postoperative changes right lung apex.     08/07/2024 - Appointment with Edmundo Infante, APRN:  The patient has had problems with globus sensation, sore throat, and hoarseness.   At the last office visit, Protonix was increased to twice daily before breakfast and dinner.    He states that he is still with intermittent hoarseness.    He has had a CT neck in the past that was correlated in office with direct visualization-this was normal.   Plan:  Return in about 5 months (around 1/7/2025) " for Recheck.     12/27/2024 - Appointment with Edmundo Infante APRN:  Presents for evaluation of persistent hoarseness.   Plan:  Persistent hoarseness.  He continues to experience hoarseness, which he had hoped would improve following his recent cholecystectomy, but it has not.   He has reduced his smoking habit to approximately three-quarters of a pack per day.   He is currently on a regimen of Nexium, taking two tablets once daily in the morning before meals. He reports no current symptoms of heartburn.   He also reports difficulty swallowing, with occasional instances of food impaction. He has not undergone a swallow study.   A CT scan of his neck has not been performed.   He is scheduled for a CT scan of his lungs next week at [facility].   A CT scan of the neck will be ordered to be obtained concurrently with the scheduled CT chest. A swallow study will also be conducted.   The dosage of Nexium will be increased to one tablet twice daily, to be taken before breakfast and dinner. It is recommended to eliminate milk and dairy from diet.  Return in about 3 months (around 3/27/2025) for Recheck, CT.     12/30/2024 - CT soft tissue neck with contrast:  Abnormal appearance and contour of the epiglottis left of midline with CT concerning for a mucosal space lesion arising along its laryngeal surface, possibly ulcerated and measuring up to 1.5 cm in greatest axial diameter.   No suspicious adenopathy by size criteria.     01/02/2025 - Appointment with :  Direct Laryngoscopy with biopsy recommended.      01/02/2025 - Flexible Fiberoptic Laryngoscopy per :  FINDINGS:  Mucosal surfaces:   The mucosal surfaces demonstrated normal mucosa surfaces with moderately severe inflammation  Base of tongue:  The base of tongue was found to have no mass or lesion.  Epiglottis:  The epiglottis was found to have no mass or lesion.  The vallecula is clear but the laryngeal surface of the epiglottis does not have an obvious  lesion discernible.  Aryepiglottic fold:  The AE folds were found to have no mass or lesion no moderate erythema is noted throughout.  False Vocal Fold:  The false cords were found to have no mass or lesion.  True Vocal Cord:  The true vocal cords were found to have no mass or lesion. Both true vocal cords adduct and abduct normally.  There is significant erythema in the posterior one third of both true vocal folds without mass identifiable.  Arytenoid:   The arytenoids were found to have Moderately severe interarytenoid edema with erythema.  Hypopharynx:  The hypopharynx was found to have no mass or lesion.    01/13/2025 - Labs:  CMP normal -creatinine 0.86; GFR 90.3.  Hgb 14.8 otherwise normal CBC     01/20/2025 - Laryngeal surface of epiglottis, biopsies per :  Invasive keratinizing squamous cell carcinoma.     01/27/2025 - Documentation per  clinic:  Will need pet scan and referral to oncology and radiation      01/30/2025 - Consult per Dr. 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 He clinically has no grossly identifiable adenopathy. Completion workup with a PET/CT is pending. We have discussed the indications and rationale of radiation therapy according to the NCCN Guidelines for supraglottic carcinomas.  I reviewed the options of surgical extirpation that would require laryngectomy versus laryngeal conservation/organ preservation approach with chemoradiation.   We discussed treatment options per the national guidelines could include surgical resection, a definitive course of radiation therapy, with or without concurrent chemotherapy. Prior to treatment, we would recommend dental clearance, nutrition, speech and swallowing evaluations. Long-term salivary gland dysfunction is likely and will exacerbate future dental care problems.  Will order referrals to oncology dietician  and speech pathologist for care and management during radiation course.  We also will refer the patient to surgery for prophylactic PEG tube placement for supportive enteral nutrition during treatment with its expected level of pharyngeal and esophagus toxicity In consideration of diagnostic data and evaluation of the patient,  I recommend a definitive course of chemoradiation therapy, anticipate a total tumor dose of approximately 3215-3311 cGy over 30-35 fractions. Will coordinate care with medical oncology for delivery of chemotherapy per their direction.    02/04/2025 - PET scan: 1.  Hypermetabolic soft tissue mass in the left larynx/epiglottis, in keeping with biopsy-proven primary squamous cell carcinoma.2.  No evidence of hypermetabolic metastatic disease in the neck, chest, abdomen, or pelvis. 3.  Postoperative change of right upper lobe wedge resection without evidence of recurrent or metastatic disease. 4.  Postoperative change of left nephrectomy without suspicious nodule or metabolic activity in the nephrectomy bed. 5.  3 mm nonobstructing right upper pole renal calculus.    02/12/2025 - Seen in chemotherapy consideration:     Previous interventions: Invasive squamous cell carcinoma, supraglottic larynx  -Definitive radiation    ----------------------------------------------------------------    Diagnostic abnormalities/previous interventions:  History of Right Lung Cancer  12/29/2020 - PET Scan:  Solitary hypermetabolic right upper lobe pulmonary nodule which is concerning for primary lung malignancy.   No scintigraphic evidence for hilar or mediastinal paris metastasis or distant metastatic disease.   Solitary kidney.      02/15/2021 - Wedge resection and lymph node excision per :  Lung, right upper lobe, wedge resection:  Invasive keratinizing squamous cell carcinoma.  Maximum tumor diameter is 3.2 cm.  The surgical margins are free of tumor (0.3 cm).  Please see CAP synoptic.  Level 7 lymph  node, excision:   1 benign lymph node.  Level 4L lymph node, excision:  1 benign lymph node.  Level 4R lymph node, excision:  1 benign lymph node.  Level 2R lymph node, excision:  1 benign lymph node.     Synoptic Checklist   LUNG   8th Edition - Protocol posted: 2/26/2020LUNG: RESECTION - All Specimens       SPECIMEN   Procedure   Wedge resection   Specimen Laterality   Right   TUMOR   Tumor Site   Upper lobe of lung   Histologic Type   Invasive squamous cell carcinoma, keratinizing   Histologic Grade   G2: Moderately differentiated   Tumor Size       Total Tumor Size (size of entire tumor)   Greatest Dimension (Centimeters): 3.2 cm   Additional Dimension (Centimeters)   2.6 cm       1.3 cm   Tumor Focality   Single focus   Visceral Pleura Invasion   Not identified   Direct Invasion of Adjacent Structures   No adjacent structures present   Treatment Effect   No known presurgical therapy   Lymphovascular Invasion   Not identified   MARGINS   Margins   All margins are uninvolved by tumor   Margins Examined   Parenchymal   Distance of Invasive Carcinoma from Closest Margin (Centimeters)   0.3 cm   Closest Margin   Parenchymal   LYMPH NODES   Number of Lymph Nodes Involved   0   Number of Lymph Nodes Examined   4   Juan David Stations Examined   2R: Upper paratracheal       4R: Lower paratracheal       7: Subcarinal       4L: Lower paratracheal   PATHOLOGIC STAGE CLASSIFICATION (pTNM, AJCC 8th Edition)   Primary Tumor (pT)   pT2a   Regional Lymph Nodes (pN)   pN0   ADDITIONAL FINDINGS   Additional Findings   Emphysema   .      05/21/2021 - CT Chest with contrast:  Since the previous study the patient's undergone wedge resection for a primary neoplasm of the lung within the anterior segment of the right upper lobe. No evidence of localized recurrence. No new lung lesions are present.  Stable subcarinal right hilar lymph nodes. These are enlarged by CT criteria but demonstrate no significant change from previous study of  12/10/2020. No new mediastinal lymphadenopathy is present. There is evidence of remote granulomatous disease.  Stable subtle hypodense lesion within the left lobe of the liver. There is steatosis of the liver. No new lesions are identified within the liver in those portions which are imaged.  Heavy coronary calcifications are present      11/24/2021 - CT chest with contrast:  Treatment-related changes in the right apex are stable. There is a mild right hilar and mediastinal adenopathy which is also stable and nonspecific. There are some granulomatous calcifications identified in these areas in the right hilum and mediastinum, perhaps old granulomatous inflammation.  5 mm left lower lobe pulmonary nodule slightly larger, previously measuring 4 mm.  Lung emphysema.  Advanced coronary atheromatous calcification.     12/07/2022 - CT Chest with contrast:  Multiple new small pulmonary nodules measuring up to 5 mm. These may be infectious or inflammatory in nature but short interval follow-up in 2-3 months is recommended to confirm stability or resolution, given history of lung cancer.  Stable posttreatment change in the RIGHT upper lobe.  No change in mild RIGHT hilar lymphadenopathy.  Moderate emphysema.     03/07/2023 - CT Chest with contrast:  Postoperative change of RIGHT upper lobe wedge resection without definite evidence of recurrent or metastatic disease.  Waxing and waning of sub-6 mm pulmonary nodules. Several pulmonary nodules present on the prior study have now resolved. However, multiple new 3 to 4 mm pulmonary nodules have developed. Favor an infectious or inflammatory process but recommend continued imaging surveillance.  Moderate emphysema.     06/14/2023 - CT Chest with contrast:  No lung mass is identified, however there are several new nodules within both lungs, none of the new nodules measures more than 6.1 mm. Some the pre-existing nodules have slightly increased in size. This may be related to an  ongoing infectious or inflammatory etiology rather than small pulmonary metastases, consider repeat chest CT in 3 months. None of the pulmonary nodules is large enough to attempt percutaneous biopsy. No evidence of intrathoracic lymphadenopathy. There are advanced emphysematous changes as before. Postsurgical changes noted in the right upper lobe.     09/20/2023 - CT Chest with contrast:  Several of the small nodules in the posterior lower lobes have decreased in size or are resolved, compatible with resolved/resolving infectious/inflammatory process. Additional sub-6 mm pulmonary nodules are unchanged in size. No new suspicious pulmonary nodule.  Status post right upper lobe wedge resection without evidence of recurrent disease or convincing evidence of metastatic disease.     03/25/2024 - CT Chest with contrast:  A tiny nodule, 4 mm in maximum dimension, in the right lower lobe, was not seen in the previous study. A follow-up examination in 6 months is recommended to ensure stability or resolution.  The remaining nodules are either stable or have resolved or decreased in size.  Nonspecific prominent right hilar lymph nodes. Etiology and clinical significance is not certain.  Chronic emphysematous lung changes.  The postsurgical/postprocedural changes in the right upper lobe.     06/26/2024 - CT Chest with contrast:  Postoperative change of right upper lobe wedge resection without definite evidence of recurrent or metastatic disease in the chest.  New indeterminate 4 mm right upper lobe and 3 mm right lower lobe pulmonary nodules. Recommend a follow-up chest CT in 6 months to confirm stability or resolution and exclude neoplastic potential.  Bilateral hilar lymphadenopathy, similar to multiple prior studies dating back to 5/21/2021. Given stability, suspect these are related to an indolent/chronic infectious or inflammatory process.  Intrahepatic and extrahepatic biliary ductal dilatation extending down to the  ampulla where a 10 mm filling defect is present, likely representing choledocholithiasis. Recommend GI referral for ERCP.     12/30/2024 - CT Chest with contrast:  No evidence of disease recurrence or progression.  Interval resolution of previously described new pulmonary nodules on the prior exam 6/26/2024.  Similar borderline hilar lymph nodes, not optimally evaluated on this noncontrast exam.  Interval cholecystectomy with resolution of bile duct di     01/08/2025 - Appointment with Mallory Bryan APRN:  Stage 1 mild COPD by GOLD classification (Primary)  1/8/25 Reviewed PFT which although remains mildly obstructive he has had a drop in his FEV1 from 94 to 86% for predicted.  He also has had a drop in his diffusion capacity when corrected for alveolar volume from 54 to 46% predicted.  He was given Spiriva at his last visit however his primary care physician is keeping him with samples of Trelegy for which he finds benefit.  He is encouraged to continue this.  Continue as needed albuterol HFA.  Continue to monitor periodic flow-volume loop and diffusion capacity.  Multiple lung nodules  Several have resolved on recent CT imaging he does have a 3 mm left lower lobe nodule that is stable.  He has been getting routine imaging with his history of lung cancer.  Follow Up   Return in about 4 months (around 5/8/2025).     01/08/2025 - Pulmonary Function Tests:  Spirometry with Diffusion Capacity  Pre Drug % Predicted   FVC: 108%  FEV1: 86%  FEF 25-75%: 45%  FEV1/FVC: 60%  DLCO: 49%  D/VAsb: 46%     Interpretation Spirometry   Spirometry shows mild obstruction. There is reduced midflow suggesting small airway/airflow obstruction.   Review of FVL curve   Patient's effort is normal.   Diffusion Capacity  The patient's diffusion capacity is moderately reduced.  Diffusion capacity is moderately reduced when corrected for alveolar volume.   Overall comments: To June 2023 FEV1 has decreased from 94 to 86% predicted, FVC  has decreased from 120 to 108% predicted.  Diffusion capacity when corrected for alveolar volume remains moderately decreased however has dropped from 54 to 46% predicted.     ----------------------------------------------------------------     LABS    Lab Results - Last 18 Months   Lab Units 05/14/25  1310 03/26/25  1432 01/13/25  0850 08/29/24  0802   HEMOGLOBIN g/dL 13.5 17.4 14.8 16.0   HEMATOCRIT % 41.9 55.8* 45.4 49.7   MCV fL 96.5 98.6* 95.0 98.6*   WBC 10*3/mm3 5.81 19.31* 8.01 7.8   RDW % 13.5 12.9 11.9* 12.2   MPV fL 10.4 11.9 11.3 11.4   PLATELETS 10*3/mm3 176 295 180 160   IMM GRAN % %  --   --  0.4  --    NEUTROS ABS 10*3/mm3  --  17.73* 4.82 4.8   LYMPHS ABS 10*3/mm3  --   --  2.27 2.1   MONOS ABS 10*3/mm3  --   --  0.56 0.50   EOS ABS 10*3/mm3  --  0.19 0.23 0.30   BASOS ABS 10*3/mm3  --  0.00 0.10 0.10   IMMATURE GRANS (ABS) 10*3/mm3  --   --  0.03 0.0   NRBC /100 WBC 0.0  --  0.0  --    NEUTROPHIL % %  --  91.8*  --   --    MONOCYTES % %  --  2.1*  --   --    BASOPHIL % %  --  0.0  --   --    ANISOCYTOSIS   --  Slight/1+  --   --        Lab Results - Last 18 Months   Lab Units 05/14/25  1310 03/29/25  0449 03/28/25  0313 03/27/25  0508 03/26/25  1537 01/13/25  0850 12/30/24  0942 08/29/24  0802 07/09/24  1549   GLUCOSE mg/dL 103* 130* 115* 156* 141* 92  --  80 100   SODIUM mmol/L 140 143 149* 162* 160* 141  --  142 137   POTASSIUM mmol/L 3.9 4.1 4.1 4.8 5.2 4.7  --  4.5 4.3   TOTAL CO2 mmol/L  --   --   --   --   --   --   --  27 26   CO2 mmol/L 25.0 26.0 25.0 28.0 24.0 29.0  --   --   --    CHLORIDE mmol/L 104 107 112* 120* 117* 104  --  103 98   ANION GAP mmol/L 11.0 10.0 12.0 14.0 19.0* 8.0  --  12 13   CREATININE mg/dL 0.60* 0.82 1.31* 2.75* 3.99* 0.86   < > 0.7 0.7   BUN mg/dL 12 33* 87* 148* 165* 17  --  16 18   BUN / CREAT RATIO  20.0 40.2* 66.4* 53.8* 41.4* 19.8  --   --   --    CALCIUM mg/dL 9.0 9.1 9.9 10.4 10.5 9.4  --  9.7 9.5   ALK PHOS U/L 55  --   --  89 93 82  --  93 433*   TOTAL  PROTEIN g/dL 6.8  --   --  8.6* 8.7* 7.3  --  7.1 7.7   ALT (SGPT) U/L 15  --   --  63* 58* 16  --  13 105*   AST (SGOT) U/L 17  --   --  58* 53* 19  --  21 58*   BILIRUBIN mg/dL 0.4  --   --  0.6 0.6 0.4  --  0.8 0.9   ALBUMIN g/dL 4.0  --   --  3.8 3.9 4.4  --  4.4 4.2   GLOBULIN gm/dL 2.8  --   --  4.8 4.8 2.9  --   --   --     < > = values in this interval not displayed.       PAST MEDICAL HISTORY:  ALLERGIES:  No Known Allergies  CURRENT MEDICATIONS:  Outpatient Encounter Medications as of 5/14/2025   Medication Sig Dispense Refill    albuterol sulfate  (90 Base) MCG/ACT inhaler Inhale 2 puffs Every 4 (Four) Hours As Needed for Wheezing or Shortness of Air. 18 g 3    aspirin 81 MG EC tablet Take 1 tablet by mouth Daily.      atorvastatin (LIPITOR) 10 MG tablet Take 1 tablet by mouth Daily.      Cholecalciferol (VITAMIN D3 EXTRA STRENGTH PO) Take 1 tablet by mouth Daily.      esomeprazole (nexIUM) 20 MG capsule Take 2 capsules by mouth 2 (Two) Times a Day.      Fluticasone-Umeclidin-Vilant (TRELEGY) 100-62.5-25 MCG/ACT inhaler Inhale 1 puff Daily. 60 each 3    vitamin C (ASCORBIC ACID) 250 MG tablet Take 1 tablet by mouth Daily.       No facility-administered encounter medications on file as of 5/14/2025.     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    Tobacco user Z72.0    Right upper lobe pulmonary nodule R91.1    Malignant neoplasm of upper lobe of right lung C34.11    Overweight E66.3    Hilar adenopathy R59.0    Mediastinal adenopathy R59.0    Pre-procedure lab exam Z01.812    Pulmonary emphysema  J43.9    Esophageal dysphagia R13.19    Multiple lung nodules R91.8    Personal history of nicotine dependence Z87.891    Elevated liver function tests R79.89    Dysphagia R13.10    Hoarseness R49.0    Current every day smoker F17.200    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    Oral pharyngeal candidiasis B37.0    Acute renal failure N17.9    Hypernatremia E87.0    Severe protein-calorie malnutrition E43     SURGERIES:  Past Surgical History:   Procedure Laterality Date    BRONCHOSCOPY N/A 02/15/2021    Procedure: BRONCHOSCOPY;  Surgeon: Bruce Murillo MD;  Location: [facility] OR;  Service: Cardiothoracic;  Laterality: N/A;    CATARACT EXTRACTION      CHOLECYSTECTOMY      COLONOSCOPY  11/20/2012    One 1cm tubular adenomatous polyp in the sigmoid colon; One 5mm hyperplastic polyp in the rectum; Diverticulosis; Repeat 4 years     COLONOSCOPY N/A 03/30/2017    Two 4-6mm tubular adenomatous polyps at the hepatic flexure; One 5mm tubular adenomatous polyp in the transverse colon; One 11mm tubular adenomatous polyp in the sigmoid colon; One 6mm tubular adenomatous polyp in the rectum; Diverticulosis in the left colon; Repeat 3 years     COLONOSCOPY  12/17/2009    One less than 5mm tubular adenomatous polyp in the cecum; One 5mm hyperplastic polyp in the transverse; One less than 3mm hyperplastic polyp in the rectum; Diverticulosis; Repeat 3 years     COLONOSCOPY N/A 07/02/2020    Two 5-7mm tubular adenomatous polyps at the hepatic flexure; One 6mm tubular adenomatous polyp in the transverse colon; Diverticulosis in the left colon; The entire examined colon is normal on direct and retroflexion views; Repeat 5 years    COLONOSCOPY N/A 06/29/2022    Diverticulosis in the left colon; One 6mm tubular adenomatous polyp in the descending colon; One 4mm tubular adenomatous polyp in the descending colon; Congested mucosa in the entire examined colon-biopsied; Repeat 5 years    ENDOSCOPY N/A 03/27/2019    Medium-sized HH;  Normal stomach; Normal examined duodenum-biopsied    ENDOSCOPY N/A 06/09/2022    Small HH; Non-erosive gastritis-biopsied; Normal examined duodenum-biopsied    ENDOSCOPY N/A 2/24/2025    Procedure: ESOPHAGOGASTRODUODENOSCOPY WITH PERCUTANEOUS ENDOSCOPIC GASTROSTOMY TUBE INSERTION;  Surgeon: Amie Donnelly MD;  Location: [facility] ENDOSCOPY;  Service: General;  Laterality: N/A;  pre op:   post op:  pcp:Diego Lucio MD    ERCP  07/30/2024    Dr. Buzz Dangelo-Choledocholithiasis status post balloon sweep extraction wtih balloon assisted sphincteroplasty; Placement of biliary stent as well as prophylactic pancreatic stent; Repeat ERCP 3 months for biliary stent removal    FOOT SURGERY Left     ORIF    HERNIA REPAIR      INNER EAR SURGERY      LARYNGOSCOPY N/A 1/20/2025    Procedure: DIRECT LARYNGOSCOPY WITH EXCISION OF LESION;  Surgeon: Antelmo Dangelo MD;  Location: [facility] OR;  Service: ENT;  Laterality: N/A;    MEDIASTINOSCOPY N/A 02/15/2021    Procedure: CERVICAL MEDIASTINOSCOPY WITH LYMPH NODE DISECTION;  Surgeon: Bruce Murillo MD;  Location: [facility] OR;  Service: Cardiothoracic;  Laterality: N/A;    PEG TUBE INSERTION N/A 2/24/2025    Procedure: PERCUTANEOUS ENDOSCOPIC GASTROSTOMY TUBE INSERTION;  Surgeon: Amie Donnelly MD;  Location: [facility] ENDOSCOPY;  Service: General;  Laterality: N/A;  pre op:  post op:  pcp:Diego Lucio MD    THORACOSCOPY Right 02/15/2021    Procedure: THORACOSCOPY, WEDGE RESECTION OF RIGHT UPPER LOBE WITH FROZEN,  , MEDIASTINAL LYMPH NODE DISSECTION;  Surgeon: Bruce Murillo MD;  Location: [facility] OR;  Service: Cardiothoracic;  Laterality: Right;    TONSILLECTOMY       HEALTH MAINTENANCE ITEMS:  Health Maintenance Due   Topic Date Due    LIPID PANEL  Never done    TDAP/TD VACCINES (1 - Tdap) Never done    ZOSTER VACCINE (1 of 2) Never done    Hepatitis B (1 of 3 - Risk 3-dose series) Never done    RSV Vaccine - Adults (1 - 1-dose 75+ series) Never done     "COVID-19 Vaccine (4 - 2024-25 season) 09/01/2024       <no information>  Last Completed Colonoscopy            Upcoming       COLORECTAL CANCER SCREENING (COLONOSCOPY - Every 5 Years) Next due on 6/29/2027 06/29/2022  COLONOSCOPY    06/29/2022  Surgical Procedure: COLONOSCOPY    07/02/2020  Surgical Procedure: COLONOSCOPY    07/02/2020  COLONOSCOPY    03/30/2017  Surgical Procedure: COLONOSCOPY     Only the first 5 history entries have been loaded, but more history exists.                        Immunization History   Administered Date(s) Administered    COVID-19 (MODERNA) 1st,2nd,3rd Dose Monovalent 03/24/2021, 04/21/2021, 11/10/2021     Last Completed Mammogram    This patient has no relevant Health Maintenance data.           FAMILY HISTORY:  Family History   Problem Relation Age of Onset    Colon polyps Mother         Unknown age    Diabetes Mother     Colon cancer Neg Hx     Esophageal cancer Neg Hx     Liver cancer Neg Hx     Liver disease Neg Hx     Rectal cancer Neg Hx     Stomach cancer Neg Hx      SOCIAL HISTORY:  Social History     Socioeconomic History    Marital status: Significant Other   Tobacco Use    Smoking status: Every Day     Current packs/day: 0.50     Average packs/day: 0.5 packs/day for 56.4 years (28.2 ttl pk-yrs)     Types: Cigarettes     Start date: 1/1/1969     Passive exposure: Current    Smokeless tobacco: Never    Tobacco comments:     Pt states about 7-8 cigarettes per day 7/3   Vaping Use    Vaping status: Never Used   Substance and Sexual Activity    Alcohol use: Not Currently     Alcohol/week: 3.0 standard drinks of alcohol     Types: 3 Shots of liquor per week     Comment: sober as of 06/01/2022    Drug use: Yes     Types: Marijuana     Comment: daily    Sexual activity: Defer       REVIEW OF SYSTEMS:  Review of Systems   Constitutional:  Positive for appetite change (Improved.  He is now eating regular diet at least \"3 meals a day\".  No longer uses PEG tube for nutrition) " "and fatigue (Chronic). Negative for activity change and unexpected weight loss.        Manages his ADLs including chores, errands, driving. He is up and about \"all the time\"   HENT:  Positive for hearing loss. Negative for dental problem, sore throat, trouble swallowing (Sent she is able to swallow most foods.  Has no problem with liquids.) and voice change (Improved hoarseness).         Dry mouth post radiation   Eyes: Negative.    Respiratory:  Positive for cough (Chronic.  Sometimes productive of yellow phlegm), shortness of breath (Baseline BERNSTEIN but no SOB with routine activities) and wheezing.         Current cigarette smoker-age 17 present.  1/2 pack/day   Cardiovascular: Negative.    Gastrointestinal:  Negative for diarrhea, nausea and vomiting.        Still has his PEG tube that he hopes will be removed next month when Dr. Johnson sees him for follow-up   Endocrine: Negative.    Genitourinary:  Positive for nocturia.   Musculoskeletal: Negative.    Skin: Negative.    Allergic/Immunologic: Negative.    Neurological: Negative.  Negative for weakness and headache.   Hematological: Negative.    Psychiatric/Behavioral: Negative.         /76   Pulse 80   Temp 98 °F (36.7 °C) (Temporal)   Resp 18   Ht 177.8 cm (70\")   Wt 72.2 kg (159 lb 1.6 oz)   SpO2 95%   BMI 22.83 kg/m²  Body surface area is 1.89 meters squared.  Pain Score    05/14/25 1338   PainSc: 0-No pain     Physical Exam  Vitals and nursing note reviewed.   Constitutional:       Comments: Pleasant, cooperative, heavyset, modestly kept elderly male.  Ambulatory.  ECOG 1    Has lost 27 lb since the last visit   HENT:      Head: Normocephalic and atraumatic.      Mouth/Throat:      Mouth: Mucous membranes are moist.      Comments:  Head: Normocephalic.      Mouth/Throat:      Mouth: Mucous membranes are somewhat dry.      Dentition: Has dentures.      Tongue: No lesions. Tongue does not deviate from midline.      Pharynx: Oropharynx is not entirely " visualized but grossly appears clear.  Unable to see the epiglottis     Comments: Voice is less raspy.    Eyes:      General: No scleral icterus.     Extraocular Movements: Extraocular movements intact.      Pupils: Pupils are equal, round, and reactive to light.   Cardiovascular:      Rate and Rhythm: Normal rate.   Pulmonary:      Effort: Pulmonary effort is normal.   Abdominal:      Palpations: Abdomen is soft.      Tenderness: There is no abdominal tenderness.      Comments: PEG tube site is clean and device is well seated   Musculoskeletal:         General: Normal range of motion.      Cervical back: Normal range of motion.   Lymphadenopathy:      Cervical: No cervical adenopathy.      Right cervical: No superficial, deep or posterior cervical adenopathy.     Left cervical: No superficial, deep or posterior cervical adenopathy.      Upper Body:      Right upper body: No supraclavicular or axillary adenopathy.      Left upper body: No supraclavicular or axillary adenopathy.   Skin:     General: Skin is warm.   Neurological:      General: No focal deficit present.      Mental Status: He is alert and oriented to person, place, and time.   Psychiatric:         Mood and Affect: Mood normal.         Behavior: Behavior normal.         Thought Content: Thought content normal.       ASSESSMENT:   1.   Admitted [facility], 3/26/25-3/29/25 for hypernatremia, ARF, severe protein-calorie malnutrition.  Resolved with IVFs and PEG feeding  2.  Invasive squamous cell carcinoma, supraglottic larynx:   Original tumor stage: AJCC I (cT1, cN0 M0).   Original tumor burden: .    04/24/2024 - CT Soft tissue neck with contrast:  Mild asymmetrical nodular prominence of the left aryepiglottic fold for which ENT consultation and visualization is recommended. No pathologic lymphadenopathy.  Moderate to advanced emphysema with postoperative changes right lung apex.        12/30/2024 - CT soft tissue neck with contrast:  Abnormal appearance and  contour of the epiglottis left of midline with CT concerning for a mucosal space lesion arising along its laryngeal surface, possibly ulcerated and measuring up to 1.5 cm in greatest axial diameter.   No suspicious adenopathy by size criteria.        01/02/2025 - Flexible Fiberoptic Laryngoscopy per :  FINDINGS:  Mucosal surfaces:   The mucosal surfaces demonstrated normal mucosa surfaces with moderately severe inflammation  Base of tongue:  The base of tongue was found to have no mass or lesion.  Epiglottis:  The epiglottis was found to have no mass or lesion.  The vallecula is clear but the laryngeal surface of the epiglottis does not have an obvious lesion discernible.  Aryepiglottic fold:  The AE folds were found to have no mass or lesion no moderate erythema is noted throughout.  False Vocal Fold:  The false cords were found to have no mass or lesion.  True Vocal Cord:  The true vocal cords were found to have no mass or lesion. Both true vocal cords adduct and abduct normally.  There is significant erythema in the posterior one third of both true vocal folds without mass identifiable.  Arytenoid:   The arytenoids were found to have Moderately severe interarytenoid edema with erythema.  Hypopharynx:  The hypopharynx was found to have no mass or lesion.    01/13/2025 - Labs:  CMP normal -creatinine 0.86; GFR 90.3.  Hgb 14.8 otherwise normal CBC     01/20/2025 - Laryngeal surface of epiglottis, biopsies per :  Invasive keratinizing squamous cell carcinoma.     01/27/2025 - Documentation per  clinic:  Will need pet scan and referral to oncology and radiation      01/30/2025 - Consult per Dr. 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 He clinically has no grossly identifiable adenopathy. Completion workup with a PET/CT is pending. We have discussed the indications and  rationale of radiation therapy according to the NCCN Guidelines for supraglottic carcinomas.  I reviewed the options of surgical extirpation that would require laryngectomy versus laryngeal conservation/organ preservation approach with chemoradiation.   We discussed treatment options per the national guidelines could include surgical resection, a definitive course of radiation therapy, with or without concurrent chemotherapy. Prior to treatment, we would recommend dental clearance, nutrition, speech and swallowing evaluations. Long-term salivary gland dysfunction is likely and will exacerbate future dental care problems.  Will order referrals to oncology dietician and speech pathologist for care and management during radiation course.  We also will refer the patient to surgery for prophylactic PEG tube placement for supportive enteral nutrition during treatment with its expected level of pharyngeal and esophagus toxicity In consideration of diagnostic data and evaluation of the patient,  I recommend a definitive course of chemoradiation therapy, anticipate a total tumor dose of approximately 3070-3234 cGy over 30-35 fractions. Will coordinate care with medical oncology for delivery of chemotherapy per their direction.    02/04/2025 - PET scan: 1.  Hypermetabolic soft tissue mass in the left larynx/epiglottis, in keeping with biopsy-proven primary squamous cell carcinoma.2.  No evidence of hypermetabolic metastatic disease in the neck, chest, abdomen, or pelvis. 3.  Postoperative change of right upper lobe wedge resection without evidence of recurrent or metastatic disease. 4.  Postoperative change of left nephrectomy without suspicious nodule or metabolic activity in the nephrectomy bed. 5.  3 mm nonobstructing right upper pole renal calculus.      Complications of tumor: Hoarseness, dysphagia  Tumor status:   Definitive RT beginning 3/6/25- 4/22/25 (26 fx).      3.  Longstanding tobacco smoker (28 pack years)  4.   COPD  5.  History of Right Lung Cancer:  Original tumor stage:  IB (pT2a,pN0M0)  Original tunor burden:  -12/29/2020 - PET Scan:  Solitary hypermetabolic right upper lobe pulmonary nodule which is concerning for primary lung malignancy.   No scintigraphic evidence for hilar or mediastinal paris metastasis or distant metastatic disease.   Solitary kidney.      Tumor status:  -02/15/2021 - Wedge resection and lymph node excision per :  Lung, right upper lobe, wedge resection:  Invasive keratinizing squamous cell carcinoma.  Maximum tumor diameter is 3.2 cm.  The surgical margins are free of tumor (0.3 cm).  Please see CAP synoptic.  Level 7 lymph node, excision:   1 benign lymph node.  Level 4L lymph node, excision:  1 benign lymph node.  Level 4R lymph node, excision:   1 benign lymph node.  Level 2R lymph node, excision:  1 benign lymph node.                    -02/04/2025- PET scan: 1.  Hypermetabolic soft tissue mass in the left larynx/epiglottis, in keeping with biopsy-proven primary squamous cell carcinoma.2.  No evidence of hypermetabolic metastatic disease in the neck, chest, abdomen, or pelvis. 3.  Postoperative change of right upper lobe wedge resection without evidence of recurrent or metastatic disease    RECOMMENDATIONS:   1.  Review hospital admission [facility], 3/26/25-3/29/25 for hypernatremia, ARF, severe protein-calorie malnutrition.  Resolved with IVFs and PEG feeding  2.  Review labs, 5/14/2025-sodium 140 otherwise normal CMP, Hgb 13.5 otherwise normal CBC  3.  Previously reviewed the available diagnostic information. Note the labs (normal CBC, normal CMP, clinical (laryngoscopic findings), pathology from biopsies of larynx (SCC confined to 1 subsite) and the radiographic (CT/PET scan) findings (above). Hypermetabolic soft tissue mass in the left larynx/epiglottis, in keeping with biopsy-proven primary squamous cell carcinoma.  No evidence of hypermetabolic metastatic disease in the neck, chest,  abdomen, or pelvis.   4.  Review NCCN guidelines version 2.2025 cancer of the supraglottic larynx with T1-2, N0 disease amenable to larynx preserving (conservation) surgery/: Treatment of primary and neck--endoscopic resection+ neck dissection, or endoscopic or open partial laryngectomy+ neck dissection or definitive RT--post RT neck yicfnmnmsy-6-7 weeks clinical assessment as appropriate.  If response--PET scan negative-observation/follow-up: H&P (including complete H&N exam and mirror and fiberoptic exam).  Year 1, every 1-3 months; year 2, every 2-6 months; years 3-5 every 4-8 months;> 5 years, every 12 months.  TSH every 6-12 months of neck irradiated.    5.  Follow-up with radiation oncology as scheduled- Defer surveillance imaging to them  6.  Return to the [place] office with CMP and CBC with differential in 12 weeks.          7.  Importance of Smoking Cessation discussed with patient and informed patient additional information will be on today's [facility]'s Flyer - Plan to Be Tobacco Free handout provided to patient         PQRS:   QUALITY MEASURES:   MEDICAL DECISION MAKING: High Complexity   AMOUNT OF DATA: Extensive   RISK OF COMPLICATIONS: Moderate     I spent ~51 minutes caring for Se on this date of service. This time includes time spent by me in the following activities: preparing for the visit, reviewing tests, performing a medically appropriate examination and/or evaluation, counseling and educating the patient/family/caregiver, ordering medications, tests, or procedures and documenting information in the medical record

## 2025-05-14 ENCOUNTER — LAB (OUTPATIENT)
Dept: LAB | Facility: HOSPITAL | Age: 75
End: 2025-05-14
Payer: MEDICARE

## 2025-05-14 ENCOUNTER — OFFICE VISIT (OUTPATIENT)
Dept: ONCOLOGY | Facility: CLINIC | Age: 75
End: 2025-05-14
Payer: MEDICARE

## 2025-05-14 VITALS
TEMPERATURE: 98 F | OXYGEN SATURATION: 95 % | WEIGHT: 159.1 LBS | HEIGHT: 70 IN | BODY MASS INDEX: 22.78 KG/M2 | HEART RATE: 80 BPM | SYSTOLIC BLOOD PRESSURE: 102 MMHG | RESPIRATION RATE: 18 BRPM | DIASTOLIC BLOOD PRESSURE: 76 MMHG

## 2025-05-14 DIAGNOSIS — C32.1 SQUAMOUS CELL CARCINOMA OF EPIGLOTTIS: ICD-10-CM

## 2025-05-14 DIAGNOSIS — C34.11 MALIGNANT NEOPLASM OF UPPER LOBE OF RIGHT LUNG: Primary | ICD-10-CM

## 2025-05-14 LAB
ALBUMIN SERPL-MCNC: 4 G/DL (ref 3.5–5.2)
ALBUMIN/GLOB SERPL: 1.4 G/DL
ALP SERPL-CCNC: 55 U/L (ref 39–117)
ALT SERPL W P-5'-P-CCNC: 15 U/L (ref 1–41)
ANION GAP SERPL CALCULATED.3IONS-SCNC: 11 MMOL/L (ref 5–15)
AST SERPL-CCNC: 17 U/L (ref 1–40)
BASOPHILS # BLD MANUAL: 0 10*3/MM3 (ref 0–0.2)
BASOPHILS NFR BLD MANUAL: 0 % (ref 0–1.5)
BILIRUB SERPL-MCNC: 0.4 MG/DL (ref 0–1.2)
BUN SERPL-MCNC: 12 MG/DL (ref 8–23)
BUN/CREAT SERPL: 20 (ref 7–25)
CALCIUM SPEC-SCNC: 9 MG/DL (ref 8.6–10.5)
CHLORIDE SERPL-SCNC: 104 MMOL/L (ref 98–107)
CO2 SERPL-SCNC: 25 MMOL/L (ref 22–29)
CREAT SERPL-MCNC: 0.6 MG/DL (ref 0.76–1.27)
DEPRECATED RDW RBC AUTO: 47.9 FL (ref 37–54)
EGFRCR SERPLBLD CKD-EPI 2021: 100.7 ML/MIN/1.73
EOSINOPHIL # BLD MANUAL: 0.17 10*3/MM3 (ref 0–0.4)
EOSINOPHIL NFR BLD MANUAL: 3 % (ref 0.3–6.2)
ERYTHROCYTE [DISTWIDTH] IN BLOOD BY AUTOMATED COUNT: 13.5 % (ref 12.3–15.4)
GLOBULIN UR ELPH-MCNC: 2.8 GM/DL
GLUCOSE SERPL-MCNC: 103 MG/DL (ref 65–99)
HCT VFR BLD AUTO: 41.9 % (ref 37.5–51)
HGB BLD-MCNC: 13.5 G/DL (ref 13–17.7)
HOLD SPECIMEN: NORMAL
LYMPHOCYTES # BLD MANUAL: 1.41 10*3/MM3 (ref 0.7–3.1)
LYMPHOCYTES NFR BLD MANUAL: 8.1 % (ref 5–12)
MCH RBC QN AUTO: 31.1 PG (ref 26.6–33)
MCHC RBC AUTO-ENTMCNC: 32.2 G/DL (ref 31.5–35.7)
MCV RBC AUTO: 96.5 FL (ref 79–97)
MONOCYTES # BLD: 0.47 10*3/MM3 (ref 0.1–0.9)
NEUTROPHILS # BLD AUTO: 3.7 10*3/MM3 (ref 1.7–7)
NEUTROPHILS NFR BLD MANUAL: 61.6 % (ref 42.7–76)
NEUTS BAND NFR BLD MANUAL: 2 % (ref 0–5)
PLASMA CELL PREC NFR BLD MANUAL: 1 % (ref 0–0)
PLAT MORPH BLD: NORMAL
PLATELET # BLD AUTO: 176 10*3/MM3 (ref 140–450)
PMV BLD AUTO: 10.4 FL (ref 6–12)
POIKILOCYTOSIS BLD QL SMEAR: ABNORMAL
POLYCHROMASIA BLD QL SMEAR: ABNORMAL
POTASSIUM SERPL-SCNC: 3.9 MMOL/L (ref 3.5–5.2)
PROT SERPL-MCNC: 6.8 G/DL (ref 6–8.5)
RBC # BLD AUTO: 4.34 10*6/MM3 (ref 4.14–5.8)
SODIUM SERPL-SCNC: 140 MMOL/L (ref 136–145)
VARIANT LYMPHS NFR BLD MANUAL: 18.2 % (ref 19.6–45.3)
VARIANT LYMPHS NFR BLD MANUAL: 6.1 % (ref 0–5)
WBC NRBC COR # BLD AUTO: 5.81 10*3/MM3 (ref 3.4–10.8)

## 2025-05-14 PROCEDURE — 80053 COMPREHEN METABOLIC PANEL: CPT

## 2025-05-14 PROCEDURE — 85025 COMPLETE CBC W/AUTO DIFF WBC: CPT

## 2025-05-14 PROCEDURE — 36415 COLL VENOUS BLD VENIPUNCTURE: CPT

## 2025-05-14 PROCEDURE — 85007 BL SMEAR W/DIFF WBC COUNT: CPT

## 2025-05-16 ENCOUNTER — HOSPITAL ENCOUNTER (OUTPATIENT)
Dept: PHYSICAL THERAPY | Facility: HOSPITAL | Age: 75
Setting detail: THERAPIES SERIES
Discharge: HOME | End: 2025-05-16
Payer: MEDICARE

## 2025-05-16 DIAGNOSIS — C32.1 SQUAMOUS CELL CARCINOMA OF EPIGLOTTIS: ICD-10-CM

## 2025-05-16 DIAGNOSIS — Z91.89 AT RISK FOR LYMPHEDEMA: Primary | ICD-10-CM

## 2025-05-16 PROCEDURE — 97140 MANUAL THERAPY 1/> REGIONS: CPT

## 2025-05-16 NOTE — THERAPY TREATMENT NOTE
Physical Therapy Treatment Note  [facility] Outpatient Therapy Services  A department [facility]  [address], [place] [address]    Patient: Se Ramirez                                                 Visit Date: 2025  :     1950    Referring practitioner:    Jerome Johnson MD  Date of Initial Visit:          Type: THERAPY  Episode: Lymphedema  Number of visits this episode: 6    Visit Diagnoses:    ICD-10-CM ICD-9-CM   1. 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     SUBJECTIVE     Subjective:  He is no longer using the feeding tube, he can tell he still has swelling in his neck. Swallowing doesn't hurt but he can feel it when the food goes down. He still spits phlegm in the morning, his salvia is still thicker.     PAIN: 0/10         OBJECTIVE     Objective    Lymphedema       Row Name 25 1500             Manual Lymphatic Drainage    Manual Lymphatic Drainage initial sequence;head/neck treatment;opened regional lymph nodes  -AL      Initial Sequence short neck;supraclavicular  -AL      Supraclavicular right;left  -AL      Opened Regional Lymph Nodes axillary  -AL      Axillary right;left  -AL      Head/Neck Treatment full/complex MLD  -AL      Full/Complex MLD Used the hand-held massager to the anterior neck followed this with MLD.  -AL      Manual Therapy 40  -AL                User Key  (r) = Recorded By, (t) = Taken By, (c) = Cosigned By      Initials Name Provider Type    AL Narcisa Sands, PTA, [facility] Physical Therapist Assistant                           Therapy Education/Self Care [id number]   Education offered today May try to elevate his head more in the bed.  Can work on skin stretch lymph drainage to neck   Medbride Code    Ongoing HEP   Exercises given listed above.    Timed Minutes        Total Timed Treatment:     40   mins  Total Time of Visit:             40   mins         ASSESSMENT/PLAN      GOALS          Goals                                          Progress Note 5/24/25                                                      Recert due by 7/22/25   STG by:  Comments Date Status   Patient will have a good basic understanding of lymphedema and its suggested risk reduction practices  Continue to educated 5/16/25 Ongoing   Patient will be independent with remedial HEP for lymphedema He is working on the HEP 5/16/25 Ongoing   Patient will be independent with self MLD for lymphedema He is working on the MLD 5/16/25 Ongoing             LTG by:          Patient will have appropriate compression garments for lymphedema maintenance We will do discuss compression garments next treatment 5/16/2025 Ongoing   Patient will have no sign or symptoms of infection Skin is red but intact 4/25/2025 Ongoing   Patient will be independent with comprehensive maintenance program for lymphedema   4/25/2025 Ongoing             Anticipated CPT codes: Therapeutic Exercise 51033, Manual Therapy 17129, Therapeutic Activity 01844, and Self Care/Home Management 03771      Assessment/Plan     ASSESSMENT:   Patient is now back to eating full-time orally and not using the feeding tube.  I did encourage him to purchase a hand-held massager to help move the fluid out of the anterior neck.  He did have a good reduction after treatment today.    PLAN:   Lymphedema treatment on a weekly basis over the next 3 months    SIGNATURE: Narcisa Sands PTA, [facility] [place] License #: [id number]  Electronically Signed on 5/16/2025        [facility] [address]  [place] [address]  [phone]

## 2025-05-19 LAB
RAD ONC ARIA COURSE END DATE: NORMAL
RAD ONC ARIA COURSE ID: NORMAL
RAD ONC ARIA COURSE INTENT: NORMAL
RAD ONC ARIA COURSE LAST TREATMENT DATE: NORMAL
RAD ONC ARIA COURSE START DATE: NORMAL
RAD ONC ARIA COURSE TREATMENT ELAPSED DAYS: 54
RAD ONC ARIA FIRST TREATMENT DATE: NORMAL
RAD ONC ARIA PLAN FRACTIONS TREATED TO DATE: 35
RAD ONC ARIA PLAN ID: NORMAL
RAD ONC ARIA PLAN NAME: NORMAL
RAD ONC ARIA PLAN PRESCRIBED DOSE PER FRACTION: 2 GY
RAD ONC ARIA PLAN PRIMARY REFERENCE POINT: NORMAL
RAD ONC ARIA PLAN TOTAL FRACTIONS PRESCRIBED: 35
RAD ONC ARIA PLAN TOTAL PRESCRIBED DOSE: 7000 CGY
RAD ONC ARIA REFERENCE POINT DOSAGE GIVEN TO DATE: 70 GY
RAD ONC ARIA REFERENCE POINT ID: NORMAL

## 2025-05-23 ENCOUNTER — HOSPITAL ENCOUNTER (OUTPATIENT)
Dept: PHYSICAL THERAPY | Facility: HOSPITAL | Age: 75
Setting detail: THERAPIES SERIES
Discharge: HOME | End: 2025-05-23
Payer: MEDICARE

## 2025-05-23 DIAGNOSIS — C32.1 SQUAMOUS CELL CARCINOMA OF EPIGLOTTIS: ICD-10-CM

## 2025-05-23 DIAGNOSIS — Z91.89 AT RISK FOR LYMPHEDEMA: Primary | ICD-10-CM

## 2025-05-23 PROCEDURE — 97110 THERAPEUTIC EXERCISES: CPT

## 2025-05-23 PROCEDURE — 97140 MANUAL THERAPY 1/> REGIONS: CPT

## 2025-05-23 NOTE — THERAPY TREATMENT NOTE
Physical Therapy Treatment Note and 30 Day Progress Note  [facility] Outpatient Therapy Services  A department [facility]  [address], [place] [address]    Patient: Se Ramirez                                                 Visit Date: 2025  :     1950    Referring practitioner:    Jerome Johnson MD  Date of Initial Visit:          Type: THERAPY  Episode: Lymphedema  Number of visits this episode: 7    Visit Diagnoses:    ICD-10-CM ICD-9-CM   1. 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     SUBJECTIVE     Subjective:  He has been doing good, he is eating better and his taste buds are better. He has been working on the MLD.      PAIN: 0/10         OBJECTIVE     Objective    Lymphedema       Row Name 25 2666             Subjective Pain    Able to rate subjective pain? yes  -AL      Pre-Treatment Pain Level 0  -AL      Post-Treatment Pain Level 0  -AL         Manual Lymphatic Drainage    Manual Lymphatic Drainage initial sequence;head/neck treatment;opened regional lymph nodes  -AL      Initial Sequence short neck;supraclavicular  -AL      Supraclavicular right;left  -AL      Opened Regional Lymph Nodes axillary  -AL      Axillary right;left  -AL      Head/Neck Treatment full/complex MLD  -AL      Full/Complex MLD Used the hand-held massager to the anterior neck followed this with MLD.  -AL      Manual Therapy 35  -AL                User Key  (r) = Recorded By, (t) = Taken By, (c) = Cosigned By      Initials Name Provider Type    AL Narcisa Sands, PTA, [facility] Physical Therapist Assistant                             Therapy Education/Self Care [id number]   Education offered today Instructed on the MLD and educated about the lymph system   Medbridge Code    Ongoing HEP   Exercises given listed above.    Timed Minutes 10       Total Timed Treatment:     45   mins  Total Time of Visit:             45   mins          ASSESSMENT/PLAN     GOALS          Goals                                          Progress Note 6/22/25                                                      Recert due by 7/22/25   STG by:  Comments Date Status   Patient will have a good basic understanding of lymphedema and its suggested risk reduction practices Educate about the lymph system 5/23/2025 Ongoing   Patient will be independent with remedial HEP for lymphedema He is working on the HEP 5/23/2025 Ongoing   Patient will be independent with self MLD for lymphedema Went through the self MLD and gave patient written instructions 5/23/2025 Ongoing             LTG by:          Patient will have appropriate compression garments for lymphedema maintenance He will purchase a size medium head neck compression garment 5/23/2025 Ongoing   Patient will have no sign or symptoms of infection No signs or symptoms of infection 5/23/2025 Ongoing   Patient will be independent with comprehensive maintenance program for lymphedema He is working towards his home maintenance program 5/23/2025 Ongoing             Anticipated CPT codes: Therapeutic Exercise 93885, Manual Therapy 25414, Therapeutic Activity 94319, and Self Care/Home Management 11997      Assessment/Plan     ASSESSMENT:   Patient had less swelling for 2 days after his last treatment, he is not sure what happened but the swelling did increase and he has a lot of swelling today.  We did discuss the Flexitouch pump and he is not interested in the pump at this time.  Patient will order a head neck compression garment from [employer] and start working on the MLD.  He is using a hand-held massager to assist with MLD at home.    PLAN:   Lymphedema treatment on a weekly basis over the next 3 months.  Assess any compression patient has next treatment.    SIGNATURE: Narcisa Sands PTA, [facility] License #: [id number]  Electronically Signed on 5/23/2025        [address]  [place] [address]  [phone]

## 2025-05-27 ENCOUNTER — TELEPHONE (OUTPATIENT)
Age: 75
End: 2025-05-27
Payer: MEDICARE

## 2025-05-27 NOTE — TELEPHONE ENCOUNTER
"Patient called.  Asking for apt with Dr. Donnelly to take out his feeding tube.  \"I'm not using it.\"  He has an appt 06- for follow up for XRT with Dr. Johnson.  He can discuss that then.    I sent him a message about waiting until he has been seen in follow up.  If he is maintaining his weight, I am sure Dr. Johnson will happily agree with tube removal.  "

## 2025-05-30 ENCOUNTER — HOSPITAL ENCOUNTER (OUTPATIENT)
Dept: PHYSICAL THERAPY | Facility: HOSPITAL | Age: 75
Setting detail: THERAPIES SERIES
Discharge: HOME | End: 2025-05-30
Payer: MEDICARE

## 2025-05-30 DIAGNOSIS — C32.1 SQUAMOUS CELL CARCINOMA OF EPIGLOTTIS: ICD-10-CM

## 2025-05-30 DIAGNOSIS — Z91.89 AT RISK FOR LYMPHEDEMA: Primary | ICD-10-CM

## 2025-05-30 PROCEDURE — 97140 MANUAL THERAPY 1/> REGIONS: CPT | Performed by: PHYSICAL THERAPIST

## 2025-05-30 NOTE — THERAPY TREATMENT NOTE
Physical Therapy Treatment Note  [facility] Outpatient Therapy Services  A department [facility]  [address], [place] [address]    Patient: Se Ramirez                                                 Visit Date: 2025  :     1950    Referring practitioner:    Jerome Johnson MD  Date of Initial Visit:          Type: THERAPY  Episode: Lymphedema  Number of visits this episode: 8    Visit Diagnoses:    ICD-10-CM ICD-9-CM   1. 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  PAIN: 0/10         OBJECTIVE     Objective    Lymphedema       Row Name 25 1600             Manual Lymphatic Drainage    Manual Lymphatic Drainage initial sequence;head/neck treatment;opened regional lymph nodes  -HR      Initial Sequence short neck;supraclavicular  -HR      Supraclavicular right;left  -HR      Opened Regional Lymph Nodes axillary  -HR      Axillary right;left  -HR      Head/Neck Treatment full/complex MLD  -HR      Full/Complex MLD Used the hand-held massager to the anterior neck followed this with MLD.  -HR      Manual Therapy 40  -HR         Compression/Skin Care    Compression/Skin Care Comments He wore his compression to treatment today  -HR                User Key  (r) = Recorded By, (t) = Taken By, (c) = Cosigned By      Initials Name Provider Type    HR Ellie Garcia, PT, DPT, CLT-KYLE Physical Therapist                             Therapy Education/Self Care [id number]   Education offered today Instructed on the MLD and educated about the lymph system   Medbridge Code    Ongoing HEP   Exercises given listed above.    Timed Minutes        Total Timed Treatment:     40   mins  Total Time of Visit:             40   mins         ASSESSMENT/PLAN     GOALS           Goals                                          Progress Note 6/22/25                                                      Recert due by 7/22/25   STG by:  Comments Date Status   Patient will have a good basic understanding of lymphedema and its suggested risk reduction practices Educate about the lymph system 5/23/2025 Ongoing   Patient will be independent with remedial HEP for lymphedema He is working on the HEP 5/23/2025 Ongoing   Patient will be independent with self MLD for lymphedema Went through the self MLD and gave patient written instructions 5/23/2025 Ongoing             LTG by:          Patient will have appropriate compression garments for lymphedema maintenance He has the garment and it fits well 5/30/2025 Ongoing   Patient will have no sign or symptoms of infection No signs or symptoms of infection.  He has some sort of bug bite on the anterior neck on the right side 5/30/2025 Ongoing   Patient will be independent with comprehensive maintenance program for lymphedema He is working towards his home maintenance program 5/23/2025 Ongoing             Anticipated CPT codes: Therapeutic Exercise 29705, Manual Therapy 90623, Therapeutic Activity 75546, and Self Care/Home Management 91950      Assessment/Plan     ASSESSMENT:   He seemed to have more swelling again in the neck today than when I saw him 2 weeks ago.  I did note a small swollen spot that looked like a bug bite and he confirmed that something bit him although he does not know what it was.  He has been wearing the compression and going to sleep and it although he does have to take it off at some point in the night.    PLAN:   Lymphedema treatment on a weekly basis over the next 3 months.      SIGNATURE: Ellie Garcia, PT, DPT, JINA JIMENEZ License #: [id number]  Electronically Signed on 5/30/2025        [address] [place] [address]  [place] [address]  [phone]

## 2025-06-05 NOTE — PROGRESS NOTES
[facility]  Radiation Oncology Clinic   Parvez Johnson MD, FACR  Lionel MCCAULEY  _______________________________________________  [facility]  Department of Radiation Oncology  [address] [place] [id number]  Office: [phone]  Fax: [phone]    DATE: 06/09/2025  PATIENT: Se Ramirez  1950                         MEDICAL RECORD #: [id number]    1. History of laryngeal cancer    2. History of radiation therapy    3. History of primary non-small cell carcinoma of right lung    4. Current every day smoker                                              REASON FOR VISIT:    No chief complaint on file.    Se Ramirez is a very pleasant 74 y.o. patient that has completed radiation therapy and returns to the clinic today for oncologic surveillance .    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 He completed 7000 cGy in 35 fractions to the head/neck area on 04/22/2025.     04/24/2024 - CT Soft tissue neck with contrast:  Mild asymmetrical nodular prominence of the left aryepiglottic fold for which ENT consultation and visualization is recommended. No pathologic lymphadenopathy.  Moderate to advanced emphysema with postoperative changes right lung apex.    08/07/2024 - Appointment with Edmundo Infante APRN:  The patient has had problems with globus sensation, sore throat, and hoarseness.   At the last office visit, Protonix was increased to twice daily before breakfast and dinner.    He states that he is still with intermittent hoarseness.    He has had a CT neck in the past that was correlated in office with direct visualization-this was normal.   Plan:  Return in about 5 months (around 1/7/2025) for Recheck.    12/27/2024 - Appointment with Edmundo Infante APRN:  Presents for evaluation of persistent  hoarseness.   Plan:  Persistent hoarseness.  He continues to experience hoarseness, which he had hoped would improve following his recent cholecystectomy, but it has not.   He has reduced his smoking habit to approximately three-quarters of a pack per day.   He is currently on a regimen of Nexium, taking two tablets once daily in the morning before meals. He reports no current symptoms of heartburn.   He also reports difficulty swallowing, with occasional instances of food impaction. He has not undergone a swallow study.   A CT scan of his neck has not been performed.   He is scheduled for a CT scan of his lungs next week at [facility].   A CT scan of the neck will be ordered to be obtained concurrently with the scheduled CT chest. A swallow study will also be conducted.   The dosage of Nexium will be increased to one tablet twice daily, to be taken before breakfast and dinner. It is recommended to eliminate milk and dairy from diet.  Return in about 3 months (around 3/27/2025) for Recheck, CT.    12/30/2024 - CT soft tissue neck with contrast:  Abnormal appearance and contour of the epiglottis left of midline with CT concerning for a mucosal space lesion arising along its laryngeal surface, possibly ulcerated and measuring up to 1.5 cm in greatest axial diameter.   No suspicious adenopathy by size criteria.    01/02/2025 - Appointment with :  Direct Laryngoscopy with biopsy recommended.     01/02/2025 - Flexible Fiberoptic Laryngoscopy per :  FINDINGS:  Mucosal surfaces:   The mucosal surfaces demonstrated normal mucosa surfaces with moderately severe inflammation  Base of tongue:  The base of tongue was found to have no mass or lesion.  Epiglottis:  The epiglottis was found to have no mass or lesion.  The vallecula is clear but the laryngeal surface of the epiglottis does not have an obvious lesion discernible.  Aryepiglottic fold:  The AE folds were found to have no mass or lesion no moderate  erythema is noted throughout.  False Vocal Fold:  The false cords were found to have no mass or lesion.  True Vocal Cord:  The true vocal cords were found to have no mass or lesion. Both true vocal cords adduct and abduct normally.  There is significant erythema in the posterior one third of both true vocal folds without mass identifiable.  Arytenoid:   The arytenoids were found to have Moderately severe interarytenoid edema with erythema.  Hypopharynx:  The hypopharynx was found to have no mass or lesion.    01/20/2025 - Laryngeal surface of epiglottis, biopsies per :  Invasive keratinizing squamous cell carcinoma.    01/27/2025 - Documentation per 's clinic:  Will need pet scan and referral to oncology and radiation     01/30/2025 - Consult with :  In consideration of diagnostic data and evaluation of the patient,  I recommend a definitive course of chemoradiation therapy, anticipate a total tumor dose of approximately 3082-2162 cGy over 30-35 fractions. Will coordinate care with medical oncology for delivery of chemotherapy per their direction.   The patient and his family verbalize understanding of this discussion, voice no further questions and wishes to proceed with recommendations. Will simulate treatment fields after medical oncology consultation and PET/CT to begin treatment planning. Final course pending.  Plan:  Proceed with scheduled PET/CT.  Proceed with Med Onc consultation.  Follow-up referral for PEG Tube placement.  Return for CT Simulation after Med Onc appt.    02/05/2025 - Appointment with :  Throat cancer with oropharyngeal dysphagia.   He is scheduled to start radiation therapy next week. Due to potential difficulties in swallowing and maintaining adequate nutrition during treatment, a percutaneous endoscopic gastrostomy (PEG) tube is recommended. The procedure involves inserting a tube through the mouth into the stomach, with a small incision made where the  light shines through. It is an outpatient procedure performed in the endoscopy suite, and he will be able to go home the same day. A home health nurse will provide instructions on tube care and usage. He has been advised to quit smoking, as it can negatively impact his treatment and overall health. An appointment with a nutritionist will be arranged to discuss tube feeding recommendations. The PEG tube insertion procedure is scheduled for 02/19/2024, Wednesday morning.    02/12/2025 - Consult with :  RECOMMENDATIONS:    Re: Apprised of the available diagnostic information. Note the labs (normal CBC, normal CMP, clinical (laryngoscopic findings), pathology from biopsies of larynx (SCC confined to 1 subsite) and the radiographic (CT/PET scan) findings (above). Hypermetabolic soft tissue mass in the left larynx/epiglottis, in keeping with biopsy-proven primary squamous cell carcinoma.  No evidence of hypermetabolic metastatic disease in the neck, chest, abdomen, or pelvis.   [facility]  PET scan ordered,. Recommend a definitive course of chemoradiation therapy, anticipate a total tumor dose of approximately 8838-9118 cGy over 30-35 fractions.  Review NCCN guidelines version 2.2025 cancer of the supraglottic larynx with T1-2, N0 disease amenable to larynx preserving (conservation) surgery/: Treatment of primary and neck--endoscopic resection+ neck dissection, or endoscopic or open partial laryngectomy+ neck dissection or definitive RT--post RT neck dczkvprajw-2-9 weeks clinical assessment as appropriate.  If response--PET scan negative-observation/follow-up: H&P (including complete H&N exam and mirror and fiberoptic exam).  Year 1, every 1-3 months; year 2, every 2-6 months; years 3-5 every 4-8 months;> 5 years, every 12 months.  TSH every 6-12 months of neck irradiated.  Anticipate radiation therapy as outlined above.  PEG tube placement scheduled for 2/19/25 per   Andrzej  Return to the [place] office with CMP and CBC with differential in 12 weeks  Importance of Smoking Cessation discussed with patient and informed patient additional information will be on today's AVS. [place] [facility]'s Flyer - Plan to Be Tobacco Free handout provided to patient     02/04/2025 - PET Scan:  Hypermetabolic soft tissue mass in the left larynx/epiglottis, in keeping with biopsy-proven primary squamous cell carcinoma.  No evidence of hypermetabolic metastatic disease in the neck, chest, abdomen, or pelvis.  Postoperative change of right upper lobe wedge resection without evidence of recurrent or metastatic disease.  Postoperative change of left nephrectomy without suspicious nodule or metabolic activity in the nephrectomy bed  3 mm nonobstructing right upper pole renal calculus.    02/24/2025 - G-tube insertion per .    02/27/2025 - 04/22/2025 - Completed radiation course:  Received 7000 cGy in 35 fractions to the head/neck area.     05/08/2025 - Appointment with :  Be aware of the signs and symptoms of recurrent head and neck cancer including neck mass, persistent or recurrent sore throat, ear pain, hemoptysis, weight loss and hoarseness. If any of these symptoms recur and or persist, call for an evaluation.   D/W patient increasing caloric intake and discussed cruciferous vegetables and protein shakes etc to maintain optimal nutrition.  The necessity of abstaining from tobacco products was also discussed particularly with respect to not smoking   Dr. Dangelo examined and discussed care with patient and agrees with treatment plan.   Return in about 2 months (around 7/8/2025).    05/14/2025 - Appointment with :  RECOMMENDATIONS:   Review hospital admission [facility], 3/26/25-3/29/25 for hypernatremia, ARF, severe protein-calorie malnutrition.  Resolved with IVFs and PEG feeding  Review labs, 5/14/2025-sodium 140 otherwise normal CMP, Hgb 13.5 otherwise normal  CBC  Previously reviewed the available diagnostic information. Note the labs (normal CBC, normal CMP, clinical (laryngoscopic findings), pathology from biopsies of larynx (SCC confined to 1 subsite) and the radiographic (CT/PET scan) findings (above). Hypermetabolic soft tissue mass in the left larynx/epiglottis, in keeping with biopsy-proven primary squamous cell carcinoma.  No evidence of hypermetabolic metastatic disease in the neck, chest, abdomen, or pelvis.   Review NCCN guidelines version 2.2025 cancer of the supraglottic larynx with T1-2, N0 disease amenable to larynx preserving (conservation) surgery/: Treatment of primary and neck--endoscopic resection+ neck dissection, or endoscopic or open partial laryngectomy+ neck dissection or definitive RT--post RT neck zylkgcfjki-5-8 weeks clinical assessment as appropriate.  If response--PET scan negative-observation/follow-up: H&P (including complete H&N exam and mirror and fiberoptic exam).  Year 1, every 1-3 months; year 2, every 2-6 months; years 3-5 every 4-8 months;> 5 years, every 12 months.  TSH every 6-12 months of neck irradiated.  Follow-up with radiation oncology as scheduled- Defer surveillance imaging to them  Return to the [place] office with CMP and CBC with differential in 12 weeks.    Importance of Smoking Cessation discussed with patient and informed patient additional information will be on today's [facility]. [place] [facility]'s Flyer - Plan to Be Tobacco Free handout provided to patient     07/10/2025 - Scheduled appointment with .    08/13/2025 - Scheduled appointment with .     ------------------------------------------------------------------------------------------------------------------------------------------------------------------------    History of right lung cancer:    12/29/2020 - PET Scan:  Solitary hypermetabolic right upper lobe pulmonary nodule which is concerning for primary lung malignancy.   No  scintigraphic evidence for hilar or mediastinal juan david metastasis or distant metastatic disease.   Solitary kidney.     02/15/2021 - Wedge resection and lymph node excision per :  Lung, right upper lobe, wedge resection:  Invasive keratinizing squamous cell carcinoma.  Maximum tumor diameter is 3.2 cm.  The surgical margins are free of tumor (0.3 cm).  Please see CAP synoptic.  Level 7 lymph node, excision:   1 benign lymph node.  Level 4L lymph node, excision:  1 benign lymph node.  Level 4R lymph node, excision:   1 benign lymph node.  Level 2R lymph node, excision:  1 benign lymph node.    Synoptic Checklist   LUNG   8th Edition - Protocol posted: 2/26/2020LUNG: RESECTION - All Specimens  SPECIMEN   Procedure  Wedge resection   Specimen Laterality  Right   TUMOR   Tumor Site  Upper lobe of lung   Histologic Type  Invasive squamous cell carcinoma, keratinizing   Histologic Grade  G2: Moderately differentiated   Tumor Size     Total Tumor Size (size of entire tumor)  Greatest Dimension (Centimeters): 3.2 cm   Additional Dimension (Centimeters)  2.6 cm     1.3 cm   Tumor Focality  Single focus   Visceral Pleura Invasion  Not identified   Direct Invasion of Adjacent Structures  No adjacent structures present   Treatment Effect  No known presurgical therapy   Lymphovascular Invasion  Not identified   MARGINS   Margins  All margins are uninvolved by tumor   Margins Examined  Parenchymal   Distance of Invasive Carcinoma from Closest Margin (Centimeters)  0.3 cm   Closest Margin  Parenchymal   LYMPH NODES   Number of Lymph Nodes Involved  0   Number of Lymph Nodes Examined  4   Juan David Stations Examined  2R: Upper paratracheal     4R: Lower paratracheal     7: Subcarinal     4L: Lower paratracheal   PATHOLOGIC STAGE CLASSIFICATION (pTNM, AJCC 8th Edition)   Primary Tumor (pT)  pT2a   Regional Lymph Nodes (pN)  pN0   ADDITIONAL FINDINGS   Additional Findings  Emphysema   .     05/21/2021 - CT Chest with  contrast:  Since the previous study the patient's undergone wedge resection for a primary neoplasm of the lung within the anterior segment of the right upper lobe. No evidence of localized recurrence. No new lung lesions are present.  Stable subcarinal right hilar lymph nodes. These are enlarged by CT criteria but demonstrate no significant change from previous study of 12/10/2020. No new mediastinal lymphadenopathy is present. There is evidence of remote granulomatous disease.  Stable subtle hypodense lesion within the left lobe of the liver. There is steatosis of the liver. No new lesions are identified within the liver in those portions which are imaged.  Heavy coronary calcifications are present     11/24/2021 - CT chest with contrast:  Treatment-related changes in the right apex are stable. There is a mild right hilar and mediastinal adenopathy which is also stable and nonspecific. There are some granulomatous calcifications identified in these areas in the right hilum and mediastinum, perhaps old granulomatous inflammation.  5 mm left lower lobe pulmonary nodule slightly larger, previously measuring 4 mm.  Lung emphysema.  Advanced coronary atheromatous calcification.    12/07/2022 - CT Chest with contrast:  Multiple new small pulmonary nodules measuring up to 5 mm. These may be infectious or inflammatory in nature but short interval follow-up in 2-3 months is recommended to confirm stability or resolution, given history of lung cancer.  Stable posttreatment change in the RIGHT upper lobe.  No change in mild RIGHT hilar lymphadenopathy.  Moderate emphysema.    03/07/2023 - CT Chest with contrast:  Postoperative change of RIGHT upper lobe wedge resection without definite evidence of recurrent or metastatic disease.  Waxing and waning of sub-6 mm pulmonary nodules. Several pulmonary nodules present on the prior study have now resolved. However, multiple new 3 to 4 mm pulmonary nodules have developed. Favor an  infectious or inflammatory process but recommend continued imaging surveillance.  Moderate emphysema.    06/14/2023 - CT Chest with contrast:  No lung mass is identified, however there are several new nodules within both lungs, none of the new nodules measures more than 6.1 mm. Some the pre-existing nodules have slightly increased in size. This may be related to an ongoing infectious or inflammatory etiology rather than small pulmonary metastases, consider repeat chest CT in 3 months. None of the pulmonary nodules is large enough to attempt percutaneous biopsy. No evidence of intrathoracic lymphadenopathy. There are advanced emphysematous changes as before. Postsurgical changes noted in the right upper lobe.    09/20/2023 - CT Chest with contrast:  Several of the small nodules in the posterior lower lobes have decreased in size or are resolved, compatible with resolved/resolving infectious/inflammatory process. Additional sub-6 mm pulmonary nodules are unchanged in size. No new suspicious pulmonary nodule.  Status post right upper lobe wedge resection without evidence of recurrent disease or convincing evidence of metastatic disease.    03/25/2024 - CT Chest with contrast:  A tiny nodule, 4 mm in maximum dimension, in the right lower lobe, was not seen in the previous study. A follow-up examination in 6 months is recommended to ensure stability or resolution.  The remaining nodules are either stable or have resolved or decreased in size.  Nonspecific prominent right hilar lymph nodes. Etiology and clinical significance is not certain.  Chronic emphysematous lung changes.  The postsurgical/postprocedural changes in the right upper lobe.    06/26/2024 - CT Chest with contrast:  Postoperative change of right upper lobe wedge resection without definite evidence of recurrent or metastatic disease in the chest.  New indeterminate 4 mm right upper lobe and 3 mm right lower lobe pulmonary nodules. Recommend a follow-up  chest CT in 6 months to confirm stability or resolution and exclude neoplastic potential.  Bilateral hilar lymphadenopathy, similar to multiple prior studies dating back to 5/21/2021. Given stability, suspect these are related to an indolent/chronic infectious or inflammatory process.  Intrahepatic and extrahepatic biliary ductal dilatation extending down to the ampulla where a 10 mm filling defect is present, likely representing choledocholithiasis. Recommend GI referral for ERCP.    12/30/2024 - CT Chest with contrast:  No evidence of disease recurrence or progression.  Interval resolution of previously described new pulmonary nodules on the prior exam 6/26/2024.  Similar borderline hilar lymph nodes, not optimally evaluated on this noncontrast exam.  Interval cholecystectomy with resolution of bile duct dilation.    01/08/2025 - Appointment with Mallory Bryan APRN:  Stage 1 mild COPD by GOLD classification (Primary)  1/8/25 Reviewed PFT which although remains mildly obstructive he has had a drop in his FEV1 from 94 to 86% for predicted.  He also has had a drop in his diffusion capacity when corrected for alveolar volume from 54 to 46% predicted.  He was given Spiriva at his last visit however his primary care physician is keeping him with samples of Trelegy for which he finds benefit.  He is encouraged to continue this.  Continue as needed albuterol HFA.  Continue to monitor periodic flow-volume loop and diffusion capacity.  Multiple lung nodules  Several have resolved on recent CT imaging he does have a 3 mm left lower lobe nodule that is stable.  He has been getting routine imaging with his history of lung cancer.  Follow Up   Return in about 4 months (around 5/8/2025).    01/08/2025 - Pulmonary Function Tests:  Interpretation Spirometry   Spirometry shows mild obstruction. There is reduced midflow suggesting small airway/airflow obstruction.   Review of FVL curve   Patient's effort is normal.    Diffusion Capacity  The patient's diffusion capacity is moderately reduced.  Diffusion capacity is moderately reduced when corrected for alveolar volume.   Overall comments: To June 2023 FEV1 has decreased from 94 to 86% predicted, FVC has decreased from 120 to 108% predicted.  Diffusion capacity when corrected for alveolar volume remains moderately decreased however has dropped from 54 to 46% predicted.    05/08/2025 - Appointment with Mallory Bryan APRN:  Janak standpoint is doing well.  He will continue his Trelegy and a prescription is given for as needed albuterol.  I have reviewed his recent history since my last visit including notes from Dr. Mcduffie and Dr. Johnson as well as general surgery op note, PET scans and CT scans.  He has a follow-up today with Dr. Antelmo Dangelo he is encouraged to keep.  I have tentatively asked him to return in 3 months with a follow-up CT which would be 6 months from his PET scan.  We also discussed that should radiation oncology and/or oncology wish to take over all routine imaging I am okay with that but also okay to continue managing imaging of his chest as well.  Patient is agreeable to the plan.  I advised him to quit and he is willing to quit. We have discussed the following method/s for tobacco cessation:  Hypnosis.  Together we have set a quit date for by August.  He will follow up with me in 3 months or sooner to check on his progress.    08/13/2025 - Scheduled appointment with Mallory Bryan APRN:      History obtained from  PATIENT, FAMILY, and CHART    PAST MEDICAL HISTORY  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    History of adenomatous polyp of colon     History of colon polyps     History of lung cancer     Right  lung    Hyperlipidemia     Throat cancer       PAST SURGICAL HISTORY  Past Surgical History:   Procedure Laterality Date    BRONCHOSCOPY N/A 02/15/2021    Procedure: BRONCHOSCOPY;  Surgeon: Bruce Murillo MD;  Location: [facility] OR;  Service: Cardiothoracic;  Laterality: N/A;    CATARACT EXTRACTION      CHOLECYSTECTOMY      COLONOSCOPY  11/20/2012    One 1cm tubular adenomatous polyp in the sigmoid colon; One 5mm hyperplastic polyp in the rectum; Diverticulosis; Repeat 4 years     COLONOSCOPY N/A 03/30/2017    Two 4-6mm tubular adenomatous polyps at the hepatic flexure; One 5mm tubular adenomatous polyp in the transverse colon; One 11mm tubular adenomatous polyp in the sigmoid colon; One 6mm tubular adenomatous polyp in the rectum; Diverticulosis in the left colon; Repeat 3 years     COLONOSCOPY  12/17/2009    One less than 5mm tubular adenomatous polyp in the cecum; One 5mm hyperplastic polyp in the transverse; One less than 3mm hyperplastic polyp in the rectum; Diverticulosis; Repeat 3 years     COLONOSCOPY N/A 07/02/2020    Two 5-7mm tubular adenomatous polyps at the hepatic flexure; One 6mm tubular adenomatous polyp in the transverse colon; Diverticulosis in the left colon; The entire examined colon is normal on direct and retroflexion views; Repeat 5 years    COLONOSCOPY N/A 06/29/2022    Diverticulosis in the left colon; One 6mm tubular adenomatous polyp in the descending colon; One 4mm tubular adenomatous polyp in the descending colon; Congested mucosa in the entire examined colon-biopsied; Repeat 5 years    ENDOSCOPY N/A 03/27/2019    Medium-sized HH; Normal stomach; Normal examined duodenum-biopsied    ENDOSCOPY N/A 06/09/2022    Small HH; Non-erosive gastritis-biopsied; Normal examined duodenum-biopsied    ENDOSCOPY N/A 2/24/2025    Procedure: ESOPHAGOGASTRODUODENOSCOPY WITH PERCUTANEOUS ENDOSCOPIC GASTROSTOMY TUBE INSERTION;  Surgeon: Amie Donnelly MD;  Location: [facility] ENDOSCOPY;  Service:  General;  Laterality: N/A;  pre op:   post op:  pcp:Diego Lucio MD    ERCP  07/30/2024    Dr. Buzz Dangelo-Choledocholithiasis status post balloon sweep extraction wtih balloon assisted sphincteroplasty; Placement of biliary stent as well as prophylactic pancreatic stent; Repeat ERCP 3 months for biliary stent removal    FOOT SURGERY Left     ORIF    HERNIA REPAIR      INNER EAR SURGERY      LARYNGOSCOPY N/A 1/20/2025    Procedure: DIRECT LARYNGOSCOPY WITH EXCISION OF LESION;  Surgeon: Antelmo Dangelo MD;  Location: [facility] OR;  Service: ENT;  Laterality: N/A;    MEDIASTINOSCOPY N/A 02/15/2021    Procedure: CERVICAL MEDIASTINOSCOPY WITH LYMPH NODE DISECTION;  Surgeon: Bruce Murillo MD;  Location: [facility] OR;  Service: Cardiothoracic;  Laterality: N/A;    PEG TUBE INSERTION N/A 2/24/2025    Procedure: PERCUTANEOUS ENDOSCOPIC GASTROSTOMY TUBE INSERTION;  Surgeon: Amie Donnelly MD;  Location: [facility] ENDOSCOPY;  Service: General;  Laterality: N/A;  pre op:  post op:  pcp:Diego Lucio MD    THORACOSCOPY Right 02/15/2021    Procedure: THORACOSCOPY, WEDGE RESECTION OF RIGHT UPPER LOBE WITH FROZEN,  , MEDIASTINAL LYMPH NODE DISSECTION;  Surgeon: Bruce Murillo MD;  Location: [facility] OR;  Service: Cardiothoracic;  Laterality: Right;    TONSILLECTOMY        FAMILY HISTORY  family history includes Colon polyps in his mother; Diabetes in his mother.    SOCIAL HISTORY  Social History     Tobacco Use    Smoking status: Every Day     Current packs/day: 0.50     Average packs/day: 0.5 packs/day for 56.4 years (28.2 ttl pk-yrs)     Types: Cigarettes     Start date: 1/1/1969     Passive exposure: Current    Smokeless tobacco: Never    Tobacco comments:     Pt states about 7-8 cigarettes per day 7/3   Vaping Use    Vaping status: Never Used   Substance Use Topics    Alcohol use: Not Currently     Alcohol/week: 3.0 standard drinks of alcohol     Types: 3 Shots of liquor per week     Comment: sober as of  "06/01/2022    Drug use: Yes     Types: Marijuana     Comment: daily     ALLERGIES  Patient has no known allergies.     MEDICATIONS    Current Outpatient Medications:     albuterol sulfate  (90 Base) MCG/ACT inhaler, Inhale 2 puffs Every 4 (Four) Hours As Needed for Wheezing or Shortness of Air., Disp: 18 g, Rfl: 3    aspirin 81 MG EC tablet, Take 1 tablet by mouth Daily., Disp: , Rfl:     atorvastatin (LIPITOR) 10 MG tablet, Take 1 tablet by mouth Daily., Disp: , Rfl:     Cholecalciferol (VITAMIN D3 EXTRA STRENGTH PO), Take 1 tablet by mouth Daily., Disp: , Rfl:     esomeprazole (nexIUM) 20 MG capsule, Take 2 capsules by mouth 2 (Two) Times a Day., Disp: , Rfl:     Fluticasone-Umeclidin-Vilant (TRELEGY) 100-62.5-25 MCG/ACT inhaler, Inhale 1 puff Daily., Disp: 60 each, Rfl: 3    vitamin C (ASCORBIC ACID) 250 MG tablet, Take 1 tablet by mouth Daily., Disp: , Rfl:     Current outpatient and discharge medications have been reconciled for the patient.  Reviewed by: PARISA Gomes    The following portions of the patient's history were reviewed and updated as appropriate: allergies, current medications, past family history, past medical history, past social history, past surgical history and problem list.    REVIEW OF SYSTEMS  Review of Systems   Constitutional:  Positive for fatigue.   HENT:  Positive for facial swelling (under chin and around jaw bilaterally; seeing lymphedema), trouble swallowing (occasionally) and voice change (hoarseness). Negative for mouth sores and sore throat.         Taste 80%   Dry mouth; improving   [place]; bilateral hearing aids   Eyes: Negative.    Respiratory:  Positive for cough.    Cardiovascular: Negative.    Gastrointestinal: Negative.    Endocrine: Negative.    Genitourinary: Negative.    Musculoskeletal: Negative.    Skin: Negative.    Allergic/Immunologic: Negative.    Neurological: Negative.    Psychiatric/Behavioral:  Positive for sleep disturbance (\"not sleeping well\").  " "      PHYSICAL EXAM  VITAL SIGNS:   Vitals:    06/09/25 1254   BP: 118/55   Pulse: 95   SpO2: 93%  Comment: room air   Weight: 73 kg (160 lb 14.4 oz)   Height: 177.8 cm (70\")   PainSc: 0-No pain       Physical Exam  HENT:      Head:      Comments: Area of firm submandibular fullness consistent with lymphedema.      Mouth/Throat:      Mouth: Mucous membranes are dry. No oral lesions.      Tongue: No lesions.      Pharynx: Oropharynx is clear.           Performance Status: ECOG (1) Restricted in physically strenuous activity, ambulatory and able to do work of light nature    Clinical Quality Measures  - Pain Documented by Standardized Tool, FPS   Se Ramirez reports a pain score of 0.  Given his pain assessment as noted, treatment options were discussed and the following options were decided upon as a follow-up plan to address the patient's pain: continuation of current treatment plan for pain.    - Body Mass Index Screening and Follow-Up Plan  BMI is within normal parameters. No other follow-up for BMI required.    - Tobacco Use: Screening and Cessation Intervention  Social History    Tobacco Use      Smoking status: Every Day        Packs/day: 0.50        Years: 0.5 packs/day for 56.4 years (28.2 ttl pk-yrs)        Types: Cigarettes        Start date: 1/1/1969        Passive exposure: Current      Smokeless tobacco: Never      Tobacco comments: Pt states about 7-8 cigarettes per day 7/3    Smoking cessation information given in after visit summary.    - Advanced Care Planning Advance Care Planning   ACP discussion was held with the patient during this visit. Patient does not have an advance directive, information provided.    - PHQ-2 Depression Screening:  Little interest or pleasure in doing things? Not at all   Feeling down, depressed, or hopeless? Not at all   PHQ-2 Total Score 0     ASSESSMENT AND PLAN  1. History of laryngeal cancer    2. History of radiation therapy    3. History of primary non-small " cell carcinoma of right lung    4. Current every day smoker      Orders Placed This Encounter   Procedures    NM Pet Skull Base To Mid Thigh    Ambulatory Referral to General Surgery     RECOMMENDATIONS: Se Ramirez is status post completion of radiation therapy and presents to our clinic today for oncological surveillance. 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 He completed 7000 cGy in 35 fractions to the head/neck area on 04/22/2025.     We discussed the expected post radiation side effects and continued care and monitoring per NCCN Guidelines. We discussed the signs and symptoms of head and neck cancer including neck mass, persistent sore throat, ear pain, hemoptysis, weight loss and hoarseness. If any of these symptoms occur, to call for evaluation. End of treatment summary and survivorship care plan was given to the patient today.      he is doing well today with no new or significant treatment or disease related complaints and I do not see evidence for recurrent or metastatic disease. He follows with ENT for laryngoscopies. I will order a swallow study and will refer him to general surgery for feeding tube removal considerations.    He will follow up in 2 months with a PET scan before to assess treatment response. He does have a history of lung cancer for which we will continue to monitor. Considering that he has a PET scan ordered in August, I will order next CT imaging 6 months after in February 2025. Will continue follow-up/surveillance as discussed and will continue care with other MDs.    Se Ramirez  reports that he has been smoking cigarettes. He started smoking about 56 years ago. He has a 28.2 pack-year smoking history. He has been exposed to tobacco smoke. He has never used smokeless tobacco. I have educated him on the risk of diseases from using tobacco products such as cancer, COPD,  and heart disease. I spent >10 minutes counseling the patient.    Patient Instructions   1) referral to general surgery for feeding tube removal  2) PET scan in 2 months  3) return in 2 months for follow up     Return in about 2 months (around 8/9/2025).    Time Spent: I spent 44 minutes caring for Se on this date of service. This time includes time spent by me in the following activities: preparing for the visit, reviewing tests, obtaining and/or reviewing a separately obtained history, performing a medically appropriate examination and/or evaluation, counseling and educating the patient/family/caregiver, ordering medications, tests, or procedures, referring and communicating with other health care professionals, documenting information in the medical record, independently interpreting results and communicating that information with the patient/family/caregiver, and care coordination.   Lionel Mathis, APRN   06/09/2025

## 2025-06-06 ENCOUNTER — HOSPITAL ENCOUNTER (OUTPATIENT)
Dept: RADIATION ONCOLOGY | Facility: HOSPITAL | Age: 75
Setting detail: RADIATION/ONCOLOGY SERIES
End: 2025-06-06
Payer: MEDICARE

## 2025-06-09 ENCOUNTER — OFFICE VISIT (OUTPATIENT)
Age: 75
End: 2025-06-09
Payer: MEDICARE

## 2025-06-09 VITALS
BODY MASS INDEX: 23.03 KG/M2 | WEIGHT: 160.9 LBS | OXYGEN SATURATION: 93 % | HEART RATE: 95 BPM | HEIGHT: 70 IN | SYSTOLIC BLOOD PRESSURE: 118 MMHG | DIASTOLIC BLOOD PRESSURE: 55 MMHG

## 2025-06-09 DIAGNOSIS — Z85.21 HISTORY OF LARYNGEAL CANCER: Primary | ICD-10-CM

## 2025-06-09 DIAGNOSIS — Z92.3 HISTORY OF RADIATION THERAPY: ICD-10-CM

## 2025-06-09 DIAGNOSIS — F17.200 CURRENT EVERY DAY SMOKER: ICD-10-CM

## 2025-06-09 DIAGNOSIS — Z85.118 HISTORY OF PRIMARY NON-SMALL CELL CARCINOMA OF RIGHT LUNG: ICD-10-CM

## 2025-06-09 PROCEDURE — 1159F MED LIST DOCD IN RCRD: CPT

## 2025-06-09 PROCEDURE — 99024 POSTOP FOLLOW-UP VISIT: CPT

## 2025-06-09 PROCEDURE — G0463 HOSPITAL OUTPT CLINIC VISIT: HCPCS | Performed by: RADIOLOGY

## 2025-06-09 PROCEDURE — 1126F AMNT PAIN NOTED NONE PRSNT: CPT

## 2025-06-09 PROCEDURE — 1160F RVW MEDS BY RX/DR IN RCRD: CPT

## 2025-06-09 NOTE — PATIENT INSTRUCTIONS
1) referral to general surgery for feeding tube removal  2) PET scan in 2 months  3) return in 2 months for follow up

## 2025-06-10 ENCOUNTER — TELEPHONE (OUTPATIENT)
Dept: SURGERY | Facility: CLINIC | Age: 75
End: 2025-06-10
Payer: MEDICARE

## 2025-06-10 NOTE — TELEPHONE ENCOUNTER
Left a message for patient to call to set up an appt for removal of feeding tube.  Established patient.

## 2025-06-11 ENCOUNTER — HOSPITAL ENCOUNTER (OUTPATIENT)
Dept: PHYSICAL THERAPY | Facility: HOSPITAL | Age: 75
Setting detail: THERAPIES SERIES
Discharge: HOME | End: 2025-06-11
Payer: MEDICARE

## 2025-06-11 DIAGNOSIS — Z91.89 AT RISK FOR LYMPHEDEMA: Primary | ICD-10-CM

## 2025-06-11 DIAGNOSIS — C32.1 SQUAMOUS CELL CARCINOMA OF EPIGLOTTIS: ICD-10-CM

## 2025-06-11 PROCEDURE — 97140 MANUAL THERAPY 1/> REGIONS: CPT | Performed by: PHYSICAL THERAPIST

## 2025-06-11 NOTE — THERAPY TREATMENT NOTE
Physical Therapy Treatment Note  [facility]  [facility]  [address] [place] [address]    Patient: Se Ramirez                                                 Visit Date: 2025  :     1950    Referring practitioner:    Jerome Johnson MD  Date of Initial Visit:          Type: THERAPY  Episode: Lymphedema  Number of visits this episode: 9    Visit Diagnoses:    ICD-10-CM ICD-9-CM   1. 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     SUBJECTIVE     Subjective: He is having the feeding tube removed on Monday next week.  He feels like he is doing pretty well overall except for the swelling in his neck.      PAIN: 0/10         OBJECTIVE     Objective    Lymphedema       Row Name 25 1100             Subjective Pain    Able to rate subjective pain? yes  -HR      Pre-Treatment Pain Level 0  -HR         Manual Lymphatic Drainage    Manual Lymphatic Drainage initial sequence;head/neck treatment;opened regional lymph nodes  -HR      Initial Sequence short neck;supraclavicular  -HR      Supraclavicular right;left  -HR      Opened Regional Lymph Nodes axillary  -HR      Axillary right;left  -HR      Head/Neck Treatment full/complex MLD  -HR      Full/Complex MLD Used the hand-held massager to the anterior neck followed this with MLD.  -HR      Manual Lymphatic Drainage Comments extra time on MLD to the anterior neck and submandicular areas.  -HR      Manual Therapy 45  -HR         Compression/Skin Care    Compression/Skin Care Comments Made a chip bag with some cherry pits as well to use under the compression  -HR                User Key  (r) = Recorded By, (t) = Taken By, (c) = Cosigned By      Initials Name Provider Type    HR Ellie Garcia, PT, DPT, CLT-KYLE Physical Therapist                  BEFORE RADIATION (3/7/2025)    3 days after 35 radiation txs (25)     25 (2.5  weeks post radiation)    5/30/25 (5 weeks post radiation)          Therapy Education/Self Care [id number]   Education offered today Instructed on the MLD and educated about the lymph system   Medbridge Code    Ongoing HEP   Exercises given listed above.    Timed Minutes        Total Timed Treatment:     45   mins  Total Time of Visit:             45   mins         ASSESSMENT/PLAN     GOALS          Goals                                          Progress Note 6/22/25                                                      Recert due by 7/22/25   STG by:  Comments Date Status   Patient will have a good basic understanding of lymphedema and its suggested risk reduction practices Educate about the lymph system 5/23/2025 Ongoing   Patient will be independent with remedial HEP for lymphedema He is working on the HEP 5/23/2025 Ongoing   Patient will be independent with self MLD for lymphedema Went through the self MLD and gave patient written instructions 5/23/2025 Ongoing             LTG by:          Patient will have appropriate compression garments for lymphedema maintenance He is wearing the compression at least 4 to 5 hours each day 6/11/2025 Ongoing   Patient will have no sign or symptoms of infection No signs or symptoms of infection.  He has some sort of bug bite on the anterior neck on the right side 5/30/2025 Ongoing   Patient will be independent with comprehensive maintenance program for lymphedema He is working towards his home maintenance program 6/11/2025 Ongoing             Anticipated CPT codes: Therapeutic Exercise 60273, Manual Therapy 57304, Therapeutic Activity 46671, and Self Care/Home Management 87649      Assessment/Plan     ASSESSMENT:   The lymphedema and the neck continues to be a little worse.  He had 35 radiation treatments that he completed less than 2 months ago.  It seems like his symptoms are still worsening right now due to the radiation damage.  We will need to continue regular skilled visits to  try to reduce the swelling and prevent this from continuing to progress.  He has compression and has been using it daily.  I gave him an additional chip bag to use with the compression to try to soften the tissue along the anterior neck.    PLAN:   Lymphedema treatment 1 time a week for 8-12 more weeks.      SIGNATURE: Ellie Garcia, PT, DPT, HARRI[facility]-JINA WRIGHT License #: [id number]  Electronically Signed on 6/11/2025        [address] [place] [address]. [address]  [phone]

## 2025-06-12 ENCOUNTER — APPOINTMENT (OUTPATIENT)
Dept: PHYSICAL THERAPY | Facility: HOSPITAL | Age: 75
End: 2025-06-12
Payer: MEDICARE

## 2025-06-16 ENCOUNTER — OFFICE VISIT (OUTPATIENT)
Dept: SURGERY | Facility: CLINIC | Age: 75
End: 2025-06-16
Payer: MEDICARE

## 2025-06-16 VITALS
OXYGEN SATURATION: 96 % | BODY MASS INDEX: 22.9 KG/M2 | WEIGHT: 160 LBS | DIASTOLIC BLOOD PRESSURE: 70 MMHG | HEIGHT: 70 IN | HEART RATE: 91 BPM | SYSTOLIC BLOOD PRESSURE: 110 MMHG

## 2025-06-16 DIAGNOSIS — Z43.1 PEG (PERCUTANEOUS ENDOSCOPIC GASTROSTOMY) ADJUSTMENT/REPLACEMENT/REMOVAL: Primary | ICD-10-CM

## 2025-06-16 PROCEDURE — 99213 OFFICE O/P EST LOW 20 MIN: CPT

## 2025-06-16 PROCEDURE — 1159F MED LIST DOCD IN RCRD: CPT

## 2025-06-16 PROCEDURE — 1160F RVW MEDS BY RX/DR IN RCRD: CPT

## 2025-06-16 NOTE — PROGRESS NOTES
Office Established Patient Note:     Referring Provider: No ref. provider found    Chief Complaint   Patient presents with    Follow-up       Subjective     History of Present Illness  The patient presents for removal of a PEG tube.    He has been living with a percutaneous endoscopic gastrostomy (PEG) tube for an extended period but is now prepared for its removal. He has transitioned to oral intake and has not utilized the PEG tube in several months. He sees radiation oncology here and they have given consent for the removal of the PEG tube.       Review of Systems    Review of Systems - General ROS: negative  ENT ROS: negative  Respiratory ROS: no cough, shortness of breath, or wheezing  Cardiovascular ROS: no chest pain or dyspnea on exertion  Gastrointestinal ROS: positive for - PEG tube in place   Genito-Urinary ROS: no dysuria, trouble voiding, or hematuria  Dermatological ROS: negative   Breast ROS: negative for breast lumps  Hematological and Lymphatic ROS: negative  Musculoskeletal ROS: negative   Neurological ROS: no TIA or stroke symptoms    Psychological ROS: negative  Endocrine ROS: negative    History  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    History of adenomatous polyp of colon     History of colon polyps     History of lung cancer     Right lung    Hyperlipidemia     Throat cancer    ,   Past Surgical History:   Procedure Laterality Date    BRONCHOSCOPY N/A 02/15/2021    Procedure: BRONCHOSCOPY;  Surgeon: Bruce Murillo MD;  Location: [facility] OR;  Service: Cardiothoracic;  Laterality: N/A;    CATARACT EXTRACTION      CHOLECYSTECTOMY      COLONOSCOPY  11/20/2012    One 1cm tubular adenomatous polyp in the sigmoid colon; One 5mm hyperplastic polyp in the rectum;  Diverticulosis; Repeat 4 years     COLONOSCOPY N/A 03/30/2017    Two 4-6mm tubular adenomatous polyps at the hepatic flexure; One 5mm tubular adenomatous polyp in the transverse colon; One 11mm tubular adenomatous polyp in the sigmoid colon; One 6mm tubular adenomatous polyp in the rectum; Diverticulosis in the left colon; Repeat 3 years     COLONOSCOPY  12/17/2009    One less than 5mm tubular adenomatous polyp in the cecum; One 5mm hyperplastic polyp in the transverse; One less than 3mm hyperplastic polyp in the rectum; Diverticulosis; Repeat 3 years     COLONOSCOPY N/A 07/02/2020    Two 5-7mm tubular adenomatous polyps at the hepatic flexure; One 6mm tubular adenomatous polyp in the transverse colon; Diverticulosis in the left colon; The entire examined colon is normal on direct and retroflexion views; Repeat 5 years    COLONOSCOPY N/A 06/29/2022    Diverticulosis in the left colon; One 6mm tubular adenomatous polyp in the descending colon; One 4mm tubular adenomatous polyp in the descending colon; Congested mucosa in the entire examined colon-biopsied; Repeat 5 years    ENDOSCOPY N/A 03/27/2019    Medium-sized HH; Normal stomach; Normal examined duodenum-biopsied    ENDOSCOPY N/A 06/09/2022    Small HH; Non-erosive gastritis-biopsied; Normal examined duodenum-biopsied    ENDOSCOPY N/A 2/24/2025    Procedure: ESOPHAGOGASTRODUODENOSCOPY WITH PERCUTANEOUS ENDOSCOPIC GASTROSTOMY TUBE INSERTION;  Surgeon: Amie Donnelly MD;  Location: [facility] ENDOSCOPY;  Service: General;  Laterality: N/A;  pre op:   post op:  pcp:Diego Lucio MD    ERCP  07/30/2024    Dr. Buzz Dangelo-Choledocholithiasis status post balloon sweep extraction wtih balloon assisted sphincteroplasty; Placement of biliary stent as well as prophylactic pancreatic stent; Repeat ERCP 3 months for biliary stent removal    FOOT SURGERY Left     ORIF    HERNIA REPAIR      INNER EAR SURGERY      LARYNGOSCOPY N/A 1/20/2025    Procedure: DIRECT  LARYNGOSCOPY WITH EXCISION OF LESION;  Surgeon: Antelmo Dangelo MD;  Location: [facility] OR;  Service: ENT;  Laterality: N/A;    MEDIASTINOSCOPY N/A 02/15/2021    Procedure: CERVICAL MEDIASTINOSCOPY WITH LYMPH NODE DISECTION;  Surgeon: Bruce Murillo MD;  Location: [facility] OR;  Service: Cardiothoracic;  Laterality: N/A;    PEG TUBE INSERTION N/A 2/24/2025    Procedure: PERCUTANEOUS ENDOSCOPIC GASTROSTOMY TUBE INSERTION;  Surgeon: Amie Donnelly MD;  Location: [facility] ENDOSCOPY;  Service: General;  Laterality: N/A;  pre op:  post op:  pcp:Diego Lucio MD    THORACOSCOPY Right 02/15/2021    Procedure: THORACOSCOPY, WEDGE RESECTION OF RIGHT UPPER LOBE WITH FROZEN,  , MEDIASTINAL LYMPH NODE DISSECTION;  Surgeon: Bruce Murillo MD;  Location: [facility] OR;  Service: Cardiothoracic;  Laterality: Right;    TONSILLECTOMY     ,   Family History   Problem Relation Age of Onset    Colon polyps Mother         Unknown age    Diabetes Mother     Colon cancer Neg Hx     Esophageal cancer Neg Hx     Liver cancer Neg Hx     Liver disease Neg Hx     Rectal cancer Neg Hx     Stomach cancer Neg Hx    ,   Social History     Tobacco Use    Smoking status: Every Day     Current packs/day: 0.50     Average packs/day: 0.5 packs/day for 56.5 years (28.2 ttl pk-yrs)     Types: Cigarettes     Start date: 1/1/1969     Passive exposure: Current    Smokeless tobacco: Never    Tobacco comments:     Pt states about 7-8 cigarettes per day 7/3   Vaping Use    Vaping status: Never Used   Substance Use Topics    Alcohol use: Not Currently     Alcohol/week: 3.0 standard drinks of alcohol     Types: 3 Shots of liquor per week     Comment: sober as of 06/01/2022    Drug use: Yes     Types: Marijuana     Comment: daily   , (Not in a hospital admission)   and Allergies:  Patient has no known allergies.    Current Outpatient Medications:     albuterol sulfate  (90 Base) MCG/ACT inhaler, Inhale 2 puffs Every 4 (Four) Hours As Needed for  "Wheezing or Shortness of Air., Disp: 18 g, Rfl: 3    aspirin 81 MG EC tablet, Take 1 tablet by mouth Daily., Disp: , Rfl:     atorvastatin (LIPITOR) 10 MG tablet, Take 1 tablet by mouth Daily., Disp: , Rfl:     Cholecalciferol (VITAMIN D3 EXTRA STRENGTH PO), Take 1 tablet by mouth Daily., Disp: , Rfl:     esomeprazole (nexIUM) 20 MG capsule, Take 2 capsules by mouth 2 (Two) Times a Day., Disp: , Rfl:     Fluticasone-Umeclidin-Vilant (TRELEGY) 100-62.5-25 MCG/ACT inhaler, Inhale 1 puff Daily., Disp: 60 each, Rfl: 3    vitamin C (ASCORBIC ACID) 250 MG tablet, Take 1 tablet by mouth Daily., Disp: , Rfl:     Objective     Vital Signs   /70 (BP Location: Left arm, Patient Position: Sitting, Cuff Size: Adult)   Pulse 91   Ht 177.8 cm (70\")   Wt 72.6 kg (160 lb)   SpO2 96%   BMI 22.96 kg/m²      Physical Exam:  General appearance - alert, well appearing, and in no distress  Mental status - normal mood, behavior, speech, dress, motor activity, and thought processes  Eyes - sclera anicteric  Neck - supple, no significant adenopathy  Chest - no tachypnea, retractions or cyanosis  Heart - normal rate and regular rhythm  Abdomen - soft, nontender, nondistended, no masses or organomegaly  Neurological - alert, oriented, normal speech, no focal findings or movement disorder noted  Skin - normal coloration and turgor, no rashes, no suspicious skin lesions noted    Physical Exam  Gastrointestinal: PEG tube in place, no signs of infection or complications noted.       Results Review:  Result Review :            Results         Assessment & Plan     Diagnoses and all orders for this visit:    1. PEG (percutaneous endoscopic gastrostomy) adjustment/replacement/removal (Primary)         Assessment & Plan  1. Percutaneous endoscopic gastrostomy (PEG) tube removal.  The PEG tube will be removed today as it has not been used in months and the patient is eating by mouth without issues. The radiation oncologist has approved the " removal. There is a risk of leakage post-removal, which will be managed with appropriate dressings. The site is expected to heal from the inside over the next few days.    Follow-up  A follow-up appointment is scheduled in 3 weeks to ensure proper healing.    PROCEDURE  Procedure: Removal of PEG tube    All questions were answered and agreement to proceed was given after the following Pre-Procedure details were reviewed:  - Risks and Benefits: Potential for leakage post-removal, healing process discussed.  - Side effects: Potential leakage from the site post-removal.  - Consent: Patient agreed to proceed with the removal of the PEG tube.    Intra-Procedure:  - Dressing: Dressing applied to the site post-removal to manage potential leakage.    Post-Procedure:  - Home Care Instructions: Patient advised to monitor the site for leakage and to return for follow-up in 3 weeks to ensure proper healing.       Follow up:     BMI is within normal parameters. No other follow-up for BMI required.    Return in about 3 weeks (around 7/7/2025) for Recheck.        Cielo Adams PA-C  06/16/25  16:30 CDT    Patient or patient representative verbalized consent for the use of Ambient Listening during the visit with  Cielo Adams PA-C for chart documentation. 6/16/2025  13:50 CDT

## 2025-06-17 ENCOUNTER — APPOINTMENT (OUTPATIENT)
Dept: PHYSICAL THERAPY | Facility: HOSPITAL | Age: 75
End: 2025-06-17
Payer: MEDICARE

## 2025-06-24 ENCOUNTER — HOSPITAL ENCOUNTER (OUTPATIENT)
Dept: GENERAL RADIOLOGY | Facility: HOSPITAL | Age: 75
Discharge: HOME | End: 2025-06-24
Payer: MEDICARE

## 2025-06-24 ENCOUNTER — HOSPITAL ENCOUNTER (OUTPATIENT)
Dept: PHYSICAL THERAPY | Facility: HOSPITAL | Age: 75
Setting detail: THERAPIES SERIES
Discharge: HOME | End: 2025-06-24
Payer: MEDICARE

## 2025-06-24 DIAGNOSIS — Z85.21 HISTORY OF LARYNGEAL CANCER: Primary | ICD-10-CM

## 2025-06-24 DIAGNOSIS — Z92.3 HISTORY OF RADIATION THERAPY: ICD-10-CM

## 2025-06-24 DIAGNOSIS — F17.200 CURRENT EVERY DAY SMOKER: ICD-10-CM

## 2025-06-24 DIAGNOSIS — R13.12 OROPHARYNGEAL DYSPHAGIA: ICD-10-CM

## 2025-06-24 DIAGNOSIS — C32.1 SQUAMOUS CELL CARCINOMA OF EPIGLOTTIS: ICD-10-CM

## 2025-06-24 DIAGNOSIS — Z91.89 AT RISK FOR LYMPHEDEMA: Primary | ICD-10-CM

## 2025-06-24 DIAGNOSIS — Z85.118 HISTORY OF PRIMARY NON-SMALL CELL CARCINOMA OF RIGHT LUNG: ICD-10-CM

## 2025-06-24 PROCEDURE — 63710000001 BARIUM SULFATE 40 % PASTE

## 2025-06-24 PROCEDURE — 92611 MOTION FLUOROSCOPY/SWALLOW: CPT | Performed by: SPEECH-LANGUAGE PATHOLOGIST

## 2025-06-24 PROCEDURE — A9270 NON-COVERED ITEM OR SERVICE: HCPCS

## 2025-06-24 PROCEDURE — 74230 X-RAY XM SWLNG FUNCJ C+: CPT

## 2025-06-24 PROCEDURE — 97140 MANUAL THERAPY 1/> REGIONS: CPT

## 2025-06-24 PROCEDURE — 63710000001 BARIUM SULFATE 40 % SUSPENSION

## 2025-06-24 PROCEDURE — 63710000001 BARIUM SULFATE 40 % RECONSTITUTED SUSPENSION

## 2025-06-24 RX ADMIN — BARIUM SULFATE 50 ML: 400 SUSPENSION ORAL at 09:17

## 2025-06-24 RX ADMIN — BARIUM SULFATE 25 ML: 400 SUSPENSION ORAL at 09:17

## 2025-06-24 RX ADMIN — BARIUM SULFATE 25 ML: 400 PASTE ORAL at 09:17

## 2025-06-24 RX ADMIN — BARIUM SULFATE 50 ML: 0.81 POWDER, FOR SUSPENSION ORAL at 09:17

## 2025-06-24 NOTE — MBS/VFSS/FEES
Speech Language Pathology   MBS FEES / Discharge Summary  [facility]       Patient Name: Se Ramirez  : 1950  MRN: [id number]    Today's Date: 2025      Visit Date: 2025   SPEECH-LANGUAGE PATHOLOGY EVALUTION - MBS/VFSS  Subjective: The patient was seen on this date for a VFSS(Videofluoroscopic Swallowing Study).  Patient was alert and cooperative.    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 Prior VFSS completed 2025 with deep penetration and aspiration with thin liquids. Seeing lymphedema. Just got PEG tube removed.   Objective: Risks/benefits were reviewed with the patient, and consent was obtained. Oral motor examination results: WFL. The study was completed with SLP and Radiologist present. The patient was seen in lateral view(s). Textures given included thin liquid, nectar thick liquid, honey thick liquid, puree consistency, mechanical soft consistency, and regular consistency.  Assessment:   Observations:   Trial 1: Nectar Thick - Straw  Route: Clinician-Fed  Oral Phase: Functional oral acceptance with reduced oral control. Premature spillage to the tip of the epiglottis.   Pharyngeal Phase: Trace penetration during the swallow. Reduced hyolaryngeal excursion, reduced epiglottic inversion, and reduced posterior pharyngeal stripping wave.   Trial Result: Diffuse mild residue trailing from the lingual surface, vallecular, and pyriforms. Extra swallow cued which is effective in clearing most of the residue; however, deeper penetration of residue is observed.      Trial 2: Thin Liquid - Straw  Route: Clinician-Fed  Oral Phase: Functional oral acceptance with improved oral control.   Pharyngeal Phase: Penetration during and after the swallow due to residue is observed.   Trial Result: Mild base of tongue, vallecular, and pyriform residue.      Trial 3: Honey Thick -  Spoon  Route: Clinician-Fed  Oral Phase: Functional oral acceptance. Effortful appearing oral transit noted.   Pharyngeal Phase: Swallow completed with no new penetration or aspiration. Residue from prior trial remains in laryngeal vestibule. Continues with global weakness.   Trial Result: Mild base of tongue, vallecula, and pyriform residue. 2x extra swallow assists in clearing some of the residue.      Trial 4: Pudding Thick Liquid - Spoon  Route: Clinician-Fed  Oral Phase: Functional oral acceptance and control.   Pharyngeal Phase: Swallow completed with no penetration or aspiration.    Trial Result: Mild oral and vallecular residue. Extra swallow completed which was effective in clearing residue to a minimal amount in the vallecula.      Trial 5: Soft Solids  Route: Clinician-Fed  Oral Phase: Functional oral acceptance and control. Functional rotary chew with soft solids.   Pharyngeal Phase: No new penetration or aspiration observed.   Trial Result: Mild base of tongue and vallecular residue. 1x extra swallow not effective in clearing residue.      Trial 6: Regular Solids  Route: Clinician-Fed  Oral Phase: Functional oral acceptance and control. Functional mastication of the regular solid.   Pharyngeal Phase: No penetration or aspiration observed.   Trial Result: Mild oral, base of tongue, and vallecular residue. Extra swallow completed which is effective in clearing oral residue, vallecular residue remains.      Trial 7: Thin Liquid - Straw  Route: Clinician-Fed  Oral Phase: Functional oral acceptance with reduced oral control   Pharyngeal Phase: Swallow completed with penetration during the swallow.   Trial Result: Trace residue along lingual surface and within vallecula.      Trial 8: Chin tuck  SLP cued chin tuck which is partially effective in clearing residue; however, difficult for patient to initiate swallow in this position.     SLP Findings: Patient presents with moderate oropharyngeal dysphagia.    Recommendations: Diet Textures: regular and thin liquids. Medications should be taken  as tolerated.   Recommended Strategies: upright for PO and small bites and sips. Oral care before meals and PRN.  Other Recommended Evaluations: Referral to OP SLP for continued swallowing therapy and continue lymphedema treatment    Dysphagia therapy is recommended.    Visit Dx:     ICD-10-CM ICD-9-CM   1. History of laryngeal cancer  Z85.21 V10.21   2. History of radiation therapy  Z92.3 V15.3   3. History of primary non-small cell carcinoma of right lung  Z85.118 V10.11   4. Current every day smoker  F17.200 305.1   5. 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    Tobacco user    Right upper lobe pulmonary nodule    Malignant neoplasm of upper lobe of right lung    Overweight    Hilar adenopathy    Mediastinal adenopathy    Pre-procedure lab exam    Pulmonary emphysema    Esophageal dysphagia    Multiple lung nodules    Personal history of nicotine dependence    Elevated liver function tests    Dysphagia    Hoarseness    Current every day smoker    Squamous cell carcinoma of epiglottis    Oral pharyngeal candidiasis    Acute renal failure    Hypernatremia    Severe protein-calorie malnutrition        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    History of adenomatous polyp of colon     History of colon polyps     History of lung  cancer     Right lung    Hyperlipidemia     Throat cancer         Past Surgical History:   Procedure Laterality Date    BRONCHOSCOPY N/A 02/15/2021    Procedure: BRONCHOSCOPY;  Surgeon: Bruce Murillo MD;  Location: [facility] OR;  Service: Cardiothoracic;  Laterality: N/A;    CATARACT EXTRACTION      CHOLECYSTECTOMY      COLONOSCOPY  11/20/2012    One 1cm tubular adenomatous polyp in the sigmoid colon; One 5mm hyperplastic polyp in the rectum; Diverticulosis; Repeat 4 years     COLONOSCOPY N/A 03/30/2017    Two 4-6mm tubular adenomatous polyps at the hepatic flexure; One 5mm tubular adenomatous polyp in the transverse colon; One 11mm tubular adenomatous polyp in the sigmoid colon; One 6mm tubular adenomatous polyp in the rectum; Diverticulosis in the left colon; Repeat 3 years     COLONOSCOPY  12/17/2009    One less than 5mm tubular adenomatous polyp in the cecum; One 5mm hyperplastic polyp in the transverse; One less than 3mm hyperplastic polyp in the rectum; Diverticulosis; Repeat 3 years     COLONOSCOPY N/A 07/02/2020    Two 5-7mm tubular adenomatous polyps at the hepatic flexure; One 6mm tubular adenomatous polyp in the transverse colon; Diverticulosis in the left colon; The entire examined colon is normal on direct and retroflexion views; Repeat 5 years    COLONOSCOPY N/A 06/29/2022    Diverticulosis in the left colon; One 6mm tubular adenomatous polyp in the descending colon; One 4mm tubular adenomatous polyp in the descending colon; Congested mucosa in the entire examined colon-biopsied; Repeat 5 years    ENDOSCOPY N/A 03/27/2019    Medium-sized HH; Normal stomach; Normal examined duodenum-biopsied    ENDOSCOPY N/A 06/09/2022    Small HH; Non-erosive gastritis-biopsied; Normal examined duodenum-biopsied    ENDOSCOPY N/A 2/24/2025    Procedure: ESOPHAGOGASTRODUODENOSCOPY WITH PERCUTANEOUS ENDOSCOPIC GASTROSTOMY TUBE INSERTION;  Surgeon: Amie Donnelly MD;  Location: [facility] ENDOSCOPY;  Service: General;   Laterality: N/A;  pre op:   post op:  pcp:Diego Lucio MD    ERCP  07/30/2024    Dr. Buzz Dangelo-Choledocholithiasis status post balloon sweep extraction wtih balloon assisted sphincteroplasty; Placement of biliary stent as well as prophylactic pancreatic stent; Repeat ERCP 3 months for biliary stent removal    FOOT SURGERY Left     ORIF    HERNIA REPAIR      INNER EAR SURGERY      LARYNGOSCOPY N/A 1/20/2025    Procedure: DIRECT LARYNGOSCOPY WITH EXCISION OF LESION;  Surgeon: Antelmo Dangelo MD;  Location: [facility] OR;  Service: ENT;  Laterality: N/A;    MEDIASTINOSCOPY N/A 02/15/2021    Procedure: CERVICAL MEDIASTINOSCOPY WITH LYMPH NODE DISECTION;  Surgeon: Bruce Murillo MD;  Location: [facility] OR;  Service: Cardiothoracic;  Laterality: N/A;    PEG TUBE INSERTION N/A 2/24/2025    Procedure: PERCUTANEOUS ENDOSCOPIC GASTROSTOMY TUBE INSERTION;  Surgeon: Amie Donnelly MD;  Location: [facility] ENDOSCOPY;  Service: General;  Laterality: N/A;  pre op:  post op:  pcp:Diego Lucio MD    THORACOSCOPY Right 02/15/2021    Procedure: THORACOSCOPY, WEDGE RESECTION OF RIGHT UPPER LOBE WITH FROZEN,  , MEDIASTINAL LYMPH NODE DISSECTION;  Surgeon: Bruce Murillo MD;  Location: [facility] OR;  Service: Cardiothoracic;  Laterality: Right;    TONSILLECTOMY                        SLP Adult Swallow Evaluation       Row Name 06/24/25 0907       Rehab Evaluation    Document Type discharge evaluation/summary  -MG    Subjective Information no complaints  -MG    Patient Observations alert;cooperative;agree to therapy  -MG    Patient Effort good  -MG    Symptoms Noted During/After Treatment none  -MG       General Information    Patient Profile Reviewed yes  -MG    Pertinent History Of Current Problem 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 Prior VFSS completed 1/16/2025 with deep penetration and aspiration with thin liquids.  Seeing lymphedema. Just got PEG tube removed.  -MG    Current Method of Nutrition regular textures;thin liquids  -MG    Precautions/Limitations, Vision WFL;for purposes of eval  -MG    Precautions/Limitations, Hearing WFL;for purposes of eval  -MG    Prior Level of Function-Communication WFL  -MG    Prior Level of Function-Swallowing no diet consistency restrictions  -MG    Plans/Goals Discussed with patient;agreed upon  -MG    Barriers to Rehab medically complex  -MG    Patient's Goals for Discharge return to all previous roles/activities  -MG       Pain    Additional Documentation Pain Scale: FACES Pre/Post-Treatment (Group)  -MG       Pain Scale: FACES Pre/Post-Treatment    Pain: FACES Scale, Pretreatment 0-->no hurt  -MG    Posttreatment Pain Rating 0-->no hurt  -MG       Oral Motor Structure and Function    Secretion Management WNL/WFL  -MG    Mucosal Quality dry  -MG    Volitional Swallow weak  -MG    Volitional Cough WFL  -MG       Oral Musculature and Cranial Nerve Assessment    Oral Motor General Assessment WFL  -MG       Respiratory    Respiratory Status room air  -MG       MBS/VFSS    Utensils Used spoon;straw  -MG    Consistencies Trialed nectar/syrup-thick liquids;thin liquids;honey-thick liquids;pudding thick;soft to chew textures;regular textures  -MG       MBS/VFSS Interpretation    Oral Prep Phase WFL  -MG    Oral Transit Phase WFL  -MG    Oral Residue WFL  -MG    VFSS Summary See note  -MG       Initiation of Pharyngeal Swallow    Initiation of Pharyngeal Swallow WFL  -MG    Pharyngeal Phase impaired pharyngeal phase of swallowing  -MG    Pharyngeal Phase, Comment see note for detail  -MG       Esophageal Phase    Esophageal Phase no impairments  -MG       SLP Evaluation Clinical Impression    SLP Swallowing Diagnosis mild;oral dysphagia;pharyngeal dysphagia  -MG    Functional Impact risk of aspiration/pneumonia;risk of malnutrition;risk of dehydration  -MG    Rehab Potential/Prognosis, Swallowing  adequate, monitor progress closely  -MG    Swallow Criteria for Skilled Therapeutic Interventions Met demonstrates skilled criteria  -MG       Recommendations    Therapy Frequency (Swallow) evaluation only  -MG    Predicted Duration Therapy Intervention (Days) until discharge  -MG    SLP Diet Recommendation regular textures;thin liquids  -MG    Recommended Precautions and Strategies upright posture during/after eating;small bites of food and sips of liquid;general aspiration precautions  -MG    Oral Care Recommendations Oral Care BID/PRN;Toothbrush  -MG    SLP Rec. for Method of Medication Administration as tolerated  -MG    Monitor for Signs of Aspiration yes;notify SLP if any concerns  -MG    Anticipated Discharge Disposition (SLP) home with OP services  -MG    Demonstrates Need for Referral to Another Service speech therapy  -MG              User Key  (r) = Recorded By, (t) = Taken By, (c) = Cosigned By      Initials Name Provider Type    Ruth Pearson MS CCC-SLP Speech and Language Pathologist                                    OP SLP Education       Row Name 06/24/25 1242       Education    Barriers to Learning No barriers identified  -MG    Education Provided Described results of evaluation;Patient expressed understanding of evaluation  -MG    Assessed Learning needs;Learning motivation;Learning preferences;Learning readiness  -MG    Learning Motivation Strong  -MG    Learning Method Explanation  -MG    Teaching Response Verbalized understanding  -MG    Education Comments Results and recommendations reviewed directly following the evaluation.  -MG              User Key  (r) = Recorded By, (t) = Taken By, (c) = Cosigned By      Initials Name Effective Dates    Rtuh Pearson MS CCC-SLP 07/11/23 -                                          Time Calculation:   Untimed Charges  SLP Eval/Re-eval : ST Motion Fluoro Eval Swallow - 08585  84393-HF Motion Fluoro Eval Swallow Minutes: 115  Total  Minutes  Untimed Charges Total Minutes: 115   Total Minutes: 115                Ruth Sherwood, MS CCC-SLP  6/24/2025

## 2025-06-24 NOTE — THERAPY TREATMENT NOTE
Physical Therapy Treatment Note and 30 Day Progress Note  [facility]  A department [facility]  [address], [place] [address]    Patient: Se Ramirez                                                 Visit Date: 2025  :     1950    Referring practitioner:    Jerome Johnson MD  Date of Initial Visit:          Type: THERAPY  Episode: Lymphedema  Number of visits this episode: 10    Visit Diagnoses:    ICD-10-CM ICD-9-CM   1. 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     SUBJECTIVE     Subjective: Patient states the chip bag that Ellie made for him last treatment has helped a lot with softening the tissue in his neck.  Patient wears the neck compression in the chip bag at night.  Patient states he has to leave today's treatment 15 minutes early because he has a swallow study today.      PAIN: 0/10         OBJECTIVE     Objective    Lymphedema       Row Name 25 0800             Subjective Pain    Able to rate subjective pain? yes  -AL      Pre-Treatment Pain Level 0  -AL      Post-Treatment Pain Level 0  -AL         Manual Lymphatic Drainage    Manual Lymphatic Drainage initial sequence;head/neck treatment;opened regional lymph nodes  -AL      Initial Sequence short neck;supraclavicular;abdomen;diaphragmatic breathing  -AL      Supraclavicular right;left  -AL      Abdomen --  Abdominals today due to patient being tender from the feeding tube removal.  -AL      Diaphragmatic Breathing x 10  -AL      Opened Regional Lymph Nodes axillary  -AL      Axillary right;left  -AL      Head/Neck Treatment full/complex MLD  -AL      Full/Complex MLD Used the hand-held massager to the anterior neck followed this with MLD.  -AL      Manual Lymphatic Drainage Comments extra time on MLD to the anterior neck and submandicular areas.  -AL      Manual Therapy 30  -AL         Compression/Skin Care     Compression/Skin Care Comments Wear the compression garment with the chip bag over the anterior neck under the garment.  -AL                User Key  (r) = Recorded By, (t) = Taken By, (c) = Cosigned By      Initials Name Provider Type    Narcisa Gross, REYES, TONY Physical Therapist Assistant                        Therapy Education/Self Care [id number]   Education offered today Work on the HEP and MLD   Medbridge Code    Ongoing HEP   Work on complete decongestive therapy   Timed Minutes        Total Timed Treatment:     45   mins  Total Time of Visit:             45   mins         ASSESSMENT/PLAN     GOALS          Goals                                          Progress Note 7/23/25                                                      Recert due by 7/22/25   STG by:  Comments Date Status   Patient will have a good basic understanding of lymphedema and its suggested risk reduction practices Continue to educate 6/24/2025 Ongoing   Patient will be independent with remedial HEP for lymphedema He needs to work on the HEP more often 6/24/2025 Ongoing   Patient will be independent with self MLD for lymphedema Patient is working on the MLD 6/24/2025 Met             LTG by:          Patient will have appropriate compression garments for lymphedema maintenance Patient is wearing the compression garment each night and using the chip bag with the garment. 6/24/2025 Met   Patient will have no sign or symptoms of infection No signs or symptoms of infection 6/24/2025 Ongoing   Patient will be independent with comprehensive maintenance program for lymphedema He is working towards his home maintenance program 6/24/2025 Ongoing             Anticipated CPT codes: Therapeutic Exercise 74887, Manual Therapy 85611, Therapeutic Activity 79122, and Self Care/Home Management 31379      Assessment/Plan     ASSESSMENT:   Patient is using the compression garment with the chip bag at night, this has helped soften the dense tissue at the  anterior neck.  The tissue is softer before treatment I am able to further soften the treatment and the easier.  Went to make a cherry pit pack for patient to alternate using with the other chip bag that Ellie has made.  Both of these should help soften the tissue at the anterior neck.  I did have to decrease today's treatment by 15 minutes as patient is having a swallow study today.    PLAN:   Lymphedema treatment 1 time a week for 8-12 more weeks.      SIGNATURE: Narcisa Sands PTA, [place] License #: [id number]  Electronically Signed on 6/24/2025        [address] [place] [address]. [phone]  [phone]

## 2025-06-24 NOTE — THERAPY TREATMENT NOTE
Progress Note Addendum    Patient: Se Ramirez           : 1950  Visit Date: 2025  Referring practitioner: Jerome Johnson MD  Date of Initial Visit: Type: THERAPY  Episode: Lymphedema    Visit Diagnoses:    ICD-10-CM ICD-9-CM   1. 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          Clinical Progress: improved  Home Program Compliance: Yes  Progress toward previous goals: Partially Met  Prognosis to achieve goals: good    Objective     Assessment & Plan       Plan  Therapy options: will be seen for skilled therapy services  Frequency: 1x week  Duration in weeks: 12  Treatment plan discussed with: PTA and patient        Anticipated CPT codes: Therapeutic Exercise 48551, Manual Therapy 19131, Therapeutic Activity 21315, and Self Care/Home Management 84859    I reviewed the treatment and goals with Colleen Sands and agree with the POC.    SIGNATURE: Ellie Garcia, PT, DPT, CLT-KYLE, License #: [id number]  Electronically Signed on 2025

## 2025-07-03 ENCOUNTER — HOSPITAL ENCOUNTER (OUTPATIENT)
Facility: HOSPITAL | Age: 75
Setting detail: THERAPIES SERIES
Discharge: HOME | End: 2025-07-03
Payer: MEDICARE

## 2025-07-03 DIAGNOSIS — C76.0 HEAD AND NECK CANCER: ICD-10-CM

## 2025-07-03 DIAGNOSIS — R13.12 OROPHARYNGEAL DYSPHAGIA: Primary | ICD-10-CM

## 2025-07-03 PROCEDURE — 92610 EVALUATE SWALLOWING FUNCTION: CPT | Performed by: SPEECH-LANGUAGE PATHOLOGIST

## 2025-07-03 NOTE — PROGRESS NOTES
Speech/Language Pathology Initial Evaluation and Plan of Care  [address] [place] [address]    Patient: Se Ramirez               : 1950  Visit Date: 7/3/2025  Referring practitioner: Jerome Johnson MD  Date of Initial Visit: 7/3/2025      Visit Diagnoses:    ICD-10-CM ICD-9-CM   1. Oropharyngeal dysphagia  R13.12 787.22   2. 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    History of adenomatous polyp of colon     History of colon polyps     History of lung cancer     Right lung    Hyperlipidemia     Throat cancer      Past Surgical History:   Procedure Laterality Date    BRONCHOSCOPY N/A 02/15/2021    Procedure: BRONCHOSCOPY;  Surgeon: Bruce Murillo MD;  Location: [facility];  Service: Cardiothoracic;  Laterality: N/A;    CATARACT EXTRACTION      CHOLECYSTECTOMY      COLONOSCOPY  2012    One 1cm tubular adenomatous polyp in the sigmoid colon; One 5mm hyperplastic polyp in the rectum; Diverticulosis; Repeat 4 years     COLONOSCOPY N/A 2017    Two 4-6mm tubular adenomatous polyps at the hepatic flexure; One 5mm tubular adenomatous polyp in the transverse colon; One 11mm tubular adenomatous polyp in the sigmoid colon; One 6mm tubular adenomatous polyp in the rectum; Diverticulosis in the left colon; Repeat 3 years     COLONOSCOPY  2009    One less than 5mm tubular adenomatous polyp in the cecum; One 5mm hyperplastic polyp in the transverse; One less than 3mm hyperplastic polyp in the rectum; Diverticulosis; Repeat 3 years     COLONOSCOPY N/A 2020    Two 5-7mm tubular adenomatous polyps at the hepatic flexure; One 6mm tubular adenomatous polyp in  the transverse colon; Diverticulosis in the left colon; The entire examined colon is normal on direct and retroflexion views; Repeat 5 years    COLONOSCOPY N/A 06/29/2022    Diverticulosis in the left colon; One 6mm tubular adenomatous polyp in the descending colon; One 4mm tubular adenomatous polyp in the descending colon; Congested mucosa in the entire examined colon-biopsied; Repeat 5 years    ENDOSCOPY N/A 03/27/2019    Medium-sized HH; Normal stomach; Normal examined duodenum-biopsied    ENDOSCOPY N/A 06/09/2022    Small HH; Non-erosive gastritis-biopsied; Normal examined duodenum-biopsied    ENDOSCOPY N/A 2/24/2025    Procedure: ESOPHAGOGASTRODUODENOSCOPY WITH PERCUTANEOUS ENDOSCOPIC GASTROSTOMY TUBE INSERTION;  Surgeon: Amie Donnelly MD;  Location: [facility] ENDOSCOPY;  Service: General;  Laterality: N/A;  pre op:   post op:  pcp:Diego Lucio MD    ERCP  07/30/2024    Dr. Buzz Dangelo-Choledocholithiasis status post balloon sweep extraction wtih balloon assisted sphincteroplasty; Placement of biliary stent as well as prophylactic pancreatic stent; Repeat ERCP 3 months for biliary stent removal    FOOT SURGERY Left     ORIF    HERNIA REPAIR      INNER EAR SURGERY      LARYNGOSCOPY N/A 1/20/2025    Procedure: DIRECT LARYNGOSCOPY WITH EXCISION OF LESION;  Surgeon: Antelmo Dangelo MD;  Location: [facility] OR;  Service: ENT;  Laterality: N/A;    MEDIASTINOSCOPY N/A 02/15/2021    Procedure: CERVICAL MEDIASTINOSCOPY WITH LYMPH NODE DISECTION;  Surgeon: Bruce Murillo MD;  Location: [facility] OR;  Service: Cardiothoracic;  Laterality: N/A;    PEG TUBE INSERTION N/A 2/24/2025    Procedure: PERCUTANEOUS ENDOSCOPIC GASTROSTOMY TUBE INSERTION;  Surgeon: Amie Donnelly MD;  Location: [facility];  Service: General;  Laterality: N/A;  pre op:  post op:  pcp:Diego Lucio MD    THORACOSCOPY Right 02/15/2021    Procedure: THORACOSCOPY, WEDGE RESECTION OF RIGHT UPPER LOBE WITH FROZEN,  , MEDIASTINAL  LYMPH NODE DISSECTION;  Surgeon: Bruce Murillo MD;  Location: [facility];  Service: Cardiothoracic;  Laterality: Right;    TONSILLECTOMY         SUBJECTIVE     Subjective: Patient seen today for swallowing clinical evaluation  Patient presents with the following symptoms: coughing, difficulty swallowing solids, difficulty swallowing liquids, difficulty swallowing pills, food getting stuck, and history of aspiration   Date of Onset: June 2024  History of present problems: Patient was diagnosed earlier this year with 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  He received a PEG tube which has been recently removed and he has returned to PO diet.  His current weight is 158 pounds.  The functional impact on the patient: Difficulty swallowing and risk of aspiration/pneumonia.   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 Previous lung cancer, GERD, COPD, hearing loss  Current Diet Level:   Solid: 7 -  Easy to Chew/Regular  Liquid: 0 - Thin  Non-oral Feeding: N/A, PEG tube removed    OBJECTIVE     CLINICAL OBSERVATIONS  Respiratory/Swallow Coordination: Mildly impaired  Position During Evaluation: Upright  Anatomy/Physiology: Patient presents with redness in posterior pharynx.  Oral motor WNL.  Patient presents with lymphedema.  Dentition: Patient is endentulous, Patient has dentures/partials, Dentures are not typically work for meals, and dentures poorly fitting, patient states that they work.  Oral Health: Oral cavity is clean  Awareness/Control of Secretions: Patient swallows saliva and patient complains of xerostomia  Method of Food Administration: Cup with thin water  Oral Phase symptoms: Decreased oral control bolus formation  Pharyngeal  Symptoms: multiple swallows with consistencies of thin water, coughing with consistencies of thin water, and throat clearing with consistencies of thin water      INSTRUMENTAL ASSESSMENT  Instrumental Exam Completed?:Yes: :  Date: 6/24/2025  Hospital/Medical Center: [facility]  Results: Moderate oropharyngeal dysphagia  Recommendations: Diet textures: Regular and thin liquids.  Medication to be taken as tolerated  Please see SLP report of VFSS  Severity Level of Dysphagia: moderate dysphagia  Consistencies Aspirated/Penetrated: Penetration noted with trials of thin liquid, and nectar thick liquid  Summary Statement: Referral to OP SLP for continued swallowing therapy and continue lymphedema treatment    Dysphagia therapy is recommended.    Please see SLP report of VFSS      PATIENT SELF ASSESSMENT    EAT 10  0= No problem 4= Severe problem  My swallowing has caused me to lose weight 0   My Swallowing problem interferes with my ability to go out for meals  0   Swallowing liquids takes extra effort 0   Swallowing solids takes extra effort 1   Swallowing pills takes extra effort 3   Swallowing is painful 0   The pleasure of eating is affected by my swallowing 0   When I swallow, food sticks in my throat 2   I cough when I eat 1   Swallowing is stressful 0                                                                                                  Total Score 7     0-9 Minimal  10-19 Mild  20-29 Moderate 30-40 Severe    IMPRESSION/RECOMMENDATIONS  Factors Affecting Performance: no difficulty participating in study  Learning Motivation: strong  Education/Learning Comments: Patient demonstrated understanding of evaluation results and plan of care.   Clinical Impression:   Moderate: oropharyngeal phase dysphagia  Impact on Function: risk for inadequate nutrition, inadequate hydration, aspiration, and pneumonia  Prognosis Comment: Good for established goals      Therapy Education/Self Care    Details:  Evaluation results and plan of care   Given Home Exercise Program  home strategies  handouts on Oral care and aspiration  Access Code: [email or web address]  URL: [email or web address]  Date: 07/03/2025  Prepared by: Sandra Marie    Exercises  - Tongue Resistance with Front of Tongue   - 1 x daily - 7 x weekly - 3 sets - 10 reps  - Tongue Resistance with Top of Tongue   - 1 x daily - 7 x weekly - 3 sets - 10 reps  - Tongue Resistance with Side of Tongue   - 1 x daily - 7 x weekly - 3 sets - 10 reps  - Effortful Swallow  - 2-3 x daily - 7 x weekly - 3 sets - 10 reps  - Supraglottic swallow  - 1 x daily - 7 x weekly - 3 sets - 10 reps    Patient Education  - Normal Swallowing & Aspiration  - Oral Care: How to Take Care of Your Mouth   Progress: New and Reinforced   Education provided to:  Patient   Level of understanding Verbalized and Demonstrated           Total Time of Visit:            75   mins    ASSESSMENT/PLAN     Goals                                            STG  Comments Status   Patient will improve oral skills to enhance safety and increase eating efficiency and bolus control as measured by improved bolus formation and improved posterior propulsion of the bolus.  New     Patient will improve epiglottic inversion, improve laryngeal closure, improve UES opening, increase base of the tongue strength and posterior pharyngeal wall excursion, and increase efficiency of cough to clear residue from the airway as measured by decreased overt signs and symptoms of aspiration and decreased penetration and aspiration on repeat instrumental swallow study, if applicable.  Patient or EMST-150 for swallow exercises.  Reviewed prior swallow exercises and patient to continue. New   Patient will report decreased difficulty with swallowing and improved EAT-10 score.  New   Patient will improve oral hygiene to enhance safety and decrease risk of aspiration pneumonia Patient given handout on oral care and aspiration New   LTG       Patient will safely consume full PO diet of regular consistency food and thin liquids without discomfort and difficulty or complications such as aspiration or pneumonia.   New               ASSESSMENT:   Patient is indicated for skilled care by a Speech/Language Pathologist.     Summary of Assessment: Patient presents with moderate oropharyngeal dysphagia secondary to radiation treatment for squamous cell carcinoma of the epiglottis.    Recommendations for Diet/Nutrition: aggressive oral care and full oral diet  Swallowing Precautions: upright position for at least 30 minutes after meals, alternative liquids and solids while eating, small sips and bites when eating, multiple swallows per bite or sip, and clearing cough/swallow  Therapy Recommendations: dysphagia therapy to address swallowing deficits  Screening indicates the further need for Physical Therapy.  Patient is seeing physical therapy for lymphedema    PLAN:  Details of Plan of Care: Initiate therapy and home exercises for dysphagia    Frequency: 1 time per week  Duration: 12 visits  Estimated Length of Session: 45 minutes    SPEECH/LANGUAGE PATHOLOGIST SIGNATURE: Sandra Marie, CCC-SLP, [place] License #: [id number]  Electronically Signed on 7/3/2025        Initial Certification  Certification Period: 7/3/2025 through 9/30/2025  I certify that the therapy services are furnished while this patient is under my care.  The services outlined above are required by this patient, and will be reviewed every 90 days.     PHYSICIAN: Jerome Johnson MD (NPI: [id number])    Signature:____________________________________________DATE: _________     Please sign and return via fax to [phone].   Thank you so much for letting us work with Se. I appreciate your letting us work with your patients. If you have any questions or concerns, please don't hesitate to contact me.          Outpatient Therapy Services  [address] [place] [address]  [phone]

## 2025-07-07 ENCOUNTER — OFFICE VISIT (OUTPATIENT)
Dept: SURGERY | Facility: CLINIC | Age: 75
End: 2025-07-07
Payer: MEDICARE

## 2025-07-07 VITALS
WEIGHT: 160 LBS | OXYGEN SATURATION: 97 % | HEIGHT: 70 IN | DIASTOLIC BLOOD PRESSURE: 72 MMHG | BODY MASS INDEX: 22.9 KG/M2 | SYSTOLIC BLOOD PRESSURE: 120 MMHG | HEART RATE: 67 BPM

## 2025-07-07 DIAGNOSIS — Z43.1 PEG (PERCUTANEOUS ENDOSCOPIC GASTROSTOMY) ADJUSTMENT/REPLACEMENT/REMOVAL: Primary | ICD-10-CM

## 2025-07-07 PROCEDURE — 1160F RVW MEDS BY RX/DR IN RCRD: CPT

## 2025-07-07 PROCEDURE — 99212 OFFICE O/P EST SF 10 MIN: CPT

## 2025-07-07 PROCEDURE — 1159F MED LIST DOCD IN RCRD: CPT

## 2025-07-10 ENCOUNTER — OFFICE VISIT (OUTPATIENT)
Dept: OTOLARYNGOLOGY | Facility: CLINIC | Age: 75
End: 2025-07-10
Payer: MEDICARE

## 2025-07-10 VITALS
HEIGHT: 70 IN | BODY MASS INDEX: 22.9 KG/M2 | SYSTOLIC BLOOD PRESSURE: 142 MMHG | DIASTOLIC BLOOD PRESSURE: 80 MMHG | WEIGHT: 160 LBS

## 2025-07-10 DIAGNOSIS — C10.9 SQUAMOUS CELL CARCINOMA OF OROPHARYNX: Primary | ICD-10-CM

## 2025-07-10 DIAGNOSIS — J38.7 LESION OF EPIGLOTTIS: ICD-10-CM

## 2025-07-10 DIAGNOSIS — Z72.0 TOBACCO USE: ICD-10-CM

## 2025-07-10 DIAGNOSIS — R13.10 DYSPHAGIA, UNSPECIFIED TYPE: ICD-10-CM

## 2025-07-10 DIAGNOSIS — K21.9 LARYNGOPHARYNGEAL REFLUX (LPR): ICD-10-CM

## 2025-07-10 DIAGNOSIS — R49.0 HOARSENESS: ICD-10-CM

## 2025-07-10 NOTE — PROGRESS NOTES
YOB: 1950  Location: [facility]  Location Address: [address] [place] [address], [facility], [address] [place] [address]  Location Phone: [phone]    Chief Complaint   Patient presents with    Cancer Surveillance     Follow up with no problems.       History of Present Illness  Se Ramirez is a 74 y.o. male.  Se Ramirez is here for follow up of ENT complaints. The patient is s/p direct laryngoscopy with excision of epiglottic lesion on 25. Pathology revealed SCC [id number] BOT S/P XRT 2025. He completed radiation in 2025. He admits decreased taste and thickened mucous today. He's had a recent dysphagia study that recommends continued dysphagia therapy and lymphedema treatment. He takes nexium daily before breakfast.      Reviewed:     Reviewed:     Final Diagnosis   Laryngeal surface of epiglottis, biopsies:  Invasive keratinizing squamous cell carcinoma.        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    History of adenomatous polyp of colon     History of colon polyps     History of lung cancer     Right lung    Hyperlipidemia     Throat cancer        Past Surgical History:   Procedure Laterality Date    BRONCHOSCOPY N/A 02/15/2021    Procedure: BRONCHOSCOPY;  Surgeon: Bruce Murillo MD;  Location: [facility];  Service: Cardiothoracic;  Laterality: N/A;    CATARACT EXTRACTION      CHOLECYSTECTOMY      COLONOSCOPY  2012    One 1cm tubular adenomatous polyp in the sigmoid colon; One 5mm hyperplastic polyp in the rectum; Diverticulosis; Repeat 4 years     COLONOSCOPY N/A 2017    Two 4-6mm tubular adenomatous polyps at the hepatic flexure; One 5mm tubular adenomatous polyp in the transverse colon; One 11mm tubular adenomatous polyp in the  sigmoid colon; One 6mm tubular adenomatous polyp in the rectum; Diverticulosis in the left colon; Repeat 3 years     COLONOSCOPY  12/17/2009    One less than 5mm tubular adenomatous polyp in the cecum; One 5mm hyperplastic polyp in the transverse; One less than 3mm hyperplastic polyp in the rectum; Diverticulosis; Repeat 3 years     COLONOSCOPY N/A 07/02/2020    Two 5-7mm tubular adenomatous polyps at the hepatic flexure; One 6mm tubular adenomatous polyp in the transverse colon; Diverticulosis in the left colon; The entire examined colon is normal on direct and retroflexion views; Repeat 5 years    COLONOSCOPY N/A 06/29/2022    Diverticulosis in the left colon; One 6mm tubular adenomatous polyp in the descending colon; One 4mm tubular adenomatous polyp in the descending colon; Congested mucosa in the entire examined colon-biopsied; Repeat 5 years    ENDOSCOPY N/A 03/27/2019    Medium-sized HH; Normal stomach; Normal examined duodenum-biopsied    ENDOSCOPY N/A 06/09/2022    Small HH; Non-erosive gastritis-biopsied; Normal examined duodenum-biopsied    ENDOSCOPY N/A 2/24/2025    Procedure: ESOPHAGOGASTRODUODENOSCOPY WITH PERCUTANEOUS ENDOSCOPIC GASTROSTOMY TUBE INSERTION;  Surgeon: Amie Donnelly MD;  Location: [facility] ENDOSCOPY;  Service: General;  Laterality: N/A;  pre op:   post op:  pcp:Diego Lucio MD    ERCP  07/30/2024    Dr. Buzz Dangelo-Choledocholithiasis status post balloon sweep extraction wtih balloon assisted sphincteroplasty; Placement of biliary stent as well as prophylactic pancreatic stent; Repeat ERCP 3 months for biliary stent removal    FOOT SURGERY Left     ORIF    HERNIA REPAIR      INNER EAR SURGERY      LARYNGOSCOPY N/A 1/20/2025    Procedure: DIRECT LARYNGOSCOPY WITH EXCISION OF LESION;  Surgeon: Antelmo Dangelo MD;  Location: [facility] OR;  Service: ENT;  Laterality: N/A;    MEDIASTINOSCOPY N/A 02/15/2021    Procedure: CERVICAL MEDIASTINOSCOPY WITH LYMPH NODE DISECTION;  Surgeon:  Bruce Murillo MD;  Location:  [facility] OR;  Service: Cardiothoracic;  Laterality: N/A;    PEG TUBE INSERTION N/A 2/24/2025    Procedure: PERCUTANEOUS ENDOSCOPIC GASTROSTOMY TUBE INSERTION;  Surgeon: Amie Donnelly MD;  Location:  [facility] ENDOSCOPY;  Service: General;  Laterality: N/A;  pre op:  post op:  pcp:Diego Lucio MD    THORACOSCOPY Right 02/15/2021    Procedure: THORACOSCOPY, WEDGE RESECTION OF RIGHT UPPER LOBE WITH FROZEN,  , MEDIASTINAL LYMPH NODE DISSECTION;  Surgeon: Bruce Murillo MD;  Location:  [facility] OR;  Service: Cardiothoracic;  Laterality: Right;    TONSILLECTOMY         Outpatient Medications Marked as Taking for the 7/10/25 encounter (Office Visit) with Antelmo Dangelo MD   Medication Sig Dispense Refill    albuterol sulfate  (90 Base) MCG/ACT inhaler Inhale 2 puffs Every 4 (Four) Hours As Needed for Wheezing or Shortness of Air. 18 g 3    aspirin 81 MG EC tablet Take 1 tablet by mouth Daily.      atorvastatin (LIPITOR) 10 MG tablet Take 1 tablet by mouth Daily.      Cholecalciferol (VITAMIN D3 EXTRA STRENGTH PO) Take 1 tablet by mouth Daily.      esomeprazole (nexIUM) 20 MG capsule Take 2 capsules by mouth 2 (Two) Times a Day. 120 capsule 3    Fluticasone-Umeclidin-Vilant (TRELEGY) 100-62.5-25 MCG/ACT inhaler Inhale 1 puff Daily. 60 each 3    vitamin C (ASCORBIC ACID) 250 MG tablet Take 1 tablet by mouth Daily.         Patient has no known allergies.    Family History   Problem Relation Age of Onset    Colon polyps Mother         Unknown age    Diabetes Mother     Colon cancer Neg Hx     Esophageal cancer Neg Hx     Liver cancer Neg Hx     Liver disease Neg Hx     Rectal cancer Neg Hx     Stomach cancer Neg Hx        Social History     Socioeconomic History    Marital status: Significant Other   Tobacco Use    Smoking status: Every Day     Current packs/day: 0.50     Average packs/day: 0.5 packs/day for 56.5 years (28.3 ttl pk-yrs)     Types: Cigarettes     Start date:  1/1/1969     Passive exposure: Current    Smokeless tobacco: Never    Tobacco comments:     Pt states about 7-8 cigarettes per day 7/3   Vaping Use    Vaping status: Never Used   Substance and Sexual Activity    Alcohol use: Not Currently     Alcohol/week: 3.0 standard drinks of alcohol     Types: 3 Shots of liquor per week     Comment: sober as of 06/01/2022    Drug use: Yes     Types: Marijuana     Comment: daily    Sexual activity: Defer       Review of Systems   Constitutional: Negative.    HENT:          Admits thickened mucous       Vitals:    07/10/25 1351   BP: 142/80       Body mass index is 22.96 kg/m².    Objective     Physical Exam  Vitals reviewed.   Constitutional:       Appearance: Normal appearance.   HENT:      Head: Normocephalic.      Right Ear: External ear normal.      Left Ear: External ear normal.      Ears:      Comments: Right hearing aid noted, removed for exam     Nose: Nose normal.      Mouth/Throat:      Lips: Pink.      Mouth: Mucous membranes are moist.      Dentition: Has dentures.      Pharynx: Oropharynx is clear.      Comments: See endoscopy  Musculoskeletal:      Cervical back: Full passive range of motion without pain.   Lymphadenopathy:      Cervical: No cervical adenopathy.   Neurological:      Mental Status: He is alert.   Psychiatric:         Behavior: Behavior is cooperative.         Assessment & Plan   Diagnoses and all orders for this visit:    1. Squamous cell carcinoma of oropharynx (Primary)    2. Dysphagia, unspecified type    3. Laryngopharyngeal reflux (LPR)    4. Lesion of epiglottis    5. Tobacco use    6. Hoarseness      * Surgery not found *  No orders of the defined types were placed in this encounter.    Change nexium to before breakfast and dinner  Reflux precautions    Be aware of the signs and symptoms of recurrent head and neck cancer including neck mass, persistent or recurrent sore throat, ear pain, hemoptysis, weight loss and hoarseness. If any of these  symptoms recur and or persist, call for an evaluation.      D/W patient increasing caloric intake and discussed cruciferous vegetables and protein shakes etc to maintain optimal nutrition.  The necessity of abstaining from tobacco products was also discussed particularly with respect to not smoking     Dr. Dangelo examined and discussed care with patient and agrees with treatment plan.     Return in about 3 months (around 10/10/2025) for Recheck.       Patient Instructions   Be aware of the signs and symptoms of recurrent head and neck cancer including neck mass, persistent or recurrent sore throat, ear pain, hemoptysis, weight loss and hoarseness. If any of these symptoms recur and or persist, call for an evaluation.      D/W patient increasing caloric intake and discussed cruciferous vegetables and protein shakes etc to maintain optimal nutrition.  The necessity of abstaining from tobacco products was also discussed particularly with respect to not smoking

## 2025-07-10 NOTE — PROGRESS NOTES
OPERATIVE NOTE:  Se Ramirez    DATE OF PROCEDURE: 7/10/2025    PROCEDURE:   Flexible Fiberoptic Laryngoscopy    ANESTHESIA:  None    REASON FOR PROCEDURE:  Procedure was recommended for re-evaluation of a lesion previously documented-surveillance of SCC [id number] BOT S/P XRT 4/2025  Risks, benefits and alternatives were discussed.      DETAILS of OPERATION:  The patient was seated in the exam chair.  A flexible fiberoptic laryngoscopy was performed through the oral cavity.  The scope was introduced into the oral cavity and directed to the level of the glottis, examining the structures of the oropharynx, base of tongue, vallecula, supraglottic larynx, glottic larynx, and hypopharynx.      FINDINGS:  Mucosal surfaces:   The mucosal surfaces demonstrated normal mucosa surfaces with moderate inflammation.  No masses or lesions were appreciated by visualization or palpation including bimanual palpation at the base of the tongue.    Base of tongue:  The base of tongue was found to have no mass or lesion.    Epiglottis:  The epiglottis was found to have no mass or lesion.    Aryepiglottic fold:  The AE folds were found to have no mass or lesion.    False Vocal Fold:  The false cords were found to have no mass or lesion.    True Vocal Cord:  The true vocal cords were found to have no mass or lesion. Both true vocal cords adduct and abduct normally    Arytenoid:   The arytenoids were found to have moderately severe interarytenoid edema with mild erythema.    Hypopharynx:  The hypopharynx was found to have no mass or lesion.    The patient tolerated procedure well.

## 2025-07-11 ENCOUNTER — APPOINTMENT (OUTPATIENT)
Dept: PHYSICAL THERAPY | Facility: HOSPITAL | Age: 75
End: 2025-07-11
Payer: MEDICARE

## 2025-07-11 ENCOUNTER — APPOINTMENT (OUTPATIENT)
Facility: HOSPITAL | Age: 75
End: 2025-07-11
Payer: MEDICARE

## 2025-07-17 ENCOUNTER — HOSPITAL ENCOUNTER (OUTPATIENT)
Dept: PHYSICAL THERAPY | Facility: HOSPITAL | Age: 75
Setting detail: THERAPIES SERIES
Discharge: HOME | End: 2025-07-17
Payer: MEDICARE

## 2025-07-17 ENCOUNTER — HOSPITAL ENCOUNTER (OUTPATIENT)
Facility: HOSPITAL | Age: 75
Setting detail: THERAPIES SERIES
Discharge: HOME | End: 2025-07-17
Payer: MEDICARE

## 2025-07-17 DIAGNOSIS — C32.1 SQUAMOUS CELL CARCINOMA OF EPIGLOTTIS: ICD-10-CM

## 2025-07-17 DIAGNOSIS — C76.0 HEAD AND NECK CANCER: ICD-10-CM

## 2025-07-17 DIAGNOSIS — R13.12 OROPHARYNGEAL DYSPHAGIA: Primary | ICD-10-CM

## 2025-07-17 DIAGNOSIS — Z91.89 AT RISK FOR LYMPHEDEMA: Primary | ICD-10-CM

## 2025-07-17 PROCEDURE — 92526 ORAL FUNCTION THERAPY: CPT | Performed by: SPEECH-LANGUAGE PATHOLOGIST

## 2025-07-17 PROCEDURE — 97110 THERAPEUTIC EXERCISES: CPT

## 2025-07-17 PROCEDURE — 97140 MANUAL THERAPY 1/> REGIONS: CPT

## 2025-07-17 NOTE — PROGRESS NOTES
Speech Language Pathology Treatment Note  [address] [place] [address]    Patient: Se Ramriez                                                                                     Visit Date: 2025  :     1950    Referring practitioner:    Jerome Johnson MD  Date of Initial Visit:          Type: THERAPY  Episode: Dysphagia, HNC      Visit Diagnoses:    ICD-10-CM ICD-9-CM   1. Oropharyngeal dysphagia  R13.12 787.22   2. Head and neck cancer  C76.0 195.0     SUBJECTIVE         Pain: Patient expressed no pain.    OBJECTIVE   GOALS  Goals                                            STG  Comments Status   Patient will improve oral skills to enhance safety and increase eating efficiency and bolus control as measured by improved bolus formation and improved posterior propulsion of the bolus.  Reviewed tongue press exercises (up, out, side r/l). Patient able to demonstrate with minimal cues.  Ongoing      Patient will improve epiglottic inversion, improve laryngeal closure, improve UES opening, increase base of the tongue strength and posterior pharyngeal wall excursion, and increase efficiency of cough to clear residue from the airway as measured by decreased overt signs and symptoms of aspiration and decreased penetration and aspiration on repeat instrumental swallow study, if applicable.   Reviewed effortful swallow and supraglottic swallow exercises. Patient to continue. Patient to order EMST-150 for swallow exercises.  Ongoing   Patient will report decreased difficulty with swallowing and improved EAT-10 score.  Most recent EAT 10 score 7 New   Patient will improve oral hygiene to enhance safety and decrease risk of aspiration pneumonia Patient is following Ongoing   LTG        Patient will safely consume full PO diet of regular consistency food and thin liquids without discomfort and difficulty or complications such as  aspiration or pneumonia.   Patient is consuming full oral diet. He continues with difficulty swallowing Ongoing       Therapy Education/Self Care    Details: POC   Given Home Exercise Program  Access Code: [email or web address]  URL: [email or web address]  Date: 07/17/2025  Prepared by: Sandra Marie    Exercises  - Tongue Resistance with Front of Tongue   - 1 x daily - 7 x weekly - 3 sets - 10 reps  - Tongue Resistance with Top of Tongue   - 1 x daily - 7 x weekly - 3 sets - 10 reps  - Tongue Resistance with Side of Tongue   - 1 x daily - 7 x weekly - 3 sets - 10 reps  - Effortful Swallow  - 2-3 x daily - 7 x weekly - 3 sets - 10 reps  - Supraglottic swallow  - 1 x daily - 7 x weekly - 3 sets - 10 reps    Patient Education  - Normal Swallowing & Aspiration  - Oral Care: How to Take Care of Your Mouth   Progress: Reinforced   Education provided to:  Patient   Level of understanding Verbalized           CPT Code:   16136 Swallow Treatment  Total Time of Visit:             45   mins         ASSESSMENT/PLAN     ASSESSMENT: Patient able to demonstrate exercises with minimal cues.  He has not yet ordered EMS T-150 but will do so this week and bring to therapy for instructions.    PLAN: Continue therapy and home exercises  Frequency: 1x/ Week  Duration: 12 weeks    Progress Note Due Date: 7/30/2025  Recertification Due Date: 9/3/2025    SIGNATURE: Sandra Marie, CCC-SLP, [place] License #: [id number]  Electronically Signed on 7/17/2025          [address] [place] [address]. [address]  [phone]

## 2025-07-17 NOTE — THERAPY TREATMENT NOTE
"Physical Therapy Treatment Note  [facility]  A department [facility]  [address], [place] [address]    Patient: Se Ramirez                                                 Visit Date: 2025  :     1950    Referring practitioner:    Jerome Johnson MD  Date of Initial Visit:          Type: THERAPY  Episode: Lymphedema  Number of visits this episode: 11    Visit Diagnoses:    ICD-10-CM ICD-9-CM   1. 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  He states he wears the compression but the swelling comes back.  He is massaging the anterior neck some but not a lot.       PAIN: 0/10         OBJECTIVE     Objective    Lymphedema       Row Name 25 1100             Manual Lymphatic Drainage    Manual Lymphatic Drainage initial sequence;head/neck treatment;opened regional lymph nodes  -AL      Initial Sequence short neck;supraclavicular;abdomen;diaphragmatic breathing  -AL      Supraclavicular right;left  -AL      Abdomen superficial  Abdominals today due to patient being tender from the feeding tube removal.  -AL      Diaphragmatic Breathing X 10 with abdominals  -AL      Opened Regional Lymph Nodes axillary  -AL      Axillary right;left  -AL      Head/Neck Treatment full/complex MLD  -AL      Full/Complex MLD Used the hand-held massager over the anterior neck followed by MLD  -AL      Manual Therapy 35  -AL         Compression/Skin Care    Compression/Skin Care compression garment  -AL      Compression/Skin Care Comments Wear compression daily  -AL                User Key  (r) = Recorded By, (t) = Taken By, (c) = Cosigned By      Initials Name Provider Type    AL Narcisa Sands, PTA, CLT-KYLE Physical Therapist Assistant                    Therapeutic Exercises    45078 Units Comments "   Stretched the neck into bilateral rotation     Stretched neck into bilateral lateral flexion     Occipital release               Timed Minutes 8          Therapy Education/Self Care 57640   Education offered today Work on the HEP, MLD, and wear compression daily   Medbridge Code    Ongoing HEP   Work on complete decongestive therapy   Timed Minutes        Total Timed Treatment:     43   mins  Total Time of Visit:             43   mins         ASSESSMENT/PLAN     GOALS          Goals                                          Progress Note 7/23/25                                                      Recert due by 7/22/25   STG by:  Comments Date Status   Patient will have a good basic understanding of lymphedema and its suggested risk reduction practices Continue to educate 6/24/2025 Ongoing   Patient will be independent with remedial HEP for lymphedema He needs to work on the HEP more often 6/24/2025 Ongoing   Patient will be independent with self MLD for lymphedema Patient is working on the MLD 6/24/2025 Met             LTG by:          Patient will have appropriate compression garments for lymphedema maintenance Patient is wearing the compression garment each night and using the chip bag with the garment. 6/24/2025 Met   Patient will have no sign or symptoms of infection No signs or symptoms of infection 7/17/25 Met   Patient will be independent with comprehensive maintenance program for lymphedema He is working towards his home maintenance program 6/24/2025 Ongoing             Anticipated CPT codes: Therapeutic Exercise 10076, Manual Therapy 30765, Therapeutic Activity 77189, and Self Care/Home Management 12877      Assessment/Plan     ASSESSMENT:   We briefly discussed the pump for the head and neck, patient is not sure if this is something he will use so he will let us know later.  I did give him a brochure about the Flexitouch pump from [facility].  Stressed importance of working on wearing the  compression, doing the HEP, and MLD daily.  He can also use the hand-held massager daily over the anterior neck to help remove any fluid and dense tissue.    PLAN:   Lymphedema treatment 1 time a week for 8-12 more weeks.  Patient will need a progress note next treatment    SIGNATURE: Narcisa Sands PTA, [facility], [place] License #: [id number]  Electronically Signed on 7/17/2025        [address] [place] [address]. [address]  [phone]

## 2025-07-24 ENCOUNTER — HOSPITAL ENCOUNTER (OUTPATIENT)
Dept: PHYSICAL THERAPY | Facility: HOSPITAL | Age: 75
Setting detail: THERAPIES SERIES
Discharge: HOME | End: 2025-07-24
Payer: MEDICARE

## 2025-07-24 ENCOUNTER — HOSPITAL ENCOUNTER (OUTPATIENT)
Facility: HOSPITAL | Age: 75
Setting detail: THERAPIES SERIES
Discharge: HOME | End: 2025-07-24
Payer: MEDICARE

## 2025-07-24 VITALS — HEIGHT: 70 IN | WEIGHT: 152 LBS | BODY MASS INDEX: 21.76 KG/M2

## 2025-07-24 DIAGNOSIS — C76.0 HEAD AND NECK CANCER: ICD-10-CM

## 2025-07-24 DIAGNOSIS — R13.12 OROPHARYNGEAL DYSPHAGIA: Primary | ICD-10-CM

## 2025-07-24 DIAGNOSIS — C32.1 SQUAMOUS CELL CARCINOMA OF EPIGLOTTIS: ICD-10-CM

## 2025-07-24 DIAGNOSIS — Z91.89 AT RISK FOR LYMPHEDEMA: Primary | ICD-10-CM

## 2025-07-24 PROCEDURE — 97140 MANUAL THERAPY 1/> REGIONS: CPT

## 2025-07-24 PROCEDURE — 92526 ORAL FUNCTION THERAPY: CPT | Performed by: SPEECH-LANGUAGE PATHOLOGIST

## 2025-07-24 NOTE — PROGRESS NOTES
Speech Language Pathology Treatment Note and 30 Day Progress Note  [address] [place] [address]    Patient: Se Ramirez                                                                                     Visit Date: 2025  :     1950    Referring practitioner:    Jerome Johnson MD  Date of Initial Visit:          Type: THERAPY  Episode: Dysphagia, HNC      Visit Diagnoses:    ICD-10-CM ICD-9-CM   1. Oropharyngeal dysphagia  R13.12 787.22   2. Head and neck cancer  C76.0 195.0     SUBJECTIVE     Patient seen today for swallow therapy. He has had a few coughing fits. He is losing weight.     Pain: Patient expressed no pain.    OBJECTIVE   GOALS  Goals                                            STG  Comments Status   Patient will improve oral skills to enhance safety and increase eating efficiency and bolus control as measured by improved bolus formation and improved posterior propulsion of the bolus.  Reviewed tongue press exercises (up, out, side r/l). Patient able to demonstrate with minimal cues.  Ongoing, independent with this exercise      Patient will improve epiglottic inversion, improve laryngeal closure, improve UES opening, increase base of the tongue strength and posterior pharyngeal wall excursion, and increase efficiency of cough to clear residue from the airway as measured by decreased overt signs and symptoms of aspiration and decreased penetration and aspiration on repeat instrumental swallow study, if applicable.   Reviewed effortful swallow. Able to complete 50 effortful swallows. Reviewed supraglottic swallow exercises.  Patient to continue. Patient has not yet ordered EMST-150 for swallow exercises, says he will soon.  Ongoing   Patient will report decreased difficulty with swallowing and improved EAT-10 score.  Most recent EAT 10 score 7. He denies pain with swallowing. Foods do not taste good. Difficulty  getting things to go down.  Ongoing   Patient will improve oral hygiene to enhance safety and decrease risk of aspiration pneumonia Patient is following oral hygiene instructions Ongoing   LTG        Patient will safely consume full PO diet of regular consistency food and thin liquids without discomfort and difficulty or complications such as aspiration or pneumonia.   Patient is consuming full oral diet but is only eating breakfast and supper with an occasional snack. He continues with difficulty swallowing.  He is losing weight, weight today 152lbs, down from 161. Discussed increasing protein and calories with added protein, shakes/supplements, and high protein/calorie foods to maximize nutrition.  Ongoing       Therapy Education/Self Care    Details: POC   Given Home Exercise Program  Access Code: [email or web address]  URL: [email or web address]  Date: 07/17/2025  Prepared by: Sandra Marie    Exercises  - Tongue Resistance with Front of Tongue   - 1 x daily - 7 x weekly - 3 sets - 10 reps  - Tongue Resistance with Top of Tongue   - 1 x daily - 7 x weekly - 3 sets - 10 reps  - Tongue Resistance with Side of Tongue   - 1 x daily - 7 x weekly - 3 sets - 10 reps  - Effortful Swallow  - 2-3 x daily - 7 x weekly - 3 sets - 10 reps  - Supraglottic swallow  - 1 x daily - 7 x weekly - 3 sets - 10 reps    Patient Education  - Normal Swallowing & Aspiration  - Oral Care: How to Take Care of Your Mouth   Progress: Reinforced   Education provided to:  Patient   Level of understanding Verbalized           CPT Code:   54957 Swallow Treatment  Total Time of Visit:             45   mins         ASSESSMENT/PLAN     ASSESSMENT: Patient able to demonstrate exercises with minimal cues.  He has not yet ordered EMS T-150 but will do so this week and bring to therapy for instructions.    PLAN: Continue therapy and home exercises  Frequency: 1x/ Week  Duration: 12 weeks    Progress Note Due Date: 8/30/2025  Recertification Due Date:  9/3/2025    SIGNATURE: Sandra Marie, CCC-SLP, [place] License #: [id number]  Electronically Signed on 7/24/2025          [address] [place] [address]  [phone]

## 2025-07-24 NOTE — THERAPY TREATMENT NOTE
Physical Therapy Treatment Note, 30 Day Progress Note, and 90 Day Recertification Note  [facility]  A department [facility]  [address], [place] [address]    Patient: Se Ramirez                                                 Visit Date: 2025  :     1950    Referring practitioner:    Jerome Johnson MD  Date of Initial Visit:          Type: THERAPY  Episode: Lymphedema  Number of visits this episode: 12    Visit Diagnoses:    ICD-10-CM ICD-9-CM   1. 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       PAIN: 0/10         OBJECTIVE     Objective    Lymphedema       Row Name 25 1050             Subjective Pain    Able to rate subjective pain? yes  -AL      Pre-Treatment Pain Level 0  -AL      Post-Treatment Pain Level 0  -AL         Lymphedema Measurements    Measurement Type(s) Circumferential  -AL      Circumferential Areas Head  -AL         Head Circumferential (cm)    Measurement Location 1 upper lip  -AL      Head 1 50.4 cm  -AL      Measurement Location 2 10 cm inferior to earlobe attachment  -AL      Head 2 37.4 cm  -AL      Head Circumferential Total 87.8 cm  -AL         Manual Lymphatic Drainage    Manual Lymphatic Drainage initial sequence;head/neck treatment;opened regional lymph nodes  -AL      Initial Sequence short neck;supraclavicular;abdomen;diaphragmatic breathing  -AL      Supraclavicular right;left  -AL      Abdomen superficial  -AL      Diaphragmatic Breathing X 10 with abdominals  -AL      Opened Regional Lymph Nodes axillary  -AL      Axillary right;left  -AL      Head/Neck Treatment full/complex MLD  -AL      Full/Complex MLD --  -AL      Manual Lymphatic Drainage Comments Used the hand-held massager over the anterior neck followed by MLD.  Gentle stretching for the neck  into bilateral rotation and bilateral sidebending  -AL      Manual Therapy 40  -AL         Compression/Skin Care    Compression/Skin Care compression garment  -AL      Compression/Skin Care Comments Continue to wear compression daily.  -AL                User Key  (r) = Recorded By, (t) = Taken By, (c) = Cosigned By      Initials Name Provider Type    Narcisa Gross, PTA, DANAT-KYLE Physical Therapist Assistant                      Therapy Education/Self Care 07785   Education offered today Work on the HEP, MLD, and wear compression daily   Medbridge Code    Ongoing HEP   Work on complete decongestive therapy   Timed Minutes        Total Timed Treatment:     40   mins  Total Time of Visit:             40   mins         ASSESSMENT/PLAN     GOALS          Goals                                          Progress Note 8/23/25                                                      Recert due by 10/21/25   STG by:  Comments Date Status   Patient will have a good basic understanding of lymphedema and its suggested risk reduction practices Educated about the Flexitouch pump. 7/24/25 Ongoing   Patient will be independent with remedial HEP for lymphedema He is working on the HEP and stretches. 7/24/25 Met   Patient will be independent with self MLD for lymphedema Patient is working on the MLD 6/24/2025 Met             LTG by:          Patient will have appropriate compression garments for lymphedema maintenance Patient is wearing the compression garment each night and using the chip bag with the garment. 6/24/2025 Met   Patient will have no sign or symptoms of infection No signs or symptoms of infection 7/17/25 Met   Patient will be independent with comprehensive maintenance program for lymphedema He is interested in the Flexitouch pump. 7/24/25 Ongoing             Anticipated CPT codes: Therapeutic Exercise 85600, Manual Therapy 70631, Therapeutic Activity 67496, and Self Care/Home Management 73118      Assessment/Plan      ASSESSMENT:   Patient has been working on the HEP, MLD, and wearing compression for the head and neck for over 4 weeks.  Patient continues to have difficulty with swallowing and has now lost an additional 10 pounds. Patient no longer has his feeding tube.  I do believe patient has internal swelling which is affecting his swallowing.  Patient would benefit from a Flexitouch pump to help move the fluid out of the neck/throat area.  There is no basic pump for the head/neck so the Flexitouch pump is the only option for patient.    PLAN:   Lymphedema treatment 1 time a week for 8-10 more weeks.     SIGNATURE: Narcisa Sands PTA, [facility] License #: [id number]  Electronically Signed on 7/24/2025        [address] [facility] [place] [address]  [phone]

## 2025-07-25 NOTE — THERAPY TREATMENT NOTE
30 Day Progress Note and 90 Day Recertification Addendum      Patient: Se Ramirez           : 1950  Visit Date: 2025  Referring practitioner: Jerome Johnson MD  Date of Initial Visit: Type: THERAPY  Episode: Lymphedema    Visit Diagnoses:    ICD-10-CM ICD-9-CM   1. 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          Clinical Progress: improved  Home Program Compliance: Yes  Progress toward previous goals: Partially Met  Prognosis to achieve goals: good    Objective     Assessment & Plan       Assessment  Impairments: lacks appropriate home exercise program   Other impairment: risk for lymphedema  Prognosis: good    Plan  Therapy options: will be seen for skilled therapy services  Planned therapy interventions: manual therapy, soft tissue mobilization, therapeutic activities, home exercise program and functional ROM exercises  Frequency: 1x week  Duration in weeks: 12  Treatment plan discussed with: PTA        Anticipated CPT codes: Therapeutic Exercise 70662, Manual Therapy 62534, and Self Care/Home Management 20256    I reviewed the treatment and goals with Colleen Sands PTA and agree with the POC.    SIGNATURE: Wolf Bell PT, License #: [id number]  Electronically Signed on 2025      90 Day Recertification  Certification Period: 2025 through 10/23/2025  Based upon review of the patient's progress and continued therapy plan, it is my medical opinion that Se Ramirez should continue physical therapy treatment at [facility]     PHYSICIAN: Jerome Johnson MD (NPI: [id number])    Signature: __________________________________________________DATE: ___________________     Please sign and return via fax to [phone].   Thank you so much for letting us work with Se. I appreciate your letting us work with your patients. If you have any questions or concerns, please don't hesitate to contact me.

## 2025-07-30 ENCOUNTER — APPOINTMENT (OUTPATIENT)
Facility: HOSPITAL | Age: 75
End: 2025-07-30
Payer: MEDICARE

## 2025-08-04 ENCOUNTER — TELEPHONE (OUTPATIENT)
Dept: PULMONOLOGY | Facility: CLINIC | Age: 75
End: 2025-08-04
Payer: MEDICARE

## 2025-08-05 ENCOUNTER — HOSPITAL ENCOUNTER (OUTPATIENT)
Dept: PHYSICAL THERAPY | Facility: HOSPITAL | Age: 75
Setting detail: THERAPIES SERIES
Discharge: HOME | End: 2025-08-05
Payer: MEDICARE

## 2025-08-05 ENCOUNTER — HOSPITAL ENCOUNTER (OUTPATIENT)
Facility: HOSPITAL | Age: 75
Setting detail: THERAPIES SERIES
Discharge: HOME | End: 2025-08-05
Payer: MEDICARE

## 2025-08-05 DIAGNOSIS — Z91.89 AT RISK FOR LYMPHEDEMA: ICD-10-CM

## 2025-08-05 DIAGNOSIS — R13.12 OROPHARYNGEAL DYSPHAGIA: Primary | ICD-10-CM

## 2025-08-05 DIAGNOSIS — C32.1 SQUAMOUS CELL CARCINOMA OF EPIGLOTTIS: ICD-10-CM

## 2025-08-05 DIAGNOSIS — C76.0 HEAD AND NECK CANCER: ICD-10-CM

## 2025-08-05 DIAGNOSIS — I89.0 LYMPHEDEMA DUE TO RADIATION: Primary | ICD-10-CM

## 2025-08-05 PROCEDURE — 92526 ORAL FUNCTION THERAPY: CPT | Performed by: SPEECH-LANGUAGE PATHOLOGIST

## 2025-08-05 PROCEDURE — 97140 MANUAL THERAPY 1/> REGIONS: CPT | Performed by: PHYSICAL THERAPIST

## 2025-08-11 ENCOUNTER — HOSPITAL ENCOUNTER (OUTPATIENT)
Dept: CT IMAGING | Facility: HOSPITAL | Age: 75
Discharge: HOME | End: 2025-08-11
Payer: MEDICARE

## 2025-08-11 DIAGNOSIS — F17.200 CURRENT EVERY DAY SMOKER: ICD-10-CM

## 2025-08-11 DIAGNOSIS — Z85.21 HISTORY OF LARYNGEAL CANCER: ICD-10-CM

## 2025-08-11 DIAGNOSIS — Z85.118 HISTORY OF PRIMARY NON-SMALL CELL CARCINOMA OF RIGHT LUNG: ICD-10-CM

## 2025-08-11 LAB — GLUCOSE BLDC GLUCOMTR-MCNC: 92 MG/DL (ref 70–130)

## 2025-08-11 PROCEDURE — 78815 PET IMAGE W/CT SKULL-THIGH: CPT

## 2025-08-11 PROCEDURE — A9552 F18 FDG: HCPCS

## 2025-08-11 PROCEDURE — 82948 REAGENT STRIP/BLOOD GLUCOSE: CPT

## 2025-08-11 PROCEDURE — 34310000005 FLUDEOXYGLUCOSE F18 SOLUTION

## 2025-08-11 RX ADMIN — FLUDEOXYGLUCOSE F18 1 DOSE: 300 INJECTION INTRAVENOUS at 09:30

## 2025-08-12 ENCOUNTER — HOSPITAL ENCOUNTER (OUTPATIENT)
Dept: RADIATION ONCOLOGY | Facility: HOSPITAL | Age: 75
Setting detail: RADIATION/ONCOLOGY SERIES
End: 2025-08-12
Payer: MEDICARE

## 2025-08-12 ENCOUNTER — HOSPITAL ENCOUNTER (OUTPATIENT)
Dept: PHYSICAL THERAPY | Facility: HOSPITAL | Age: 75
Setting detail: THERAPIES SERIES
Discharge: HOME | End: 2025-08-12
Payer: MEDICARE

## 2025-08-12 DIAGNOSIS — I89.0 LYMPHEDEMA DUE TO RADIATION: Primary | ICD-10-CM

## 2025-08-12 DIAGNOSIS — C32.1 SQUAMOUS CELL CARCINOMA OF EPIGLOTTIS: ICD-10-CM

## 2025-08-12 PROCEDURE — 97140 MANUAL THERAPY 1/> REGIONS: CPT | Performed by: PHYSICAL THERAPIST

## 2025-08-13 ENCOUNTER — LAB (OUTPATIENT)
Dept: LAB | Facility: HOSPITAL | Age: 75
End: 2025-08-13
Payer: MEDICARE

## 2025-08-13 ENCOUNTER — OFFICE VISIT (OUTPATIENT)
Age: 75
End: 2025-08-13
Payer: MEDICARE

## 2025-08-13 ENCOUNTER — OFFICE VISIT (OUTPATIENT)
Dept: ONCOLOGY | Facility: CLINIC | Age: 75
End: 2025-08-13
Payer: MEDICARE

## 2025-08-13 VITALS
WEIGHT: 152.9 LBS | HEART RATE: 83 BPM | HEIGHT: 70 IN | OXYGEN SATURATION: 96 % | TEMPERATURE: 97 F | RESPIRATION RATE: 18 BRPM | BODY MASS INDEX: 21.89 KG/M2 | DIASTOLIC BLOOD PRESSURE: 70 MMHG | SYSTOLIC BLOOD PRESSURE: 106 MMHG

## 2025-08-13 VITALS
HEART RATE: 74 BPM | DIASTOLIC BLOOD PRESSURE: 60 MMHG | OXYGEN SATURATION: 96 % | SYSTOLIC BLOOD PRESSURE: 123 MMHG | HEIGHT: 70 IN | WEIGHT: 157.2 LBS | BODY MASS INDEX: 22.5 KG/M2

## 2025-08-13 DIAGNOSIS — C34.11 MALIGNANT NEOPLASM OF UPPER LOBE OF RIGHT LUNG: Primary | ICD-10-CM

## 2025-08-13 DIAGNOSIS — F17.200 CURRENT EVERY DAY SMOKER: ICD-10-CM

## 2025-08-13 DIAGNOSIS — Z85.118 HISTORY OF PRIMARY NON-SMALL CELL CARCINOMA OF RIGHT LUNG: ICD-10-CM

## 2025-08-13 DIAGNOSIS — C32.1 SQUAMOUS CELL CARCINOMA OF EPIGLOTTIS: ICD-10-CM

## 2025-08-13 DIAGNOSIS — Z92.3 HISTORY OF RADIATION THERAPY: ICD-10-CM

## 2025-08-13 DIAGNOSIS — Z85.21 HISTORY OF LARYNGEAL CANCER: Primary | ICD-10-CM

## 2025-08-13 LAB
ALBUMIN SERPL-MCNC: 4.2 G/DL (ref 3.5–5.2)
ALBUMIN/GLOB SERPL: 1.4 G/DL
ALP SERPL-CCNC: 74 U/L (ref 39–117)
ALT SERPL W P-5'-P-CCNC: 15 U/L (ref 1–41)
ANION GAP SERPL CALCULATED.3IONS-SCNC: 11 MMOL/L (ref 5–15)
AST SERPL-CCNC: 18 U/L (ref 1–40)
BASOPHILS # BLD AUTO: 0.07 10*3/MM3 (ref 0–0.2)
BASOPHILS NFR BLD AUTO: 1 % (ref 0–1.5)
BILIRUB SERPL-MCNC: 0.6 MG/DL (ref 0–1.2)
BUN SERPL-MCNC: 13.8 MG/DL (ref 8–23)
BUN/CREAT SERPL: 17.5 (ref 7–25)
CALCIUM SPEC-SCNC: 9.4 MG/DL (ref 8.6–10.5)
CHLORIDE SERPL-SCNC: 101 MMOL/L (ref 98–107)
CO2 SERPL-SCNC: 27 MMOL/L (ref 22–29)
CREAT SERPL-MCNC: 0.79 MG/DL (ref 0.76–1.27)
DEPRECATED RDW RBC AUTO: 42.4 FL (ref 37–54)
EGFRCR SERPLBLD CKD-EPI 2021: 92.6 ML/MIN/1.73
EOSINOPHIL # BLD AUTO: 0.63 10*3/MM3 (ref 0–0.4)
EOSINOPHIL NFR BLD AUTO: 8.6 % (ref 0.3–6.2)
ERYTHROCYTE [DISTWIDTH] IN BLOOD BY AUTOMATED COUNT: 12.3 % (ref 12.3–15.4)
GLOBULIN UR ELPH-MCNC: 3 GM/DL
GLUCOSE SERPL-MCNC: 91 MG/DL (ref 65–99)
HCT VFR BLD AUTO: 46.2 % (ref 37.5–51)
HGB BLD-MCNC: 15.1 G/DL (ref 13–17.7)
HOLD SPECIMEN: NORMAL
IMM GRANULOCYTES # BLD AUTO: 0.02 10*3/MM3 (ref 0–0.05)
IMM GRANULOCYTES NFR BLD AUTO: 0.3 % (ref 0–0.5)
LYMPHOCYTES # BLD AUTO: 1.97 10*3/MM3 (ref 0.7–3.1)
LYMPHOCYTES NFR BLD AUTO: 27 % (ref 19.6–45.3)
MCH RBC QN AUTO: 30.5 PG (ref 26.6–33)
MCHC RBC AUTO-ENTMCNC: 32.7 G/DL (ref 31.5–35.7)
MCV RBC AUTO: 93.3 FL (ref 79–97)
MONOCYTES # BLD AUTO: 0.48 10*3/MM3 (ref 0.1–0.9)
MONOCYTES NFR BLD AUTO: 6.6 % (ref 5–12)
NEUTROPHILS NFR BLD AUTO: 4.13 10*3/MM3 (ref 1.7–7)
NEUTROPHILS NFR BLD AUTO: 56.5 % (ref 42.7–76)
NRBC BLD AUTO-RTO: 0 /100 WBC (ref 0–0.2)
PLATELET # BLD AUTO: 181 10*3/MM3 (ref 140–450)
PMV BLD AUTO: 10.7 FL (ref 6–12)
POTASSIUM SERPL-SCNC: 4.2 MMOL/L (ref 3.5–5.2)
PROT SERPL-MCNC: 7.2 G/DL (ref 6–8.5)
RBC # BLD AUTO: 4.95 10*6/MM3 (ref 4.14–5.8)
SODIUM SERPL-SCNC: 139 MMOL/L (ref 136–145)
WBC NRBC COR # BLD AUTO: 7.3 10*3/MM3 (ref 3.4–10.8)

## 2025-08-13 PROCEDURE — 85025 COMPLETE CBC W/AUTO DIFF WBC: CPT

## 2025-08-13 PROCEDURE — 36415 COLL VENOUS BLD VENIPUNCTURE: CPT

## 2025-08-13 PROCEDURE — G0463 HOSPITAL OUTPT CLINIC VISIT: HCPCS | Performed by: RADIOLOGY

## 2025-08-13 PROCEDURE — 80053 COMPREHEN METABOLIC PANEL: CPT

## 2025-08-13 RX ORDER — LORAZEPAM 1 MG/1
TABLET ORAL
Qty: 1 TABLET | Refills: 0 | Status: SHIPPED | OUTPATIENT
Start: 2025-08-13

## 2025-08-19 ENCOUNTER — HOSPITAL ENCOUNTER (OUTPATIENT)
Dept: PHYSICAL THERAPY | Facility: HOSPITAL | Age: 75
Setting detail: THERAPIES SERIES
Discharge: HOME | End: 2025-08-19
Payer: MEDICARE

## 2025-08-19 DIAGNOSIS — Z91.89 AT RISK FOR LYMPHEDEMA: ICD-10-CM

## 2025-08-19 DIAGNOSIS — I89.0 LYMPHEDEMA DUE TO RADIATION: Primary | ICD-10-CM

## 2025-08-19 DIAGNOSIS — C32.1 SQUAMOUS CELL CARCINOMA OF EPIGLOTTIS: ICD-10-CM

## 2025-08-19 PROCEDURE — 97140 MANUAL THERAPY 1/> REGIONS: CPT

## 2025-08-25 ENCOUNTER — HOSPITAL ENCOUNTER (OUTPATIENT)
Dept: PHYSICAL THERAPY | Facility: HOSPITAL | Age: 75
Setting detail: THERAPIES SERIES
Discharge: HOME | End: 2025-08-25
Payer: MEDICARE

## 2025-08-25 DIAGNOSIS — Z91.89 AT RISK FOR LYMPHEDEMA: ICD-10-CM

## 2025-08-25 DIAGNOSIS — I89.0 LYMPHEDEMA DUE TO RADIATION: Primary | ICD-10-CM

## 2025-08-25 DIAGNOSIS — C32.1 SQUAMOUS CELL CARCINOMA OF EPIGLOTTIS: ICD-10-CM

## 2025-08-25 PROCEDURE — 97140 MANUAL THERAPY 1/> REGIONS: CPT

## (undated) DEVICE — TOWEL,OR,DSP,ST,BLUE,STD,4/PK,20PK/CS: Brand: MEDLINE

## (undated) DEVICE — OASIS DRAIN, SINGLE, INLINE & ATS COMPATIBLE: Brand: OASIS

## (undated) DEVICE — SPNG DISSCT CHRRY 3/8IN STRL PK/5

## (undated) DEVICE — SYSTEM BX CAP BILI RAP EXCHG CAP LOK DEV COMPATIBLE W/ OLY

## (undated) DEVICE — FRCP BIOP ENDO CAPTURAPRO SPK SERR 2.8MM 230CM

## (undated) DEVICE — YANKAUER,BULB TIP WITH VENT: Brand: ARGYLE

## (undated) DEVICE — SENSR O2 OXIMAX FNGR A/ 18IN NONSTR

## (undated) DEVICE — SYRINGE 20ML LL S/C 50

## (undated) DEVICE — GLV SURG DERMASSURE GRN LF PF 7.0

## (undated) DEVICE — MSK O2 MD CONCENTR A/ LF 7FT 1P/U

## (undated) DEVICE — APPL CHLORAPREP HI/LITE 26ML ORNG

## (undated) DEVICE — SUT MNCRYL 4/0 PS2 27IN UD MCP426H

## (undated) DEVICE — BANDAGE,GAUZE,BULKEE II,4.5"X4.1YD,STRL: Brand: MEDLINE

## (undated) DEVICE — SNAR POLYP SENSATION MICRO OVL 13 240X40

## (undated) DEVICE — MAX-A-L MAX ADULT LONG PROBE: Brand: MEDLINE

## (undated) DEVICE — BNDR ABD 4PANEL 12IN 46 TO 62IN

## (undated) DEVICE — GOWN, ORBIS, XLONG/XLARGE, STERILE: Brand: MEDLINE

## (undated) DEVICE — DRSNG TELFA PAD NONADH STR 1S 3X8IN

## (undated) DEVICE — ELECTRD BLD EZ CLN MOD 6.5IN

## (undated) DEVICE — ANESTHESIA CIRCUIT ADULT-LF: Brand: MEDLINE INDUSTRIES, INC.

## (undated) DEVICE — GLV SURG BIOGEL LTX PF 6 1/2

## (undated) DEVICE — MASK VENTILATOR MED AD SUPERNOVA ET

## (undated) DEVICE — TBG SMPL FLTR LINE NASL 02/C02 A/ BX/100

## (undated) DEVICE — KT NDL GUIDE STRL 18GA

## (undated) DEVICE — ECHELON FLEX POWERED PLUS ARTICULATING ENDOSCOPIC LINEAR CUTTER , 60MM: Brand: ECHELON FLEX

## (undated) DEVICE — WOUND RETRACTOR AND PROTECTOR: Brand: ALEXIS O WOUND PROTECTOR-RETRACTOR

## (undated) DEVICE — ARM DRAPE

## (undated) DEVICE — CUFF,BP,DISP,1 TUBE,ADULT,HP: Brand: MEDLINE

## (undated) DEVICE — HYPODERMIC SAFETY NEEDLE: Brand: MAGELLAN

## (undated) DEVICE — SUTURE MONOCRYL SZ 4-0 L18IN ABSRB UD L19MM PS-2 3/8 CIR PRIM Y496G

## (undated) DEVICE — ENDO KIT,LOURDES HOSPITAL: Brand: MEDLINE INDUSTRIES, INC.

## (undated) DEVICE — SYRINGE MED 50ML LUERLOCK TIP

## (undated) DEVICE — GENERAL LAP CDS

## (undated) DEVICE — THE SINGLE USE ETRAP – POLYP TRAP IS USED FOR SUCTION RETRIEVAL OF ENDOSCOPICALLY REMOVED POLYPS.: Brand: ETRAP

## (undated) DEVICE — SUT VIC 2/0 UR6 27IN VCP602H

## (undated) DEVICE — Device: Brand: DEFENDO AIR/WATER/SUCTION AND BIOPSY VALVE

## (undated) DEVICE — GAUZE,SPONGE,4"X4",16PLY,XRAY,STRL,LF: Brand: MEDLINE

## (undated) DEVICE — TRAP FLD MINIVAC MEGADYNE 100ML

## (undated) DEVICE — AIRSEAL 8 MM CANNULA CAP AND OBTURATOR WITH BLADELESS OPTICAL TIP COMPATIBLE WITH INTUITIVE DA VINCI XI AND DA VINCI X 8 MM INSTRUMENT CANNULA, STANDARD LENGTH: Brand: AIRSEAL

## (undated) DEVICE — CONTAINER,SPECIMEN,OR STERILE,4OZ: Brand: MEDLINE

## (undated) DEVICE — SUT SILK 0 SUTUPAK TIES 24IN SA76G

## (undated) DEVICE — POSITIONER,HEAD,MULTIRING,36CS: Brand: MEDLINE

## (undated) DEVICE — SNARE ENDOSCP L240CM LOOP W13MM SHTH DIA2.4MM SM OVL FLX

## (undated) DEVICE — SUTURE VICRYL CTD ANTBCTRL 54 IN TI PLU VLT

## (undated) DEVICE — PACK,UNIVERSAL,NO GOWNS: Brand: MEDLINE

## (undated) DEVICE — THE CHANNEL CLEANING BRUSH IS A NYLON FLEXI BRUSH ATTACHED TO A FLEXIBLE PLASTIC SHEATH DESIGNED TO SAFELY REMOVE DEBRIS FROM FLEXIBLE ENDOSCOPES.

## (undated) DEVICE — HYDROGEL COATED LATEX URINE METER FOLEY TRAY,16 FR/CH (5.3 MM), 5 ML CATHETER PRE-CONNECTED TO 2000 ML DRAINAGE BAG WITH NEEDLE SAMPLING: Brand: DOVER

## (undated) DEVICE — PK TURNOVER RM ADV

## (undated) DEVICE — BALLOON DILATATION CATHETER: Brand: HURRICANE™ RX

## (undated) DEVICE — PROTECT TEETH A/

## (undated) DEVICE — SPNG GZ WOVN 4X4IN 12PLY 10/BX STRL

## (undated) DEVICE — UTILITY MARKER W/MED LABELS: Brand: MEDLINE

## (undated) DEVICE — GLV SURG SENSICARE W/ALOE PF LF 6.5 STRL

## (undated) DEVICE — CONTRAST IOTHALAMATE MEGLUMINE 60% 50 ML INJ CONRAY 60

## (undated) DEVICE — TUBE ET 7.5MM NSL ORAL BASIC CUF INTMED MURPHY EYE RADPQ

## (undated) DEVICE — GLOVE SURG SZ 7 CRM LTX FREE POLYISOPRENE POLYMER BEAD ANTI

## (undated) DEVICE — SPHINCTEROTOME: Brand: DREAMTOME™ RX 44

## (undated) DEVICE — 3M™ IOBAN™ 2 ANTIMICROBIAL INCISE DRAPE 6651EZ: Brand: IOBAN™ 2

## (undated) DEVICE — SUT SILK 3/0 SUTUPAK TIES 24IN SA74H

## (undated) DEVICE — 4-PORT MANIFOLD: Brand: NEPTUNE 2

## (undated) DEVICE — CURAVIEW LED LARYNGOSCOPE BLADE & HANDLE,DISPOSABLE,MAC 3.5: Brand: CURAPLEX

## (undated) DEVICE — 28 FR STRAIGHT – SOFT PVC CATHETER: Brand: PVC THORACIC CATHETERS

## (undated) DEVICE — SUT SILK 2/0 FS BLK 18IN 685G

## (undated) DEVICE — DRAPE,UTILITY,TAPE,15X26,STERILE: Brand: MEDLINE

## (undated) DEVICE — SKIN AFFIX SURG ADHESIVE 72/CS 0.55ML: Brand: MEDLINE

## (undated) DEVICE — PACK,SET UP,NO DRAPES: Brand: MEDLINE

## (undated) DEVICE — SOLUTION ANTIFOG VIS SYS CLEARIFY LAPSCP

## (undated) DEVICE — COVER LT HNDL BLU PLAS

## (undated) DEVICE — SEAL

## (undated) DEVICE — TUBING, SUCTION, 1/4" X 12', STRAIGHT: Brand: MEDLINE

## (undated) DEVICE — SPONGE,LAP,12"X12",XR,ST,5/PK,40PK/CS: Brand: MEDLINE

## (undated) DEVICE — RETRIEVAL BALLOON CATHETER: Brand: EXTRACTOR™ PRO RX

## (undated) DEVICE — PAD MINOR UNIVERSAL: Brand: MEDLINE INDUSTRIES, INC.

## (undated) DEVICE — SENSOR PLSE OXMTR AD CBL L3FT ADH TRANSMISSIVE

## (undated) DEVICE — COVER,MAYO STAND,STERILE: Brand: MEDLINE

## (undated) DEVICE — AIRSEAL BIFURCATED FILTERED TUBESET WITH ACTIVATED CHARCOAL FILTER: Brand: AIRSEAL

## (undated) DEVICE — TISSUE RETRIEVAL SYSTEM: Brand: INZII RETRIEVAL SYSTEM

## (undated) DEVICE — MASK,OXYGEN,MED CONC,ADLT,7' TUB, UC: Brand: PENDING

## (undated) DEVICE — CURAVIEW LED LARYNGOSCOPE BLADE & HANDLE,DISPOSABLE,MILLER 3: Brand: CURAPLEX

## (undated) DEVICE — SUT SILK 4/0 SUTUPAK TIES 24IN SA73H

## (undated) DEVICE — GLV SURG BIOGEL M LTX PF 7 1/2

## (undated) DEVICE — CLTH CLENS READYCLEANSE PERI CARE PK/5

## (undated) DEVICE — KIT,ANTI FOG,W/SPONGE & FLUID,SOFT PACK: Brand: MEDLINE

## (undated) DEVICE — ENDOGATOR AUXILIARY WATER JET CONNECTOR: Brand: ENDOGATOR

## (undated) DEVICE — GUIDEWIRE VASC L260CM TIP L5CM 0.25FR STR RND TIP TUNGSTEN

## (undated) DEVICE — CONMED SCOPE SAVER BITE BLOCK, 20X27 MM: Brand: SCOPE SAVER

## (undated) DEVICE — SUT PROLN 4/0 RB1 D/A 36IN 8557H

## (undated) DEVICE — SUT SILK 2/0 SUTUPAK TIES 24IN SA75H

## (undated) DEVICE — ANTIBACTERIAL UNDYED BRAIDED (POLYGLACTIN 910), SYNTHETIC ABSORBABLE SUTURE: Brand: COATED VICRYL

## (undated) DEVICE — LIQUIBAND RAPID ADHESIVE 36/CS 0.8ML: Brand: MEDLINE

## (undated) DEVICE — PAD,NON-ADHERENT,3X8,STERILE,LF,1/PK: Brand: MEDLINE

## (undated) DEVICE — FRCP BX RADJAW4 NDL 2.8 240 STD OG

## (undated) DEVICE — SPONGE,NEURO,0.5"X1.5",XR,STRL,LF,10/PK: Brand: MEDLINE

## (undated) DEVICE — KT ANTI FOG W/FLD AND SPNG

## (undated) DEVICE — TP APPL SPRY EXT PROGEL 11IN

## (undated) DEVICE — BLADELESS OBTURATOR: Brand: WECK VISTA

## (undated) DEVICE — RX PUSHER: Brand: NAVIFLEX™ RX PUSHER

## (undated) DEVICE — ENDOPOUCH RETRIEVER SPECIMEN RETRIEVAL BAGS: Brand: ENDOPOUCH RETRIEVER

## (undated) DEVICE — INFLATION DEVICE: Brand: ENCORE 26

## (undated) DEVICE — GLV SURG BIOGEL LTX PF 7 1/2

## (undated) DEVICE — CVR HNDL LIGHT RIGID

## (undated) DEVICE — COLUMN DRAPE

## (undated) DEVICE — DISSCT SECTO 1PC 1P/U 5MMX35CM STRL